# Patient Record
Sex: FEMALE | Race: WHITE | NOT HISPANIC OR LATINO | URBAN - NONMETROPOLITAN AREA
[De-identification: names, ages, dates, MRNs, and addresses within clinical notes are randomized per-mention and may not be internally consistent; named-entity substitution may affect disease eponyms.]

---

## 2020-03-06 ENCOUNTER — DOCTOR'S OFFICE (OUTPATIENT)
Dept: URBAN - NONMETROPOLITAN AREA CLINIC 1 | Facility: CLINIC | Age: 50
Setting detail: OPHTHALMOLOGY
End: 2020-03-06
Payer: COMMERCIAL

## 2020-03-06 DIAGNOSIS — H25.13: ICD-10-CM

## 2020-03-06 DIAGNOSIS — H40.023: ICD-10-CM

## 2020-03-06 PROCEDURE — 99204 OFFICE O/P NEW MOD 45 MIN: CPT | Performed by: OPHTHALMOLOGY

## 2020-03-06 PROCEDURE — 76514 ECHO EXAM OF EYE THICKNESS: CPT | Performed by: OPHTHALMOLOGY

## 2020-03-06 PROCEDURE — 92133 CPTRZD OPH DX IMG PST SGM ON: CPT | Performed by: OPHTHALMOLOGY

## 2020-03-06 ASSESSMENT — KERATOMETRY
OS_K2POWER_DIOPTERS: 43.75
OD_K1POWER_DIOPTERS: 42.25
OS_K1POWER_DIOPTERS: 41.75
OS_AXISANGLE_DEGREES: 087
OD_K2POWER_DIOPTERS: 44.00
OD_AXISANGLE_DEGREES: 085

## 2020-03-06 ASSESSMENT — AXIALLENGTH_DERIVED
OS_AL: 24.432
OD_AL: 24.0298

## 2020-03-06 ASSESSMENT — CONFRONTATIONAL VISUAL FIELD TEST (CVF)
OD_FINDINGS: FULL
OS_FINDINGS: FULL

## 2020-03-06 ASSESSMENT — REFRACTION_AUTOREFRACTION
OS_AXIS: 175
OD_AXIS: 003
OS_CYLINDER: -2.25
OS_SPHERE: -0.25
OD_SPHERE: +0.50
OD_CYLINDER: -2.50

## 2020-03-06 ASSESSMENT — REFRACTION_CURRENTRX
OS_AXIS: 175
OD_ADD: +1.50
OS_CYLINDER: -2.25
OD_VPRISM_DIRECTION: PROGS
OD_SPHERE: +0.25
OS_SPHERE: -0.75
OS_VPRISM_DIRECTION: PROGS
OD_AXIS: 176
OD_CYLINDER: -2.25
OD_OVR_VA: 20/
OS_OVR_VA: 20/
OS_ADD: +1.50

## 2020-03-06 ASSESSMENT — PACHYMETRY
OS_CT_CORRECTION: 2
OD_CT_UM: 518
OD_CT_CORRECTION: 2
OS_CT_UM: 516

## 2020-03-06 ASSESSMENT — SPHEQUIV_DERIVED
OS_SPHEQUIV: -1.375
OD_SPHEQUIV: -0.75

## 2020-03-06 ASSESSMENT — VISUAL ACUITY
OD_BCVA: 20/20-1
OS_BCVA: 20/20-1

## 2020-05-07 ENCOUNTER — OFFICE VISIT (OUTPATIENT)
Dept: URGENT CARE | Facility: CLINIC | Age: 50
End: 2020-05-07

## 2020-05-07 DIAGNOSIS — Z20.828 EXPOSURE TO SARS-ASSOCIATED CORONAVIRUS: ICD-10-CM

## 2020-05-07 DIAGNOSIS — Z02.1 PRE-EMPLOYMENT EXAMINATION: Primary | ICD-10-CM

## 2020-05-07 PROCEDURE — 86765 RUBEOLA ANTIBODY: CPT

## 2020-05-07 PROCEDURE — 86480 TB TEST CELL IMMUN MEASURE: CPT

## 2020-05-07 NOTE — PATIENT INSTRUCTIONS
Wellness Visit for Adults   AMBULATORY CARE:   A wellness visit  is when you see your healthcare provider to get screened for health problems. You can also get advice on how to stay healthy. Write down your questions so you remember to ask them. Ask your healthcare provider how often you should have a wellness visit. What happens at a wellness visit:  Your healthcare provider will ask about your health, and your family history of health problems. This includes high blood pressure, heart disease, and cancer. He or she will ask if you have symptoms that concern you, if you smoke, and about your mood. You may also be asked about your intake of medicines, supplements, food, and alcohol. Any of the following may be done:  · Your weight  will be checked. Your height may also be checked so your body mass index (BMI) can be calculated. Your BMI shows if you are at a healthy weight. · Your blood pressure  and heart rate will be checked. Your temperature may also be checked. · Blood and urine tests  may be done. Blood tests may be done to check your cholesterol levels. Abnormal cholesterol levels increase your risk for heart disease and stroke. You may also need a blood or urine test to check for diabetes if you are at increased risk. Urine tests may be done to look for signs of an infection or kidney disease. · A physical exam  includes checking your heartbeat and lungs with a stethoscope. Your healthcare provider may also check your skin to look for sun damage. · Screening tests  may be recommended. A screening test is done to check for diseases that may not cause symptoms. The screening tests you may need depend on your age, gender, family history, and lifestyle habits. For example, colorectal screening may be recommended if you are 48years old or older. Screening tests you need if you are a woman:   · A Pap smear  is used to screen for cervical cancer.  Pap smears are usually done every 3 to 5 years depending on your age. You may need them more often if you have had abnormal Pap smear test results in the past. Ask your healthcare provider how often you should have a Pap smear. · A mammogram  is an x-ray of your breasts to screen for breast cancer. Experts recommend mammograms every 2 years starting at age 48 years. You may need a mammogram at age 52 years or younger if you have an increased risk for breast cancer. Talk to your healthcare provider about when you should start having mammograms and how often you need them. Vaccines you may need:   · Get an influenza vaccine  every year. The influenza vaccine protects you from the flu. Several types of viruses cause the flu. The viruses change over time, so new vaccines are made each year. · Get a tetanus-diphtheria (Td) booster vaccine  every 10 years. This vaccine protects you against tetanus and diphtheria. Tetanus is a severe infection that may cause painful muscle spasms and lockjaw. Diphtheria is a severe bacterial infection that causes a thick covering in the back of your mouth and throat. · Get a human papillomavirus (HPV) vaccine  if you are female and aged 23 to 32 or male 23 to 24 and never received it. This vaccine protects you from HPV infection. HPV is the most common infection spread by sexual contact. HPV may also cause vaginal, penile, and anal cancers. · Get a pneumococcal vaccine  if you are aged 72 years or older. The pneumococcal vaccine is an injection given to protect you from pneumococcal disease. Pneumococcal disease is an infection caused by pneumococcal bacteria. The infection may cause pneumonia, meningitis, or an ear infection. · Get a shingles vaccine  if you are aged 61 or older, even if you have had shingles before. The shingles vaccine is an injection to protect you from the varicella-zoster virus. This is the same virus that causes chickenpox.  Shingles is a painful rash that develops in people who had chickenpox or have been exposed to the virus. How to eat healthy:  My Plate is a model for planning healthy meals. It shows the types and amounts of foods that should go on your plate. Fruits and vegetables make up about half of your plate, and grains and protein make up the other half. A serving of dairy is included on the side of your plate. The amount of calories and serving sizes you need depends on your age, gender, weight, and height. Examples of healthy foods are listed below:  · Eat a variety of vegetables  such as dark green, red, and orange vegetables. You can also include canned vegetables low in sodium (salt) and frozen vegetables without added butter or sauces. · Eat a variety of fresh fruits , canned fruit in 100% juice, frozen fruit, and dried fruit. · Include whole grains. At least half of the grains you eat should be whole grains. Examples include whole-wheat bread, wheat pasta, brown rice, and whole-grain cereals such as oatmeal.    · Eat a variety of protein foods such as seafood (fish and shellfish), lean meat, and poultry without skin (turkey and chicken). Examples of lean meats include pork leg, shoulder, or tenderloin, and beef round, sirloin, tenderloin, and extra lean ground beef. Other protein foods include eggs and egg substitutes, beans, peas, soy products, nuts, and seeds. · Choose low-fat dairy products such as skim or 1% milk or low-fat yogurt, cheese, and cottage cheese. · Limit unhealthy fats  such as butter, hard margarine, and shortening. Exercise:  Exercise at least 30 minutes per day on most days of the week. Some examples of exercise include walking, biking, dancing, and swimming. You can also fit in more physical activity by taking the stairs instead of the elevator or parking farther away from stores. Include muscle strengthening activities 2 days each week. Regular exercise provides many health benefits.  It helps you manage your weight, and decreases your risk for type 2 diabetes, heart disease, stroke, and high blood pressure. Exercise can also help improve your mood. Ask your healthcare provider about the best exercise plan for you. General health and safety guidelines:   · Do not smoke. Nicotine and other chemicals in cigarettes and cigars can cause lung damage. Ask your healthcare provider for information if you currently smoke and need help to quit. E-cigarettes or smokeless tobacco still contain nicotine. Talk to your healthcare provider before you use these products. · Limit alcohol. A drink of alcohol is 12 ounces of beer, 5 ounces of wine, or 1½ ounces of liquor. · Lose weight, if needed. Being overweight increases your risk of certain health conditions. These include heart disease, high blood pressure, type 2 diabetes, and certain types of cancer. · Protect your skin. Do not sunbathe or use tanning beds. Use sunscreen with a SPF 15 or higher. Apply sunscreen at least 15 minutes before you go outside. Reapply sunscreen every 2 hours. Wear protective clothing, hats, and sunglasses when you are outside. · Drive safely. Always wear your seatbelt. Make sure everyone in your car wears a seatbelt. A seatbelt can save your life if you are in an accident. Do not use your cell phone when you are driving. This could distract you and cause an accident. Pull over if you need to make a call or send a text message. · Practice safe sex. Use latex condoms if are sexually active and have more than one partner. Your healthcare provider may recommend screening tests for sexually transmitted infections (STIs). · Wear helmets, lifejackets, and protective gear. Always wear a helmet when you ride a bike or motorcycle, go skiing, or play sports that could cause a head injury. Wear protective equipment when you play sports. Wear a lifejacket when you are on a boat or doing water sports.   © 2017 [address] [place] [address] Information is for End User's use only and may not be sold, redistributed or otherwise used for commercial purposes. All illustrations and images included in CareNotes® are the copyrighted property of A.D.A.M., Inc. or Bernabe Valentine. The above information is an  only. It is not intended as medical advice for individual conditions or treatments. Talk to your doctor, nurse or pharmacist before following any medical regimen to see if it is safe and effective for you.

## 2020-05-07 NOTE — PROGRESS NOTES
[facility]        NAME: Rodger Kirkpatrick is a 48 y.o. female  : 1970    MRN: [id number]  DATE: May 7, 2020  TIME: 8:28 AM    Assessment and Plan   Pre-employment examination [Z02.1]  1. Pre-employment examination  Quantiferon TB Gold Plus    Rubeola antibody IgG         Patient Instructions       Follow up with PCP in 3-5 days. Proceed to  ER if symptoms worsen. Chief Complaint   No chief complaint on file. History of Present Illness       Patient is a 25-year-old female with no significant past medical history presents to the office for pre-employment physical.  Patient offers no complaints. Review of Systems   Review of Systems   Constitutional: Negative for chills and fever. HENT: Negative for congestion and sore throat. Eyes: Negative for visual disturbance. Respiratory: Negative for cough and shortness of breath. Cardiovascular: Negative for chest pain and palpitations. Gastrointestinal: Negative for abdominal pain, diarrhea, nausea and vomiting. Genitourinary: Negative for dysuria. Musculoskeletal: Negative for myalgias. Skin: Negative for rash. Neurological: Negative for dizziness, light-headedness and headaches. Current Medications     No current outpatient medications on file. Current Allergies     Allergies as of 2020   • (Not on File)            The following portions of the patient's history were reviewed and updated as appropriate: allergies, current medications, past family history, past medical history, past social history, past surgical history and problem list.     No past medical history on file. No past surgical history on file. No family history on file. Medications have been verified. Objective   There were no vitals taken for this visit. Physical Exam     Physical Exam   Constitutional: She appears well-developed and well-nourished. HENT:   Head: Normocephalic and atraumatic.    Right Ear: Tympanic membrane, external ear and ear canal normal.   Left Ear: Tympanic membrane, external ear and ear canal normal.   Nose: Nose normal.   Mouth/Throat: Uvula is midline, oropharynx is clear and moist and mucous membranes are normal.   Eyes: Pupils are equal, round, and reactive to light. Conjunctivae, EOM and lids are normal.   Neck: Neck supple. Cardiovascular: Normal rate, regular rhythm, normal heart sounds and normal pulses. Exam reveals no gallop and no friction rub. No murmur heard. Pulmonary/Chest: Effort normal and breath sounds normal. She has no wheezes. She has no rhonchi. She has no rales. She exhibits no tenderness. Abdominal: Soft. Normal appearance and bowel sounds are normal. There is no tenderness. Musculoskeletal: Normal range of motion. Lymphadenopathy:     She has no cervical adenopathy. Neurological: She is alert. She has normal strength and normal reflexes. No cranial nerve deficit. She displays a negative Romberg sign. Skin: Skin is warm and dry. Capillary refill takes less than 2 seconds. No rash noted. Psychiatric: She has a normal mood and affect. Her speech is normal and behavior is normal.   Nursing note and vitals reviewed.

## 2020-05-08 LAB
GAMMA INTERFERON BACKGROUND BLD IA-ACNC: 0.05 IU/ML
M TB IFN-G BLD-IMP: NEGATIVE
M TB IFN-G CD4+ BCKGRND COR BLD-ACNC: -0.01 IU/ML
M TB IFN-G CD4+ BCKGRND COR BLD-ACNC: -0.01 IU/ML
MITOGEN IGNF BCKGRD COR BLD-ACNC: 5.83 IU/ML

## 2020-05-12 LAB — MEV IGG SER QL: ABNORMAL

## 2020-05-15 ENCOUNTER — DOCTOR'S OFFICE (OUTPATIENT)
Dept: URBAN - NONMETROPOLITAN AREA CLINIC 1 | Facility: CLINIC | Age: 50
Setting detail: OPHTHALMOLOGY
End: 2020-05-15
Payer: COMMERCIAL

## 2020-05-15 DIAGNOSIS — H40.023: ICD-10-CM

## 2020-05-15 PROCEDURE — 92020 GONIOSCOPY: CPT | Performed by: OPHTHALMOLOGY

## 2020-05-15 PROCEDURE — 92083 EXTENDED VISUAL FIELD XM: CPT | Performed by: OPHTHALMOLOGY

## 2020-05-15 PROCEDURE — 92014 COMPRE OPH EXAM EST PT 1/>: CPT | Performed by: OPHTHALMOLOGY

## 2020-05-15 ASSESSMENT — KERATOMETRY
OS_K1POWER_DIOPTERS: 41.50
OD_K1POWER_DIOPTERS: 42.00
OD_AXISANGLE_DEGREES: 085
OS_AXISANGLE_DEGREES: 086
OS_K2POWER_DIOPTERS: 43.50
OD_K2POWER_DIOPTERS: 44.00

## 2020-05-15 ASSESSMENT — REFRACTION_CURRENTRX
OS_AXIS: 175
OS_VPRISM_DIRECTION: PROGS
OD_AXIS: 176
OD_OVR_VA: 20/
OS_CYLINDER: -2.25
OD_CYLINDER: -2.25
OD_ADD: +1.50
OD_SPHERE: +0.25
OS_SPHERE: -0.75
OS_OVR_VA: 20/
OD_VPRISM_DIRECTION: PROGS
OS_ADD: +1.50

## 2020-05-15 ASSESSMENT — VISUAL ACUITY
OD_BCVA: 20/20-2
OS_BCVA: 20/20

## 2020-05-15 ASSESSMENT — CONFRONTATIONAL VISUAL FIELD TEST (CVF)
OD_FINDINGS: FULL
OS_FINDINGS: FULL

## 2020-05-15 ASSESSMENT — REFRACTION_AUTOREFRACTION
OD_SPHERE: +0.75
OS_AXIS: 177
OD_AXIS: 180
OS_SPHERE: 0.00
OD_CYLINDER: -2.25
OS_CYLINDER: -2.50

## 2020-05-15 ASSESSMENT — SPHEQUIV_DERIVED
OS_SPHEQUIV: -1.25
OD_SPHEQUIV: -0.375

## 2020-05-15 ASSESSMENT — AXIALLENGTH_DERIVED
OD_AL: 23.9263
OS_AL: 24.4783

## 2020-05-19 LAB
EXTERNAL HIV CONFIRMATION: NORMAL
EXTERNAL HIV SCREEN: NORMAL
EXTERNAL HIV SCREEN: NORMAL

## 2020-08-04 PROCEDURE — U0003 INFECTIOUS AGENT DETECTION BY NUCLEIC ACID (DNA OR RNA); SEVERE ACUTE RESPIRATORY SYNDROME CORONAVIRUS 2 (SARS-COV-2) (CORONAVIRUS DISEASE [COVID-19]), AMPLIFIED PROBE TECHNIQUE, MAKING USE OF HIGH THROUGHPUT TECHNOLOGIES AS DESCRIBED BY CMS-2020-01-R: HCPCS

## 2020-08-05 LAB — SARS-COV-2 RNA SPEC QL NAA+PROBE: NEGATIVE

## 2020-08-14 ENCOUNTER — TRANSCRIBE ORDERS (OUTPATIENT)
Dept: ADMINISTRATIVE | Facility: HOSPITAL | Age: 50
End: 2020-08-14

## 2020-08-14 DIAGNOSIS — Z20.828 EXPOSURE TO SARS-ASSOCIATED CORONAVIRUS: Primary | ICD-10-CM

## 2020-08-14 PROCEDURE — U0003 INFECTIOUS AGENT DETECTION BY NUCLEIC ACID (DNA OR RNA); SEVERE ACUTE RESPIRATORY SYNDROME CORONAVIRUS 2 (SARS-COV-2) (CORONAVIRUS DISEASE [COVID-19]), AMPLIFIED PROBE TECHNIQUE, MAKING USE OF HIGH THROUGHPUT TECHNOLOGIES AS DESCRIBED BY CMS-2020-01-R: HCPCS | Performed by: PHYSICIAN ASSISTANT

## 2020-08-15 LAB — SARS-COV-2 RNA SPEC QL NAA+PROBE: NOT DETECTED

## 2020-09-11 ENCOUNTER — OFFICE VISIT (OUTPATIENT)
Dept: CARDIOLOGY CLINIC | Facility: CLINIC | Age: 50
End: 2020-09-11
Payer: COMMERCIAL

## 2020-09-11 VITALS
DIASTOLIC BLOOD PRESSURE: 72 MMHG | HEART RATE: 61 BPM | SYSTOLIC BLOOD PRESSURE: 102 MMHG | WEIGHT: 151 LBS | HEIGHT: 68 IN | BODY MASS INDEX: 22.88 KG/M2

## 2020-09-11 DIAGNOSIS — I10 HYPERTENSION, UNSPECIFIED TYPE: ICD-10-CM

## 2020-09-11 DIAGNOSIS — E78.2 MIXED HYPERLIPIDEMIA: ICD-10-CM

## 2020-09-11 DIAGNOSIS — R00.2 PALPITATIONS: Primary | ICD-10-CM

## 2020-09-11 DIAGNOSIS — I47.1 SVT (SUPRAVENTRICULAR TACHYCARDIA) (HCC): ICD-10-CM

## 2020-09-11 DIAGNOSIS — E87.6 HYPOKALEMIA: ICD-10-CM

## 2020-09-11 DIAGNOSIS — I47.2 VT (VENTRICULAR TACHYCARDIA) (HCC): ICD-10-CM

## 2020-09-11 PROCEDURE — 99204 OFFICE O/P NEW MOD 45 MIN: CPT | Performed by: INTERNAL MEDICINE

## 2020-09-11 RX ORDER — LEVOTHYROXINE SODIUM 112 UG/1
112 TABLET ORAL DAILY
COMMUNITY
Start: 2020-06-16 | End: 2021-04-14 | Stop reason: SDUPTHER

## 2020-09-11 RX ORDER — ATORVASTATIN CALCIUM 40 MG/1
40 TABLET, FILM COATED ORAL DAILY
COMMUNITY
Start: 2020-06-16 | End: 2021-04-14 | Stop reason: SDUPTHER

## 2020-09-11 RX ORDER — CITALOPRAM 20 MG/1
20 TABLET ORAL DAILY
COMMUNITY
Start: 2020-06-16 | End: 2021-10-04

## 2020-09-11 RX ORDER — ECHINACEA 400 MG
CAPSULE ORAL
COMMUNITY
End: 2021-04-12

## 2020-09-11 RX ORDER — METOPROLOL SUCCINATE 25 MG/1
TABLET, EXTENDED RELEASE ORAL
COMMUNITY
Start: 2020-06-26 | End: 2020-09-28 | Stop reason: SDUPTHER

## 2020-09-11 RX ORDER — MULTIVITAMIN WITH IRON
250 TABLET ORAL DAILY
Qty: 30 TABLET | Refills: 0
Start: 2020-09-11

## 2020-09-11 RX ORDER — HYDROXYZINE HYDROCHLORIDE 25 MG/1
25 TABLET, FILM COATED ORAL AS NEEDED
COMMUNITY
Start: 2020-08-31 | End: 2021-09-20 | Stop reason: SDUPTHER

## 2020-09-11 RX ORDER — OMEPRAZOLE 20 MG/1
20 TABLET, DELAYED RELEASE ORAL DAILY
COMMUNITY
End: 2021-10-04

## 2020-09-11 RX ORDER — IRBESARTAN AND HYDROCHLOROTHIAZIDE 150; 12.5 MG/1; MG/1
1 TABLET, FILM COATED ORAL DAILY
COMMUNITY
Start: 2020-06-16 | End: 2021-05-18 | Stop reason: DRUGHIGH

## 2020-09-11 RX ORDER — SODIUM PHOSPHATE,MONO-DIBASIC 19G-7G/118
ENEMA (ML) RECTAL DAILY
COMMUNITY

## 2020-09-28 ENCOUNTER — TRANSCRIBE ORDERS (OUTPATIENT)
Dept: ADMINISTRATIVE | Facility: HOSPITAL | Age: 50
End: 2020-09-28

## 2020-09-28 DIAGNOSIS — I10 HYPERTENSION, UNSPECIFIED TYPE: ICD-10-CM

## 2020-09-28 DIAGNOSIS — R00.2 PALPITATIONS: Primary | ICD-10-CM

## 2020-09-28 RX ORDER — METOPROLOL SUCCINATE 25 MG/1
25 TABLET, EXTENDED RELEASE ORAL DAILY
Qty: 90 TABLET | Refills: 4 | Status: SHIPPED | OUTPATIENT
Start: 2020-09-28 | End: 2021-09-20 | Stop reason: SDUPTHER

## 2020-10-01 ENCOUNTER — TRANSCRIBE ORDERS (OUTPATIENT)
Dept: ADMINISTRATIVE | Facility: HOSPITAL | Age: 50
End: 2020-10-01

## 2020-10-01 ENCOUNTER — APPOINTMENT (OUTPATIENT)
Dept: LAB | Facility: HOSPITAL | Age: 50
End: 2020-10-01
Attending: INTERNAL MEDICINE
Payer: COMMERCIAL

## 2020-10-01 ENCOUNTER — APPOINTMENT (OUTPATIENT)
Dept: LAB | Facility: HOSPITAL | Age: 50
End: 2020-10-01
Payer: COMMERCIAL

## 2020-10-01 DIAGNOSIS — Z79.1 ENCOUNTER FOR LONG-TERM (CURRENT) USE OF NON-STEROIDAL ANTI-INFLAMMATORIES: Primary | ICD-10-CM

## 2020-10-01 DIAGNOSIS — Z79.1 ENCOUNTER FOR LONG-TERM (CURRENT) USE OF NON-STEROIDAL ANTI-INFLAMMATORIES: ICD-10-CM

## 2020-10-01 DIAGNOSIS — I10 HYPERTENSION, UNSPECIFIED TYPE: ICD-10-CM

## 2020-10-01 DIAGNOSIS — R00.2 PALPITATIONS: ICD-10-CM

## 2020-10-01 DIAGNOSIS — E87.6 HYPOKALEMIA: ICD-10-CM

## 2020-10-01 LAB
ALBUMIN SERPL BCP-MCNC: 4.1 G/DL (ref 3.5–5)
ALP SERPL-CCNC: 58 U/L (ref 46–116)
ALT SERPL W P-5'-P-CCNC: 27 U/L (ref 12–78)
ANION GAP SERPL CALCULATED.3IONS-SCNC: 9 MMOL/L (ref 4–13)
AST SERPL W P-5'-P-CCNC: 16 U/L (ref 5–45)
BASOPHILS # BLD AUTO: 0.03 THOUSANDS/ΜL (ref 0–0.1)
BASOPHILS NFR BLD AUTO: 1 % (ref 0–1)
BILIRUB SERPL-MCNC: 0.53 MG/DL (ref 0.2–1)
BUN SERPL-MCNC: 11 MG/DL (ref 5–25)
CALCIUM SERPL-MCNC: 8.9 MG/DL (ref 8.3–10.1)
CHLORIDE SERPL-SCNC: 101 MMOL/L (ref 100–108)
CO2 SERPL-SCNC: 29 MMOL/L (ref 21–32)
CREAT SERPL-MCNC: 0.81 MG/DL (ref 0.6–1.3)
EOSINOPHIL # BLD AUTO: 0.05 THOUSAND/ΜL (ref 0–0.61)
EOSINOPHIL NFR BLD AUTO: 1 % (ref 0–6)
ERYTHROCYTE [DISTWIDTH] IN BLOOD BY AUTOMATED COUNT: 12.7 % (ref 11.6–15.1)
GFR SERPL CREATININE-BSD FRML MDRD: 85 ML/MIN/1.73SQ M
GLUCOSE P FAST SERPL-MCNC: 110 MG/DL (ref 65–99)
HCT VFR BLD AUTO: 36.3 % (ref 34.8–46.1)
HGB BLD-MCNC: 12 G/DL (ref 11.5–15.4)
IMM GRANULOCYTES # BLD AUTO: 0.02 THOUSAND/UL (ref 0–0.2)
IMM GRANULOCYTES NFR BLD AUTO: 0 % (ref 0–2)
LYMPHOCYTES # BLD AUTO: 1.21 THOUSANDS/ΜL (ref 0.6–4.47)
LYMPHOCYTES NFR BLD AUTO: 22 % (ref 14–44)
MAGNESIUM SERPL-MCNC: 1.9 MG/DL (ref 1.6–2.6)
MCH RBC QN AUTO: 31.2 PG (ref 26.8–34.3)
MCHC RBC AUTO-ENTMCNC: 33.1 G/DL (ref 31.4–37.4)
MCV RBC AUTO: 94 FL (ref 82–98)
MONOCYTES # BLD AUTO: 0.35 THOUSAND/ΜL (ref 0.17–1.22)
MONOCYTES NFR BLD AUTO: 7 % (ref 4–12)
NEUTROPHILS # BLD AUTO: 3.75 THOUSANDS/ΜL (ref 1.85–7.62)
NEUTS SEG NFR BLD AUTO: 69 % (ref 43–75)
NRBC BLD AUTO-RTO: 0 /100 WBCS
PLATELET # BLD AUTO: 214 THOUSANDS/UL (ref 149–390)
PMV BLD AUTO: 10.6 FL (ref 8.9–12.7)
POTASSIUM SERPL-SCNC: 3.5 MMOL/L (ref 3.5–5.3)
PROT SERPL-MCNC: 7.5 G/DL (ref 6.4–8.2)
RBC # BLD AUTO: 3.85 MILLION/UL (ref 3.81–5.12)
SODIUM SERPL-SCNC: 139 MMOL/L (ref 136–145)
WBC # BLD AUTO: 5.41 THOUSAND/UL (ref 4.31–10.16)

## 2020-10-01 PROCEDURE — 36415 COLL VENOUS BLD VENIPUNCTURE: CPT

## 2020-10-01 PROCEDURE — 83735 ASSAY OF MAGNESIUM: CPT | Performed by: INTERNAL MEDICINE

## 2020-10-01 PROCEDURE — 80053 COMPREHEN METABOLIC PANEL: CPT

## 2020-10-01 PROCEDURE — 85025 COMPLETE CBC W/AUTO DIFF WBC: CPT

## 2020-10-13 ENCOUNTER — ANNUAL EXAM (OUTPATIENT)
Dept: GYNECOLOGY | Facility: CLINIC | Age: 50
End: 2020-10-13
Payer: COMMERCIAL

## 2020-10-13 VITALS
HEIGHT: 68 IN | WEIGHT: 144 LBS | DIASTOLIC BLOOD PRESSURE: 66 MMHG | SYSTOLIC BLOOD PRESSURE: 102 MMHG | TEMPERATURE: 96.1 F | BODY MASS INDEX: 21.82 KG/M2 | HEART RATE: 73 BPM

## 2020-10-13 DIAGNOSIS — R92.8 ABNORMAL MAMMOGRAM: Primary | ICD-10-CM

## 2020-10-13 DIAGNOSIS — Z01.419 ENCOUNTER FOR GYNECOLOGICAL EXAMINATION WITHOUT ABNORMAL FINDING: Primary | ICD-10-CM

## 2020-10-13 PROCEDURE — S0610 ANNUAL GYNECOLOGICAL EXAMINA: HCPCS | Performed by: OBSTETRICS & GYNECOLOGY

## 2020-10-13 RX ORDER — DIPHENOXYLATE HYDROCHLORIDE AND ATROPINE SULFATE 2.5; .025 MG/1; MG/1
1 TABLET ORAL DAILY
COMMUNITY

## 2020-10-15 DIAGNOSIS — R92.8 ABNORMAL MAMMOGRAM: Primary | ICD-10-CM

## 2020-10-15 DIAGNOSIS — Z12.31 ENCOUNTER FOR SCREENING MAMMOGRAM FOR MALIGNANT NEOPLASM OF BREAST: ICD-10-CM

## 2020-10-21 ENCOUNTER — HOSPITAL ENCOUNTER (OUTPATIENT)
Dept: RADIOLOGY | Facility: CLINIC | Age: 50
Discharge: HOME | End: 2020-10-21
Payer: COMMERCIAL

## 2020-10-21 VITALS — BODY MASS INDEX: 21.82 KG/M2 | WEIGHT: 144 LBS | HEIGHT: 68 IN

## 2020-10-21 VITALS
DIASTOLIC BLOOD PRESSURE: 64 MMHG | HEART RATE: 68 BPM | RESPIRATION RATE: 14 BRPM | SYSTOLIC BLOOD PRESSURE: 111 MMHG | TEMPERATURE: 96.1 F

## 2020-10-21 DIAGNOSIS — R92.8 ABNORMAL MAMMOGRAM: ICD-10-CM

## 2020-10-21 PROCEDURE — 88305 TISSUE EXAM BY PATHOLOGIST: CPT | Performed by: PATHOLOGY

## 2020-10-21 PROCEDURE — 77066 DX MAMMO INCL CAD BI: CPT

## 2020-10-21 PROCEDURE — 88341 IMHCHEM/IMCYTCHM EA ADD ANTB: CPT | Performed by: PATHOLOGY

## 2020-10-21 PROCEDURE — 88342 IMHCHEM/IMCYTCHM 1ST ANTB: CPT | Performed by: PATHOLOGY

## 2020-10-21 PROCEDURE — 76642 ULTRASOUND BREAST LIMITED: CPT

## 2020-10-21 PROCEDURE — G0279 TOMOSYNTHESIS, MAMMO: HCPCS

## 2020-10-21 PROCEDURE — 19083 BX BREAST 1ST LESION US IMAG: CPT

## 2020-10-21 RX ORDER — LIDOCAINE HYDROCHLORIDE 10 MG/ML
5 INJECTION, SOLUTION EPIDURAL; INFILTRATION; INTRACAUDAL; PERINEURAL ONCE
Status: COMPLETED | OUTPATIENT
Start: 2020-10-21 | End: 2020-10-21

## 2020-10-21 RX ADMIN — LIDOCAINE HYDROCHLORIDE 5 ML: 10 INJECTION, SOLUTION EPIDURAL; INFILTRATION; INTRACAUDAL; PERINEURAL at 12:35

## 2020-10-27 DIAGNOSIS — M81.0 POSTMENOPAUSAL BONE LOSS: Primary | ICD-10-CM

## 2020-10-28 ENCOUNTER — TELEPHONE (OUTPATIENT)
Dept: MAMMOGRAPHY | Facility: CLINIC | Age: 50
End: 2020-10-28

## 2020-10-29 ENCOUNTER — DOCUMENTATION (OUTPATIENT)
Dept: GYNECOLOGY | Facility: CLINIC | Age: 50
End: 2020-10-29

## 2020-11-17 ENCOUNTER — RX ONLY (RX ONLY)
Age: 50
End: 2020-11-17

## 2020-11-17 ENCOUNTER — DOCTOR'S OFFICE (OUTPATIENT)
Dept: URBAN - NONMETROPOLITAN AREA CLINIC 1 | Facility: CLINIC | Age: 50
Setting detail: OPHTHALMOLOGY
End: 2020-11-17
Payer: COMMERCIAL

## 2020-11-17 DIAGNOSIS — H40.1131: ICD-10-CM

## 2020-11-17 PROCEDURE — 92014 COMPRE OPH EXAM EST PT 1/>: CPT | Performed by: OPHTHALMOLOGY

## 2020-11-17 PROCEDURE — 92083 EXTENDED VISUAL FIELD XM: CPT | Performed by: OPHTHALMOLOGY

## 2020-11-17 ASSESSMENT — REFRACTION_CURRENTRX
OD_VPRISM_DIRECTION: PROGS
OS_ADD: +1.50
OD_CYLINDER: -2.25
OD_ADD: +1.50
OD_OVR_VA: 20/
OS_CYLINDER: -2.25
OS_AXIS: 175
OD_SPHERE: +0.25
OS_VPRISM_DIRECTION: PROGS
OD_AXIS: 176
OS_OVR_VA: 20/
OS_SPHERE: -0.75

## 2020-11-17 ASSESSMENT — REFRACTION_AUTOREFRACTION
OD_SPHERE: +0.50
OS_CYLINDER: -2.50
OD_AXIS: 175
OS_AXIS: 172
OD_CYLINDER: -2.25
OS_SPHERE: 0.00

## 2020-11-17 ASSESSMENT — KERATOMETRY
OS_K2POWER_DIOPTERS: 43.25
OS_AXISANGLE_DEGREES: 085
OD_K1POWER_DIOPTERS: 41.75
OD_AXISANGLE_DEGREES: 084
OS_K1POWER_DIOPTERS: 41.25
OD_K2POWER_DIOPTERS: 43.50

## 2020-11-17 ASSESSMENT — DRY EYES - PHYSICIAN NOTES: OS_GENERALCOMMENTS: 2+ PEE

## 2020-11-17 ASSESSMENT — SPHEQUIV_DERIVED
OD_SPHEQUIV: -0.625
OS_SPHEQUIV: -1.25

## 2020-11-17 ASSESSMENT — AXIALLENGTH_DERIVED
OS_AL: 24.5776
OD_AL: 24.1707

## 2020-11-17 ASSESSMENT — CONFRONTATIONAL VISUAL FIELD TEST (CVF)
OD_FINDINGS: FULL
OS_FINDINGS: FULL

## 2020-11-17 ASSESSMENT — VISUAL ACUITY
OS_BCVA: 20/20-1
OD_BCVA: 20/20

## 2020-11-18 ENCOUNTER — HOSPITAL ENCOUNTER (OUTPATIENT)
Dept: RADIOLOGY | Facility: CLINIC | Age: 50
Discharge: HOME | End: 2020-11-18

## 2020-11-18 ENCOUNTER — OFFICE VISIT (OUTPATIENT)
Dept: CARDIOLOGY CLINIC | Facility: CLINIC | Age: 50
End: 2020-11-18
Payer: COMMERCIAL

## 2020-11-18 VITALS
HEIGHT: 68 IN | BODY MASS INDEX: 21.22 KG/M2 | TEMPERATURE: 95.7 F | OXYGEN SATURATION: 100 % | SYSTOLIC BLOOD PRESSURE: 108 MMHG | HEART RATE: 72 BPM | WEIGHT: 140 LBS | DIASTOLIC BLOOD PRESSURE: 64 MMHG

## 2020-11-18 VITALS — BODY MASS INDEX: 21.22 KG/M2 | HEIGHT: 68 IN | WEIGHT: 140 LBS

## 2020-11-18 DIAGNOSIS — R92.8 ABNORMAL MAMMOGRAM: ICD-10-CM

## 2020-11-18 DIAGNOSIS — I47.2 VT (VENTRICULAR TACHYCARDIA) (HCC): ICD-10-CM

## 2020-11-18 DIAGNOSIS — I10 ESSENTIAL HYPERTENSION: ICD-10-CM

## 2020-11-18 DIAGNOSIS — E87.6 HYPOKALEMIA: ICD-10-CM

## 2020-11-18 DIAGNOSIS — R00.2 PALPITATIONS: Primary | ICD-10-CM

## 2020-11-18 DIAGNOSIS — I47.1 SVT (SUPRAVENTRICULAR TACHYCARDIA) (HCC): ICD-10-CM

## 2020-11-18 DIAGNOSIS — E78.2 MIXED HYPERLIPIDEMIA: ICD-10-CM

## 2020-11-18 PROCEDURE — 99213 OFFICE O/P EST LOW 20 MIN: CPT | Performed by: INTERNAL MEDICINE

## 2020-11-19 ENCOUNTER — DOCUMENTATION (OUTPATIENT)
Dept: GYNECOLOGY | Facility: CLINIC | Age: 50
End: 2020-11-19

## 2020-11-29 PROCEDURE — 93000 ELECTROCARDIOGRAM COMPLETE: CPT | Performed by: INTERNAL MEDICINE

## 2020-12-24 ENCOUNTER — IMMUNIZATIONS (OUTPATIENT)
Dept: FAMILY MEDICINE CLINIC | Facility: HOSPITAL | Age: 50
End: 2020-12-24
Payer: COMMERCIAL

## 2020-12-24 DIAGNOSIS — Z23 ENCOUNTER FOR IMMUNIZATION: ICD-10-CM

## 2020-12-24 PROCEDURE — 0011A SARS-COV-2 / COVID-19 MRNA VACCINE (MODERNA) 100 MCG: CPT

## 2020-12-24 PROCEDURE — 91301 SARS-COV-2 / COVID-19 MRNA VACCINE (MODERNA) 100 MCG: CPT

## 2021-01-12 ENCOUNTER — OFFICE VISIT (OUTPATIENT)
Dept: GYNECOLOGY | Facility: CLINIC | Age: 51
End: 2021-01-12
Payer: COMMERCIAL

## 2021-01-12 VITALS
HEART RATE: 72 BPM | WEIGHT: 140 LBS | DIASTOLIC BLOOD PRESSURE: 60 MMHG | BODY MASS INDEX: 21.22 KG/M2 | SYSTOLIC BLOOD PRESSURE: 100 MMHG | HEIGHT: 68 IN

## 2021-01-12 DIAGNOSIS — M81.0 OSTEOPOROSIS, UNSPECIFIED OSTEOPOROSIS TYPE, UNSPECIFIED PATHOLOGICAL FRACTURE PRESENCE: Primary | ICD-10-CM

## 2021-01-12 PROCEDURE — 99211 OFF/OP EST MAY X REQ PHY/QHP: CPT | Performed by: OBSTETRICS & GYNECOLOGY

## 2021-01-12 PROCEDURE — 96372 THER/PROPH/DIAG INJ SC/IM: CPT | Performed by: OBSTETRICS & GYNECOLOGY

## 2021-01-18 ENCOUNTER — DOCTOR'S OFFICE (OUTPATIENT)
Dept: URBAN - NONMETROPOLITAN AREA CLINIC 1 | Facility: CLINIC | Age: 51
Setting detail: OPHTHALMOLOGY
End: 2021-01-18
Payer: COMMERCIAL

## 2021-01-18 DIAGNOSIS — H40.1131: ICD-10-CM

## 2021-01-18 PROCEDURE — 92012 INTRM OPH EXAM EST PATIENT: CPT | Performed by: OPHTHALMOLOGY

## 2021-01-18 ASSESSMENT — REFRACTION_AUTOREFRACTION
OS_AXIS: 172
OS_SPHERE: 0.00
OD_SPHERE: +0.50
OS_CYLINDER: -2.50
OD_AXIS: 175
OD_CYLINDER: -2.25

## 2021-01-18 ASSESSMENT — CONFRONTATIONAL VISUAL FIELD TEST (CVF)
OS_FINDINGS: FULL
OD_FINDINGS: FULL

## 2021-01-18 ASSESSMENT — SPHEQUIV_DERIVED
OS_SPHEQUIV: -1.25
OD_SPHEQUIV: -0.625

## 2021-01-18 ASSESSMENT — PACHYMETRY
OS_CT_UM: 516
OD_CT_CORRECTION: 2
OD_CT_UM: 518
OS_CT_CORRECTION: 2

## 2021-01-18 ASSESSMENT — REFRACTION_CURRENTRX
OS_VPRISM_DIRECTION: PROGS
OD_VPRISM_DIRECTION: PROGS
OD_SPHERE: +0.25
OS_ADD: +1.50
OS_SPHERE: -0.75
OD_OVR_VA: 20/
OD_CYLINDER: -2.25
OS_OVR_VA: 20/
OS_AXIS: 175
OD_AXIS: 176
OD_ADD: +1.50
OS_CYLINDER: -2.25

## 2021-01-18 ASSESSMENT — KERATOMETRY
OS_K1POWER_DIOPTERS: 41.25
OD_K2POWER_DIOPTERS: 43.50
OD_K1POWER_DIOPTERS: 41.75
OS_K2POWER_DIOPTERS: 43.25
OS_AXISANGLE_DEGREES: 085
OD_AXISANGLE_DEGREES: 084

## 2021-01-18 ASSESSMENT — VISUAL ACUITY
OD_BCVA: 20/25-1
OS_BCVA: 20/25

## 2021-01-18 ASSESSMENT — TONOMETRY
OS_IOP_MMHG: 15
OD_IOP_MMHG: 10

## 2021-01-18 ASSESSMENT — DRY EYES - PHYSICIAN NOTES: OS_GENERALCOMMENTS: 2+ PEE

## 2021-01-18 ASSESSMENT — AXIALLENGTH_DERIVED
OD_AL: 24.1707
OS_AL: 24.5776

## 2021-01-20 ENCOUNTER — IMMUNIZATIONS (OUTPATIENT)
Dept: FAMILY MEDICINE CLINIC | Facility: HOSPITAL | Age: 51
End: 2021-01-20

## 2021-01-20 DIAGNOSIS — Z23 ENCOUNTER FOR IMMUNIZATION: Primary | ICD-10-CM

## 2021-01-20 PROCEDURE — 91301 SARS-COV-2 / COVID-19 MRNA VACCINE (MODERNA) 100 MCG: CPT

## 2021-01-20 PROCEDURE — 0012A SARS-COV-2 / COVID-19 MRNA VACCINE (MODERNA) 100 MCG: CPT

## 2021-03-31 ENCOUNTER — HOSPITAL ENCOUNTER (OUTPATIENT)
Dept: RADIOLOGY | Facility: CLINIC | Age: 51
Discharge: HOME | End: 2021-03-31
Payer: COMMERCIAL

## 2021-03-31 VITALS — WEIGHT: 140 LBS | BODY MASS INDEX: 21.22 KG/M2 | HEIGHT: 68 IN

## 2021-03-31 DIAGNOSIS — R92.8 ABNORMAL MAMMOGRAM: ICD-10-CM

## 2021-03-31 PROCEDURE — 76642 ULTRASOUND BREAST LIMITED: CPT

## 2021-04-12 ENCOUNTER — OFFICE VISIT (OUTPATIENT)
Dept: FAMILY MEDICINE CLINIC | Facility: CLINIC | Age: 51
End: 2021-04-12
Payer: COMMERCIAL

## 2021-04-12 ENCOUNTER — TELEPHONE (OUTPATIENT)
Dept: ADMINISTRATIVE | Facility: OTHER | Age: 51
End: 2021-04-12

## 2021-04-12 VITALS
WEIGHT: 145.6 LBS | SYSTOLIC BLOOD PRESSURE: 104 MMHG | HEIGHT: 68 IN | DIASTOLIC BLOOD PRESSURE: 62 MMHG | HEART RATE: 63 BPM | OXYGEN SATURATION: 98 % | BODY MASS INDEX: 22.07 KG/M2 | TEMPERATURE: 97.6 F

## 2021-04-12 DIAGNOSIS — E87.6 HYPOKALEMIA: ICD-10-CM

## 2021-04-12 DIAGNOSIS — Z00.8 ENCOUNTER FOR OTHER GENERAL EXAMINATION: ICD-10-CM

## 2021-04-12 DIAGNOSIS — F41.9 ANXIETY: ICD-10-CM

## 2021-04-12 DIAGNOSIS — F33.0 MILD EPISODE OF RECURRENT MAJOR DEPRESSIVE DISORDER (HCC): ICD-10-CM

## 2021-04-12 DIAGNOSIS — H40.9 GLAUCOMA OF RIGHT EYE, UNSPECIFIED GLAUCOMA TYPE: ICD-10-CM

## 2021-04-12 DIAGNOSIS — R00.2 PALPITATIONS: ICD-10-CM

## 2021-04-12 DIAGNOSIS — E03.9 ACQUIRED HYPOTHYROIDISM: ICD-10-CM

## 2021-04-12 DIAGNOSIS — I10 ESSENTIAL HYPERTENSION: ICD-10-CM

## 2021-04-12 DIAGNOSIS — E78.2 MIXED HYPERLIPIDEMIA: ICD-10-CM

## 2021-04-12 DIAGNOSIS — Z00.00 ANNUAL PHYSICAL EXAM: Primary | ICD-10-CM

## 2021-04-12 DIAGNOSIS — G43.809 OTHER MIGRAINE WITHOUT STATUS MIGRAINOSUS, NOT INTRACTABLE: ICD-10-CM

## 2021-04-12 DIAGNOSIS — I47.1 SVT (SUPRAVENTRICULAR TACHYCARDIA) (HCC): ICD-10-CM

## 2021-04-12 PROCEDURE — 99386 PREV VISIT NEW AGE 40-64: CPT | Performed by: NURSE PRACTITIONER

## 2021-04-12 RX ORDER — LORAZEPAM 0.5 MG/1
0.5 TABLET ORAL AS NEEDED
COMMUNITY
End: 2022-08-08

## 2021-04-12 RX ORDER — SUMATRIPTAN 100 MG/1
100 TABLET, FILM COATED ORAL AS NEEDED
COMMUNITY
Start: 2020-12-31

## 2021-04-12 RX ORDER — LIDOCAINE, MENTHOL, AND METHYL SALICYLATE 4; 5; 4 MG/100MG; MG/100MG; MG/100MG
PATCH TOPICAL
COMMUNITY
End: 2022-08-08

## 2021-04-12 RX ORDER — BIMATOPROST 0.01 %
DROPS OPHTHALMIC (EYE)
COMMUNITY
End: 2022-08-08

## 2021-04-12 RX ORDER — DICLOFENAC SODIUM 75 MG/1
75 TABLET, DELAYED RELEASE ORAL AS NEEDED
COMMUNITY
End: 2021-05-09

## 2021-04-12 RX ORDER — RIMEGEPANT SULFATE 75 MG/75MG
75 TABLET, ORALLY DISINTEGRATING ORAL AS NEEDED
COMMUNITY
End: 2022-02-16

## 2021-04-12 NOTE — TELEPHONE ENCOUNTER
----- Message from Soy Banks sent at 4/9/2021  1:36 PM EDT -----  Regarding: HIV  04/09/21 1:37 PM    Hello, our patient Armand Duran has had HIV completed/performed  Please assist in updating the patient chart by pulling the Care Everywhere (CE) document  The date of service is 05/19/2020       Thank you,  Ramona Bolivar MA  [facility] PRIMARY [facility]

## 2021-04-12 NOTE — TELEPHONE ENCOUNTER
----- Message from Soy Rivers sent at 4/9/2021  1:35 PM EDT -----  Regarding: Mammogram  04/09/21 1:35 PM    Hello, our patient Quita Cervantes has had Mammogram completed/performed  Please assist in updating the patient chart by pulling the Care Everywhere (CE) document  The date of service is 05/05/2020       Thank you,  Vilma Samuel MA  [facility] PRIMARY [facility]

## 2021-04-12 NOTE — TELEPHONE ENCOUNTER
Upon review of the In Basket request we were able to locate, review, and update the patient chart as requested for Mammogram     Any additional questions or concerns should be emailed to the Practice Liaisons via [email or web address]  [email or web address] email, please do not reply via In Basket      Thank you  Sharath Mcwilliams

## 2021-04-12 NOTE — PROGRESS NOTES
ADULT ANNUAL [facility] PRIMARY CARE    NAME: Tyrone Peak  AGE: 46 y o  SEX: female  : 1970     DATE: 2021     Assessment and Plan:     Problem List Items Addressed This Visit        Endocrine    Hypothyroidism    Relevant Orders    TSH, 3rd generation       Cardiovascular and Mediastinum    Hypertension    SVT (supraventricular tachycardia) (HCC)    Migraine    Relevant Medications    SUMAtriptan (IMITREX) 100 mg tablet    Rimegepant Sulfate (Nurtec) 75 MG TBDP    diclofenac (VOLTAREN) 75 mg EC tablet       Other    Palpitations    Hypokalemia    Hyperlipidemia    Depression    Relevant Medications    LORazepam (ATIVAN) 0 5 mg tablet    Glaucoma    Relevant Medications    bimatoprost (Lumigan) 0 01 % ophthalmic drops    Anxiety      Other Visit Diagnoses     Annual physical exam    -  Primary    Encounter for other general examination        Relevant Orders    Lipid panel    Comprehensive metabolic panel    HEMOGLOBIN A1C W/ EAG ESTIMATION          Immunizations and preventive care screenings were discussed with patient today  Appropriate education was printed on patient's after visit summary  Counseling:  · Dental Health: discussed importance of regular tooth brushing, flossing, and dental visits  Screening blood work ordered, no refills provided at this time  Will have her decrease her Celexa at this time as she is feeling tired and does not want to try new medication but feels she needs a break from it  Return in about 6 months (around 10/12/2021) for Next scheduled follow up  Chief Complaint:     Chief Complaint   Patient presents with    Physical Exam     [facility]      History of Present Illness:     Adult Annual Physical   Patient here for a comprehensive physical exam  The patient reports no problems  Here as a new patient to establish care    Needs screening blood work for work wellness program   Hx of hypothyroidism, has not had blood work to check TSH recently  Hx of migraines which have increased more recently, controlled with Imitrex  Hx of depression and anxiety, recently increased due to family illness  Has been on Celexa but does not feel it is helping at this time  Recent diagnosis of glaucoma, right eye worse than left but eye drops that were prescribed are helping to decrease the pressure  Hx of SVT, controlled with a beta-blocker  Did have a past hx of low potassium  Hx of HTN, controlled with medication  Hx of hyperlipidemia, controlled with a statin  Diet and Physical Activity  · Diet/Nutrition: well balanced diet  · Exercise: no formal exercise  Depression Screening  PHQ-9 Depression Screening    PHQ-9:   Frequency of the following problems over the past two weeks:      Little interest or pleasure in doing things: 1 - several days  Feeling down, depressed, or hopeless: 1 - several days  PHQ-2 Score: 2       General Health  · Sleep: sleeps well  · Hearing: normal - bilateral   · Vision: goes for regular eye exams and wears glasses  · Dental: regular dental visits  /GYN Health  · Patient is: postmenopausal, had surgery  ·      Review of Systems:     Review of Systems   Past Medical History:     Past Medical History:   Diagnosis Date    Allergic 1974    Seasonal    Anxiety     Arthritis     Colitis     Noted on colonoscopy 04/24/2020      Depression     Disease of thyroid gland     Gastritis     Noted on EGD 04/24/2020    Glaucoma     Headache(784 0)     Hyperlipidemia     Hypertension     Hypothyroidism     Lyme disease     Migraine     Osteoporosis     Scoliosis     SVT (supraventricular tachycardia) (HCC)     Visual impairment     VT (ventricular tachycardia) (HCC)       Past Surgical History:     Past Surgical History:   Procedure Laterality Date    BREAST BIOPSY Right 10/21/2020    papilloma    COLONOSCOPY      EGD      FINGER SURGERY      left pinky  HYSTERECTOMY      HYSTERECTOMY W/ SALPINGO-OOPHERECTOMY  2008    US GUIDED BREAST BIOPSY RIGHT COMPLETE Right 10/21/2020      Social History:     E-Cigarette/Vaping    E-Cigarette Use Never User      E-Cigarette/Vaping Substances    Nicotine No     THC No     CBD No     Flavoring No     Other No     Unknown No      Social History     Socioeconomic History    Marital status: /Civil Union     Spouse name: None    Number of children: None    Years of education: None    Highest education level: None   Occupational History    None   Social Needs    Financial resource strain: None    Food insecurity     Worry: None     Inability: None    Transportation needs     Medical: None     Non-medical: None   Tobacco Use    Smoking status: Former Smoker     Packs/day:      Years: 10 00     Pack years: 10 00     Types: Cigarettes     Quit date: 2007     Years since quittin 2    Smokeless tobacco: Never Used    Tobacco comment: quit    Substance and Sexual Activity    Alcohol use: Never     Frequency: Never    Drug use: Never    Sexual activity: Yes     Birth control/protection: Post-menopausal     Comment: hysterectomy   Lifestyle    Physical activity     Days per week: None     Minutes per session: None    Stress: None   Relationships    Social connections     Talks on phone: None     Gets together: None     Attends Confucianist service: None     Active member of club or organization: None     Attends meetings of clubs or organizations: None     Relationship status: None    Intimate partner violence     Fear of current or ex partner: None     Emotionally abused: None     Physically abused: None     Forced sexual activity: None   Other Topics Concern    None   Social History Narrative    None      Family History:     Family History   Problem Relation Age of Onset    Peripheral vascular disease Mother     Glaucoma Mother     Prostate cancer Father     Hypothyroidism Sister  No Known Problems Daughter     Colon cancer Maternal Grandmother     No Known Problems Maternal Grandfather     Arthritis Paternal Grandmother     No Known Problems Paternal Grandfather     No Known Problems Daughter     No Known Problems Maternal Aunt     No Known Problems Maternal Aunt     Skin cancer Paternal Aunt     No Known Problems Paternal Aunt       Current Medications:     Current Outpatient Medications   Medication Sig Dispense Refill    atorvastatin (LIPITOR) 40 mg tablet Take 40 mg by mouth daily       bimatoprost (Lumigan) 0 01 % ophthalmic drops Lumigan 0 01 % eye drops      Calcium Carbonate-Vit D-Min (CALCIUM 1200 PO) Take by mouth daily       Cholecalciferol (VITAMIN D3 PO) Take 1,000 mg by mouth      citalopram (CeleXA) 20 mg tablet Take 20 mg by mouth daily       diclofenac (VOLTAREN) 75 mg EC tablet Take 75 mg by mouth as needed      Glucosamine-Chondroitin 500-400 MG CAPS Take by mouth daily       hydrOXYzine HCL (ATARAX) 25 mg tablet Take 25 mg by mouth as needed       Inulin (FIBER CHOICE PO) Take by mouth      irbesartan-hydrochlorothiazide (AVALIDE) 150-12 5 MG per tablet Take 1 tablet by mouth daily       levothyroxine 112 mcg tablet Take 112 mcg by mouth daily       LORazepam (ATIVAN) 0 5 mg tablet Take 0 5 mg by mouth as needed      Magnesium 250 MG TABS Take 1 tablet (250 mg total) by mouth daily 30 tablet 0    Methyl Salicylate-Lido-Menthol (LidoPro) 4-4-5 % PTCH LidoPro 4 %-4 %-5 % topical patch   APPLY 1 PATCH TO THE AFFECTED AREA EVERY 8 TO 12 HOURS AS NEEDED   MAX OF 2 PATCHES PER DAY      metoprolol succinate (TOPROL-XL) 25 mg 24 hr tablet Take 1 tablet (25 mg total) by mouth daily 90 tablet 4    multivitamin (THERAGRAN) TABS Take 1 tablet by mouth daily      Omega-3 Fatty Acids (FISH OIL OMEGA-3 PO) Take by mouth      omeprazole (PriLOSEC OTC) 20 MG tablet Take 20 mg by mouth daily       Rimegepant Sulfate (Nurtec) 75 MG TBDP Take 75 mg by mouth as needed      SUMAtriptan (IMITREX) 100 mg tablet Take 100 mg by mouth as needed      Thiamine HCl (VITAMIN B1 PO) Take 1 capsule by mouth daily       No current facility-administered medications for this visit         Allergies:     No Known Allergies   Physical Exam:     /62 (BP Location: Left arm, Patient Position: Sitting, Cuff Size: Standard)   Pulse 63   Temp 97 6 °F (36 4 °C)   Ht 5' 8" (1 727 m)   Wt 66 kg (145 lb 9 6 oz)   SpO2 98%   BMI 22 14 kg/m²     Physical Exam     Zackary Gregg 9

## 2021-04-12 NOTE — TELEPHONE ENCOUNTER
Upon review of the In Basket request we were able to locate, review, and update the patient chart as requested for HIV  Any additional questions or concerns should be emailed to the Practice Liaisons via [email or web address] email, please do not reply via In Basket      Thank you  Rich Fresh

## 2021-04-13 ENCOUNTER — TELEPHONE (OUTPATIENT)
Dept: ADMINISTRATIVE | Facility: OTHER | Age: 51
End: 2021-04-13

## 2021-04-13 ENCOUNTER — APPOINTMENT (OUTPATIENT)
Dept: LAB | Facility: HOSPITAL | Age: 51
End: 2021-04-13
Payer: COMMERCIAL

## 2021-04-13 DIAGNOSIS — Z00.8 ENCOUNTER FOR OTHER GENERAL EXAMINATION: ICD-10-CM

## 2021-04-13 DIAGNOSIS — E03.9 ACQUIRED HYPOTHYROIDISM: ICD-10-CM

## 2021-04-13 LAB
ALBUMIN SERPL BCP-MCNC: 3.9 G/DL (ref 3.5–5)
ALP SERPL-CCNC: 64 U/L (ref 46–116)
ALT SERPL W P-5'-P-CCNC: 34 U/L (ref 12–78)
ANION GAP SERPL CALCULATED.3IONS-SCNC: 5 MMOL/L (ref 4–13)
AST SERPL W P-5'-P-CCNC: 16 U/L (ref 5–45)
BILIRUB SERPL-MCNC: 0.4 MG/DL (ref 0.2–1)
BUN SERPL-MCNC: 13 MG/DL (ref 5–25)
CALCIUM SERPL-MCNC: 9.1 MG/DL (ref 8.3–10.1)
CHLORIDE SERPL-SCNC: 101 MMOL/L (ref 100–108)
CHOLEST SERPL-MCNC: 189 MG/DL (ref 50–200)
CO2 SERPL-SCNC: 31 MMOL/L (ref 21–32)
CREAT SERPL-MCNC: 0.77 MG/DL (ref 0.6–1.3)
EST. AVERAGE GLUCOSE BLD GHB EST-MCNC: 100 MG/DL
GFR SERPL CREATININE-BSD FRML MDRD: 90 ML/MIN/1.73SQ M
GLUCOSE SERPL-MCNC: 90 MG/DL (ref 65–140)
HBA1C MFR BLD: 5.1 %
HDLC SERPL-MCNC: 52 MG/DL
LDLC SERPL CALC-MCNC: 99 MG/DL (ref 0–100)
NONHDLC SERPL-MCNC: 137 MG/DL
POTASSIUM SERPL-SCNC: 3.7 MMOL/L (ref 3.5–5.3)
PROT SERPL-MCNC: 7.8 G/DL (ref 6.4–8.2)
SODIUM SERPL-SCNC: 137 MMOL/L (ref 136–145)
TRIGL SERPL-MCNC: 188 MG/DL
TSH SERPL DL<=0.05 MIU/L-ACNC: 0.24 UIU/ML (ref 0.36–3.74)

## 2021-04-13 PROCEDURE — 83036 HEMOGLOBIN GLYCOSYLATED A1C: CPT

## 2021-04-13 PROCEDURE — 80053 COMPREHEN METABOLIC PANEL: CPT

## 2021-04-13 PROCEDURE — 80061 LIPID PANEL: CPT

## 2021-04-13 PROCEDURE — 84443 ASSAY THYROID STIM HORMONE: CPT

## 2021-04-13 NOTE — TELEPHONE ENCOUNTER
Upon review of the In Basket request we were able to locate, review, and update the patient chart as requested for HIV  Any additional questions or concerns should be emailed to the Practice Liaisons via [email or web address] email, please do not reply via In Basket      Thank you  Herbert Ortega

## 2021-04-13 NOTE — TELEPHONE ENCOUNTER
----- Message from Soy Melendrez sent at 4/12/2021  6:44 PM EDT -----  Regarding: Care Gap request  04/12/21 6:44 PM    Hello, our patient attached above has had HIV completed/performed  Please assist in updating the patient chart by pulling the Care Everywhere (CE) document  The date of service is 2020         Thank you,  Palmira Nixon MA  [facility] PRIMARY CARE

## 2021-04-14 DIAGNOSIS — E78.2 MIXED HYPERLIPIDEMIA: Primary | ICD-10-CM

## 2021-04-14 DIAGNOSIS — E03.9 ACQUIRED HYPOTHYROIDISM: ICD-10-CM

## 2021-04-14 RX ORDER — ATORVASTATIN CALCIUM 40 MG/1
40 TABLET, FILM COATED ORAL DAILY
Qty: 90 TABLET | Refills: 1 | Status: SHIPPED | OUTPATIENT
Start: 2021-04-14 | End: 2021-09-20 | Stop reason: SDUPTHER

## 2021-04-14 RX ORDER — LEVOTHYROXINE SODIUM 0.1 MG/1
100 TABLET ORAL DAILY
Qty: 90 TABLET | Refills: 0 | Status: SHIPPED | OUTPATIENT
Start: 2021-04-14 | End: 2021-05-12

## 2021-04-29 DIAGNOSIS — Z12.31 ENCOUNTER FOR SCREENING MAMMOGRAM FOR MALIGNANT NEOPLASM OF BREAST: ICD-10-CM

## 2021-04-29 DIAGNOSIS — N63.0 BREAST MASS: ICD-10-CM

## 2021-04-29 DIAGNOSIS — R92.8 ABNORMAL MAMMOGRAM: Primary | ICD-10-CM

## 2021-05-09 ENCOUNTER — APPOINTMENT (EMERGENCY)
Dept: RADIOLOGY | Facility: HOSPITAL | Age: 51
End: 2021-05-09
Payer: COMMERCIAL

## 2021-05-09 ENCOUNTER — APPOINTMENT (EMERGENCY)
Dept: CT IMAGING | Facility: HOSPITAL | Age: 51
End: 2021-05-09
Payer: COMMERCIAL

## 2021-05-09 ENCOUNTER — HOSPITAL ENCOUNTER (EMERGENCY)
Facility: HOSPITAL | Age: 51
Discharge: HOME | End: 2021-05-09
Attending: EMERGENCY MEDICINE | Admitting: EMERGENCY MEDICINE
Payer: COMMERCIAL

## 2021-05-09 VITALS
WEIGHT: 145 LBS | DIASTOLIC BLOOD PRESSURE: 78 MMHG | OXYGEN SATURATION: 98 % | HEART RATE: 66 BPM | BODY MASS INDEX: 21.98 KG/M2 | SYSTOLIC BLOOD PRESSURE: 120 MMHG | RESPIRATION RATE: 16 BRPM | HEIGHT: 68 IN | TEMPERATURE: 97.1 F

## 2021-05-09 DIAGNOSIS — R55 SYNCOPE: Primary | ICD-10-CM

## 2021-05-09 DIAGNOSIS — S09.90XA INJURY OF HEAD, INITIAL ENCOUNTER: ICD-10-CM

## 2021-05-09 LAB
ANION GAP SERPL CALCULATED.3IONS-SCNC: 6 MMOL/L (ref 4–13)
BASOPHILS # BLD AUTO: 0.04 THOUSANDS/ΜL (ref 0–0.1)
BASOPHILS NFR BLD AUTO: 1 % (ref 0–1)
BUN SERPL-MCNC: 22 MG/DL (ref 5–25)
CALCIUM SERPL-MCNC: 9 MG/DL (ref 8.3–10.1)
CHLORIDE SERPL-SCNC: 102 MMOL/L (ref 100–108)
CO2 SERPL-SCNC: 30 MMOL/L (ref 21–32)
CREAT SERPL-MCNC: 0.87 MG/DL (ref 0.6–1.3)
EOSINOPHIL # BLD AUTO: 0.06 THOUSAND/ΜL (ref 0–0.61)
EOSINOPHIL NFR BLD AUTO: 1 % (ref 0–6)
ERYTHROCYTE [DISTWIDTH] IN BLOOD BY AUTOMATED COUNT: 12.6 % (ref 11.6–15.1)
GFR SERPL CREATININE-BSD FRML MDRD: 77 ML/MIN/1.73SQ M
GLUCOSE SERPL-MCNC: 117 MG/DL (ref 65–140)
GLUCOSE SERPL-MCNC: 97 MG/DL (ref 65–140)
HCT VFR BLD AUTO: 36.5 % (ref 34.8–46.1)
HGB BLD-MCNC: 12.3 G/DL (ref 11.5–15.4)
IMM GRANULOCYTES # BLD AUTO: 0.03 THOUSAND/UL (ref 0–0.2)
IMM GRANULOCYTES NFR BLD AUTO: 1 % (ref 0–2)
LYMPHOCYTES # BLD AUTO: 1.05 THOUSANDS/ΜL (ref 0.6–4.47)
LYMPHOCYTES NFR BLD AUTO: 19 % (ref 14–44)
MCH RBC QN AUTO: 31.3 PG (ref 26.8–34.3)
MCHC RBC AUTO-ENTMCNC: 33.7 G/DL (ref 31.4–37.4)
MCV RBC AUTO: 93 FL (ref 82–98)
MONOCYTES # BLD AUTO: 0.38 THOUSAND/ΜL (ref 0.17–1.22)
MONOCYTES NFR BLD AUTO: 7 % (ref 4–12)
NEUTROPHILS # BLD AUTO: 3.93 THOUSANDS/ΜL (ref 1.85–7.62)
NEUTS SEG NFR BLD AUTO: 71 % (ref 43–75)
NRBC BLD AUTO-RTO: 0 /100 WBCS
PLATELET # BLD AUTO: 229 THOUSANDS/UL (ref 149–390)
PMV BLD AUTO: 9.8 FL (ref 8.9–12.7)
POTASSIUM SERPL-SCNC: 3.8 MMOL/L (ref 3.5–5.3)
RBC # BLD AUTO: 3.93 MILLION/UL (ref 3.81–5.12)
SODIUM SERPL-SCNC: 138 MMOL/L (ref 136–145)
TROPONIN I SERPL-MCNC: <0.02 NG/ML
WBC # BLD AUTO: 5.49 THOUSAND/UL (ref 4.31–10.16)

## 2021-05-09 PROCEDURE — 84484 ASSAY OF TROPONIN QUANT: CPT | Performed by: PHYSICIAN ASSISTANT

## 2021-05-09 PROCEDURE — 80048 BASIC METABOLIC PNL TOTAL CA: CPT | Performed by: PHYSICIAN ASSISTANT

## 2021-05-09 PROCEDURE — 99285 EMERGENCY DEPT VISIT HI MDM: CPT | Performed by: PHYSICIAN ASSISTANT

## 2021-05-09 PROCEDURE — 82948 REAGENT STRIP/BLOOD GLUCOSE: CPT

## 2021-05-09 PROCEDURE — 93005 ELECTROCARDIOGRAM TRACING: CPT

## 2021-05-09 PROCEDURE — 70450 CT HEAD/BRAIN W/O DYE: CPT

## 2021-05-09 PROCEDURE — 71045 X-RAY EXAM CHEST 1 VIEW: CPT

## 2021-05-09 PROCEDURE — 36415 COLL VENOUS BLD VENIPUNCTURE: CPT | Performed by: PHYSICIAN ASSISTANT

## 2021-05-09 PROCEDURE — 72125 CT NECK SPINE W/O DYE: CPT

## 2021-05-09 PROCEDURE — 85025 COMPLETE CBC W/AUTO DIFF WBC: CPT | Performed by: PHYSICIAN ASSISTANT

## 2021-05-09 PROCEDURE — 99284 EMERGENCY DEPT VISIT MOD MDM: CPT

## 2021-05-09 RX ORDER — KETOROLAC TROMETHAMINE 30 MG/ML
15 INJECTION, SOLUTION INTRAMUSCULAR; INTRAVENOUS ONCE
Status: DISCONTINUED | OUTPATIENT
Start: 2021-05-09 | End: 2021-05-09

## 2021-05-09 RX ORDER — ACETAMINOPHEN 325 MG/1
650 TABLET ORAL ONCE
Status: DISCONTINUED | OUTPATIENT
Start: 2021-05-09 | End: 2021-05-09 | Stop reason: HOSPADM

## 2021-05-09 NOTE — ED PROVIDER NOTES
Emergency Department Trauma Note  Ronan Ash 46 y o  female MRN: [id number]  Unit/Bed#: ED 11/ED 11 Encounter: [id number]      Trauma Alert: Trauma Acuity: Trauma Evaluation  Model of Arrival:   via    Trauma Team: Current Providers  Attending Provider: Caesar Marvin DO  Registered Nurse: Kurt Simmons RN  Physician Assistant: Keny Izquierdo PA-C  Consultants: None      History of Present Illness     Chief Complaint:   Chief Complaint   Patient presents with    Fall     fell at 600 am per  and stated she hit her head in the kitchen  The patient doesn't recall   states she passed out      HPI:  Ronan Ash is a 46 y o  female who presents status post syncopal event and head injury  Mechanism:Details of Incident: pt was at home and doesnt' recall anything   states around 600 AM she passed out in the kitchen          46year old female presents the emergency department for evaluation  Patient appears to be confused about specifics of events  States she woke up this morning in a cold sweat  Reports this was around 6am  States she got out of bed to go to the bathroom and the next this she remembers is her  waking her up and she was laying on the floor   states patient rushed out of bed and he thought she was getting up to go to the bathroom  States he then heard a loud noise and he went to see if she was okay when he found her laying on the floor in kitchen which was linoleum frank  They are currently staying in a camper so has been believes he was to her within several seconds  States she seemed to be out of it he got there  He denies any noticeable seizure-like activity  Patient denies history of seizures  Patient denies any loss of bowel or bladder control  She reports hitting the back of her head and states she has a bump on the back of her head and a headache  She denies any use of blood thinners  She denies any recent changes in medications    She denies any visual changes  She denies any nausea or vomiting  She denies any chest pain or difficulty breathing  Patient states she was able to stand ambulate after the incident  She reports history of vasovagal syncope several times previously  Patient denies remembering any prodromal symptoms  Patient denies any weakness, numbness, paresthesias  She denies any speech changes, facial droop  History provided by:  Patient  Syncope  Episode history:  Single  Most recent episode: Today  Context: standing up    Witnessed: no    Associated symptoms: confusion and headaches    Associated symptoms: no anxiety, no chest pain, no diaphoresis, no difficulty breathing, no dizziness, no fever, no focal sensory loss, no focal weakness, no malaise/fatigue, no nausea, no palpitations, no recent fall, no recent injury, no recent surgery, no rectal bleeding, no seizures, no shortness of breath, no visual change, no vomiting and no weakness      Review of Systems   Constitutional: Negative  Negative for appetite change, chills, diaphoresis, fatigue, fever and malaise/fatigue  HENT: Negative  Respiratory: Negative  Negative for shortness of breath  Cardiovascular: Positive for syncope  Negative for chest pain, palpitations and leg swelling  Gastrointestinal: Negative  Negative for nausea and vomiting  Genitourinary: Negative  Musculoskeletal: Negative  Skin: Negative  Neurological: Positive for headaches  Negative for dizziness, tremors, focal weakness, seizures, syncope, facial asymmetry, speech difficulty, weakness, light-headedness and numbness  Psychiatric/Behavioral: Positive for confusion  All other systems reviewed and are negative        Historical Information     Immunizations:   Immunization History   Administered Date(s) Administered    H1N1, All Formulations 11/25/2009    INFLUENZA 10/01/2020    Influenza Quadrivalent Preservative Free 3 years and older IM 10/03/2018, 10/04/2018, 10/01/2019  SARS-CoV-2 / COVID-19 mRNA IM (Moderna) 2020, 2021    Tuberculin Skin Test-PPD Intradermal 1997       Past Medical History:   Diagnosis Date    Allergic 1974    Seasonal    Anxiety     Arthritis     Colitis     Noted on colonoscopy 2020      Depression     Disease of thyroid gland     Gastritis     Noted on EGD 2020    Glaucoma     Headache(784 0)     Hyperlipidemia     Hypertension     Hypothyroidism     Lyme disease     Migraine     Osteoporosis     Scoliosis     SVT (supraventricular tachycardia) (HCC)     Visual impairment     VT (ventricular tachycardia) (HCC)        Family History   Problem Relation Age of Onset    Peripheral vascular disease Mother     Glaucoma Mother     Prostate cancer Father     Hypothyroidism Sister     No Known Problems Daughter     Colon cancer Maternal Grandmother     No Known Problems Maternal Grandfather     Arthritis Paternal Grandmother     No Known Problems Paternal Grandfather     No Known Problems Daughter     No Known Problems Maternal Aunt     No Known Problems Maternal Aunt     Skin cancer Paternal Aunt     No Known Problems Paternal Aunt      Past Surgical History:   Procedure Laterality Date    BREAST BIOPSY Right 10/21/2020    papilloma    COLONOSCOPY      EGD      FINGER SURGERY      left pinky    HYSTERECTOMY      HYSTERECTOMY W/ SALPINGO-OOPHERECTOMY  2008    US GUIDED BREAST BIOPSY RIGHT COMPLETE Right 10/21/2020     Social History     Tobacco Use    Smoking status: Former Smoker     Packs/day: 1 00     Years: 10 00     Pack years: 10 00     Types: Cigarettes     Quit date: 2007     Years since quittin 3    Smokeless tobacco: Never Used    Tobacco comment: quit    Substance Use Topics    Alcohol use: Never     Frequency: Never    Drug use: Never     E-Cigarette/Vaping    E-Cigarette Use Never User      E-Cigarette/Vaping Substances    Nicotine No     THC No     CBD No  Flavoring No     Other No     Unknown No        Family History: non-contributory    Meds/Allergies   Prior to Admission Medications   Prescriptions Last Dose Informant Patient Reported? Taking? Calcium Carbonate-Vit D-Min (CALCIUM 1200 PO) 5/8/2021 at Unknown time  Yes Yes   Sig: Take by mouth daily    Cholecalciferol (VITAMIN D3 PO) 5/8/2021 at Unknown time  Yes Yes   Sig: Take 1,000 mg by mouth   Glucosamine-Chondroitin 500-400 MG CAPS 5/8/2021 at Unknown time  Yes Yes   Sig: Take by mouth daily    Inulin (FIBER CHOICE PO) Unknown at Unknown time  Yes No   Sig: Take by mouth   LORazepam (ATIVAN) 0 5 mg tablet Unknown at Unknown time  Yes No   Sig: Take 0 5 mg by mouth as needed   Magnesium 250 MG TABS Unknown at Unknown time  No No   Sig: Take 1 tablet (250 mg total) by mouth daily   Methyl Salicylate-Lido-Menthol (LidoPro) 4-4-5 % PTCH Unknown at Unknown time  Yes No   Sig: LidoPro 4 %-4 %-5 % topical patch   APPLY 1 PATCH TO THE AFFECTED AREA EVERY 8 TO 12 HOURS AS NEEDED   MAX OF 2 PATCHES PER DAY   Omega-3 Fatty Acids (FISH OIL OMEGA-3 PO) 5/8/2021 at Unknown time  Yes Yes   Sig: Take by mouth   Rimegepant Sulfate (Nurtec) 75 MG TBDP Unknown at Unknown time  Yes No   Sig: Take 75 mg by mouth as needed   SUMAtriptan (IMITREX) 100 mg tablet Unknown at Unknown time  Yes No   Sig: Take 100 mg by mouth as needed   Thiamine HCl (VITAMIN B1 PO) Unknown at Unknown time  Yes No   Sig: Take 1 capsule by mouth daily   atorvastatin (LIPITOR) 40 mg tablet 5/8/2021 at Unknown time  No Yes   Sig: Take 1 tablet (40 mg total) by mouth daily   bimatoprost (Lumigan) 0 01 % ophthalmic drops 5/8/2021 at Unknown time  Yes Yes   Sig: Lumigan 0 01 % eye drops   citalopram (CeleXA) 20 mg tablet 5/8/2021 at Unknown time  Yes Yes   Sig: Take 20 mg by mouth daily    hydrOXYzine HCL (ATARAX) 25 mg tablet Unknown at Unknown time  Yes No   Sig: Take 25 mg by mouth as needed    irbesartan-hydrochlorothiazide (AVALIDE) 150-12 5 MG per tablet 5/8/2021 at Unknown time  Yes Yes   Sig: Take 1 tablet by mouth daily    levothyroxine 100 mcg tablet 5/8/2021 at Unknown time  No Yes   Sig: Take 1 tablet (100 mcg total) by mouth daily   metoprolol succinate (TOPROL-XL) 25 mg 24 hr tablet 5/8/2021 at Unknown time  No Yes   Sig: Take 1 tablet (25 mg total) by mouth daily   multivitamin (THERAGRAN) TABS 5/8/2021 at Unknown time  Yes Yes   Sig: Take 1 tablet by mouth daily   omeprazole (PriLOSEC OTC) 20 MG tablet 5/8/2021 at Unknown time  Yes Yes   Sig: Take 20 mg by mouth daily       Facility-Administered Medications: None       No Known Allergies    PHYSICAL EXAM    PE limited by: none    Objective   Vitals:   First set: Temperature: 98 1 °F (36 7 °C) (05/09/21 1114)  Pulse: 72 (05/09/21 1114)  Respirations: 16 (05/09/21 1114)  Blood Pressure: 135/63 (05/09/21 1114)  SpO2: 98 % (05/09/21 1114)    Primary Survey:   (A) Airway:  Intact   (B) Breathing:  Spontaneously  (C) Circulation: Pulses:   normal  (D) Disabliity:  GCS Total:  15  (E) Expose:  Completed    Secondary Survey: (Click on Physical Exam tab above)  Physical Exam  Vitals signs and nursing note reviewed  Constitutional:       General: She is not in acute distress  Appearance: Normal appearance  She is normal weight  She is not ill-appearing, toxic-appearing or diaphoretic  HENT:      Head: Normocephalic  Comments: Hematoma noted on posterior aspect of the scalp  No open wounds     Right Ear: Tympanic membrane, ear canal and external ear normal       Left Ear: Tympanic membrane, ear canal and external ear normal       Mouth/Throat:      Mouth: Mucous membranes are moist       Pharynx: Oropharynx is clear  No oropharyngeal exudate or posterior oropharyngeal erythema  Eyes:      Extraocular Movements: Extraocular movements intact  Conjunctiva/sclera: Conjunctivae normal       Pupils: Pupils are equal, round, and reactive to light     Neck:      Musculoskeletal: Normal range of motion and neck supple  Muscular tenderness present  Cardiovascular:      Rate and Rhythm: Normal rate and regular rhythm  Pulmonary:      Effort: Pulmonary effort is normal  No respiratory distress  Breath sounds: Normal breath sounds  No stridor  No wheezing, rhonchi or rales  Chest:      Chest wall: No tenderness  Abdominal:      General: Abdomen is flat  Bowel sounds are normal  There is no distension  Palpations: Abdomen is soft  Tenderness: There is no abdominal tenderness  There is no left CVA tenderness or guarding  Musculoskeletal:      Cervical back: She exhibits tenderness and bony tenderness  Skin:     General: Skin is warm and dry  Capillary Refill: Capillary refill takes less than 2 seconds  Coloration: Skin is not pale  Findings: No bruising, erythema or rash  Neurological:      General: No focal deficit present  Mental Status: She is alert and oriented to person, place, and time  She is confused  GCS: GCS eye subscore is 4  GCS verbal subscore is 5  GCS motor subscore is 6  Cranial Nerves: Cranial nerves are intact  No dysarthria  Sensory: Sensation is intact  No sensory deficit  Motor: Motor function is intact  No weakness, tremor or pronator drift  Coordination: Coordination is intact  Finger-Nose-Finger Test normal       Gait: Gait is intact  Deep Tendon Reflexes: Reflexes are normal and symmetric  Psychiatric:         Mood and Affect: Mood normal          Behavior: Behavior normal          Thought Content: Thought content normal          Judgment: Judgment normal          Cervical spine cleared by clinical criteria?  No (imaging required)      Invasive Devices     None                 Lab Results:   Results Reviewed     Procedure Component Value Units Date/Time    Troponin I [[id number]]  (Normal) Collected: 05/09/21 1203    Lab Status: Final result Specimen: Blood from Arm, Right Updated: 05/09/21 1228     Troponin I <0 02 ng/mL     Basic metabolic panel [[id number]] Collected: 05/09/21 1203    Lab Status: Final result Specimen: Blood from Arm, Right Updated: 05/09/21 1218     Sodium 138 mmol/L      Potassium 3 8 mmol/L      Chloride 102 mmol/L      CO2 30 mmol/L      ANION GAP 6 mmol/L      BUN 22 mg/dL      Creatinine 0 87 mg/dL      Glucose 97 mg/dL      Calcium 9 0 mg/dL      eGFR 77 ml/min/1 73sq m     Narrative:      [place] guidelines for Chronic Kidney Disease (CKD):     Stage 1 with normal or high GFR (GFR > 90 mL/min/1 73 square meters)    Stage 2 Mild CKD (GFR = 60-89 mL/min/1 73 square meters)    Stage 3A Moderate CKD (GFR = 45-59 mL/min/1 73 square meters)    Stage 3B Moderate CKD (GFR = 30-44 mL/min/1 73 square meters)    Stage 4 Severe CKD (GFR = 15-29 mL/min/1 73 square meters)    Stage 5 End Stage CKD (GFR <15 mL/min/1 73 square meters)  Note: GFR calculation is accurate only with a steady state creatinine    CBC and differential [[id number]] Collected: 05/09/21 1203    Lab Status: Final result Specimen: Blood from Arm, Right Updated: 05/09/21 1208     WBC 5 49 Thousand/uL      RBC 3 93 Million/uL      Hemoglobin 12 3 g/dL      Hematocrit 36 5 %      MCV 93 fL      MCH 31 3 pg      MCHC 33 7 g/dL      RDW 12 6 %      MPV 9 8 fL      Platelets 828 Thousands/uL      nRBC 0 /100 WBCs      Neutrophils Relative 71 %      Immat GRANS % 1 %      Lymphocytes Relative 19 %      Monocytes Relative 7 %      Eosinophils Relative 1 %      Basophils Relative 1 %      Neutrophils Absolute 3 93 Thousands/µL      Immature Grans Absolute 0 03 Thousand/uL      Lymphocytes Absolute 1 05 Thousands/µL      Monocytes Absolute 0 38 Thousand/µL      Eosinophils Absolute 0 06 Thousand/µL      Basophils Absolute 0 04 Thousands/µL     Fingerstick Glucose (POCT) [[id number]]  (Normal) Collected: 05/09/21 1140    Lab Status: Final result Updated: 05/09/21 1143     POC Glucose 117 mg/dl                  Imaging Studies:   Direct to CT: No  TRAUMA - CT head wo contrast   Final Result by Byron Akbar MD (05/09 1144)      No acute intracranial abnormality  The study was marked in [facility] for immediate notification  Workstation performed: [id number]         TRAUMA - CT spine cervical wo contrast   Final Result by Byron Akbar MD (05/09 1146)      No cervical spine fracture or traumatic malalignment  The study was marked in [facility] for immediate notification  Workstation performed: [id number]         XR Trauma chest portable    (Results Pending)         Procedures  ECG 12 Lead Documentation Only    Date/Time: 5/9/2021 11:36 AM  Performed by: Tobias Baez PA-C  Authorized by: Tobias Baez PA-C     Indications / Diagnosis:  Trauma  Patient location:  ED  Interpretation:     Interpretation: non-specific    Rate:     ECG rate:  72    ECG rate assessment: normal    Rhythm:     Rhythm: sinus rhythm    Ectopy:     Ectopy: none    QRS:     QRS axis:  Normal    QRS intervals:  Normal  Conduction:     Conduction: normal    ST segments:     ST segments:  Normal  T waves:     T waves: normal               ED Course  ED Course as of May 09 1618   Sun May 09, 2021   1115 Trauma evaluation called by nursing staff      4763 Will forego pelvic x-ray as patient is ambulatory and no pelvic instability on exam       1130 ED interpretation of chest x-ray negative for acute cardiopulmonary findings  No obvious osseous injury      1146 CT head: No acute intracranial abnormality          1148 CT cspine: No cervical spine fracture or traumatic malalignment  1148 Cervical Collar Clearance: The patient had a CT scan of the cervical spine demonstrating no acute injury  On exam, the patient had no midline point tenderness or paresthesias/numbness/weakness in the extremities  The patient had full range of motion (was then able to flex, extend, and rotate head laterally) without pain   There were no distracting injuries and the patient was not intoxicated  The patient's cervical spine was cleared radiologically and clinically  Cervical collar removed at this time  Graham Baker PA-C  5/9/2021 11:48 AM           1234 Laboratory findings unremarkable  CBC and BNP normal   Troponin negative      1301 I discussed all results and findings with the patient  She continues with mild headache  Will give Tylenol  We discussed symptomatic treatment at home and symptoms that require prompt return to the ED for further evaluation patient verbalized understanding  She will follow-up with her family doctor  She agreed to this treatment plan and was discharged home      1310 Of note patient declined tylenol will take at home              MDM  Number of Diagnoses or Management Options  Injury of head, initial encounter: new and requires workup  Syncope: new and requires workup  Diagnosis management comments: 46year old female presents status post syncopal event causing her to hit her head  Vitals and medical record reviewed  Patient is confused however alert and oriented  Patient does have hematoma on the posterior aspect of the head  No open wounds  Patient ambulatory  No pelvic instability  Lungs clear auscultation  No specific chest tenderness  ED interpretation of chest x-ray negative for acute cardiopulmonary findings  Patient did have some neck tenderness  C-collar was placed  Additionally found have hematoma on the posterior aspect of the head  No open wounds  CT head and CT C-spine negative for acute findings  Laboratory findings unremarkable  EKG was nonischemic  Troponin negative  Fingerstick glucose normal   I discussed results and findings with the patient  She is feeling improved  Has minimal headache at this time  Refused Tylenol  We discussed symptomatic treatment home and symptoms that require prompt return to the ED for further evaluation she verbalized understanding  Patient will follow-up with her PCP  Amount and/or Complexity of Data Reviewed  Clinical lab tests: ordered and reviewed  Tests in the radiology section of CPT®: ordered and reviewed  Review and summarize past medical records: yes  Independent visualization of images, tracings, or specimens: yes            Disposition  [facility] Transfer: No  Final diagnoses:   Syncope   Injury of head, initial encounter     Time reflects when diagnosis was documented in both MDM as applicable and the Disposition within this note     Time User Action Codes Description Comment    5/9/2021  1:00 PM Jolly Quiroz Add [R55] Syncope     5/9/2021  1:00 PM Jolly Quiroz Add [S09 90XA] Injury of head, initial encounter       ED Disposition     ED Disposition Condition Date/Time Comment    Discharge Stable Sun May 9, 2021  1:00 PM Pau Cid discharge to home/self care              Follow-up Information     Follow up With Specialties Details Why [address] [facility], [address] [facility], Nurse Practitioner In 2 days For continued care and evaluation [address] [place] [address]  [phone]          Discharge Medication List as of 5/9/2021  1:01 PM      CONTINUE these medications which have NOT CHANGED    Details   atorvastatin (LIPITOR) 40 mg tablet Take 1 tablet (40 mg total) by mouth daily, Starting Wed 4/14/2021, Normal      bimatoprost (Lumigan) 0 01 % ophthalmic drops Lumigan 0 01 % eye drops, Historical Med      Calcium Carbonate-Vit D-Min (CALCIUM 1200 PO) Take by mouth daily , Historical Med      Cholecalciferol (VITAMIN D3 PO) Take 1,000 mg by mouth, Historical Med      citalopram (CeleXA) 20 mg tablet Take 20 mg by mouth daily , Starting Tue 6/16/2020, Historical Med      Glucosamine-Chondroitin 500-400 MG CAPS Take by mouth daily , Historical Med      irbesartan-hydrochlorothiazide (AVALIDE) 150-12 5 MG per tablet Take 1 tablet by mouth daily , Starting Tue 6/16/2020, Historical Med levothyroxine 100 mcg tablet Take 1 tablet (100 mcg total) by mouth daily, Starting Wed 4/14/2021, Normal      metoprolol succinate (TOPROL-XL) 25 mg 24 hr tablet Take 1 tablet (25 mg total) by mouth daily, Starting Mon 9/28/2020, Normal      multivitamin (THERAGRAN) TABS Take 1 tablet by mouth daily, Historical Med      Omega-3 Fatty Acids (FISH OIL OMEGA-3 PO) Take by mouth, Historical Med      omeprazole (PriLOSEC OTC) 20 MG tablet Take 20 mg by mouth daily , Historical Med      hydrOXYzine HCL (ATARAX) 25 mg tablet Take 25 mg by mouth as needed , Starting Mon 8/31/2020, Historical Med      Inulin (FIBER CHOICE PO) Take by mouth, Historical Med      LORazepam (ATIVAN) 0 5 mg tablet Take 0 5 mg by mouth as needed, Historical Med      Magnesium 250 MG TABS Take 1 tablet (250 mg total) by mouth daily, Starting Fri 9/11/2020, No Print      Methyl Salicylate-Lido-Menthol (LidoPro) 4-4-5 % PTCH LidoPro 4 %-4 %-5 % topical patch   APPLY 1 PATCH TO THE AFFECTED AREA EVERY 8 TO 12 HOURS AS NEEDED  MAX OF 2 PATCHES PER DAY, Historical Med      Rimegepant Sulfate (Nurtec) 75 MG TBDP Take 75 mg by mouth as needed, Historical Med      SUMAtriptan (IMITREX) 100 mg tablet Take 100 mg by mouth as needed, Starting u 12/31/2020, Historical Med      Thiamine HCl (VITAMIN B1 PO) Take 1 capsule by mouth daily, Historical Med           No discharge procedures on file      PDMP Review     None          ED Provider  Electronically Signed by         Ez George PA-C  05/09/21 2003 [facility] AZEB Lara  05/09/21 [id number]

## 2021-05-09 NOTE — DISCHARGE INSTRUCTIONS
If you have any new or worsening symptoms please return to the ED! Please follow-up with your family doctor

## 2021-05-09 NOTE — Clinical Note
Milagro Carreno was seen and treated in our emergency department on 5/9/2021  Diagnosis:     Ludin Betancur    She may return on this date: 05/11/2021         If you have any questions or concerns, please don't hesitate to call        Ian Quijano PA-C    ______________________________           _______________          _______________  Hospital Representative                              Date                                Time

## 2021-05-10 ENCOUNTER — OFFICE VISIT (OUTPATIENT)
Dept: FAMILY MEDICINE CLINIC | Facility: CLINIC | Age: 51
End: 2021-05-10
Payer: COMMERCIAL

## 2021-05-10 ENCOUNTER — APPOINTMENT (OUTPATIENT)
Dept: LAB | Facility: CLINIC | Age: 51
End: 2021-05-10
Payer: COMMERCIAL

## 2021-05-10 ENCOUNTER — TRANSCRIBE ORDERS (OUTPATIENT)
Dept: ADMINISTRATIVE | Facility: HOSPITAL | Age: 51
End: 2021-05-10

## 2021-05-10 VITALS
SYSTOLIC BLOOD PRESSURE: 118 MMHG | WEIGHT: 145 LBS | HEIGHT: 68 IN | HEART RATE: 86 BPM | OXYGEN SATURATION: 98 % | TEMPERATURE: 97.5 F | DIASTOLIC BLOOD PRESSURE: 68 MMHG | BODY MASS INDEX: 21.98 KG/M2

## 2021-05-10 DIAGNOSIS — S06.0X1A CONCUSSION WITH LOSS OF CONSCIOUSNESS OF 30 MINUTES OR LESS, INITIAL ENCOUNTER: Primary | ICD-10-CM

## 2021-05-10 DIAGNOSIS — G44.89 OTHER HEADACHE SYNDROME: ICD-10-CM

## 2021-05-10 DIAGNOSIS — H53.8 BLURRED VISION: ICD-10-CM

## 2021-05-10 DIAGNOSIS — R55 SYNCOPE, UNSPECIFIED SYNCOPE TYPE: ICD-10-CM

## 2021-05-10 DIAGNOSIS — E03.9 ACQUIRED HYPOTHYROIDISM: ICD-10-CM

## 2021-05-10 LAB — TSH SERPL DL<=0.05 MIU/L-ACNC: 0.27 UIU/ML (ref 0.36–3.74)

## 2021-05-10 PROCEDURE — 99213 OFFICE O/P EST LOW 20 MIN: CPT | Performed by: NURSE PRACTITIONER

## 2021-05-10 PROCEDURE — 84443 ASSAY THYROID STIM HORMONE: CPT

## 2021-05-10 PROCEDURE — 36415 COLL VENOUS BLD VENIPUNCTURE: CPT

## 2021-05-10 NOTE — PROGRESS NOTES
Assessment/Plan:       Diagnoses and all orders for this visit:    Concussion with loss of consciousness of 30 minutes or less, initial encounter    Other headache syndrome    Syncope, unspecified syncope type    Blurred vision      She currently does have a concussion  Unsure as to why she fell at home, she has no recollection of the event except blurred memory prior  Note provided to return to work only on Friday due to needing cognitive rest   Will have her recheck her TSH today as we did change her Levothyroxine dose 4 weeks ago  Reviewed ED testing - no red flags  Subjective:      Patient ID: Ramya Loza is a 46 y o  female  Here today for an ED follow-up  Notes she was found in her Kitchen on Sunday am having fallen and hit her head  Remembers waking up and feeling unwell but then passed out in her kitchen she thinks - does not recall detail  She is with tenderness to the right side of her head and a headache ongoing on the left side  She still feels "out of it" and notes he vision is odd when trying to focus  She did have a head CT at the time of the ED visit with blood work with nothing abnormal   She did have her levothyroxine dose decreased slightly in the past 4 weeks due to a low TSH level  The following portions of the patient's history were reviewed and updated as appropriate: allergies, current medications, past family history, past medical history, past social history, past surgical history and problem list     Review of Systems   Constitutional: Negative  Respiratory: Negative  Cardiovascular: Negative  Neurological: Positive for headaches  Please see HPI  Objective:      /68 (BP Location: Left arm, Patient Position: Sitting, Cuff Size: Standard)   Pulse 86   Temp 97 5 °F (36 4 °C)   Ht 5' 8" (1 727 m)   Wt 65 8 kg (145 lb)   SpO2 98%   BMI 22 05 kg/m²          Physical Exam  Constitutional:       Appearance: Normal appearance     HENT: Head: Normocephalic and atraumatic  Eyes:      Pupils: Pupils are equal, round, and reactive to light  Cardiovascular:      Rate and Rhythm: Normal rate and regular rhythm  Heart sounds: No murmur  No gallop  Pulmonary:      Effort: Pulmonary effort is normal  No respiratory distress  Breath sounds: Normal breath sounds  No wheezing or rales  Neurological:      General: No focal deficit present  Mental Status: She is alert and oriented to person, place, and time  Mental status is at baseline

## 2021-05-10 NOTE — LETTER
May 10, 2021     Patient: Wayne Celaya   YOB: 1970   Date of Visit: 5/10/2021       To Whom it May Concern:    Wayne Celaya is under my professional care  She was seen in my office on 5/10/2021  Please excuse her from work from 05/10/2021 to 05/13/2021  She may return to work on 05/14/2021  If you have any questions or concerns, please don't hesitate to call           Sincerely,          FITO Mccullough        CC: No Recipients

## 2021-05-10 NOTE — PATIENT INSTRUCTIONS
Have thyroid blood work drawn  Increase Celexa back to 20 mg  You may receive a survey in the mail, or by e-mail, please fill it out COMPLETELY, and let us know how we did! Please remember to sign up for your SessionM kat to check you lab results, send us messages, and schedule appointments  If you have questions about the SessionM option, please call us! Thank you again for choosing [facility]!

## 2021-05-11 ENCOUNTER — TELEPHONE (OUTPATIENT)
Dept: CARDIOLOGY CLINIC | Facility: CLINIC | Age: 51
End: 2021-05-11

## 2021-05-11 LAB
ATRIAL RATE: 72 BPM
P AXIS: 75 DEGREES
PR INTERVAL: 150 MS
QRS AXIS: 60 DEGREES
QRSD INTERVAL: 84 MS
QT INTERVAL: 428 MS
QTC INTERVAL: 468 MS
T WAVE AXIS: 1 DEGREES
VENTRICULAR RATE: 72 BPM

## 2021-05-11 PROCEDURE — 93010 ELECTROCARDIOGRAM REPORT: CPT | Performed by: INTERNAL MEDICINE

## 2021-05-11 NOTE — TELEPHONE ENCOUNTER
Spoke with Dr Yocasta BRICEÑO for visit next week  Increase hydration  No additional testing at this time (other than blood work ordered by PCP)   If any recurrent symptoms or any other cardiac complaints she should come back to ER

## 2021-05-11 NOTE — TELEPHONE ENCOUNTER
Pt states that she passed out on Sunday  States that she woke up with chest pains  States that she was cold clammy, and then fell and hit her head  States that she did go to the ER       She wanted to see if she needed to be seen sooner, or if meds needed changing

## 2021-05-12 DIAGNOSIS — E03.9 ACQUIRED HYPOTHYROIDISM: Primary | ICD-10-CM

## 2021-05-12 RX ORDER — LEVOTHYROXINE SODIUM 0.07 MG/1
75 TABLET ORAL DAILY
Qty: 30 TABLET | Refills: 1 | Status: SHIPPED | OUTPATIENT
Start: 2021-05-12 | End: 2021-06-04 | Stop reason: SDUPTHER

## 2021-05-12 NOTE — ADDENDUM NOTE
Addended by: Sanjay Wagner on: 5/12/2021 11:27 AM     Modules accepted: Orders Lab: [id number] Detail Level: Detailed Consent: Written consent was obtained and risks were reviewed including but not limited to scarring, infection, bleeding, scabbing, incomplete removal, nerve damage and allergy to anesthesia. Bill For Surgical Tray: no Hemostasis: Drysol Biopsy Method: Dermablade Notification Instructions: Patient will be notified of biopsy results. However, patient instructed to call the office if not contacted within 2 weeks. Curettage Text: The wound bed was treated with curettage after the biopsy was performed. Additional Anesthesia Volume In Cc (Will Not Render If 0): 0 Anesthesia Type: 1% lidocaine with epinephrine Wound Care: No ointment Lab Facility: [id number] Cryotherapy Text: The wound bed was treated with cryotherapy after the biopsy was performed. Biopsy Type: H and E Billing Type: Third-Party Bill Electrodesiccation And Curettage Text: The wound bed was treated with electrodesiccation and curettage after the biopsy was performed. Type Of Destruction Used: Curettage Electrodesiccation Text: The wound bed was treated with electrodesiccation after the biopsy was performed. Depth Of Biopsy: dermis Dressing: bandage Was A Bandage Applied: Yes Silver Nitrate Text: The wound bed was treated with silver nitrate after the biopsy was performed. Anesthesia Volume In Cc: 0.5 Post-Care Instructions: I reviewed with the patient in detail post-care instructions. Patient is to keep the biopsy site dry overnight, and then apply bacitracin twice daily until healed. Patient may apply hydrogen peroxide soaks to remove any crusting.

## 2021-05-18 ENCOUNTER — OFFICE VISIT (OUTPATIENT)
Dept: CARDIOLOGY CLINIC | Facility: CLINIC | Age: 51
End: 2021-05-18
Payer: COMMERCIAL

## 2021-05-18 ENCOUNTER — DOCTOR'S OFFICE (OUTPATIENT)
Dept: URBAN - NONMETROPOLITAN AREA CLINIC 1 | Facility: CLINIC | Age: 51
Setting detail: OPHTHALMOLOGY
End: 2021-05-18
Payer: COMMERCIAL

## 2021-05-18 ENCOUNTER — RX ONLY (RX ONLY)
Age: 51
End: 2021-05-18

## 2021-05-18 VITALS
BODY MASS INDEX: 22.35 KG/M2 | WEIGHT: 147 LBS | SYSTOLIC BLOOD PRESSURE: 120 MMHG | DIASTOLIC BLOOD PRESSURE: 80 MMHG | TEMPERATURE: 96.5 F | HEART RATE: 63 BPM | OXYGEN SATURATION: 98 %

## 2021-05-18 DIAGNOSIS — I47.1 SVT (SUPRAVENTRICULAR TACHYCARDIA) (HCC): ICD-10-CM

## 2021-05-18 DIAGNOSIS — I47.2 VT (VENTRICULAR TACHYCARDIA) (HCC): ICD-10-CM

## 2021-05-18 DIAGNOSIS — H25.13: ICD-10-CM

## 2021-05-18 DIAGNOSIS — H40.1131: ICD-10-CM

## 2021-05-18 DIAGNOSIS — R55 SYNCOPE AND COLLAPSE: Primary | ICD-10-CM

## 2021-05-18 DIAGNOSIS — R00.2 PALPITATIONS: ICD-10-CM

## 2021-05-18 DIAGNOSIS — E78.2 MIXED HYPERLIPIDEMIA: ICD-10-CM

## 2021-05-18 DIAGNOSIS — E87.6 HYPOKALEMIA: ICD-10-CM

## 2021-05-18 DIAGNOSIS — I10 ESSENTIAL HYPERTENSION: ICD-10-CM

## 2021-05-18 PROCEDURE — 99215 OFFICE O/P EST HI 40 MIN: CPT | Performed by: INTERNAL MEDICINE

## 2021-05-18 PROCEDURE — 92014 COMPRE OPH EXAM EST PT 1/>: CPT | Performed by: OPHTHALMOLOGY

## 2021-05-18 PROCEDURE — 92133 CPTRZD OPH DX IMG PST SGM ON: CPT | Performed by: OPHTHALMOLOGY

## 2021-05-18 RX ORDER — IRBESARTAN 75 MG/1
75 TABLET ORAL DAILY
Qty: 30 TABLET | Refills: 11 | Status: SHIPPED | OUTPATIENT
Start: 2021-05-18 | End: 2022-06-06 | Stop reason: SDUPTHER

## 2021-05-18 RX ORDER — LATANOPROST 50 UG/ML
SOLUTION/ DROPS OPHTHALMIC
Qty: 3 | Refills: 3 | Status: ACTIVE | OUTPATIENT

## 2021-05-18 ASSESSMENT — TONOMETRY
OS_IOP_MMHG: 16
OD_IOP_MMHG: 16

## 2021-05-18 ASSESSMENT — DRY EYES - PHYSICIAN NOTES: OS_GENERALCOMMENTS: 2+ PEE

## 2021-05-18 ASSESSMENT — CONFRONTATIONAL VISUAL FIELD TEST (CVF)
OD_FINDINGS: FULL
OS_FINDINGS: FULL

## 2021-05-18 ASSESSMENT — PACHYMETRY
OD_CT_CORRECTION: 2
OS_CT_UM: 516
OD_CT_UM: 518
OS_CT_CORRECTION: 2

## 2021-05-18 NOTE — PATIENT INSTRUCTIONS
Recommend discontinuing current dose of irbesartan/HCTZ 150/12 5 mg and start irbesartan 75 mg daily  I would recommend transitioning this to the morning instead of at bedtime  We will discuss possible sleep study at follow-up  I would recommend implantable loop recorder as discussed given history of SVT/short run of VT and now syncope/passing out  [employer] LINQ is the device

## 2021-05-18 NOTE — H&P (VIEW-ONLY)
Cardiology Office Visit    Nilson Lowe  [id number]  1970    [facility]  [address] [employer] [address] [place] [facility] [address] [phone]      Dear FITO Stern,    I had the pleasure of seeing your patient at our [address] [facility] [place] office today 5/18/2021  As you know she is a pleasant 46y o  year old female with a medical history as described below  Reason for office visit:  Follow up palpitations, SVT, nonsustained ventricular tachycardia, hypertension and hyperlipidemia  New onset syncope  1  Syncope and collapse  Assessment & Plan:  Patient with episode of syncope and collapse resulting in injury to her head  Concern exists for arrhythmia given ZIO with short run of NSVT  Blood pressure is on low side but she is not orthostatic on exam (see discussion under hypertension regarding medication changes)  Recommend:  Implantable loop recorder  Patient is agreeable  Orders:  -     Cardiac loop recorder implant; Future; Expected date: 05/18/2021    2  Essential hypertension  Assessment & Plan:  Blood pressure is well controlled and in fact on the low side  I would recommend discontinuing irbesartan  hydrochlorothiazide 150/12 5 mg daily and changing to irbesartan 75 mg daily  Continue metoprolol succinate 25 mg daily  Orders:  -     irbesartan (AVAPRO) 75 mg tablet; Take 1 tablet (75 mg total) by mouth daily    3  SVT (supraventricular tachycardia) (HCC)  Assessment & Plan:  Only two short runs of SVT noted on ZIO 1/2020  Continue metoprolol succinate 25 mg daily  Continue magnesium 250 mg daily  Repeat monitoring if persistent symptoms  Palpitations stable since last office visit  Orders:  -     Cardiac loop recorder implant; Future; Expected date: 05/18/2021    4   VT (ventricular tachycardia) (HCC)  Assessment & Plan:  One short run of NSVT lasting 7 beats noted on ZIO 1/2020  Echocardiogram 1/7/2020 with normal LVEF (65-70%)  Continue metoprolol succinate 25 mg daily  Continue magnesium 250 mg daily  Patient with recent syncopal episode  Will plan implantable loop recorder  Orders:  -     Cardiac loop recorder implant; Future; Expected date: 05/18/2021    5  Palpitations  Assessment & Plan:  Patient continued to have intermittent episodes of palpitations at her initial office visit  She described her heart racing at times  Patient did undergo ZIO monitor 01//2020 which showed predominantly normal sinus rhythm with average heart rate of 78 beats per minute with rare PVCs and PACs  ZIO did show 1 run of NSVT lasting 7 beats and 2 runs of SVT with the longest lasting 9 beats (no diary entries to correlate with symptoms)  I had recommended the addition of magnesium 250 mg daily  Repeat blood work given history of hypokalemia showed a K of 3 5 and Mg level of 1 9  Patient has cut back on caffeine intake and has been trying to manage stress better  Palpitations stable since her last office visit  She will continue to monitor symptoms  Continue metoprolol succinate 25 mg daily  Loop recorder for syncopal episode in setting of NSVT and ongoing palpitations  6  Mixed hyperlipidemia  Assessment & Plan:  Patient is currently on atorvastatin  40 mg daily  Lipid panel 4/13/2021: C 189  T 188  H 52  L 99  Should have repeat lipid panel 4/2022        7  Hypokalemia  Assessment & Plan:  Patient has had intermittent episodes of hypokalemia  Most likely related to HCTZ component of Avalide  Repeat labs 10/1/2020 showed K 3 5 (borderline)  Encouraged intake of potassium containing food previously  Blood pressure is somewhat low and I have recommended discontinuing HCTZ portion of Avalide and transition to irbesartan alone which likely will resolve hypokalemia  HPI   Patient presents to establish care    She was previously being followed by Dr Jamal Florence ([facility])  Patient has a history of SVT, NSVT, Lyme disease, HTN, HLD, migraines, hypothyroidism, glaucoma, osteoporosis and anxiety  Patient was initially seen in consultation by cardiology 1/27/2020 for chest pain  She reported palpitations at that time as well  She had been admitted for observation at [facility] 1/6/2020 prior to evaluation  Echocardiogram 1/7/2020 was unremarkable  ZIO monitor 01/27/2020 showed an average heart rate of 78 beats per minute with rare PACs and PVCs  One 7 beat run of NSVT was noted and 2 runs of SVT were noted with the longest lasting 9 beats  Nuclear exercise stress test 03/18/2020 showed no evidence of ischemia  Patient did undergo EGD 4/24/2020 which showed gastritis  Colonoscopy done the same day showed patchy mild inflammation in the sigmoid colon secondary to colitis  9/11/2020: Patient presents to establish care  She has been feeling well in general  She was last seen by Dr Jamal Florence 3/23/2020  She has been maintained on metoprolol succinate for her SVT/VT and palpitations  She denies any chest pain  She denies any shortness of breath  She denies any lower extremity edema  She does continue to have intermittent palpitations  She tells me that over the last 2 weeks she has had some occasional episodes of heart racing as well as occasional lightheadedness/dizziness  In general she is feeling well  ECG today is unremarkable with mild non specific ST T wave abnormality  Possible septal MI likely due to lead placement  11/18/2020: Patient returns to the office today for follow-up  I had started her on magnesium 250 mg daily at her last office visit  She has been drinking last caffeine and also trying to destress as much as possible  She has noted less palpitations since her last office visit  She denies any chest pain  Overall she has been feeling well  She remains active, particularly at work    I had recommended some updated labs at her last visit  CMP 10/1/2020 was unremarkable however potassium remained borderline at 3 5  Magnesium was normal 1 9  CBC was unremarkable  5/18/2021: Patient presents today for follow up  She tells me that she woke up in cold sweat with left upper abdominal/lower chest discomfort (pressure/squeezing)  She tried to call out and could not  She felt like she was in a fog  The next thing she remembers her  found her on the ground in the kitchen after he heard a thud  She did hit her head  She was noted to be in a cold sweat and then later had emesis  [place] funny in her head  She did go to the ER within a few hours  No seizure activity  She tells me that she has passed out in the past during mammogram  She continues to have intermittent palpitations  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  She started feeling run down and weak  She felt lightheaded  She felt her heart racing with episode  TSH was low and medications were adjusted by primary provider  Not orthostatic on exam today  /83 in ER per her report  Patient Active Problem List   Diagnosis    Palpitations    Hypertension    Hypokalemia    SVT (supraventricular tachycardia) (HCC)    VT (ventricular tachycardia) (HCC)    Hyperlipidemia    Depression    Osteoporosis    Arthritis    Hypothyroidism    Migraine    Glaucoma    Anxiety    Syncope and collapse     Past Medical History:   Diagnosis Date    Allergic 1974    Seasonal    Anxiety     Arthritis     Colitis     Noted on colonoscopy 04/24/2020      Depression     Disease of thyroid gland     Gastritis     Noted on EGD 04/24/2020    Glaucoma     Headache(784 0)     Hyperlipidemia     Hypertension     Hypothyroidism     Lyme disease     Migraine     Osteoporosis     Scoliosis     SVT (supraventricular tachycardia) (HCC)     Visual impairment     VT (ventricular tachycardia) (HCC)           Social History Socioeconomic History    Marital status: /Civil Union     Spouse name: Not on file    Number of children: Not on file    Years of education: Not on file    Highest education level: Not on file   Occupational History    Not on file   Social Needs    Financial resource strain: Not on file    Food insecurity     Worry: Not on file     Inability: Not on file    Transportation needs     Medical: Not on file     Non-medical: Not on file   Tobacco Use    Smoking status: Former Smoker     Packs/day: 1 00     Years: 10 00     Pack years: 10 00     Types: Cigarettes     Quit date: 2007     Years since quittin 3    Smokeless tobacco: Never Used    Tobacco comment: quit    Substance and Sexual Activity    Alcohol use: Never     Frequency: Never    Drug use: Never    Sexual activity: Yes     Birth control/protection: Post-menopausal     Comment: hysterectomy   Lifestyle    Physical activity     Days per week: Not on file     Minutes per session: Not on file    Stress: Not on file   Relationships    Social connections     Talks on phone: Not on file     Gets together: Not on file     Attends Faith service: Not on file     Active member of club or organization: Not on file     Attends meetings of clubs or organizations: Not on file     Relationship status: Not on file    Intimate partner violence     Fear of current or ex partner: Not on file     Emotionally abused: Not on file     Physically abused: Not on file     Forced sexual activity: Not on file   Other Topics Concern    Not on file   Social History Narrative    Not on file      Family History   Problem Relation Age of Onset    Peripheral vascular disease Mother     Glaucoma Mother     Prostate cancer Father     Hypothyroidism Sister     No Known Problems Daughter     Colon cancer Maternal Grandmother     No Known Problems Maternal Grandfather     Arthritis Paternal Grandmother     No Known Problems Paternal Grandfather     No Known Problems Daughter     No Known Problems Maternal Aunt     No Known Problems Maternal Aunt     Skin cancer Paternal Aunt     No Known Problems Paternal Aunt      Past Surgical History:   Procedure Laterality Date    BREAST BIOPSY Right 10/21/2020    papilloma    COLONOSCOPY      EGD      FINGER SURGERY      left pinky    HYSTERECTOMY      HYSTERECTOMY W/ SALPINGO-OOPHERECTOMY  05/2008    US GUIDED BREAST BIOPSY RIGHT COMPLETE Right 10/21/2020       Current Outpatient Medications:     atorvastatin (LIPITOR) 40 mg tablet, Take 1 tablet (40 mg total) by mouth daily, Disp: 90 tablet, Rfl: 1    bimatoprost (Lumigan) 0 01 % ophthalmic drops, Lumigan 0 01 % eye drops, Disp: , Rfl:     Calcium Carbonate-Vit D-Min (CALCIUM 1200 PO), Take by mouth daily , Disp: , Rfl:     Cholecalciferol (VITAMIN D3 PO), Take 1,000 mg by mouth, Disp: , Rfl:     citalopram (CeleXA) 20 mg tablet, Take 20 mg by mouth daily , Disp: , Rfl:     Glucosamine-Chondroitin 500-400 MG CAPS, Take by mouth daily , Disp: , Rfl:     hydrOXYzine HCL (ATARAX) 25 mg tablet, Take 25 mg by mouth as needed , Disp: , Rfl:     Inulin (FIBER CHOICE PO), Take by mouth, Disp: , Rfl:     levothyroxine 75 mcg tablet, Take 1 tablet (75 mcg total) by mouth daily, Disp: 30 tablet, Rfl: 1    LORazepam (ATIVAN) 0 5 mg tablet, Take 0 5 mg by mouth as needed, Disp: , Rfl:     Magnesium 250 MG TABS, Take 1 tablet (250 mg total) by mouth daily, Disp: 30 tablet, Rfl: 0    Methyl Salicylate-Lido-Menthol (LidoPro) 4-4-5 % PTCH, LidoPro 4 %-4 %-5 % topical patch  APPLY 1 PATCH TO THE AFFECTED AREA EVERY 8 TO 12 HOURS AS NEEDED   MAX OF 2 PATCHES PER DAY, Disp: , Rfl:     metoprolol succinate (TOPROL-XL) 25 mg 24 hr tablet, Take 1 tablet (25 mg total) by mouth daily, Disp: 90 tablet, Rfl: 4    multivitamin (THERAGRAN) TABS, Take 1 tablet by mouth daily, Disp: , Rfl:     Omega-3 Fatty Acids (FISH OIL OMEGA-3 PO), Take by mouth, Disp: , Rfl:     omeprazole (PriLOSEC OTC) 20 MG tablet, Take 20 mg by mouth daily , Disp: , Rfl:     Rimegepant Sulfate (Nurtec) 75 MG TBDP, Take 75 mg by mouth as needed, Disp: , Rfl:     SUMAtriptan (IMITREX) 100 mg tablet, Take 100 mg by mouth as needed, Disp: , Rfl:     Thiamine HCl (VITAMIN B1 PO), Take 1 capsule by mouth daily, Disp: , Rfl:     irbesartan (AVAPRO) 75 mg tablet, Take 1 tablet (75 mg total) by mouth daily, Disp: 30 tablet, Rfl: 11  No Known Allergies      Cardiac Testing:    ECG 5/9/2021: Normal sinus rhythm  Nonspecific ST abnormality  Lipid panel 4/13/2021: C 189  T 188  H 52  L 99  ECG 09/11/2020:  Normal sinus rhythm  Possible septal MI (more likely related to lead placement)  Nonspecific ST/T wave abnormality  Nuclear Stress test 3/18/2020:   Helio  5:01  7 1 METs  90% MPHR  Deconditioned heart rate response to exercise  No evidence of scar  No evidence of ischemia  Calculated ejection fraction 93%  ZIO 1/27/2020:   1-the patient had a ZIO monitor placed   Analysis is based on 13 days and 14 hours  2-predominant rhythm is sinus  3-the minimum heart rate is 50 with the maximum heart rate being 151 and the average being 78 when in a sinus rhythm  4-there is a rare PVC   There was one run of NSVT consisting of 7 beats  5-there is a rare PAC   There were 2 runs of SVT with the longest being 9 beats  6- there are no pauses greater than 3 0 seconds  7-there are no episodes of second-degree heart block type II or third-degree heart block  8-there were no diary entries placed  9-there were no acute ST/T wave changes on the strips that were reviewed    No comparison ZIO monitor    Echocardiogram 1/7/2020:   Normal left ventricular chamber size  Normal left ventricular systolic function  Normal regional wall motion  Normal left ventricular wall thickness   Estimated left ventricular ejection fraction is 65-70%      Mild mitral regurgitation       Normal pulmonary artery systolic pressure  Normal diastolic function      Visually Estimated LV Ejection Fraction is:70%       Review of Systems:    Review of Systems   Constitutional: Negative for activity change, appetite change and fatigue  HENT: Negative for congestion, hearing loss, tinnitus and trouble swallowing  Eyes: Negative for visual disturbance  Respiratory: Negative for cough, chest tightness, shortness of breath and wheezing  Cardiovascular: Positive for palpitations  Negative for chest pain and leg swelling  Gastrointestinal: Negative for abdominal distention, abdominal pain, nausea and vomiting  Genitourinary: Negative for difficulty urinating  Musculoskeletal: Negative for arthralgias  Skin: Negative for rash  Neurological: Negative for dizziness, syncope and light-headedness  Hematological: Does not bruise/bleed easily  Psychiatric/Behavioral: Negative for confusion  The patient is not nervous/anxious  All other systems reviewed and are negative  Vitals:    05/18/21 0806 05/18/21 0843 05/18/21 0844   BP: 98/62 114/78 120/80   BP Location: Left arm Left arm Left arm   Patient Position: Sitting Sitting Standing   Pulse: 63     Temp: (!) 96 5 °F (35 8 °C)     SpO2: 98%     Weight: 66 7 kg (147 lb)       Vitals:    05/18/21 0806   Weight: 66 7 kg (147 lb)           Physical Exam   Constitutional: She is oriented to person, place, and time  She appears well-developed and well-nourished  HENT:   Head: Normocephalic and atraumatic  Eyes: Pupils are equal, round, and reactive to light  Conjunctivae are normal    Neck: Normal range of motion  No JVD present  Cardiovascular: Normal rate, regular rhythm and normal heart sounds  Exam reveals no gallop and no friction rub  No murmur heard  Pulmonary/Chest: Effort normal and breath sounds normal    Abdominal: Soft  Bowel sounds are normal    Musculoskeletal:         General: No edema     Neurological: She is alert and oriented to person, place, and time  Skin: Skin is warm and dry  Psychiatric: She has a normal mood and affect  Her behavior is normal    Vitals reviewed

## 2021-05-18 NOTE — PROGRESS NOTES
Cardiology Office Visit    Melissa Apple  [id number]  1970    [facility] [address] [address] [phone]      Dear FITO Cobos,    I had the pleasure of seeing your patient at our [facility]  office today 5/18/2021  As you know she is a pleasant 46y o  year old female with a medical history as described below  Reason for office visit:  Follow up palpitations, SVT, nonsustained ventricular tachycardia, hypertension and hyperlipidemia  New onset syncope  1  Syncope and collapse  Assessment & Plan:  Patient with episode of syncope and collapse resulting in injury to her head  Concern exists for arrhythmia given ZIO with short run of NSVT  Blood pressure is on low side but she is not orthostatic on exam (see discussion under hypertension regarding medication changes)  Recommend:  Implantable loop recorder  Patient is agreeable  Orders:  -     Cardiac loop recorder implant; Future; Expected date: 05/18/2021    2  Essential hypertension  Assessment & Plan:  Blood pressure is well controlled and in fact on the low side  I would recommend discontinuing irbesartan  hydrochlorothiazide 150/12 5 mg daily and changing to irbesartan 75 mg daily  Continue metoprolol succinate 25 mg daily  Orders:  -     irbesartan (AVAPRO) 75 mg tablet; Take 1 tablet (75 mg total) by mouth daily    3  SVT (supraventricular tachycardia) (HCC)  Assessment & Plan:  Only two short runs of SVT noted on ZIO 1/2020  Continue metoprolol succinate 25 mg daily  Continue magnesium 250 mg daily  Repeat monitoring if persistent symptoms  Palpitations stable since last office visit  Orders:  -     Cardiac loop recorder implant; Future; Expected date: 05/18/2021    4   VT (ventricular tachycardia) (HCC)  Assessment & Plan:  One short run of NSVT lasting 7 beats noted on ZIO 1/2020  Echocardiogram 1/7/2020 with normal LVEF (65-70%)  Continue metoprolol succinate 25 mg daily  Continue magnesium 250 mg daily  Patient with recent syncopal episode  Will plan implantable loop recorder  Orders:  -     Cardiac loop recorder implant; Future; Expected date: 05/18/2021    5  Palpitations  Assessment & Plan:  Patient continued to have intermittent episodes of palpitations at her initial office visit  She described her heart racing at times  Patient did undergo ZIO monitor 01//2020 which showed predominantly normal sinus rhythm with average heart rate of 78 beats per minute with rare PVCs and PACs  ZIO did show 1 run of NSVT lasting 7 beats and 2 runs of SVT with the longest lasting 9 beats (no diary entries to correlate with symptoms)  I had recommended the addition of magnesium 250 mg daily  Repeat blood work given history of hypokalemia showed a K of 3 5 and Mg level of 1 9  Patient has cut back on caffeine intake and has been trying to manage stress better  Palpitations stable since her last office visit  She will continue to monitor symptoms  Continue metoprolol succinate 25 mg daily  Loop recorder for syncopal episode in setting of NSVT and ongoing palpitations  6  Mixed hyperlipidemia  Assessment & Plan:  Patient is currently on atorvastatin  40 mg daily  Lipid panel 4/13/2021: C 189  T 188  H 52  L 99  Should have repeat lipid panel 4/2022        7  Hypokalemia  Assessment & Plan:  Patient has had intermittent episodes of hypokalemia  Most likely related to HCTZ component of Avalide  Repeat labs 10/1/2020 showed K 3 5 (borderline)  Encouraged intake of potassium containing food previously  Blood pressure is somewhat low and I have recommended discontinuing HCTZ portion of Avalide and transition to irbesartan alone which likely will resolve hypokalemia  HPI   Patient presents to establish care    She was previously being followed by Dr Jose Nixon ([address] [facility])  Patient has a history of SVT, NSVT, Lyme disease, HTN, HLD, migraines, hypothyroidism, glaucoma, osteoporosis and anxiety  Patient was initially seen in consultation by cardiology 1/27/2020 for chest pain  She reported palpitations at that time as well  She had been admitted for observation at [facility] 1/6/2020 prior to evaluation  Echocardiogram 1/7/2020 was unremarkable  ZIO monitor 01/27/2020 showed an average heart rate of 78 beats per minute with rare PACs and PVCs  One 7 beat run of NSVT was noted and 2 runs of SVT were noted with the longest lasting 9 beats  Nuclear exercise stress test 03/18/2020 showed no evidence of ischemia  Patient did undergo EGD 4/24/2020 which showed gastritis  Colonoscopy done the same day showed patchy mild inflammation in the sigmoid colon secondary to colitis  9/11/2020: Patient presents to establish care  She has been feeling well in general  She was last seen by Dr Jose Nixon 3/23/2020  She has been maintained on metoprolol succinate for her SVT/VT and palpitations  She denies any chest pain  She denies any shortness of breath  She denies any lower extremity edema  She does continue to have intermittent palpitations  She tells me that over the last 2 weeks she has had some occasional episodes of heart racing as well as occasional lightheadedness/dizziness  In general she is feeling well  ECG today is unremarkable with mild non specific ST T wave abnormality  Possible septal MI likely due to lead placement  11/18/2020: Patient returns to the office today for follow-up  I had started her on magnesium 250 mg daily at her last office visit  She has been drinking last caffeine and also trying to destress as much as possible  She has noted less palpitations since her last office visit  She denies any chest pain  Overall she has been feeling well  She remains active, particularly at work    I had recommended some updated labs at her last visit  CMP 10/1/2020 was unremarkable however potassium remained borderline at 3 5  Magnesium was normal 1 9  CBC was unremarkable  5/18/2021: Patient presents today for follow up  She tells me that she woke up in cold sweat with left upper abdominal/lower chest discomfort (pressure/squeezing)  She tried to call out and could not  She felt like she was in a fog  The next thing she remembers her  found her on the ground in the kitchen after he heard a thud  She did hit her head  She was noted to be in a cold sweat and then later had emesis  Gay funny in her head  She did go to the ER within a few hours  No seizure activity  She tells me that she has passed out in the past during mammogram  She continues to have intermittent palpitations  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  She started feeling run down and weak  She felt lightheaded  She felt her heart racing with episode  TSH was low and medications were adjusted by primary provider  Not orthostatic on exam today  /83 in ER per her report  Patient Active Problem List   Diagnosis    Palpitations    Hypertension    Hypokalemia    SVT (supraventricular tachycardia) (HCC)    VT (ventricular tachycardia) (HCC)    Hyperlipidemia    Depression    Osteoporosis    Arthritis    Hypothyroidism    Migraine    Glaucoma    Anxiety    Syncope and collapse     Past Medical History:   Diagnosis Date    Allergic 1974    Seasonal    Anxiety     Arthritis     Colitis     Noted on colonoscopy 04/24/2020      Depression     Disease of thyroid gland     Gastritis     Noted on EGD 04/24/2020    Glaucoma     Headache(784 0)     Hyperlipidemia     Hypertension     Hypothyroidism     Lyme disease     Migraine     Osteoporosis     Scoliosis     SVT (supraventricular tachycardia) (HCC)     Visual impairment     VT (ventricular tachycardia) (HCC)           Social History Socioeconomic History    Marital status: /Civil Union     Spouse name: Not on file    Number of children: Not on file    Years of education: Not on file    Highest education level: Not on file   Occupational History    Not on file   Social Needs    Financial resource strain: Not on file    Food insecurity     Worry: Not on file     Inability: Not on file    Transportation needs     Medical: Not on file     Non-medical: Not on file   Tobacco Use    Smoking status: Former Smoker     Packs/day: 1 00     Years: 10 00     Pack years: 10 00     Types: Cigarettes     Quit date: 2007     Years since quittin 3    Smokeless tobacco: Never Used    Tobacco comment: quit    Substance and Sexual Activity    Alcohol use: Never     Frequency: Never    Drug use: Never    Sexual activity: Yes     Birth control/protection: Post-menopausal     Comment: hysterectomy   Lifestyle    Physical activity     Days per week: Not on file     Minutes per session: Not on file    Stress: Not on file   Relationships    Social connections     Talks on phone: Not on file     Gets together: Not on file     Attends [employer] service: Not on file     Active member of club or organization: Not on file     Attends meetings of clubs or organizations: Not on file     Relationship status: Not on file    Intimate partner violence     Fear of current or ex partner: Not on file     Emotionally abused: Not on file     Physically abused: Not on file     Forced sexual activity: Not on file   Other Topics Concern    Not on file   Social History Narrative    Not on file      Family History   Problem Relation Age of Onset    Peripheral vascular disease Mother     Glaucoma Mother     Prostate cancer Father     Hypothyroidism Sister     No Known Problems Daughter     Colon cancer Maternal Grandmother     No Known Problems Maternal Grandfather     Arthritis Paternal Grandmother     No Known Problems Paternal Grandfather     No Known Problems Daughter     No Known Problems Maternal Aunt     No Known Problems Maternal Aunt     Skin cancer Paternal Aunt     No Known Problems Paternal Aunt      Past Surgical History:   Procedure Laterality Date    BREAST BIOPSY Right 10/21/2020    papilloma    COLONOSCOPY      EGD      FINGER SURGERY      left pinky    HYSTERECTOMY      HYSTERECTOMY W/ SALPINGO-OOPHERECTOMY  05/2008    US GUIDED BREAST BIOPSY RIGHT COMPLETE Right 10/21/2020       Current Outpatient Medications:     atorvastatin (LIPITOR) 40 mg tablet, Take 1 tablet (40 mg total) by mouth daily, Disp: 90 tablet, Rfl: 1    bimatoprost (Lumigan) 0 01 % ophthalmic drops, Lumigan 0 01 % eye drops, Disp: , Rfl:     Calcium Carbonate-Vit D-Min (CALCIUM 1200 PO), Take by mouth daily , Disp: , Rfl:     Cholecalciferol (VITAMIN D3 PO), Take 1,000 mg by mouth, Disp: , Rfl:     citalopram (CeleXA) 20 mg tablet, Take 20 mg by mouth daily , Disp: , Rfl:     Glucosamine-Chondroitin 500-400 MG CAPS, Take by mouth daily , Disp: , Rfl:     hydrOXYzine HCL (ATARAX) 25 mg tablet, Take 25 mg by mouth as needed , Disp: , Rfl:     Inulin (FIBER CHOICE PO), Take by mouth, Disp: , Rfl:     levothyroxine 75 mcg tablet, Take 1 tablet (75 mcg total) by mouth daily, Disp: 30 tablet, Rfl: 1    LORazepam (ATIVAN) 0 5 mg tablet, Take 0 5 mg by mouth as needed, Disp: , Rfl:     Magnesium 250 MG TABS, Take 1 tablet (250 mg total) by mouth daily, Disp: 30 tablet, Rfl: 0    Methyl Salicylate-Lido-Menthol (LidoPro) 4-4-5 % PTCH, LidoPro 4 %-4 %-5 % topical patch  APPLY 1 PATCH TO THE AFFECTED AREA EVERY 8 TO 12 HOURS AS NEEDED   MAX OF 2 PATCHES PER DAY, Disp: , Rfl:     metoprolol succinate (TOPROL-XL) 25 mg 24 hr tablet, Take 1 tablet (25 mg total) by mouth daily, Disp: 90 tablet, Rfl: 4    multivitamin (THERAGRAN) TABS, Take 1 tablet by mouth daily, Disp: , Rfl:     Omega-3 Fatty Acids (FISH OIL OMEGA-3 PO), Take by mouth, Disp: , Rfl:     omeprazole (PriLOSEC OTC) 20 MG tablet, Take 20 mg by mouth daily , Disp: , Rfl:     Rimegepant Sulfate (Nurtec) 75 MG TBDP, Take 75 mg by mouth as needed, Disp: , Rfl:     SUMAtriptan (IMITREX) 100 mg tablet, Take 100 mg by mouth as needed, Disp: , Rfl:     Thiamine HCl (VITAMIN B1 PO), Take 1 capsule by mouth daily, Disp: , Rfl:     irbesartan (AVAPRO) 75 mg tablet, Take 1 tablet (75 mg total) by mouth daily, Disp: 30 tablet, Rfl: 11  No Known Allergies      Cardiac Testing:    ECG 5/9/2021: Normal sinus rhythm  Nonspecific ST abnormality  Lipid panel 4/13/2021: C 189  T 188  H 52  L 99  ECG 09/11/2020:  Normal sinus rhythm  Possible septal MI (more likely related to lead placement)  Nonspecific ST/T wave abnormality  Nuclear Stress test 3/18/2020:   Helio  5:01  7 1 METs  90% MPHR  Deconditioned heart rate response to exercise  No evidence of scar  No evidence of ischemia  Calculated ejection fraction 93%  ZIO 1/27/2020:   1-the patient had a ZIO monitor placed   Analysis is based on 13 days and 14 hours  2-predominant rhythm is sinus  3-the minimum heart rate is 50 with the maximum heart rate being 151 and the average being 78 when in a sinus rhythm  4-there is a rare PVC   There was one run of NSVT consisting of 7 beats  5-there is a rare PAC   There were 2 runs of SVT with the longest being 9 beats  6- there are no pauses greater than 3 0 seconds  7-there are no episodes of second-degree heart block type II or third-degree heart block  8-there were no diary entries placed  9-there were no acute ST/T wave changes on the strips that were reviewed    No comparison ZIO monitor    Echocardiogram 1/7/2020:   Normal left ventricular chamber size  Normal left ventricular systolic function  Normal regional wall motion  Normal left ventricular wall thickness   Estimated left ventricular ejection fraction is 65-70%      Mild mitral regurgitation       Normal pulmonary artery systolic pressure  Normal diastolic function      Visually Estimated LV Ejection Fraction is:70%       Review of Systems:    Review of Systems   Constitutional: Negative for activity change, appetite change and fatigue  HENT: Negative for congestion, hearing loss, tinnitus and trouble swallowing  Eyes: Negative for visual disturbance  Respiratory: Negative for cough, chest tightness, shortness of breath and wheezing  Cardiovascular: Positive for palpitations  Negative for chest pain and leg swelling  Gastrointestinal: Negative for abdominal distention, abdominal pain, nausea and vomiting  Genitourinary: Negative for difficulty urinating  Musculoskeletal: Negative for arthralgias  Skin: Negative for rash  Neurological: Negative for dizziness, syncope and light-headedness  Hematological: Does not bruise/bleed easily  Psychiatric/Behavioral: Negative for confusion  The patient is not nervous/anxious  All other systems reviewed and are negative  Vitals:    05/18/21 0806 05/18/21 0843 05/18/21 0844   BP: 98/62 114/78 120/80   BP Location: Left arm Left arm Left arm   Patient Position: Sitting Sitting Standing   Pulse: 63     Temp: (!) 96 5 °F (35 8 °C)     SpO2: 98%     Weight: 66 7 kg (147 lb)       Vitals:    05/18/21 0806   Weight: 66 7 kg (147 lb)           Physical Exam   Constitutional: She is oriented to person, place, and time  She appears well-developed and well-nourished  HENT:   Head: Normocephalic and atraumatic  Eyes: Pupils are equal, round, and reactive to light  Conjunctivae are normal    Neck: Normal range of motion  No JVD present  Cardiovascular: Normal rate, regular rhythm and normal heart sounds  Exam reveals no gallop and no friction rub  No murmur heard  Pulmonary/Chest: Effort normal and breath sounds normal    Abdominal: Soft  Bowel sounds are normal    Musculoskeletal:         General: No edema     Neurological: She is alert and oriented to person, place, and time  Skin: Skin is warm and dry  Psychiatric: She has a normal mood and affect  Her behavior is normal    Vitals reviewed

## 2021-05-19 ASSESSMENT — REFRACTION_CURRENTRX
OD_CYLINDER: -2.25
OS_AXIS: 175
OD_OVR_VA: 20/
OS_CYLINDER: -2.25
OS_VPRISM_DIRECTION: PROGS
OD_VPRISM_DIRECTION: PROGS
OD_SPHERE: +0.25
OD_ADD: +1.50
OD_AXIS: 176
OS_SPHERE: -0.75
OS_OVR_VA: 20/
OS_ADD: +1.50

## 2021-05-19 ASSESSMENT — REFRACTION_AUTOREFRACTION
OD_CYLINDER: -2.25
OS_AXIS: 166
OS_CYLINDER: -2.00
OD_SPHERE: +0.50
OD_AXIS: 177
OS_SPHERE: 0.00

## 2021-05-19 ASSESSMENT — KERATOMETRY
OD_K2POWER_DIOPTERS: 43.75
OD_K1POWER_DIOPTERS: 42.00
OS_K2POWER_DIOPTERS: 43.75
OS_AXISANGLE_DEGREES: 082
OD_AXISANGLE_DEGREES: 085
OS_K1POWER_DIOPTERS: 41.50

## 2021-05-19 ASSESSMENT — AXIALLENGTH_DERIVED
OD_AL: 24.0747
OS_AL: 24.325

## 2021-05-19 ASSESSMENT — SPHEQUIV_DERIVED
OD_SPHEQUIV: -0.625
OS_SPHEQUIV: -1

## 2021-05-19 ASSESSMENT — VISUAL ACUITY
OS_BCVA: 20/25
OD_BCVA: 20/25

## 2021-05-19 NOTE — ASSESSMENT & PLAN NOTE
Blood pressure is well controlled and in fact on the low side  I would recommend discontinuing irbesartan  hydrochlorothiazide 150/12 5 mg daily and changing to irbesartan 75 mg daily  Continue metoprolol succinate 25 mg daily

## 2021-05-19 NOTE — ASSESSMENT & PLAN NOTE
Only two short runs of SVT noted on ZIO 1/2020  Continue metoprolol succinate 25 mg daily  Continue magnesium 250 mg daily  Repeat monitoring if persistent symptoms  Palpitations stable since last office visit

## 2021-05-19 NOTE — ASSESSMENT & PLAN NOTE
Patient is currently on atorvastatin  40 mg daily  Lipid panel 4/13/2021: C 189  T 188  H 52  L 99  Should have repeat lipid panel 4/2022

## 2021-05-19 NOTE — ASSESSMENT & PLAN NOTE
Patient continued to have intermittent episodes of palpitations at her initial office visit  She described her heart racing at times  Patient did undergo ZIO monitor 01//2020 which showed predominantly normal sinus rhythm with average heart rate of 78 beats per minute with rare PVCs and PACs  ZIO did show 1 run of NSVT lasting 7 beats and 2 runs of SVT with the longest lasting 9 beats (no diary entries to correlate with symptoms)  I had recommended the addition of magnesium 250 mg daily  Repeat blood work given history of hypokalemia showed a K of 3 5 and Mg level of 1 9  Patient has cut back on caffeine intake and has been trying to manage stress better  Palpitations stable since her last office visit  She will continue to monitor symptoms  Continue metoprolol succinate 25 mg daily  Loop recorder for syncopal episode in setting of NSVT and ongoing palpitations

## 2021-05-19 NOTE — ASSESSMENT & PLAN NOTE
Patient with episode of syncope and collapse resulting in injury to her head  Concern exists for arrhythmia given ZIO with short run of NSVT  Blood pressure is on low side but she is not orthostatic on exam (see discussion under hypertension regarding medication changes)  Recommend:  Implantable loop recorder  Patient is agreeable

## 2021-05-19 NOTE — ASSESSMENT & PLAN NOTE
One short run of NSVT lasting 7 beats noted on ZIO 1/2020  Echocardiogram 1/7/2020 with normal LVEF (65-70%)  Continue metoprolol succinate 25 mg daily  Continue magnesium 250 mg daily  Patient with recent syncopal episode  Will plan implantable loop recorder

## 2021-05-24 ENCOUNTER — TELEPHONE (OUTPATIENT)
Dept: CARDIOLOGY CLINIC | Facility: CLINIC | Age: 51
End: 2021-05-24

## 2021-05-24 NOTE — TELEPHONE ENCOUNTER
She does not need auth for her Loop implant [id number], [id number] on 5/27/21 at Tayo Cannon at [facility]  Ref# [id number]

## 2021-05-24 NOTE — TELEPHONE ENCOUNTER
----- Message from [facility], DO sent at 5/18/2021 10:09 PM EDT -----  Regarding: Loop implant  Can we please schedule patient for loop recorder at the site of her choice for unexplained syncope and NSVT  Thank you

## 2021-05-24 NOTE — TELEPHONE ENCOUNTER
COVID Pre-Visit Screening     1  Is this a family member screening? Yes  2  Have you traveled outside of your state in the past 2 weeks? No  3  Do you presently have a fever or flu-like symptoms? No  4  Do you have symptoms of an upper respiratory infection like runny nose, sore throat, or cough? No  5  Are you suffering from new headache that you have not had in the past?  No  6  Do you have/have you experienced any new shortness of breath recently? No  7  Do you have any new diarrhea, nausea or vomiting? No  8  Have you been in contact with anyone who has been sick or diagnosed with COVID-19? No  9  Do you have any new loss of taste or smell? No  10  Are you able to wear a mask without a valve for the entire visit? Yes     Patient schedule for loop implant at \[facility]\"" on 5/27/21 with Dr Dania Serrano  Patient aware of general instructions    Please Teresita López can you check for insurance approval

## 2021-05-26 ENCOUNTER — TELEPHONE (OUTPATIENT)
Dept: CARDIOLOGY CLINIC | Facility: CLINIC | Age: 51
End: 2021-05-26

## 2021-05-27 ENCOUNTER — HOSPITAL ENCOUNTER (OUTPATIENT)
Dept: NON INVASIVE DIAGNOSTICS | Facility: HOSPITAL | Age: 51
Discharge: HOME | End: 2021-05-27
Attending: INTERNAL MEDICINE | Admitting: INTERNAL MEDICINE
Payer: COMMERCIAL

## 2021-05-27 VITALS
TEMPERATURE: 98.2 F | WEIGHT: 145 LBS | HEIGHT: 68 IN | HEART RATE: 62 BPM | DIASTOLIC BLOOD PRESSURE: 70 MMHG | RESPIRATION RATE: 18 BRPM | SYSTOLIC BLOOD PRESSURE: 127 MMHG | OXYGEN SATURATION: 100 % | BODY MASS INDEX: 21.98 KG/M2

## 2021-05-27 DIAGNOSIS — I47.2 VT (VENTRICULAR TACHYCARDIA) (HCC): ICD-10-CM

## 2021-05-27 DIAGNOSIS — I47.1 SVT (SUPRAVENTRICULAR TACHYCARDIA) (HCC): ICD-10-CM

## 2021-05-27 DIAGNOSIS — R55 SYNCOPE AND COLLAPSE: ICD-10-CM

## 2021-05-27 PROCEDURE — C1764 EVENT RECORDER, CARDIAC: HCPCS

## 2021-05-27 PROCEDURE — 33285 INSJ SUBQ CAR RHYTHM MNTR: CPT

## 2021-05-27 PROCEDURE — 33285 INSJ SUBQ CAR RHYTHM MNTR: CPT | Performed by: INTERNAL MEDICINE

## 2021-05-27 RX ORDER — LIDOCAINE HYDROCHLORIDE 10 MG/ML
INJECTION, SOLUTION EPIDURAL; INFILTRATION; INTRACAUDAL; PERINEURAL CODE/TRAUMA/SEDATION MEDICATION
Status: COMPLETED | OUTPATIENT
Start: 2021-05-27 | End: 2021-05-27

## 2021-05-27 RX ADMIN — LIDOCAINE HYDROCHLORIDE 10 ML: 10 INJECTION, SOLUTION EPIDURAL; INFILTRATION; INTRACAUDAL; PERINEURAL at 08:38

## 2021-05-27 NOTE — DISCHARGE INSTRUCTIONS
Keep loop recorder incision dry for one week  Do not use lotions/powders/creams on incision  Remove outer bandage 48 hours after procedure - if present, leave underlying steri-strips in place, they will either fall off on their own or will be removed at 2 week follow up appointment  Please call the office [phone] if you notice redness, swelling, bleeding, or drainage from incision or if you develop fevers  Cardiac Loop Recorder Insertion      WHAT YOU SHOULD KNOW:    A cardiac loop recorder is a device used to diagnose heart rhythm problems, such as a fast or irregular heartbeat  It is implanted in your left chest, just under the skin  The device records a pattern of your heart's rhythm, called an EKG  Your device records automatic EKGs, depending on how your caregiver programs it  You may also receive a handheld controller  You press a button on the controller when you have symptoms, such as dizziness, lightheadedness, or palpitations  The device will record an EKG at that moment  The recording can help your caregiver see if your symptoms may be caused by heart rhythm problems  Your caregiver will remove the device after it has collected enough data  You may need the device for up to 3 years  The procedure to remove the device is similar to the procedure used to implant it       AFTER YOU LEAVE:    Follow up with your cardiologist as directed: You will need to return in 1 to 2 weeks  Your cardiologist will check your incision  He may also program your device settings again  He will retrieve data from the device every 1 to 3 months with a monitor held over your skin  You may be able to transmit data from your device from home as well  You will do this by calling a number provided by your cardiologist, or as they have instructed you  Ask for information about this process  Write down your questions so you remember to ask them during your visits       Wound care: Keep loop recorder incision dry for one week  Do not use lotions/powders/creams on incision  Remove outer bandage 48 hours after procedure - leave underlying steri-strips in place, they will either fall off on their own or will be removed at 2 week follow up appointment  Please call the office if you notice redness, swelling, bleeding, or drainage from incision or if you develop fevers  After that first week, carefully wash your incision with soap and water  Keep the area clean and dry until it heals       Return to activity: If you received anesthesia, you will not be able to drive for 24 hours  Otherwise, most people can return to normal activities soon after the procedure  Your cardiologist may want to know if your work involves electrical current or high-voltage equipment  Ask about other electrical items that could interfere with your cardiac loop recorder       Contact your cardiologist if:   · You have a fever or chills  · Your wound is red, swollen, or draining pus  · You have questions or concerns about your condition or care  Seek care immediately or call 911 if:   · You feel weak, dizzy, or faint  · You lose consciousness  © 2014 [employer] is for End User's use only and may not be sold, redistributed or otherwise used for commercial purposes  All illustrations and images included in CareNotes® are the copyrighted property of A D [employer] M [employer]  or Bernabe Valentine  The above information is an  only  It is not intended as medical advice for individual conditions or treatments  Talk to your doctor, nurse or pharmacist before following any medical regimen to see if it is safe and effective for you

## 2021-05-27 NOTE — INTERVAL H&P NOTE
Please see recent office visit with Dr Will Vaughan for full details  Briefly this patient is a pleasant 59-year-old female with prior episode of syncope, SVT and brief NSVT noted on prior event monitor, normal LV systolic function, hypertension, hypothyroidism and hyperlipidemia  She has a history of palpitations as well as dizziness/lightheadedness, and has been maintained on metoprolol  She underwent an outpatient event monitor which showed 2 brief runs of SVT as well as a 7 beat run of nonsustained VT  A loop recorder implantation has been recommended for ongoing arrhythmia monitoring, as well as better symptom/rhythm correlation  No significant changes since she was last seen, physical exam unchanged      Vitals:    05/27/21 0820   BP: 127/70   Pulse: 62   Resp: 18   Temp: 98 2 °F (36 8 °C)   SpO2: 100%

## 2021-06-03 ENCOUNTER — APPOINTMENT (OUTPATIENT)
Dept: LAB | Facility: HOSPITAL | Age: 51
End: 2021-06-03
Payer: COMMERCIAL

## 2021-06-03 DIAGNOSIS — E03.9 ACQUIRED HYPOTHYROIDISM: ICD-10-CM

## 2021-06-03 LAB — TSH SERPL DL<=0.05 MIU/L-ACNC: 0.82 UIU/ML (ref 0.36–3.74)

## 2021-06-03 PROCEDURE — 36415 COLL VENOUS BLD VENIPUNCTURE: CPT

## 2021-06-03 PROCEDURE — 84443 ASSAY THYROID STIM HORMONE: CPT

## 2021-06-04 DIAGNOSIS — E03.9 ACQUIRED HYPOTHYROIDISM: ICD-10-CM

## 2021-06-04 RX ORDER — LEVOTHYROXINE SODIUM 0.07 MG/1
75 TABLET ORAL DAILY
Qty: 90 TABLET | Refills: 1 | Status: SHIPPED | OUTPATIENT
Start: 2021-06-04 | End: 2021-07-07 | Stop reason: SDUPTHER

## 2021-06-07 ENCOUNTER — OFFICE VISIT (OUTPATIENT)
Dept: FAMILY MEDICINE CLINIC | Facility: CLINIC | Age: 51
End: 2021-06-07
Payer: COMMERCIAL

## 2021-06-07 VITALS
TEMPERATURE: 97.6 F | WEIGHT: 147.6 LBS | OXYGEN SATURATION: 99 % | HEIGHT: 68 IN | HEART RATE: 71 BPM | DIASTOLIC BLOOD PRESSURE: 72 MMHG | SYSTOLIC BLOOD PRESSURE: 122 MMHG | BODY MASS INDEX: 22.37 KG/M2

## 2021-06-07 DIAGNOSIS — E03.9 ACQUIRED HYPOTHYROIDISM: Primary | ICD-10-CM

## 2021-06-07 DIAGNOSIS — E78.2 MIXED HYPERLIPIDEMIA: ICD-10-CM

## 2021-06-07 DIAGNOSIS — R00.2 PALPITATIONS: ICD-10-CM

## 2021-06-07 DIAGNOSIS — F41.9 ANXIETY: ICD-10-CM

## 2021-06-07 PROCEDURE — 99213 OFFICE O/P EST LOW 20 MIN: CPT | Performed by: NURSE PRACTITIONER

## 2021-06-07 NOTE — PATIENT INSTRUCTIONS
You may receive a survey in the mail, or by e-mail, please fill it out COMPLETELY, and let us know how we did! Please remember to sign up for your [employer] kat to check you lab results, send us messages, and schedule appointments  If you have questions about the [employer] option, please call us! Thank you again for choosing [facility]!

## 2021-06-07 NOTE — PROGRESS NOTES
Assessment/Plan:       Diagnoses and all orders for this visit:    Acquired hypothyroidism    Palpitations    Anxiety    Mixed hyperlipidemia      Concern regarding her weight discussed - she is currently at a normal BMI  Discussed healthy weight levels and maintaining health  Recommended she seek out the  she had last fall to help maintain her more recent weight loss as she does not want to regain the weight  Continue with healthy lifestyle choices for her cholesterol  BP within parameters  Loop recorder in place and monitoring  Subjective:      Patient ID: Ayala Thorpe is a 46 y o  female  Here today for a follow-up  She is with a hx of hypothyroidism, recent TSH in normal range  Hx of HTN, HLD, syncope/collapse - is followed by cardiology with a loop recorder recently placed to monitor her heart rhythm  Is worried about her weight - notes she lost 60 pounds last fall and has gained some back and is concerned about gaining more  She notes she does exercise and eat healthy  The following portions of the patient's history were reviewed and updated as appropriate: allergies, current medications, past family history, past medical history, past social history, past surgical history and problem list     Review of Systems   Constitutional: Negative  Respiratory: Negative  Cardiovascular:        Hx of palpitations  Genitourinary: Negative  Neurological: Negative  All other systems reviewed and are negative  Objective:      /72 (BP Location: Left arm, Patient Position: Sitting, Cuff Size: Standard)   Pulse 71   Temp 97 6 °F (36 4 °C)   Ht 5' 8" (1 727 m)   Wt 67 kg (147 lb 9 6 oz)   SpO2 99%   BMI 22 44 kg/m²          Physical Exam  Constitutional:       Appearance: Normal appearance  Cardiovascular:      Rate and Rhythm: Normal rate and regular rhythm  Heart sounds: No murmur  No gallop      Pulmonary:      Effort: Pulmonary effort is normal  No respiratory distress  Breath sounds: Normal breath sounds  No wheezing or rales  Neurological:      General: No focal deficit present  Mental Status: She is alert and oriented to person, place, and time  Mental status is at baseline  Psychiatric:         Mood and Affect: Mood normal          Behavior: Behavior normal          Thought Content:  Thought content normal          Judgment: Judgment normal

## 2021-06-16 ENCOUNTER — OFFICE VISIT (OUTPATIENT)
Dept: FAMILY MEDICINE CLINIC | Facility: CLINIC | Age: 51
End: 2021-06-16
Payer: COMMERCIAL

## 2021-06-16 VITALS
TEMPERATURE: 98 F | DIASTOLIC BLOOD PRESSURE: 78 MMHG | HEIGHT: 68 IN | WEIGHT: 149 LBS | HEART RATE: 88 BPM | SYSTOLIC BLOOD PRESSURE: 126 MMHG | OXYGEN SATURATION: 98 % | BODY MASS INDEX: 22.58 KG/M2

## 2021-06-16 DIAGNOSIS — M81.0 OSTEOPOROSIS, UNSPECIFIED OSTEOPOROSIS TYPE, UNSPECIFIED PATHOLOGICAL FRACTURE PRESENCE: Primary | ICD-10-CM

## 2021-06-16 DIAGNOSIS — M54.50 ACUTE RIGHT-SIDED LOW BACK PAIN WITHOUT SCIATICA: Primary | ICD-10-CM

## 2021-06-16 DIAGNOSIS — S39.012A STRAIN OF LUMBAR REGION, INITIAL ENCOUNTER: ICD-10-CM

## 2021-06-16 DIAGNOSIS — M62.838 MUSCLE SPASM: ICD-10-CM

## 2021-06-16 PROCEDURE — 99213 OFFICE O/P EST LOW 20 MIN: CPT | Performed by: NURSE PRACTITIONER

## 2021-06-16 PROCEDURE — 96372 THER/PROPH/DIAG INJ SC/IM: CPT | Performed by: NURSE PRACTITIONER

## 2021-06-16 RX ORDER — CYCLOBENZAPRINE HCL 10 MG
10 TABLET ORAL 3 TIMES DAILY PRN
Qty: 30 TABLET | Refills: 0 | Status: SHIPPED | OUTPATIENT
Start: 2021-06-16 | End: 2021-08-06

## 2021-06-16 RX ORDER — CHLORAL HYDRATE 500 MG
CAPSULE ORAL
COMMUNITY
End: 2021-10-04

## 2021-06-16 RX ORDER — KETOROLAC TROMETHAMINE 30 MG/ML
30 INJECTION, SOLUTION INTRAMUSCULAR; INTRAVENOUS ONCE
Status: COMPLETED | OUTPATIENT
Start: 2021-06-16 | End: 2021-06-16

## 2021-06-16 RX ORDER — DICLOFENAC SODIUM 75 MG/1
75 TABLET, DELAYED RELEASE ORAL 2 TIMES DAILY
Qty: 60 TABLET | Refills: 1 | Status: SHIPPED | OUTPATIENT
Start: 2021-06-16 | End: 2021-08-06

## 2021-06-16 RX ADMIN — KETOROLAC TROMETHAMINE 30 MG: 30 INJECTION, SOLUTION INTRAMUSCULAR; INTRAVENOUS at 12:05

## 2021-06-16 NOTE — PROGRESS NOTES
Pt  Seen initially by a [employer] student  Assessed and reviewed this patient with the [employer] student thereafter, see plan  Assessment/Plan:     Diagnoses and all orders for this visit:    Acute right-sided low back pain without sciatica  -     ketorolac (TORADOL) injection 30 mg  -     diclofenac (VOLTAREN) 75 mg EC tablet; Take 1 tablet (75 mg total) by mouth 2 (two) times a day  -     cyclobenzaprine (FLEXERIL) 10 mg tablet; Take 1 tablet (10 mg total) by mouth 3 (three) times a day as needed for muscle spasms    Strain of lumbar region, initial encounter  -     ketorolac (TORADOL) injection 30 mg  -     diclofenac (VOLTAREN) 75 mg EC tablet; Take 1 tablet (75 mg total) by mouth 2 (two) times a day  -     cyclobenzaprine (FLEXERIL) 10 mg tablet; Take 1 tablet (10 mg total) by mouth 3 (three) times a day as needed for muscle spasms    Muscle spasm  -     ketorolac (TORADOL) injection 30 mg  -     diclofenac (VOLTAREN) 75 mg EC tablet; Take 1 tablet (75 mg total) by mouth 2 (two) times a day  -     cyclobenzaprine (FLEXERIL) 10 mg tablet; Take 1 tablet (10 mg total) by mouth 3 (three) times a day as needed for muscle spasms    Other orders  -     Omega-3 Fatty Acids (fish oil) 1,000 mg; Take by mouth      Patient was given Toradol injection in the office today  She was prescribed Voltaren 75mg tablets, and was instructed to take 1 tablet by mouth, twice daily  She was also prescribed Flexeril 10mg tablets, and was told to take 1 tablet 3 times a day as needed  She was counseled to not drive or go to work if she takes Flexeril  She was told that Flexeril is best taken at night or at times when she can rest  Patient was told to do home-exercises as well  Subjective:      Patient ID: Cathyann Dakins is a 46 y o  female  Cathyann Dakins presents today due to right-sided back pain  She reports that 3 days ago she was taking her mother to a doctor's appointment and she was sliding out of her wheelchair   She went to lift and reposition her mother and felt a pull in her back  Patient states that it "wasn't terrible pain " This has happened in the past  That night she was awoken from sleep due to back pain  She reports since then she has had aching back pain, even at rest  When she walks or does any physical activities she gets sharp back pain  She has tried Tylenol, Ibuprofen,warm and cool compresses, IcyHot, and lidocaine patches with no relief  She states that she has strained her back in the past, but did not have any significant injuries to it  She does not have any pain radiating down her legs  Back Pain  This is a new problem  The current episode started in the past 7 days  The problem occurs constantly  The problem is unchanged  The pain is present in the lumbar spine  The quality of the pain is described as aching and stabbing  The pain does not radiate  The pain is moderate  The pain is worse during the day  The symptoms are aggravated by position and twisting  Pertinent negatives include no paresthesias or tingling  She has tried analgesics, heat and NSAIDs for the symptoms  The treatment provided no relief  The following portions of the patient's history were reviewed and updated as appropriate: allergies, current medications, past family history, past medical history, past social history, past surgical history and problem list     Review of Systems   Musculoskeletal: Positive for back pain  Neurological: Negative for tingling and paresthesias  Objective:      /78 (BP Location: Left arm, Patient Position: Sitting, Cuff Size: Large)   Pulse 88   Temp 98 °F (36 7 °C)   Ht 5' 8" (1 727 m)   Wt 67 6 kg (149 lb)   SpO2 98%   BMI 22 66 kg/m²          Physical Exam  Constitutional:       Appearance: Normal appearance  Musculoskeletal:         General: No swelling or tenderness  Lumbar back: No swelling or deformity  Decreased range of motion          Back:       Comments: Negative straight leg test bilaterally    Neurological:      General: No focal deficit present  Mental Status: She is alert and oriented to person, place, and time  Mental status is at baseline

## 2021-06-16 NOTE — TELEPHONE ENCOUNTER
Called [facility] admin  for info on Prolia she gave me [facility]'s number [phone] spoke to Daphne Barney  She says the RX need to go to the [address] she need to  from the phrmacy and bring it to offe  She ran a claim [facility] will have to pay $260  00   Au pt she will  RX and bring with her for appt

## 2021-06-16 NOTE — LETTER
June 16, 2021     Patient: Tennille Mesa   YOB: 1970   Date of Visit: 6/16/2021       To Whom it May Concern:    Tennille Mesa is under my professional care  She was seen in my office on 6/16/2021  Please excuse her from work on 06/17/2021  If you have any questions or concerns, please don't hesitate to call           Sincerely,          Oneida Stanley

## 2021-06-24 ENCOUNTER — DOCUMENTATION (OUTPATIENT)
Dept: GYNECOLOGY | Facility: CLINIC | Age: 51
End: 2021-06-24

## 2021-06-24 NOTE — PROGRESS NOTES
Per [facility] no Chelsey Jon is needed and R was filled at [address] so she can get shot at [facility]    She has not heard from [facility]  I will contact them

## 2021-06-28 DIAGNOSIS — M81.0 OSTEOPOROSIS, UNSPECIFIED OSTEOPOROSIS TYPE, UNSPECIFIED PATHOLOGICAL FRACTURE PRESENCE: ICD-10-CM

## 2021-06-30 ENCOUNTER — OFFICE VISIT (OUTPATIENT)
Dept: CARDIOLOGY CLINIC | Facility: CLINIC | Age: 51
End: 2021-06-30
Payer: COMMERCIAL

## 2021-06-30 ENCOUNTER — IN-CLINIC DEVICE VISIT (OUTPATIENT)
Dept: CARDIOLOGY CLINIC | Facility: CLINIC | Age: 51
End: 2021-06-30
Payer: COMMERCIAL

## 2021-06-30 ENCOUNTER — DOCUMENTATION (OUTPATIENT)
Dept: GYNECOLOGY | Facility: CLINIC | Age: 51
End: 2021-06-30

## 2021-06-30 ENCOUNTER — TELEPHONE (OUTPATIENT)
Dept: CARDIOLOGY CLINIC | Facility: CLINIC | Age: 51
End: 2021-06-30

## 2021-06-30 ENCOUNTER — APPOINTMENT (OUTPATIENT)
Dept: LAB | Facility: HOSPITAL | Age: 51
End: 2021-06-30
Payer: COMMERCIAL

## 2021-06-30 VITALS
DIASTOLIC BLOOD PRESSURE: 78 MMHG | OXYGEN SATURATION: 99 % | BODY MASS INDEX: 22.73 KG/M2 | WEIGHT: 150 LBS | HEART RATE: 90 BPM | SYSTOLIC BLOOD PRESSURE: 120 MMHG | HEIGHT: 68 IN

## 2021-06-30 DIAGNOSIS — I10 ESSENTIAL HYPERTENSION: ICD-10-CM

## 2021-06-30 DIAGNOSIS — I47.1 SVT (SUPRAVENTRICULAR TACHYCARDIA) (HCC): ICD-10-CM

## 2021-06-30 DIAGNOSIS — Z95.818 PRESENCE OF OTHER CARDIAC IMPLANTS AND GRAFTS: Primary | ICD-10-CM

## 2021-06-30 DIAGNOSIS — E03.9 ACQUIRED HYPOTHYROIDISM: ICD-10-CM

## 2021-06-30 DIAGNOSIS — E78.2 MIXED HYPERLIPIDEMIA: ICD-10-CM

## 2021-06-30 DIAGNOSIS — R55 SYNCOPE AND COLLAPSE: Primary | ICD-10-CM

## 2021-06-30 DIAGNOSIS — I47.2 VT (VENTRICULAR TACHYCARDIA) (HCC): ICD-10-CM

## 2021-06-30 LAB
T3FREE SERPL-MCNC: 2.29 PG/ML (ref 2.3–4.2)
T4 FREE SERPL-MCNC: 1.01 NG/DL (ref 0.76–1.46)
TSH SERPL DL<=0.05 MIU/L-ACNC: 0.96 UIU/ML (ref 0.36–3.74)

## 2021-06-30 PROCEDURE — 84481 FREE ASSAY (FT-3): CPT

## 2021-06-30 PROCEDURE — 93291 INTERROG DEV EVAL SCRMS IP: CPT | Performed by: INTERNAL MEDICINE

## 2021-06-30 PROCEDURE — 84439 ASSAY OF FREE THYROXINE: CPT | Performed by: NURSE PRACTITIONER

## 2021-06-30 PROCEDURE — 99214 OFFICE O/P EST MOD 30 MIN: CPT | Performed by: NURSE PRACTITIONER

## 2021-06-30 PROCEDURE — 84443 ASSAY THYROID STIM HORMONE: CPT | Performed by: NURSE PRACTITIONER

## 2021-06-30 NOTE — TELEPHONE ENCOUNTER
Spoke with pt regarding thyroid results  TSH is trending in the right direction and T3, T4 indicate she is no longer in hyperactive thyroid state  She will continue with treatment as per her PCP   She verbalized understanding

## 2021-06-30 NOTE — PROGRESS NOTES
Called [facility] in [address] pt picked up Prolia  Will need new RX nest time  Only given 1 time  Will send new RS next time

## 2021-06-30 NOTE — PATIENT INSTRUCTIONS
Will recheck thyroid now  Please send a transmission with any symptoms of concern    Call if any new or worsening symptoms

## 2021-07-05 NOTE — ASSESSMENT & PLAN NOTE
One short run of NSVT lasting 7 beats noted on ZIO 1/2020  Echocardiogram 1/7/2020 with normal LVEF (65-70%)  Continue metoprolol succinate 25 mg daily  Continue magnesium 250 mg daily  Patient did have one syncopal episode in May 2021 but no recurrent episodes since  Implantable loop recorder placed this month  No events thus far, will monitor

## 2021-07-05 NOTE — ASSESSMENT & PLAN NOTE
TSH noted to be suppressed on recent labs  Discussed correlation between thyroid and her symptoms  Levothyroxine dose adjusted to 75mcg by PCP  Will repeat TSH as well as free T3, free T4 as she is 1 month out from med adjustment

## 2021-07-05 NOTE — ASSESSMENT & PLAN NOTE
Two short runs of SVT noted on ZIO 1/2020  Continue metoprolol succinate 25 mg daily  Continue magnesium 250 mg daily  Loop recorder in place presently and no events noted since insertion this month  Will monitor

## 2021-07-05 NOTE — ASSESSMENT & PLAN NOTE
Patient with episode of syncope and collapse resulting in injury to her head in May 2021  Concern exists for arrhythmia given ZIO with short run of NSVT  Not orthostatic on exam (see discussion under hypertension regarding medication changes)  Implantable loop recorder placed this month with no events thus far    Will continue to monitor

## 2021-07-05 NOTE — ASSESSMENT & PLAN NOTE
Blood pressure is well controlled   Discontinued irbesartan-hydrochlorothiazide 150/12 5 mg daily and changed to irbesartan 75 mg daily in May 2021 with improvement in BP readings  BP remains well controlled  Continue metoprolol succinate 25 mg daily and Irbesartan 75mg daily dosing

## 2021-07-05 NOTE — PROGRESS NOTES
Cardiology Follow Up    Gerhard Marino  1970  [id number]  [facility]  [address]  [place] [facility] [address]  [phone]    Lien Pacheco presents today for routine follow up of palpitations, SVT, NSVT, syncope, s/p implantable loop recorder insertion on 5/27/2021      1  Syncope and collapse  Assessment & Plan:  Patient with episode of syncope and collapse resulting in injury to her head in May 2021  Concern exists for arrhythmia given ZIO with short run of NSVT  Not orthostatic on exam (see discussion under hypertension regarding medication changes)  Implantable loop recorder placed this month with no events thus far  Will continue to monitor       2  SVT (supraventricular tachycardia) (HCC)  Assessment & Plan:  Two short runs of SVT noted on ZIO 1/2020  Continue metoprolol succinate 25 mg daily  Continue magnesium 250 mg daily  Loop recorder in place presently and no events noted since insertion this month  Will monitor  3  VT (ventricular tachycardia) (HCC)  Assessment & Plan:  One short run of NSVT lasting 7 beats noted on ZIO 1/2020  Echocardiogram 1/7/2020 with normal LVEF (65-70%)  Continue metoprolol succinate 25 mg daily  Continue magnesium 250 mg daily  Patient did have one syncopal episode in May 2021 but no recurrent episodes since  Implantable loop recorder placed this month  No events thus far, will monitor  4  Essential hypertension  Assessment & Plan:  Blood pressure is well controlled   Discontinued irbesartan-hydrochlorothiazide 150/12 5 mg daily and changed to irbesartan 75 mg daily in May 2021 with improvement in BP readings  BP remains well controlled  Continue metoprolol succinate 25 mg daily and Irbesartan 75mg daily dosing  5  Mixed hyperlipidemia  Assessment & Plan:  Patient is currently on atorvastatin  40 mg daily  Lipid panel 4/13/2021: C 189  T 188  H 52  L 99     Should have repeat lipid panel 4/2022        6  Acquired hypothyroidism  Assessment & Plan:  TSH noted to be suppressed on recent labs  Discussed correlation between thyroid and her symptoms  Levothyroxine dose adjusted to 75mcg by PCP  Will repeat TSH as well as free T3, free T4 as she is 1 month out from med adjustment    Orders:  -     TSH, 3rd generation with Free T4 reflex  -     T3, free; Future  -     T4, free       Patient has a history of SVT, NSVT, Lyme disease, HTN, HLD, migraines, hypothyroidism, glaucoma, osteoporosis and anxiety  She was previously being followed by Dr Thanh Gates ([address] Cardiology)       Per previous visits with Dr Manoj Brunner:  "Patient was initially seen in consultation by cardiology 1/27/2020 for chest pain  She reported palpitations at that time as well  She had been admitted for observation at [facility] 1/6/2020 prior to evaluation       Echocardiogram 1/7/2020 was unremarkable  ZIO monitor 01/27/2020 showed an average heart rate of 78 beats per minute with rare PACs and PVCs  One 7 beat run of NSVT was noted and 2 runs of SVT were noted with the longest lasting 9 beats  Nuclear exercise stress test 03/18/2020 showed no evidence of ischemia      Patient did undergo EGD 4/24/2020 which showed gastritis  Colonoscopy done the same day showed patchy mild inflammation in the sigmoid colon secondary to colitis      9/11/2020: Patient presents to establish care  She has been feeling well in general  She was last seen by Dr Thanh Gates 3/23/2020  She has been maintained on metoprolol succinate for her SVT/VT and palpitations  She denies any chest pain  She denies any shortness of breath  She denies any lower extremity edema  She does continue to have intermittent palpitations  She tells me that over the last 2 weeks she has had some occasional episodes of heart racing as well as occasional lightheadedness/dizziness  In general she is feeling well   ECG today is unremarkable with mild non specific ST T wave abnormality  Possible septal MI likely due to lead placement       11/18/2020: Patient returns to the office today for follow-up  I had started her on magnesium 250 mg daily at her last office visit  She has been drinking last caffeine and also trying to destress as much as possible  She has noted less palpitations since her last office visit  She denies any chest pain  Overall she has been feeling well  She remains active, particularly at work  I had recommended some updated labs at her last visit  CMP 10/1/2020 was unremarkable however potassium remained borderline at 3 5  Magnesium was normal 1 9  CBC was unremarkable      5/18/2021: Patient presents today for follow up  She tells me that she woke up in cold sweat with left upper abdominal/lower chest discomfort (pressure/squeezing)  She tried to call out and could not  She felt like she was in a fog  The next thing she remembers her  found her on the ground in the kitchen after he heard a thud  She did hit her head  She was noted to be in a cold sweat and then later had emesis  [place] funny in her head  She did go to the ER within a few hours  No seizure activity  She tells me that she has passed out in the past during mammogram  She continues to have intermittent palpitations  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  She started feeling run down and weak  She felt lightheaded  She felt her heart racing with episode  TSH was low and medications were adjusted by primary provider  Not orthostatic on exam today  /83 in ER per her report  6/30/2021Lmeghnaconner Araiza had an implantable loop recorder placed on 5/27/2021  She has not had any events since placement  Her incision has healed well without issues  She attributes a lot of her symptoms she was experiencing to stress  She is caring for her ailing mother  Her father passed away this year   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  This year has been difficult  Her TSH did improve somewhat at recheck by her PCP 1 month ago  She is tolerating the lower dosing of levothyroxine  She feels today that she is doing better  She denies any recurrent syncope  No lightheadedness/dizziness, chest pain, shortness of breath, palpitations, or leg swelling  She is planning a trip to the [place] with her daughter coming up this fall  Medical Problems     Problem List     SVT [facility]supraventricular tachycardia) ([facility])    VT (ventricular tachycardia) ([facility])    Syncope and collapse    Hypertension    Hyperlipidemia    Palpitations    Hypokalemia    Depression    Osteoporosis    Arthritis    Overview Signed 2021  1:11 PM by FITO Torres     B/l hand pain/swelling  Hypothyroidism    Migraine    Glaucoma    Anxiety              Past Medical History:   Diagnosis Date    Allergic     Seasonal    Anxiety     Arthritis     Colitis     Noted on colonoscopy 2020      Depression     Disease of thyroid gland     Gastritis     Noted on EGD 2020    Glaucoma     Headache(784 0)     Hyperlipidemia     Hypertension     Hypothyroidism     Lyme disease     Migraine     Osteoporosis     Scoliosis     SVT (supraventricular tachycardia) (HCC)     Visual impairment     VT (ventricular tachycardia) (HCC)      Social History     Socioeconomic History    Marital status: /Civil Union     Spouse name: Not on file    Number of children: Not on file    Years of education: Not on file    Highest education level: Not on file   Occupational History    Not on file   Tobacco Use    Smoking status: Former Smoker     Packs/day: 1 00     Years: 10 00     Pack years: 10 00     Types: Cigarettes     Quit date: 2007     Years since quittin 5    Smokeless tobacco: Never Used    Tobacco comment: quit 2007   Vaping Use    Vaping Use: Never used   Substance and Sexual Activity    Alcohol use: Never    Drug use: Never    Sexual activity: Yes     Birth control/protection: Post-menopausal     Comment: hysterectomy   Other Topics Concern    Not on file   Social History Narrative    Not on file     Social Determinants of Health     Financial Resource Strain:     Difficulty of Paying Living Expenses:    Food Insecurity:     Worried About Running Out of Food in the Last Year:     [address] in the Last Year:    Transportation Needs:     Lack of Transportation (Medical):      Lack of Transportation (Non-Medical):    Physical Activity:     Days of Exercise per Week:     Minutes of Exercise per Session:    Stress:     Feeling of Stress :    Social Connections:     Frequency of Communication with Friends and Family:     Frequency of Social Gatherings with Friends and Family:     Attends [employer] Services:     Active Member of Clubs or Organizations:     Attends Club or Organization Meetings:     Marital Status:    Intimate Partner Violence:     Fear of Current or Ex-Partner:     Emotionally Abused:     Physically Abused:     Sexually Abused:       Family History   Problem Relation Age of Onset    Peripheral vascular disease Mother     Glaucoma Mother     Prostate cancer Father     Hypothyroidism Sister     No Known Problems Daughter     Colon cancer Maternal Grandmother     No Known Problems Maternal Grandfather     Arthritis Paternal Grandmother     No Known Problems Paternal Grandfather     No Known Problems Daughter     No Known Problems Maternal Aunt     No Known Problems Maternal Aunt     Skin cancer Paternal Aunt     No Known Problems Paternal Aunt      Past Surgical History:   Procedure Laterality Date    BREAST BIOPSY Right 10/21/2020    papilloma    COLONOSCOPY      EGD      FINGER SURGERY      left pinky    HYSTERECTOMY      HYSTERECTOMY W/ SALPINGO-OOPHERECTOMY  05/2008    US GUIDED BREAST BIOPSY RIGHT COMPLETE Right 10/21/2020       Current Outpatient Medications:     atorvastatin (LIPITOR) 40 mg tablet, Take 1 tablet (40 mg total) by mouth daily, Disp: 90 tablet, Rfl: 1    bimatoprost (Lumigan) 0 01 % ophthalmic drops, Lumigan 0 01 % eye drops, Disp: , Rfl:     Calcium Carbonate-Vit D-Min (CALCIUM 1200 PO), Take by mouth daily , Disp: , Rfl:     Cholecalciferol (VITAMIN D3 PO), Take 1,000 mg by mouth, Disp: , Rfl:     citalopram (CeleXA) 20 mg tablet, Take 20 mg by mouth daily , Disp: , Rfl:     cyclobenzaprine (FLEXERIL) 10 mg tablet, Take 1 tablet (10 mg total) by mouth 3 (three) times a day as needed for muscle spasms, Disp: 30 tablet, Rfl: 0    denosumab (PROLIA) 60 mg/mL, Inject 1 mL (60 mg total) under the skin every 6 (six) months, Disp: 1 mL, Rfl: 3    diclofenac (VOLTAREN) 75 mg EC tablet, Take 1 tablet (75 mg total) by mouth 2 (two) times a day, Disp: 60 tablet, Rfl: 1    Glucosamine-Chondroitin 500-400 MG CAPS, Take by mouth daily , Disp: , Rfl:     hydrOXYzine HCL (ATARAX) 25 mg tablet, Take 25 mg by mouth as needed , Disp: , Rfl:     Inulin (FIBER CHOICE PO), Take by mouth, Disp: , Rfl:     irbesartan (AVAPRO) 75 mg tablet, Take 1 tablet (75 mg total) by mouth daily, Disp: 30 tablet, Rfl: 11    levothyroxine 75 mcg tablet, Take 1 tablet (75 mcg total) by mouth daily, Disp: 90 tablet, Rfl: 1    LORazepam (ATIVAN) 0 5 mg tablet, Take 0 5 mg by mouth as needed, Disp: , Rfl:     Magnesium 250 MG TABS, Take 1 tablet (250 mg total) by mouth daily, Disp: 30 tablet, Rfl: 0    Methyl Salicylate-Lido-Menthol (LidoPro) 4-4-5 % PTCH, LidoPro 4 %-4 %-5 % topical patch  APPLY 1 PATCH TO THE AFFECTED AREA EVERY 8 TO 12 HOURS AS NEEDED   MAX OF 2 PATCHES PER DAY, Disp: , Rfl:     metoprolol succinate (TOPROL-XL) 25 mg 24 hr tablet, Take 1 tablet (25 mg total) by mouth daily, Disp: 90 tablet, Rfl: 4    multivitamin (THERAGRAN) TABS, Take 1 tablet by mouth daily, Disp: , Rfl:     Omega-3 Fatty Acids (FISH OIL OMEGA-3 PO), Take by mouth, Disp: , Rfl:     omeprazole (PriLOSEC OTC) 20 MG tablet, Take 20 mg by mouth daily , Disp: , Rfl:     Rimegepant Sulfate (Nurtec) 75 MG TBDP, Take 75 mg by mouth as needed, Disp: , Rfl:     SUMAtriptan (IMITREX) 100 mg tablet, Take 100 mg by mouth as needed, Disp: , Rfl:     Thiamine HCl (VITAMIN B1 PO), Take 1 capsule by mouth daily, Disp: , Rfl:     Omega-3 Fatty Acids (fish oil) 1,000 mg, Take by mouth (Patient not taking: Reported on 6/30/2021), Disp: , Rfl:   No Known Allergies    Labs:     Chemistry        Component Value Date/Time    K 3 8 05/09/2021 1203     05/09/2021 1203    CO2 30 05/09/2021 1203    BUN 22 05/09/2021 1203    CREATININE 0 87 05/09/2021 1203        Component Value Date/Time    CALCIUM 9 0 05/09/2021 1203    ALKPHOS 64 04/13/2021 0639    AST 16 04/13/2021 0639    ALT 34 04/13/2021 0639            No results found for: CHOL  Lab Results   Component Value Date    HDL 52 04/13/2021     Lab Results   Component Value Date    LDLCALC 99 04/13/2021     Lab Results   Component Value Date    TRIG 188 (H) 04/13/2021     Cardiac Testing:   Implantable Loop recorder interrogation 6/30/2021:  [id number]/ ACTIVE SYSTEM IS MRI CONDITIONAL  BATTERY VOLTAGE OK  PRESENTING RHYTHM: NSR @ 82 BPM  R-WAVES: 0 51mV  NO PATIENT OR DEVICE ACTIVATED EPISODES  NO PROGRAMMING CHANGES MADE TO DEVICE PARAMETERS  NORMAL DEVICE FUNCTION     ECG 5/9/2021: Normal sinus rhythm  Nonspecific ST abnormality       Lipid panel 4/13/2021: C 189  T 188  H 52  L 99       ECG 09/11/2020:  Normal sinus rhythm  Possible septal MI (more likely related to lead placement)  Nonspecific ST/T wave abnormality      Nuclear Stress test 3/18/2020:   Helio  5:01  7 1 METs  90% MPHR  Deconditioned heart rate response to exercise  No evidence of scar  No evidence of ischemia    Calculated ejection fraction 93%      ZIO 1/27/2020:   1-the patient had a ZIO monitor placed   Analysis is based on 13 days and 14 hours  2-predominant rhythm is sinus  3-the minimum heart rate is 50 with the maximum heart rate being 151 and the average being 78 when in a sinus rhythm  4-there is a rare PVC   There was one run of NSVT consisting of 7 beats  5-there is a rare PAC   There were 2 runs of SVT with the longest being 9 beats  6- there are no pauses greater than 3 0 seconds  7-there are no episodes of second-degree heart block type II or third-degree heart block  8-there were no diary entries placed  9-there were no acute ST/T wave changes on the strips that were reviewed    No comparison ZIO monitor     Echocardiogram 1/7/2020:   Normal left ventricular chamber size  Normal left ventricular systolic function  Normal regional wall motion  Normal left ventricular wall thickness  Estimated left ventricular ejection fraction is 65-70%      Mild mitral regurgitation       Normal pulmonary artery systolic pressure  Normal diastolic function      Visually Estimated LV Ejection Fraction is:70%    Review of Systems   Constitutional: Negative  HENT: Negative  Cardiovascular: Negative for chest pain, dyspnea on exertion, irregular heartbeat, leg swelling, near-syncope, orthopnea and palpitations  Respiratory: Negative for cough and snoring  Endocrine: Negative  Skin: Negative  Musculoskeletal: Negative  Gastrointestinal: Negative  Genitourinary: Negative  Neurological: Negative  Psychiatric/Behavioral: Negative  Vitals:    06/30/21 0935   BP: 120/78   Pulse: 90   SpO2: 99%     Vitals:    06/30/21 0935   Weight: 68 kg (150 lb)     Height: 5' 8" (172 7 cm)   Body mass index is 22 81 kg/m²  Physical Exam  Vitals and nursing note reviewed  Constitutional:       General: She is not in acute distress  Appearance: She is well-developed  She is not diaphoretic  HENT:      Head: Normocephalic and atraumatic  Neck:      Thyroid: No thyromegaly  Vascular: No carotid bruit or JVD     Cardiovascular:      Rate and Rhythm: Normal rate and regular rhythm  Pulses: Intact distal pulses  Radial pulses are 2+ on the right side and 2+ on the left side  Heart sounds: Normal heart sounds, S1 normal and S2 normal  No murmur heard  Comments: No lower leg swelling  Pulmonary:      Effort: Pulmonary effort is normal       Breath sounds: Normal breath sounds  Abdominal:      General: There is no distension  Palpations: Abdomen is soft  Tenderness: There is no abdominal tenderness  Musculoskeletal:         General: Normal range of motion  Cervical back: Normal range of motion and neck supple  Lymphadenopathy:      Cervical: No cervical adenopathy  Skin:     General: Skin is warm and dry  Neurological:      Mental Status: She is alert and oriented to person, place, and time  Cranial Nerves: No cranial nerve deficit     Psychiatric:         Behavior: Behavior normal

## 2021-07-07 DIAGNOSIS — E03.9 ACQUIRED HYPOTHYROIDISM: ICD-10-CM

## 2021-07-07 RX ORDER — LEVOTHYROXINE SODIUM 0.07 MG/1
75 TABLET ORAL DAILY
Qty: 90 TABLET | Refills: 0 | Status: SHIPPED | OUTPATIENT
Start: 2021-07-07 | End: 2021-09-20 | Stop reason: SDUPTHER

## 2021-08-01 ENCOUNTER — PATIENT MESSAGE (OUTPATIENT)
Dept: CARDIOLOGY CLINIC | Facility: CLINIC | Age: 51
End: 2021-08-01

## 2021-08-02 ENCOUNTER — TELEPHONE (OUTPATIENT)
Dept: NON INVASIVE DIAGNOSTICS | Facility: HOSPITAL | Age: 51
End: 2021-08-02

## 2021-08-02 NOTE — TELEPHONE ENCOUNTER
Device clinic state that her "box" for the transmission was not connected  Called pt  She sent the report from her loop over manually and it did go through while I was on the phone with her

## 2021-08-02 NOTE — TELEPHONE ENCOUNTER
Cameron John 8/2/2021 8:00 AM EDT      ----- Message -----  From: Nino Schwab  Sent: 8/1/2021 11:19 AM EDT  To: [employer]  Subject: Non-Urgent Medical Question     Friday morning I hit the loop monitor 2 times  Not severe chest pain, some discomfort, embarrassed to hit the button  I was sick, cold, clammy, sweaty and passed out once again  This time I awoke in my bathroom without memory  I'm off work Monday and Thursday this week do you want me to call for an appointment?    Thank you,Brandi

## 2021-08-02 NOTE — TELEPHONE ENCOUNTER
Pt states that she felt fine before this happened  States that remembers walking to the bathroom and waking up in the bathroom  States that she woke up from a deep sleep cold, clammy and sweaty  States that there was very mild chest pressure/pain  States that she did hit her head on the wall  Lianne July,   Could you take a look at the two time that she pressed the button? She says that it was around one in the morning on Friday

## 2021-08-02 NOTE — TELEPHONE ENCOUNTER
Regarding: Non-Urgent Medical Question  Contact: [phone]  ----- Message from Berna Friedman sent at 8/2/2021  8:39 AM EDT -----       ----- Message from Po Decker to Cassy LudwigDO sent at 8/1/2021 11:19 AM -----   Friday morning I hit the loop monitor 2 times  Not severe chest pain, some discomfort, embarrassed to hit the button  I was sick, cold, clammy, sweaty and passed out once again  This time I awoke in my bathroom without memory  I'm off work Monday and Thursday this week do you want me to call for an appointment?    Thank you,Brandi

## 2021-08-02 NOTE — TELEPHONE ENCOUNTER
Please let her know device did not show cardiac event   Please schedule sooner appt with me to discuss

## 2021-08-02 NOTE — TELEPHONE ENCOUNTER
Sohail Redd from the device clinic states that there was nothing on the device over the last few days  States that it doesn't look like it worked when she pushed the button, but if it was cardiac it would have caught it

## 2021-08-05 ENCOUNTER — OFFICE VISIT (OUTPATIENT)
Dept: CARDIOLOGY CLINIC | Facility: CLINIC | Age: 51
End: 2021-08-05
Payer: COMMERCIAL

## 2021-08-05 VITALS
DIASTOLIC BLOOD PRESSURE: 70 MMHG | WEIGHT: 153 LBS | SYSTOLIC BLOOD PRESSURE: 120 MMHG | OXYGEN SATURATION: 98 % | BODY MASS INDEX: 23.19 KG/M2 | HEIGHT: 68 IN | HEART RATE: 93 BPM

## 2021-08-05 DIAGNOSIS — I47.2 VT (VENTRICULAR TACHYCARDIA) (HCC): ICD-10-CM

## 2021-08-05 DIAGNOSIS — R00.2 PALPITATIONS: ICD-10-CM

## 2021-08-05 DIAGNOSIS — I10 ESSENTIAL HYPERTENSION: ICD-10-CM

## 2021-08-05 DIAGNOSIS — E03.9 ACQUIRED HYPOTHYROIDISM: ICD-10-CM

## 2021-08-05 DIAGNOSIS — I47.1 SVT (SUPRAVENTRICULAR TACHYCARDIA) (HCC): Primary | ICD-10-CM

## 2021-08-05 DIAGNOSIS — R55 SYNCOPE AND COLLAPSE: ICD-10-CM

## 2021-08-05 PROCEDURE — 99214 OFFICE O/P EST MOD 30 MIN: CPT | Performed by: NURSE PRACTITIONER

## 2021-08-05 NOTE — PATIENT INSTRUCTIONS
I will review symptoms with Dr Tucker Felipe and let you know further testing plans  I would consider further evaluation of your thyroid given your symptoms and abnormal thyroid levels on testing  Please continue to notify me with any recurrent symptoms and send a loop transmission  Seek immediate medical attention for anything new/worsening

## 2021-08-06 ENCOUNTER — OFFICE VISIT (OUTPATIENT)
Dept: FAMILY MEDICINE CLINIC | Facility: CLINIC | Age: 51
End: 2021-08-06
Payer: COMMERCIAL

## 2021-08-06 VITALS
WEIGHT: 153.4 LBS | DIASTOLIC BLOOD PRESSURE: 68 MMHG | HEIGHT: 68 IN | OXYGEN SATURATION: 98 % | TEMPERATURE: 97.8 F | HEART RATE: 87 BPM | BODY MASS INDEX: 23.25 KG/M2 | SYSTOLIC BLOOD PRESSURE: 110 MMHG

## 2021-08-06 DIAGNOSIS — M54.50 ACUTE RIGHT-SIDED LOW BACK PAIN WITHOUT SCIATICA: Primary | ICD-10-CM

## 2021-08-06 DIAGNOSIS — M62.838 MUSCLE SPASMS OF LOWER EXTREMITY, UNSPECIFIED LATERALITY: ICD-10-CM

## 2021-08-06 PROCEDURE — 96372 THER/PROPH/DIAG INJ SC/IM: CPT | Performed by: NURSE PRACTITIONER

## 2021-08-06 PROCEDURE — 99213 OFFICE O/P EST LOW 20 MIN: CPT | Performed by: NURSE PRACTITIONER

## 2021-08-06 RX ORDER — KETOROLAC TROMETHAMINE 30 MG/ML
30 INJECTION, SOLUTION INTRAMUSCULAR; INTRAVENOUS ONCE
Status: COMPLETED | OUTPATIENT
Start: 2021-08-06 | End: 2021-08-06

## 2021-08-06 RX ORDER — CYCLOBENZAPRINE HCL 10 MG
10 TABLET ORAL
Qty: 30 TABLET | Refills: 0 | Status: SHIPPED | OUTPATIENT
Start: 2021-08-06 | End: 2021-10-04

## 2021-08-06 RX ORDER — DICLOFENAC SODIUM 75 MG/1
75 TABLET, DELAYED RELEASE ORAL 2 TIMES DAILY
Qty: 30 TABLET | Refills: 1 | Status: SHIPPED | OUTPATIENT
Start: 2021-08-06 | End: 2021-11-08

## 2021-08-06 RX ADMIN — KETOROLAC TROMETHAMINE 30 MG: 30 INJECTION, SOLUTION INTRAMUSCULAR; INTRAVENOUS at 11:28

## 2021-08-06 NOTE — ASSESSMENT & PLAN NOTE
She again had an episode this weekend where she woke from sleep with heart racing and feeling diaphoretic and lightheaded, she got up and had a syncopal event  Patient did undergo ZIO monitor 01//2020 which showed predominantly normal sinus rhythm with average heart rate of 78 beats per minute with rare PVCs and PACs  ZIO did show 1 run of NSVT lasting 7 beats and 2 runs of SVT with the longest lasting 9 beats (no diary entries to correlate with symptoms)  Loop recorder transmission unfortunately did not come through, however device clinic states there were no events captured    Continue magnesium 250 mg daily and Toprol XL 25mg daily  Will continue to monitor

## 2021-08-06 NOTE — ASSESSMENT & PLAN NOTE
Repeat thyroid counts with TSH in range, free T3 slightly low  May benefit from endocrinology consultation for thyroid management

## 2021-08-06 NOTE — LETTER
August 6, 2021     Patient: Margareth Dias   YOB: 1970   Date of Visit: 8/6/2021       To Whom it May Concern:    Margareth Dias is under my professional care  She was seen in my office on 8/6/2021  Please excuse her from work on 08/06/2021 and 08/09/2021  If you have any questions or concerns, please don't hesitate to call           Sincerely,          [address]Am[address]

## 2021-08-06 NOTE — PROGRESS NOTES
Cardiology Follow Up    Awa Albarado  1970  [id number]  [facility]  [address]  [address] [facility] [address]  [phone]    Tomasa Trejo presents today to follow up on recurrent syncopal event  1  SVT (supraventricular tachycardia) ([facility])  Assessment & Plan:  Two short runs of SVT noted on ZIO 1/2020  Continue metoprolol succinate 25 mg daily  Continue magnesium 250 mg daily  Loop recorder in place presently  Will monitor  2  VT (ventricular tachycardia) ([facility])  Assessment & Plan:  One short run of NSVT lasting 7 beats noted on ZIO 1/2020  Echocardiogram 1/7/2020 with normal LVEF (65-70%)  Continue metoprolol succinate 25 mg daily  Continue magnesium 250 mg daily  Patient did have one syncopal episode in May 2021 and again one event this past weekend  See discussion under syncope      3  Syncope and collapse  Assessment & Plan:  Patient with episode of syncope and collapse resulting in injury to her head in May 2021  Concern exists for arrhythmia given ZIO with short run of NSVT  Not orthostatic on exam (see discussion under hypertension regarding medication changes)  Implantable loop recorder placed  Pt with one episode of syncope and collapse this past weekend - unfortunately her transmission did not come through so I cannot review results, however I am told by device clinic there were no events on loop  Will continue to monitor  4  Essential hypertension  Assessment & Plan:  Blood pressure is well controlled   Discontinued irbesartan-hydrochlorothiazide 150/12 5 mg daily and changed to irbesartan 75 mg daily in May 2021 with improvement in BP readings  BP remains well controlled  Continue metoprolol succinate 25 mg daily and Irbesartan 75mg daily dosing  5  Acquired hypothyroidism  Assessment & Plan:  Repeat thyroid counts with TSH in range, free T3 slightly low    May benefit from endocrinology consultation for thyroid management  6  Palpitations  Assessment & Plan:  She again had an episode this weekend where she woke from sleep with heart racing and feeling diaphoretic and lightheaded, she got up and had a syncopal event  Patient did undergo ZIO monitor 01//2020 which showed predominantly normal sinus rhythm with average heart rate of 78 beats per minute with rare PVCs and PACs  ZIO did show 1 run of NSVT lasting 7 beats and 2 runs of SVT with the longest lasting 9 beats (no diary entries to correlate with symptoms)  Loop recorder transmission unfortunately did not come through, however device clinic states there were no events captured  Continue magnesium 250 mg daily and Toprol XL 25mg daily  Will continue to monitor        HPI  Patient has a history of SVT, NSVT, Lyme disease, HTN, HLD, migraines, hypothyroidism, glaucoma, osteoporosis and anxiety  She was previously being followed by Dr Hira Fernando ([address] [place] [address] Cardiology)       Per previous visits with Dr Aimee Pedroza:  Patient was initially seen in consultation by cardiology 1/27/2020 for chest pain  She reported palpitations at that time as well  She had been admitted for observation at [facility] 1/6/2020 prior to evaluation       Echocardiogram 1/7/2020 was unremarkable   ZIO monitor 01/27/2020 showed an average heart rate of 78 beats per minute with rare PACs and PVCs  One 7 beat run of NSVT was noted and 2 runs of SVT were noted with the longest lasting 9 beats   Nuclear exercise stress test 03/18/2020 showed no evidence of ischemia      Patient did undergo EGD 4/24/2020 which showed gastritis   Colonoscopy done the same day showed patchy mild inflammation in the sigmoid colon secondary to colitis      9/11/2020: Patient presents to establish care  She has been feeling well in general  She was last seen by Dr Hira Fernando 3/23/2020  She has been maintained on metoprolol succinate for her SVT/VT and palpitations   She denies any chest pain   She denies any shortness of breath   She denies any lower extremity edema   She does continue to have intermittent palpitations  Shaq Borrero tells me that over the last 2 weeks she has had some occasional episodes of heart racing as well as occasional lightheadedness/dizziness  In general she is feeling well  ECG today is unremarkable with mild non specific ST T wave abnormality  Possible septal MI likely due to lead placement       11/18/2020: Patient returns to the office today for follow-up  Chen Jama had started her on magnesium 250 mg daily at her last office visit  Shaq Borrero has been drinking last caffeine and also trying to destress as much as possible   She has noted less palpitations since her last office visit  She denies any chest pain   Overall she has been feeling well   She remains active, particularly at work  Chen Evita had recommended some updated labs at her last visit  Layne Cowan 10/1/2020 was unremarkable however potassium remained borderline at 3 5   Magnesium was normal 1 9  [address] was unremarkable      5/18/2021: Patient presents today for follow up  She tells me that she woke up in cold sweat with left upper abdominal/lower chest discomfort (pressure/squeezing)  She tried to call out and could not  She felt like she was in a fog  The next thing she remembers her  found her on the ground in the kitchen after he heard a thud  She did hit her head  She was noted to be in a cold sweat and then later had emesis  [place] funny in her head  She did go to the ER within a few hours  No seizure activity  She tells me that she has passed out in the past during mammogram  She continues to have intermittent palpitations  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  She started feeling run down and weak  She felt lightheaded  She felt her heart racing with episode  TSH was low and medications were adjusted by primary provider  Not orthostatic on exam today   /83 in ER per her report       Previous OV with me:  6/30/2021: Zeinab Martini had an implantable loop recorder placed on 5/27/2021  She has not had any events since placement  Her incision has healed well without issues  She attributes a lot of her symptoms she was experiencing to stress  She is caring for her ailing mother  Her father passed away this year  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  This year has been difficult  Her TSH did improve somewhat at recheck by her PCP 1 month ago  She is tolerating the lower dosing of levothyroxine  She feels today that she is doing better  She denies any recurrent syncope  No lightheadedness/dizziness, chest pain, shortness of breath, palpitations, or leg swelling  She is planning a trip to the [place] with her daughter coming up this fall  8/5/2021: Dano presents today for follow up from a syncopal event over the weekend  She reached out to me via the patient portal on 8/1/2021 as she experienced an episode overnight where she woke up with heart racing, chest pressure, feeling lightheaded, diaphoretic  She denies any N/V/D  She states she rushed to the kitchen and then woke up on the floor  She is not sure why she went to the kitchen instead of staying in bed and she is not sure how long she was out  She states that she did hit her transmission trigger on the loop 2 times before passing out  She tells me she went back to bed and woke up without any residual symptoms  Medical Problems     Problem List     SVT (supraventricular tachycardia) ([facility])    VT (ventricular tachycardia) ([address] )    Syncope and collapse    Hypertension    Hyperlipidemia    Palpitations    Hypokalemia    Depression    Osteoporosis    Arthritis    Overview Signed 4/12/2021  1:11 PM by FITO Funes     B/l hand pain/swelling            Hypothyroidism    Migraine    Glaucoma    Anxiety              Past Medical History:   Diagnosis Date    Allergic 1974    Seasonal    Anxiety     Arthritis  Colitis     Noted on colonoscopy 2020   Depression     Disease of thyroid gland     Gastritis     Noted on EGD 2020    Glaucoma     Headache(784 0)     Hyperlipidemia     Hypertension     Hypothyroidism     Lyme disease     Migraine     Osteoporosis     Scoliosis     SVT (supraventricular tachycardia) (HCC)     Visual impairment     VT (ventricular tachycardia) (H[facility])      Social History     Socioeconomic History    Marital status: /Civil Union     Spouse name: Not on file    Number of children: Not on file    Years of education: Not on file    Highest education level: Not on file   Occupational History    Not on file   Tobacco Use    Smoking status: Former Smoker     Packs/day: 1      Years: 10 00     Pack years: 10 00     Types: Cigarettes     Quit date: 2007     Years since quittin 6    Smokeless tobacco: Never Used    Tobacco comment: quit    Vaping Use    Vaping Use: Never used   Substance and Sexual Activity    Alcohol use: Never    Drug use: Never    Sexual activity: Yes     Birth control/protection: Post-menopausal     Comment: hysterectomy   Other Topics Concern    Not on file   Social History Narrative    Not on file     Social Determinants of Health     Financial Resource Strain:     Difficulty of Paying Living Expenses:    Food Insecurity:     Worried About Running Out of Food in the Last Year:     Ran Out of Food in the Last Year:    Transportation Needs:     Lack of Transportation (Medical):      Lack of Transportation (Non-Medical):    Physical Activity:     Days of Exercise per Week:     Minutes of Exercise per Session:    Stress:     Feeling of Stress :    Social Connections:     Frequency of Communication with Friends and Family:     Frequency of Social Gatherings with Friends and Family:     Attends Gnosticist Services:     Active Member of Clubs or Organizations:     Attends Club or Organization Meetings:     Marital Status:    Intimate Partner Violence:     Fear of Current or Ex-Partner:     Emotionally Abused:     Physically Abused:     Sexually Abused:       Family History   Problem Relation Age of Onset    Peripheral vascular disease Mother     Glaucoma Mother     Prostate cancer Father     Hypothyroidism Sister     No Known Problems Daughter     Colon cancer Maternal Grandmother     No Known Problems Maternal Grandfather     Arthritis Paternal Grandmother     No Known Problems Paternal Grandfather     No Known Problems Daughter     No Known Problems Maternal Aunt     No Known Problems Maternal Aunt     Skin cancer Paternal Aunt     No Known Problems Paternal Aunt      Past Surgical History:   Procedure Laterality Date    BREAST BIOPSY Right 10/21/2020    papilloma    COLONOSCOPY      EGD      FINGER SURGERY      left pinky    HYSTERECTOMY      HYSTERECTOMY W/ SALPINGO-OOPHERECTOMY  05/2008    US GUIDED BREAST BIOPSY RIGHT COMPLETE Right 10/21/2020       Current Outpatient Medications:     atorvastatin (LIPITOR) 40 mg tablet, Take 1 tablet (40 mg total) by mouth daily, Disp: 90 tablet, Rfl: 1    bimatoprost (Lumigan) 0 01 % ophthalmic drops, Lumigan 0 01 % eye drops, Disp: , Rfl:     Calcium Carbonate-Vit D-Min (CALCIUM 1200 PO), Take by mouth daily , Disp: , Rfl:     Cholecalciferol (VITAMIN D3 PO), Take 1,000 mg by mouth, Disp: , Rfl:     denosumab (PROLIA) 60 mg/mL, Inject 1 mL (60 mg total) under the skin every 6 (six) months, Disp: 1 mL, Rfl: 3    Glucosamine-Chondroitin 500-400 MG CAPS, Take by mouth daily , Disp: , Rfl:     hydrOXYzine HCL (ATARAX) 25 mg tablet, Take 25 mg by mouth as needed , Disp: , Rfl:     Inulin (FIBER CHOICE PO), Take by mouth, Disp: , Rfl:     irbesartan (AVAPRO) 75 mg tablet, Take 1 tablet (75 mg total) by mouth daily, Disp: 30 tablet, Rfl: 11    levothyroxine 75 mcg tablet, Take 1 tablet (75 mcg total) by mouth daily, Disp: 90 tablet, Rfl: 0    LORazepam (ATIVAN) 0 5 mg tablet, Take 0 5 mg by mouth as needed, Disp: , Rfl:     Magnesium 250 MG TABS, Take 1 tablet (250 mg total) by mouth daily, Disp: 30 tablet, Rfl: 0    Methyl Salicylate-Lido-Menthol (LidoPro) 4-4-5 % PTCH, LidoPro 4 %-4 %-5 % topical patch  APPLY 1 PATCH TO THE AFFECTED AREA EVERY 8 TO 12 HOURS AS NEEDED   MAX OF 2 PATCHES PER DAY, Disp: , Rfl:     metoprolol succinate (TOPROL-XL) 25 mg 24 hr tablet, Take 1 tablet (25 mg total) by mouth daily, Disp: 90 tablet, Rfl: 4    multivitamin (THERAGRAN) TABS, Take 1 tablet by mouth daily, Disp: , Rfl:     Omega-3 Fatty Acids (FISH OIL OMEGA-3 PO), Take by mouth, Disp: , Rfl:     omeprazole (PriLOSEC OTC) 20 MG tablet, Take 20 mg by mouth daily , Disp: , Rfl:     Rimegepant Sulfate (Nurtec) 75 MG TBDP, Take 75 mg by mouth as needed, Disp: , Rfl:     SUMAtriptan (IMITREX) 100 mg tablet, Take 100 mg by mouth as needed, Disp: , Rfl:     Thiamine HCl (VITAMIN B1 PO), Take 1 capsule by mouth daily, Disp: , Rfl:     citalopram (CeleXA) 20 mg tablet, Take 20 mg by mouth daily  (Patient not taking: Reported on 8/5/2021), Disp: , Rfl:     cyclobenzaprine (FLEXERIL) 10 mg tablet, Take 1 tablet (10 mg total) by mouth 3 (three) times a day as needed for muscle spasms (Patient not taking: Reported on 8/5/2021), Disp: 30 tablet, Rfl: 0    diclofenac (VOLTAREN) 75 mg EC tablet, Take 1 tablet (75 mg total) by mouth 2 (two) times a day (Patient not taking: Reported on 8/5/2021), Disp: 60 tablet, Rfl: 1    Omega-3 Fatty Acids (fish oil) 1,000 mg, Take by mouth (Patient not taking: Reported on 6/30/2021), Disp: , Rfl:   No Known Allergies    Labs:     Chemistry        Component Value Date/Time    K 3 8 05/09/2021 1203     05/09/2021 1203    CO2 30 05/09/2021 1203    BUN 22 05/09/2021 1203    CREATININE 0 87 05/09/2021 1203        Component Value Date/Time    CALCIUM 9 0 05/09/2021 1203    ALKPHOS 64 04/13/2021 0639    AST 16 04/13/2021 0639    ALT 34 04/13/2021 0639            No results found for: CHOL  Lab Results   Component Value Date    HDL 52 04/13/2021     Lab Results   Component Value Date    LDLCALC 99 04/13/2021     Lab Results   Component Value Date    TRIG 188 (H) 04/13/2021     Cardiac Testing:   Implantable Loop recorder interrogation 6/30/2021:  LNQ11/ ACTIVE SYSTEM IS MRI CONDITIONAL  BATTERY VOLTAGE OK  PRESENTING RHYTHM: NSR @ 82 BPM  R-WAVES: 0 51mV  NO PATIENT OR DEVICE ACTIVATED EPISODES  NO PROGRAMMING CHANGES MADE TO DEVICE PARAMETERS  NORMAL DEVICE FUNCTION     ECG 5/9/2021: Normal sinus rhythm   Nonspecific ST abnormality       Lipid panel 4/13/2021: C 189  T 188  H 52  L 99       ECG 09/11/2020:  Normal sinus rhythm   Possible septal MI (more likely related to lead placement)   Nonspecific ST/T wave abnormality      Nuclear Stress test 3/18/2020:   Helio  5:01  7 1 METs  90% MPHR  Deconditioned heart rate response to exercise  No evidence of scar  No evidence of ischemia  Calculated ejection fraction 93%      ZIO 1/27/2020:   1-the patient had a ZIO monitor placed   Analysis is based on 13 days and 14 hours  2-predominant rhythm is sinus  3-the minimum heart rate is 50 with the maximum heart rate being 151 and the average being 78 when in a sinus rhythm  4-there is a rare PVC   There was one run of NSVT consisting of 7 beats  5-there is a rare PAC   There were 2 runs of SVT with the longest being 9 beats  6- there are no pauses greater than 3 0 seconds  7-there are no episodes of second-degree heart block type II or third-degree heart block  8-there were no diary entries placed  9-there were no acute ST/T wave changes on the strips that were reviewed    No comparison ZIO monitor     Echocardiogram 1/7/2020:   Normal left ventricular chamber size  Normal left ventricular systolic function  Normal regional wall motion  Normal left ventricular wall thickness  Estimated left ventricular ejection fraction is 65-70%       Mild mitral regurgitation       Normal pulmonary artery systolic pressure  Normal diastolic function      Visually Estimated LV Ejection Fraction is:70%      Review of Systems   Constitutional: Negative  HENT: Negative  Cardiovascular: Positive for palpitations and syncope  Negative for chest pain, dyspnea on exertion, irregular heartbeat, leg swelling, near-syncope and orthopnea  Respiratory: Negative for cough and snoring  Endocrine: Negative  Skin: Negative  Musculoskeletal: Negative  Gastrointestinal: Negative  Genitourinary: Negative  Psychiatric/Behavioral: Negative  Vitals:    08/05/21 1526   BP: 120/70   Pulse:    SpO2:      Vitals:    08/05/21 1433   Weight: 69 4 kg (153 lb)     Height: 5' 8" (172 7 cm)   Body mass index is 23 26 kg/m²  Physical Exam  Vitals and nursing note reviewed  Constitutional:       General: She is not in acute distress  Appearance: She is well-developed  She is not diaphoretic  HENT:      Head: Normocephalic and atraumatic  Neck:      Thyroid: No thyromegaly  Vascular: No carotid bruit or JVD  Cardiovascular:      Rate and Rhythm: Normal rate and regular rhythm  Pulses: Intact distal pulses  Radial pulses are 2+ on the right side and 2+ on the left side  Heart sounds: Normal heart sounds, S1 normal and S2 normal  No murmur heard  Comments: No lower leg edema  Pulmonary:      Effort: Pulmonary effort is normal       Breath sounds: Normal breath sounds  Abdominal:      General: There is no distension  Palpations: Abdomen is soft  Tenderness: There is no abdominal tenderness  Musculoskeletal:         General: Normal range of motion  Cervical back: Normal range of motion and neck supple  Lymphadenopathy:      Cervical: No cervical adenopathy  Skin:     General: Skin is warm and dry  Neurological:      Mental Status: She is alert and oriented to person, place, and time        Cranial Nerves: Cranial nerves are intact  No cranial nerve deficit  Psychiatric:         Mood and Affect: Mood normal          Speech: Speech normal          Behavior: Behavior normal  Behavior is cooperative           Cognition and Memory: Cognition normal

## 2021-08-06 NOTE — PROGRESS NOTES
Assessment/Plan:       Diagnoses and all orders for this visit:    Acute right-sided low back pain without sciatica  -     ketorolac (TORADOL) injection 30 mg  -     diclofenac (VOLTAREN) 75 mg EC tablet; Take 1 tablet (75 mg total) by mouth 2 (two) times a day  -     cyclobenzaprine (FLEXERIL) 10 mg tablet; Take 1 tablet (10 mg total) by mouth daily at bedtime as needed for muscle spasms    Muscle spasms of lower extremity, unspecified laterality  -     ketorolac (TORADOL) injection 30 mg  -     diclofenac (VOLTAREN) 75 mg EC tablet; Take 1 tablet (75 mg total) by mouth 2 (two) times a day  -     cyclobenzaprine (FLEXERIL) 10 mg tablet; Take 1 tablet (10 mg total) by mouth daily at bedtime as needed for muscle spasms     Will treat the same as previous back pain with a toradol injection, diclofenac PO and cyclobenzaprine at bedtiime  To call Monday if no improvement  Subjective:      Patient ID: Ashish Oconnor is a 46 y o  female  Here today for an acute visit  Reports she was helping move a mattress and started with right sided low back pain thereafter  She is unsure if she hurt her back then or prior and the mattress moving exacerbated it  Reports the pain goes down to the top part of her thigh  Back Pain  This is a recurrent problem  The current episode started yesterday  The pain is present in the lumbar spine  The quality of the pain is described as aching and cramping  The pain is at a severity of 9/10  The pain is the same all the time  The symptoms are aggravated by bending, coughing, position, lying down, sitting, standing and twisting  Stiffness is present all day  The following portions of the patient's history were reviewed and updated as appropriate: allergies, current medications, past family history, past medical history, past social history, past surgical history and problem list     Review of Systems   Constitutional: Negative  Respiratory: Negative      Cardiovascular: Negative  Musculoskeletal: Positive for back pain  All other systems reviewed and are negative  Objective:      /68 (BP Location: Left arm, Patient Position: Sitting, Cuff Size: Standard)   Pulse 87   Temp 97 8 °F (36 6 °C)   Ht 5' 8" (1 727 m)   Wt 69 6 kg (153 lb 6 4 oz)   SpO2 98%   BMI 23 32 kg/m²          Physical Exam  Vitals reviewed  Pulmonary:      Effort: Pulmonary effort is normal  No respiratory distress  Musculoskeletal:      Lumbar back: Spasms and tenderness present  No swelling, edema, deformity or bony tenderness  Decreased range of motion  Back:    Neurological:      Mental Status: She is alert and oriented to person, place, and time  Psychiatric:         Mood and Affect: Mood normal          Behavior: Behavior normal          Thought Content:  Thought content normal          Judgment: Judgment normal

## 2021-08-06 NOTE — ASSESSMENT & PLAN NOTE
Two short runs of SVT noted on ZIO 1/2020  Continue metoprolol succinate 25 mg daily  Continue magnesium 250 mg daily  Loop recorder in place presently  Will monitor

## 2021-08-06 NOTE — ASSESSMENT & PLAN NOTE
One short run of NSVT lasting 7 beats noted on ZIO 1/2020  Echocardiogram 1/7/2020 with normal LVEF (65-70%)  Continue metoprolol succinate 25 mg daily  Continue magnesium 250 mg daily  Patient did have one syncopal episode in May 2021 and again one event this past weekend    See discussion under syncope

## 2021-08-06 NOTE — ASSESSMENT & PLAN NOTE
Patient with episode of syncope and collapse resulting in injury to her head in May 2021  Concern exists for arrhythmia given ZIO with short run of NSVT  Not orthostatic on exam (see discussion under hypertension regarding medication changes)  Implantable loop recorder placed  Pt with one episode of syncope and collapse this past weekend - unfortunately her transmission did not come through so I cannot review results, however I am told by device clinic there were no events on loop  Will continue to monitor

## 2021-08-10 ENCOUNTER — OFFICE VISIT (OUTPATIENT)
Dept: GYNECOLOGY | Facility: CLINIC | Age: 51
End: 2021-08-10
Payer: COMMERCIAL

## 2021-08-10 VITALS
HEIGHT: 68 IN | HEART RATE: 87 BPM | DIASTOLIC BLOOD PRESSURE: 68 MMHG | WEIGHT: 153 LBS | SYSTOLIC BLOOD PRESSURE: 110 MMHG | BODY MASS INDEX: 23.19 KG/M2

## 2021-08-10 DIAGNOSIS — M81.0 OSTEOPOROSIS, UNSPECIFIED OSTEOPOROSIS TYPE, UNSPECIFIED PATHOLOGICAL FRACTURE PRESENCE: Primary | ICD-10-CM

## 2021-08-10 PROCEDURE — 99211 OFF/OP EST MAY X REQ PHY/QHP: CPT

## 2021-08-10 PROCEDURE — 96372 THER/PROPH/DIAG INJ SC/IM: CPT | Performed by: OBSTETRICS & GYNECOLOGY

## 2021-08-10 NOTE — PROGRESS NOTES
pt presents for prolia inj , given in [address] [facility] Gerard [id number], EXP 08/23  pt tolerated inj and provided med  Inj given by José Luis Rea

## 2021-09-10 DIAGNOSIS — M81.0 OSTEOPOROSIS, UNSPECIFIED OSTEOPOROSIS TYPE, UNSPECIFIED PATHOLOGICAL FRACTURE PRESENCE: Primary | ICD-10-CM

## 2021-09-13 ENCOUNTER — APPOINTMENT (OUTPATIENT)
Dept: LAB | Facility: HOSPITAL | Age: 51
End: 2021-09-13
Payer: COMMERCIAL

## 2021-09-13 DIAGNOSIS — M25.50 PAIN IN JOINT, MULTIPLE SITES: ICD-10-CM

## 2021-09-13 DIAGNOSIS — R76.8 FALSE POSITIVE SEROLOGICAL TEST FOR SYPHILIS: ICD-10-CM

## 2021-09-13 DIAGNOSIS — Z82.69 FAMILY HISTORY OF MUSCULOSKELETAL DISEASE: ICD-10-CM

## 2021-09-13 DIAGNOSIS — R53.83 FATIGUE, UNSPECIFIED TYPE: ICD-10-CM

## 2021-09-13 DIAGNOSIS — M79.7 SCAPULOHUMERAL FIBROSITIS: ICD-10-CM

## 2021-09-13 LAB
ALT SERPL W P-5'-P-CCNC: 23 U/L (ref 12–78)
AST SERPL W P-5'-P-CCNC: 15 U/L (ref 5–45)
BASOPHILS # BLD AUTO: 0.04 THOUSANDS/ΜL (ref 0–0.1)
BASOPHILS NFR BLD AUTO: 1 % (ref 0–1)
C3 SERPL-MCNC: 121 MG/DL (ref 90–180)
C4 SERPL-MCNC: 33 MG/DL (ref 10–40)
CK MB SERPL-MCNC: 0.5 NG/ML (ref 0–5)
CK MB SERPL-MCNC: <1 % (ref 0–2.5)
CK SERPL-CCNC: 131 U/L (ref 26–192)
CREAT SERPL-MCNC: 0.73 MG/DL (ref 0.6–1.3)
CRP SERPL QL: <0.5 MG/L
EOSINOPHIL # BLD AUTO: 0.04 THOUSAND/ΜL (ref 0–0.61)
EOSINOPHIL NFR BLD AUTO: 1 % (ref 0–6)
ERYTHROCYTE [DISTWIDTH] IN BLOOD BY AUTOMATED COUNT: 12.7 % (ref 11.6–15.1)
ERYTHROCYTE [SEDIMENTATION RATE] IN BLOOD: 12 MM/HOUR (ref 0–29)
GFR SERPL CREATININE-BSD FRML MDRD: 96 ML/MIN/1.73SQ M
HCT VFR BLD AUTO: 40.4 % (ref 34.8–46.1)
HGB BLD-MCNC: 13.2 G/DL (ref 11.5–15.4)
IMM GRANULOCYTES # BLD AUTO: 0.01 THOUSAND/UL (ref 0–0.2)
IMM GRANULOCYTES NFR BLD AUTO: 0 % (ref 0–2)
LYMPHOCYTES # BLD AUTO: 0.98 THOUSANDS/ΜL (ref 0.6–4.47)
LYMPHOCYTES NFR BLD AUTO: 19 % (ref 14–44)
MCH RBC QN AUTO: 31.4 PG (ref 26.8–34.3)
MCHC RBC AUTO-ENTMCNC: 32.7 G/DL (ref 31.4–37.4)
MCV RBC AUTO: 96 FL (ref 82–98)
MONOCYTES # BLD AUTO: 0.33 THOUSAND/ΜL (ref 0.17–1.22)
MONOCYTES NFR BLD AUTO: 6 % (ref 4–12)
NEUTROPHILS # BLD AUTO: 3.91 THOUSANDS/ΜL (ref 1.85–7.62)
NEUTS SEG NFR BLD AUTO: 73 % (ref 43–75)
NRBC BLD AUTO-RTO: 0 /100 WBCS
PLATELET # BLD AUTO: 240 THOUSANDS/UL (ref 149–390)
PMV BLD AUTO: 10.4 FL (ref 8.9–12.7)
RBC # BLD AUTO: 4.2 MILLION/UL (ref 3.81–5.12)
URATE SERPL-MCNC: 3.1 MG/DL (ref 2–6.8)
WBC # BLD AUTO: 5.31 THOUSAND/UL (ref 4.31–10.16)

## 2021-09-13 PROCEDURE — 82565 ASSAY OF CREATININE: CPT

## 2021-09-13 PROCEDURE — 82550 ASSAY OF CK (CPK): CPT

## 2021-09-13 PROCEDURE — 84450 TRANSFERASE (AST) (SGOT): CPT

## 2021-09-13 PROCEDURE — 82553 CREATINE MB FRACTION: CPT

## 2021-09-13 PROCEDURE — 86160 COMPLEMENT ANTIGEN: CPT

## 2021-09-13 PROCEDURE — 84550 ASSAY OF BLOOD/URIC ACID: CPT

## 2021-09-13 PROCEDURE — 36415 COLL VENOUS BLD VENIPUNCTURE: CPT

## 2021-09-13 PROCEDURE — 85652 RBC SED RATE AUTOMATED: CPT

## 2021-09-13 PROCEDURE — 86140 C-REACTIVE PROTEIN: CPT

## 2021-09-13 PROCEDURE — 84460 ALANINE AMINO (ALT) (SGPT): CPT

## 2021-09-13 PROCEDURE — 85025 COMPLETE CBC W/AUTO DIFF WBC: CPT

## 2021-09-20 DIAGNOSIS — E78.2 MIXED HYPERLIPIDEMIA: ICD-10-CM

## 2021-09-20 DIAGNOSIS — R00.2 PALPITATIONS: ICD-10-CM

## 2021-09-20 DIAGNOSIS — I10 HYPERTENSION, UNSPECIFIED TYPE: ICD-10-CM

## 2021-09-20 DIAGNOSIS — E03.9 ACQUIRED HYPOTHYROIDISM: ICD-10-CM

## 2021-09-20 DIAGNOSIS — F41.9 ANXIETY: Primary | ICD-10-CM

## 2021-09-20 RX ORDER — ATORVASTATIN CALCIUM 40 MG/1
40 TABLET, FILM COATED ORAL DAILY
Qty: 90 TABLET | Refills: 1 | Status: SHIPPED | OUTPATIENT
Start: 2021-09-20 | End: 2022-01-28 | Stop reason: SDUPTHER

## 2021-09-20 RX ORDER — LEVOTHYROXINE SODIUM 0.07 MG/1
75 TABLET ORAL DAILY
Qty: 90 TABLET | Refills: 0 | Status: SHIPPED | OUTPATIENT
Start: 2021-09-20 | End: 2021-12-03 | Stop reason: DRUGHIGH

## 2021-09-20 RX ORDER — METOPROLOL SUCCINATE 25 MG/1
25 TABLET, EXTENDED RELEASE ORAL DAILY
Qty: 90 TABLET | Refills: 4 | Status: SHIPPED | OUTPATIENT
Start: 2021-09-20

## 2021-09-20 RX ORDER — LEVOTHYROXINE SODIUM 0.07 MG/1
75 TABLET ORAL DAILY
Qty: 90 TABLET | Refills: 0 | Status: SHIPPED | OUTPATIENT
Start: 2021-09-20 | End: 2021-09-20 | Stop reason: SDUPTHER

## 2021-09-20 RX ORDER — HYDROXYZINE HYDROCHLORIDE 25 MG/1
25 TABLET, FILM COATED ORAL AS NEEDED
Qty: 30 TABLET | Refills: 1 | Status: SHIPPED | OUTPATIENT
Start: 2021-09-20

## 2021-09-22 ENCOUNTER — OFFICE VISIT (OUTPATIENT)
Dept: ENDOCRINOLOGY | Facility: CLINIC | Age: 51
End: 2021-09-22
Payer: COMMERCIAL

## 2021-09-22 VITALS
HEART RATE: 78 BPM | WEIGHT: 153 LBS | BODY MASS INDEX: 23.19 KG/M2 | HEIGHT: 68 IN | DIASTOLIC BLOOD PRESSURE: 80 MMHG | SYSTOLIC BLOOD PRESSURE: 112 MMHG

## 2021-09-22 DIAGNOSIS — G25.81 RESTLESS LEGS: ICD-10-CM

## 2021-09-22 DIAGNOSIS — E03.9 ACQUIRED HYPOTHYROIDISM: Primary | ICD-10-CM

## 2021-09-22 PROCEDURE — 99244 OFF/OP CNSLTJ NEW/EST MOD 40: CPT | Performed by: STUDENT IN AN ORGANIZED HEALTH CARE EDUCATION/TRAINING PROGRAM

## 2021-09-22 NOTE — ASSESSMENT & PLAN NOTE
I reviewed with patient that levothyroxine should be taken 30 minutes before a meal and that it should not be taken together with medications that inhibit its absorption including calcium or iron supplements, cholestyramine, sucralfate, and aluminum hydroxide  She should avoid calcium supplements, iron supplements, multivitamins or soy products within 4 hours of taking thyroid hormone pill  Her last dose adjustment was >3 months ago so I have asked that she repeat her studies  Of note, patient with prior negative celiacs screen

## 2021-09-22 NOTE — PROGRESS NOTES
Nicol Fleming 46 y o  female MRN: [id number]    Encounter: [id number]      Assessment/Plan     Problem List Items Addressed This Visit        Endocrine    Hypothyroidism due to Hashimoto's thyroiditis - Primary     I reviewed with patient that levothyroxine should be taken 30 minutes before a meal and that it should not be taken together with medications that inhibit its absorption including calcium or iron supplements, cholestyramine, sucralfate, and aluminum hydroxide  She should avoid calcium supplements, iron supplements, multivitamins or soy products within 4 hours of taking thyroid hormone pill  Her last dose adjustment was >3 months ago so I have asked that she repeat her studies  Of note, patient with prior negative celiacs screen  Other    Restless legs     Although last CBC wnl, will screen for iron deficiency given symptoms of restless legs  Relevant Orders    Iron Panel (Includes Ferritin, Iron Sat%, Iron, and TIBC)          CC:   Hypothyroidism    History of Present Illness     HPI:    Ms Sigrid Castañeda presents today for initial evaluation of hypothyroidism  She was diagnosed with hypothyroidism 15 years ago with symptomatic spells consistent with hypothyroidism  Currently taking brand Synthroid 75 mcg daily  She was taking 112 mcg levothyroxine in April, but this was reduced based on thyroid labs  She reports taking her synthroid reliably with a glass of water and without missed doses  She separates her synthroid from her other medications  She does not take calcium until later in the day  She hasn't been taking omeprazole  She reports a number of symptoms under the umbrella of a thyroid condition  Among them, she is most burdened by fatigue and lack of restful sleep  She does wear a fitness watch that tracks sleep quality and reports that she spends very little time in restful sleep  She reports having restless legs       She reports recent history of abrupt, syncopal episodes that have occurred on 2 occasions since May  She has seen neurology and cardiology for this reason  Recent cardiology note reports shorts episodes of SVT and NSVT on Zio  She reports having had a hysterectomy at age 45 2/2 endometriosis  She states that the procedure also included b/l oopherectomy  She states history of osteoporosis on prolia  Patient has HLAB27 positivity  Strong family history of thyroid disease on dad's side (no history of thyroid cancer)  Review of Systems   Constitutional: Positive for appetite change and fatigue  Negative for diaphoresis and unexpected weight change  HENT: Negative for trouble swallowing and voice change  Eyes: Negative for pain, redness and visual disturbance  Respiratory: Negative for shortness of breath  Cardiovascular: Negative for chest pain, palpitations and leg swelling  Gastrointestinal: Positive for constipation and diarrhea  Negative for abdominal pain, nausea and vomiting  Endocrine: Positive for cold intolerance  Negative for heat intolerance, polydipsia, polyphagia and polyuria  Genitourinary: Negative for flank pain, hematuria and menstrual problem  Musculoskeletal: Positive for arthralgias and myalgias  Skin: Negative for rash and wound  Neurological: Positive for syncope and numbness  Negative for tremors, weakness, light-headedness and headaches  Psychiatric/Behavioral: Positive for sleep disturbance  Negative for confusion, decreased concentration and self-injury  The patient is nervous/anxious  Historical Information   Past Medical History:   Diagnosis Date    Allergic 1974    Seasonal    Anxiety     Arthritis     Colitis     Noted on colonoscopy 04/24/2020      Depression     Disease of thyroid gland     Gastritis     Noted on EGD 04/24/2020    Glaucoma     Headache(784 0)     Hyperlipidemia     Hypertension     Hypothyroidism     Lyme disease     Migraine     Osteoporosis     Scoliosis     SVT (supraventricular tachycardia) (HCC)     Visual impairment     VT (ventricular tachycardia) (HCC)      Past Surgical History:   Procedure Laterality Date    BREAST BIOPSY Right 10/21/2020    papilloma    COLONOSCOPY      EGD      FINGER SURGERY      left pinky    HYSTERECTOMY      HYSTERECTOMY W/ SALPINGO-OOPHERECTOMY  2008    US GUIDED BREAST BIOPSY RIGHT COMPLETE Right 10/21/2020     Social History   Social History     Substance and Sexual Activity   Alcohol Use Never     Social History     Substance and Sexual Activity   Drug Use Never     Social History     Tobacco Use   Smoking Status Former Smoker    Packs/day: 1     Years: 10 00    Pack years: 10 00    Types: Cigarettes    Quit date: 2007    Years since quittin 7   Smokeless Tobacco Never Used   Tobacco Comment    quit      Family History:   Family History   Problem Relation Age of Onset    Peripheral vascular disease Mother     Glaucoma Mother     Prostate cancer Father     Hypothyroidism Sister     No Known Problems Daughter     Colon cancer Maternal Grandmother     No Known Problems Maternal Grandfather     Arthritis Paternal Grandmother     No Known Problems Paternal Grandfather     No Known Problems Daughter     No Known Problems Maternal Aunt     No Known Problems Maternal Aunt     Skin cancer Paternal Aunt     No Known Problems Paternal Aunt        Meds/Allergies   Current Outpatient Medications   Medication Sig Dispense Refill    atorvastatin (LIPITOR) 40 mg tablet Take 1 tablet (40 mg total) by mouth daily 90 tablet 1    bimatoprost (Lumigan) 0 01 % ophthalmic drops Lumigan 0 01 % eye drops      Calcium Carbonate-Vit D-Min (CALCIUM 1200 PO) Take by mouth daily       Cholecalciferol (VITAMIN D3 PO) Take 1,000 mg by mouth      cyclobenzaprine (FLEXERIL) 10 mg tablet Take 1 tablet (10 mg total) by mouth daily at bedtime as needed for muscle spasms 30 tablet 0    denosumab (PROLIA) 60 mg/mL Inject 1 mL (60 mg total) under the skin every 6 (six) months 1 mL 3    diclofenac (VOLTAREN) 75 mg EC tablet Take 1 tablet (75 mg total) by mouth 2 (two) times a day 30 tablet 1    Glucosamine-Chondroitin 500-400 MG CAPS Take by mouth daily       hydrOXYzine HCL (ATARAX) 25 mg tablet Take 1 tablet (25 mg total) by mouth as needed for anxiety 30 tablet 1    Inulin (FIBER CHOICE PO) Take by mouth      irbesartan (AVAPRO) 75 mg tablet Take 1 tablet (75 mg total) by mouth daily 30 tablet 11    levothyroxine 75 mcg tablet Take 1 tablet (75 mcg total) by mouth daily 90 tablet 0    LORazepam (ATIVAN) 0 5 mg tablet Take 0 5 mg by mouth as needed      Magnesium 250 MG TABS Take 1 tablet (250 mg total) by mouth daily 30 tablet 0    Methyl Salicylate-Lido-Menthol (LidoPro) 4-4-5 % PTCH LidoPro 4 %-4 %-5 % topical patch   APPLY 1 PATCH TO THE AFFECTED AREA EVERY 8 TO 12 HOURS AS NEEDED  MAX OF 2 PATCHES PER DAY      metoprolol succinate (TOPROL-XL) 25 mg 24 hr tablet Take 1 tablet (25 mg total) by mouth daily 90 tablet 4    multivitamin (THERAGRAN) TABS Take 1 tablet by mouth daily      Omega-3 Fatty Acids (FISH OIL OMEGA-3 PO) Take by mouth      omeprazole (PriLOSEC OTC) 20 MG tablet Take 20 mg by mouth daily       Rimegepant Sulfate (Nurtec) 75 MG TBDP Take 75 mg by mouth as needed      SUMAtriptan (IMITREX) 100 mg tablet Take 100 mg by mouth as needed      Thiamine HCl (VITAMIN B1 PO) Take 1 capsule by mouth daily      citalopram (CeleXA) 20 mg tablet Take 20 mg by mouth daily  (Patient not taking: Reported on 8/5/2021)      Omega-3 Fatty Acids (fish oil) 1,000 mg Take by mouth (Patient not taking: Reported on 6/30/2021)       No current facility-administered medications for this visit  No Known Allergies    Objective   Vitals: Blood pressure 112/80, pulse 78, height 5' 8" (1 727 m), weight 69 4 kg (153 lb)  Physical Exam  Vitals reviewed  Constitutional:       Appearance: She is not ill-appearing     HENT: Head: Normocephalic and atraumatic  Nose: Nose normal    Eyes:      General: No scleral icterus  Conjunctiva/sclera: Conjunctivae normal    Neck:      Thyroid: No thyroid mass, thyromegaly or thyroid tenderness  Cardiovascular:      Rate and Rhythm: Normal rate and regular rhythm  Pulses: Normal pulses  Heart sounds: No murmur heard  Pulmonary:      Effort: Pulmonary effort is normal  No respiratory distress  Abdominal:      Palpations: Abdomen is soft  Tenderness: There is no abdominal tenderness  Musculoskeletal:      Cervical back: Neck supple  Lymphadenopathy:      Cervical: No cervical adenopathy  Skin:     General: Skin is warm and dry  Neurological:      Mental Status: She is alert  Psychiatric:         Mood and Affect: Mood normal          Behavior: Behavior normal          The history was obtained from the review of the chart, patient  Lab Results:   Lab Results   Component Value Date/Time    TSH 3RD GENERATON 0 960 06/30/2021 10:35 AM    TSH 3RD GENERATON 0 817 06/03/2021 03:30 PM    TSH 3RD GENERATON 0 265 (L) 05/10/2021 08:42 AM    Free T4 1 01 06/30/2021 10:35 AM           Imaging Studies:       I have personally reviewed pertinent reports  Portions of the record may have been created with voice recognition software  Occasional wrong word or "sound a like" substitutions may have occurred due to the inherent limitations of voice recognition software  Read the chart carefully and recognize, using context, where substitutions have occurred

## 2021-09-24 ENCOUNTER — OFFICE VISIT (OUTPATIENT)
Dept: CARDIOLOGY CLINIC | Facility: CLINIC | Age: 51
End: 2021-09-24
Payer: COMMERCIAL

## 2021-09-24 VITALS
HEART RATE: 88 BPM | WEIGHT: 153.4 LBS | SYSTOLIC BLOOD PRESSURE: 118 MMHG | HEIGHT: 68 IN | DIASTOLIC BLOOD PRESSURE: 82 MMHG | OXYGEN SATURATION: 98 % | BODY MASS INDEX: 23.25 KG/M2

## 2021-09-24 DIAGNOSIS — R55 SYNCOPE AND COLLAPSE: ICD-10-CM

## 2021-09-24 DIAGNOSIS — E78.2 MIXED HYPERLIPIDEMIA: ICD-10-CM

## 2021-09-24 DIAGNOSIS — I47.1 SVT (SUPRAVENTRICULAR TACHYCARDIA) (HCC): ICD-10-CM

## 2021-09-24 DIAGNOSIS — Z87.898 HISTORY OF SYNCOPE: ICD-10-CM

## 2021-09-24 DIAGNOSIS — I47.2 VT (VENTRICULAR TACHYCARDIA) (HCC): ICD-10-CM

## 2021-09-24 DIAGNOSIS — R00.2 PALPITATIONS: Primary | ICD-10-CM

## 2021-09-24 DIAGNOSIS — I10 ESSENTIAL HYPERTENSION: ICD-10-CM

## 2021-09-24 DIAGNOSIS — E87.6 HYPOKALEMIA: ICD-10-CM

## 2021-09-24 PROCEDURE — 99214 OFFICE O/P EST MOD 30 MIN: CPT | Performed by: INTERNAL MEDICINE
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
## 2021-10-04 ENCOUNTER — OFFICE VISIT (OUTPATIENT)
Dept: FAMILY MEDICINE CLINIC | Facility: CLINIC | Age: 51
End: 2021-10-04
Payer: COMMERCIAL

## 2021-10-04 VITALS
TEMPERATURE: 97.8 F | SYSTOLIC BLOOD PRESSURE: 118 MMHG | OXYGEN SATURATION: 99 % | BODY MASS INDEX: 23.43 KG/M2 | WEIGHT: 154.6 LBS | HEART RATE: 87 BPM | HEIGHT: 68 IN | DIASTOLIC BLOOD PRESSURE: 80 MMHG

## 2021-10-04 DIAGNOSIS — S39.012A STRAIN OF LUMBAR REGION, INITIAL ENCOUNTER: ICD-10-CM

## 2021-10-04 DIAGNOSIS — E06.3 HYPOTHYROIDISM DUE TO HASHIMOTO'S THYROIDITIS: ICD-10-CM

## 2021-10-04 DIAGNOSIS — R51.9 SINUS HEADACHE: ICD-10-CM

## 2021-10-04 DIAGNOSIS — E03.8 HYPOTHYROIDISM DUE TO HASHIMOTO'S THYROIDITIS: ICD-10-CM

## 2021-10-04 DIAGNOSIS — R00.2 PALPITATIONS: ICD-10-CM

## 2021-10-04 DIAGNOSIS — R09.81 NASAL CONGESTION: ICD-10-CM

## 2021-10-04 DIAGNOSIS — F43.21 SITUATIONAL DEPRESSION: ICD-10-CM

## 2021-10-04 DIAGNOSIS — M54.50 ACUTE LEFT-SIDED LOW BACK PAIN WITHOUT SCIATICA: Primary | ICD-10-CM

## 2021-10-04 DIAGNOSIS — F41.9 ANXIETY: ICD-10-CM

## 2021-10-04 PROCEDURE — 99214 OFFICE O/P EST MOD 30 MIN: CPT | Performed by: NURSE PRACTITIONER

## 2021-10-04 RX ORDER — METHYLPREDNISOLONE 4 MG/1
TABLET ORAL
Qty: 21 EACH | Refills: 0 | Status: SHIPPED | OUTPATIENT
Start: 2021-10-04 | End: 2022-01-04

## 2021-10-04 RX ORDER — CYCLOBENZAPRINE HCL 5 MG
5 TABLET ORAL 3 TIMES DAILY PRN
Qty: 30 TABLET | Refills: 0 | Status: SHIPPED | OUTPATIENT
Start: 2021-10-04 | End: 2022-01-04

## 2021-10-11 ENCOUNTER — REMOTE DEVICE CLINIC VISIT (OUTPATIENT)
Dept: CARDIOLOGY CLINIC | Facility: CLINIC | Age: 51
End: 2021-10-11
Payer: COMMERCIAL

## 2021-10-11 DIAGNOSIS — Z95.818 PRESENCE OF OTHER CARDIAC IMPLANTS AND GRAFTS: Primary | ICD-10-CM

## 2021-10-11 PROCEDURE — 93298 REM INTERROG DEV EVAL SCRMS: CPT | Performed by: INTERNAL MEDICINE

## 2021-10-11 PROCEDURE — G2066 INTER DEVC REMOTE 30D: HCPCS | Performed by: INTERNAL MEDICINE

## 2021-10-22 ENCOUNTER — HOSPITAL ENCOUNTER (OUTPATIENT)
Dept: RADIOLOGY | Facility: CLINIC | Age: 51
Discharge: HOME | End: 2021-10-22
Payer: COMMERCIAL

## 2021-10-22 VITALS — HEIGHT: 68 IN | BODY MASS INDEX: 23.49 KG/M2 | WEIGHT: 155 LBS

## 2021-10-22 DIAGNOSIS — M81.0 OSTEOPOROSIS, UNSPECIFIED OSTEOPOROSIS TYPE, UNSPECIFIED PATHOLOGICAL FRACTURE PRESENCE: ICD-10-CM

## 2021-10-22 DIAGNOSIS — Z12.31 ENCOUNTER FOR SCREENING MAMMOGRAM FOR MALIGNANT NEOPLASM OF BREAST: ICD-10-CM

## 2021-10-22 PROCEDURE — 77080 DXA BONE DENSITY AXIAL: CPT

## 2021-10-22 PROCEDURE — 77063 BREAST TOMOSYNTHESIS BI: CPT

## 2021-10-22 PROCEDURE — 77067 SCR MAMMO BI INCL CAD: CPT

## 2021-11-08 ENCOUNTER — OFFICE VISIT (OUTPATIENT)
Dept: FAMILY MEDICINE CLINIC | Facility: CLINIC | Age: 51
End: 2021-11-08
Payer: COMMERCIAL

## 2021-11-08 VITALS
SYSTOLIC BLOOD PRESSURE: 112 MMHG | OXYGEN SATURATION: 98 % | DIASTOLIC BLOOD PRESSURE: 74 MMHG | HEART RATE: 89 BPM | TEMPERATURE: 98 F | BODY MASS INDEX: 23.64 KG/M2 | HEIGHT: 68 IN | WEIGHT: 156 LBS

## 2021-11-08 DIAGNOSIS — M54.9 UPPER BACK PAIN: ICD-10-CM

## 2021-11-08 DIAGNOSIS — M79.10 MUSCLE SORENESS: ICD-10-CM

## 2021-11-08 DIAGNOSIS — M25.512 ACUTE PAIN OF LEFT SHOULDER: Primary | ICD-10-CM

## 2021-11-08 DIAGNOSIS — M54.50 ACUTE MIDLINE LOW BACK PAIN WITHOUT SCIATICA: ICD-10-CM
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
## 2021-11-08 PROCEDURE — 99213 OFFICE O/P EST LOW 20 MIN: CPT | Performed by: NURSE PRACTITIONER

## 2021-11-08 PROCEDURE — 96372 THER/PROPH/DIAG INJ SC/IM: CPT | Performed by: NURSE PRACTITIONER

## 2021-11-08 RX ORDER — DICLOFENAC SODIUM 75 MG/1
75 TABLET, DELAYED RELEASE ORAL 2 TIMES DAILY
Qty: 60 TABLET | Refills: 0 | Status: SHIPPED | OUTPATIENT
Start: 2021-11-08 | End: 2022-01-04

## 2021-11-08 RX ORDER — KETOROLAC TROMETHAMINE 30 MG/ML
30 INJECTION, SOLUTION INTRAMUSCULAR; INTRAVENOUS ONCE
Status: COMPLETED | OUTPATIENT
Start: 2021-11-08 | End: 2021-11-08

## 2021-11-08 RX ADMIN — KETOROLAC TROMETHAMINE 30 MG: 30 INJECTION, SOLUTION INTRAMUSCULAR; INTRAVENOUS at 10:59

## 2021-11-23 ENCOUNTER — DOCTOR'S OFFICE (OUTPATIENT)
Dept: URBAN - NONMETROPOLITAN AREA CLINIC 1 | Facility: CLINIC | Age: 51
Setting detail: OPHTHALMOLOGY
End: 2021-11-23
Payer: COMMERCIAL

## 2021-11-23 DIAGNOSIS — H40.1131: ICD-10-CM

## 2021-11-23 DIAGNOSIS — H25.13: ICD-10-CM

## 2021-11-23 PROBLEM — H25.12: Status: ACTIVE | Noted: 2020-03-06

## 2021-11-23 PROBLEM — H25.11: Status: ACTIVE | Noted: 2020-03-06

## 2021-11-23 PROCEDURE — 92014 COMPRE OPH EXAM EST PT 1/>: CPT | Performed by: OPHTHALMOLOGY

## 2021-11-23 PROCEDURE — 92083 EXTENDED VISUAL FIELD XM: CPT | Performed by: OPHTHALMOLOGY

## 2021-11-23 PROCEDURE — 76514 ECHO EXAM OF EYE THICKNESS: CPT | Performed by: OPHTHALMOLOGY

## 2021-11-23 PROCEDURE — 92020 GONIOSCOPY: CPT | Performed by: OPHTHALMOLOGY

## 2021-11-23 ASSESSMENT — REFRACTION_CURRENTRX
OS_ADD: +2.00
OS_AXIS: 180
OS_VPRISM_DIRECTION: BF
OD_AXIS: 161
OD_OVR_VA: 20/
OD_CYLINDER: -2.25
OS_CYLINDER: -2.25
OS_SPHERE: +1.00
OD_SPHERE: +0.25
OS_OVR_VA: 20/

## 2021-11-23 ASSESSMENT — AXIALLENGTH_DERIVED
OD_AL: 23.9149
OS_AL: 24.4693

## 2021-11-23 ASSESSMENT — TONOMETRY
OD_IOP_MMHG: 10
OS_IOP_MMHG: 16

## 2021-11-23 ASSESSMENT — PACHYMETRY
OD_CT_CORRECTION: 2
OD_CT_UM: 518
OS_CT_CORRECTION: 2
OS_CT_UM: 516

## 2021-11-23 ASSESSMENT — DRY EYES - PHYSICIAN NOTES: OS_GENERALCOMMENTS: 2+ PEE

## 2021-11-23 ASSESSMENT — REFRACTION_AUTOREFRACTION
OS_AXIS: 172
OS_CYLINDER: -2.25
OD_CYLINDER: -1.75
OD_SPHERE: +1.00
OD_AXIS: 174
OS_SPHERE: +0.25

## 2021-11-23 ASSESSMENT — KERATOMETRY
OS_K1POWER_DIOPTERS: 41.25
OD_K2POWER_DIOPTERS: 43.25
OD_K1POWER_DIOPTERS: 41.75
OS_K2POWER_DIOPTERS: 43.00
OD_AXISANGLE_DEGREES: 082
OS_AXISANGLE_DEGREES: 082

## 2021-11-23 ASSESSMENT — SPHEQUIV_DERIVED
OS_SPHEQUIV: -0.875
OD_SPHEQUIV: 0.125

## 2021-11-23 ASSESSMENT — CONFRONTATIONAL VISUAL FIELD TEST (CVF)
OS_FINDINGS: FULL
OD_FINDINGS: FULL

## 2021-11-23 ASSESSMENT — VISUAL ACUITY
OD_BCVA: 20/20-1
OS_BCVA: 20/20-1

## 2021-12-02 ENCOUNTER — APPOINTMENT (OUTPATIENT)
Dept: LAB | Facility: HOSPITAL | Age: 51
End: 2021-12-02
Payer: COMMERCIAL

## 2021-12-02 DIAGNOSIS — E03.9 ACQUIRED HYPOTHYROIDISM: ICD-10-CM

## 2021-12-02 DIAGNOSIS — G25.81 RESTLESS LEGS: ICD-10-CM

## 2021-12-02 LAB
BASOPHILS # BLD AUTO: 0.06 THOUSANDS/ΜL (ref 0–0.1)
BASOPHILS NFR BLD AUTO: 1 % (ref 0–1)
EOSINOPHIL # BLD AUTO: 0.08 THOUSAND/ΜL (ref 0–0.61)
EOSINOPHIL NFR BLD AUTO: 2 % (ref 0–6)
ERYTHROCYTE [DISTWIDTH] IN BLOOD BY AUTOMATED COUNT: 13.9 % (ref 11.6–15.1)
FERRITIN SERPL-MCNC: 71 NG/ML (ref 8–388)
HCT VFR BLD AUTO: 36.6 % (ref 34.8–46.1)
HGB BLD-MCNC: 12 G/DL (ref 11.5–15.4)
IMM GRANULOCYTES # BLD AUTO: 0.01 THOUSAND/UL (ref 0–0.2)
IMM GRANULOCYTES NFR BLD AUTO: 0 % (ref 0–2)
IRON SATN MFR SERPL: 20 % (ref 15–50)
IRON SERPL-MCNC: 68 UG/DL (ref 50–170)
LYMPHOCYTES # BLD AUTO: 1.37 THOUSANDS/ΜL (ref 0.6–4.47)
LYMPHOCYTES NFR BLD AUTO: 25 % (ref 14–44)
MCH RBC QN AUTO: 31.5 PG (ref 26.8–34.3)
MCHC RBC AUTO-ENTMCNC: 32.8 G/DL (ref 31.4–37.4)
MCV RBC AUTO: 96 FL (ref 82–98)
MONOCYTES # BLD AUTO: 0.37 THOUSAND/ΜL (ref 0.17–1.22)
MONOCYTES NFR BLD AUTO: 7 % (ref 4–12)
NEUTROPHILS # BLD AUTO: 3.57 THOUSANDS/ΜL (ref 1.85–7.62)
NEUTS SEG NFR BLD AUTO: 65 % (ref 43–75)
NRBC BLD AUTO-RTO: 0 /100 WBCS
PLATELET # BLD AUTO: 240 THOUSANDS/UL (ref 149–390)
PMV BLD AUTO: 10.7 FL (ref 8.9–12.7)
RBC # BLD AUTO: 3.81 MILLION/UL (ref 3.81–5.12)
T4 FREE SERPL-MCNC: 1.01 NG/DL (ref 0.76–1.46)
TIBC SERPL-MCNC: 335 UG/DL (ref 250–450)
TSH SERPL DL<=0.05 MIU/L-ACNC: 10.32 UIU/ML (ref 0.36–3.74)
WBC # BLD AUTO: 5.46 THOUSAND/UL (ref 4.31–10.16)

## 2021-12-02 PROCEDURE — 83540 ASSAY OF IRON: CPT

## 2021-12-02 PROCEDURE — 83550 IRON BINDING TEST: CPT

## 2021-12-02 PROCEDURE — 85025 COMPLETE CBC W/AUTO DIFF WBC: CPT

## 2021-12-02 PROCEDURE — 84439 ASSAY OF FREE THYROXINE: CPT

## 2021-12-02 PROCEDURE — 36415 COLL VENOUS BLD VENIPUNCTURE: CPT

## 2021-12-02 PROCEDURE — 84443 ASSAY THYROID STIM HORMONE: CPT

## 2021-12-02 PROCEDURE — 82728 ASSAY OF FERRITIN: CPT

## 2021-12-03 DIAGNOSIS — E03.9 ACQUIRED HYPOTHYROIDISM: Primary | ICD-10-CM

## 2021-12-03 RX ORDER — LEVOTHYROXINE SODIUM 0.1 MG/1
100 TABLET ORAL DAILY
COMMUNITY
End: 2021-12-03 | Stop reason: DRUGHIGH

## 2021-12-03 RX ORDER — LEVOTHYROXINE SODIUM 0.1 MG/1
100 TABLET ORAL DAILY
Qty: 90 TABLET | Refills: 1 | Status: SHIPPED | OUTPATIENT
Start: 2021-12-03

## 2021-12-07 ENCOUNTER — RX ONLY (RX ONLY)
Age: 51
End: 2021-12-07

## 2021-12-07 RX ORDER — BIMATOPROST 0.1 MG/ML
SOLUTION/ DROPS OPHTHALMIC
Qty: 3 | Refills: 3 | Status: ACTIVE | OUTPATIENT

## 2021-12-13 ENCOUNTER — OFFICE VISIT (OUTPATIENT)
Dept: FAMILY MEDICINE CLINIC | Facility: CLINIC | Age: 51
End: 2021-12-13
Payer: COMMERCIAL

## 2021-12-13 ENCOUNTER — TELEPHONE (OUTPATIENT)
Dept: FAMILY MEDICINE CLINIC | Facility: CLINIC | Age: 51
End: 2021-12-13

## 2021-12-13 VITALS
DIASTOLIC BLOOD PRESSURE: 72 MMHG | WEIGHT: 156 LBS | BODY MASS INDEX: 23.64 KG/M2 | SYSTOLIC BLOOD PRESSURE: 116 MMHG | HEIGHT: 68 IN | HEART RATE: 85 BPM | OXYGEN SATURATION: 98 % | TEMPERATURE: 98.2 F

## 2021-12-13 DIAGNOSIS — R20.0 NUMBNESS AND TINGLING IN LEFT ARM: ICD-10-CM

## 2021-12-13 DIAGNOSIS — M25.512 ACUTE PAIN OF LEFT SHOULDER: Primary | ICD-10-CM

## 2021-12-13 DIAGNOSIS — M79.601 PAIN IN BOTH UPPER EXTREMITIES: ICD-10-CM

## 2021-12-13 DIAGNOSIS — M54.50 ACUTE BILATERAL LOW BACK PAIN WITHOUT SCIATICA: ICD-10-CM

## 2021-12-13 DIAGNOSIS — R20.2 NUMBNESS AND TINGLING IN LEFT ARM: ICD-10-CM

## 2021-12-13 DIAGNOSIS — M79.602 PAIN IN BOTH UPPER EXTREMITIES: ICD-10-CM

## 2021-12-13 DIAGNOSIS — M54.9 UPPER BACK PAIN: ICD-10-CM
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
## 2021-12-13 DIAGNOSIS — M25.612 DECREASED RANGE OF MOTION OF SHOULDER, LEFT: ICD-10-CM

## 2021-12-13 PROCEDURE — 99213 OFFICE O/P EST LOW 20 MIN: CPT | Performed by: NURSE PRACTITIONER

## 2021-12-23 ENCOUNTER — EVALUATION (OUTPATIENT)
Dept: PHYSICAL THERAPY | Facility: CLINIC | Age: 51
End: 2021-12-23
Payer: COMMERCIAL

## 2021-12-23 DIAGNOSIS — M54.50 ACUTE BILATERAL LOW BACK PAIN WITHOUT SCIATICA: ICD-10-CM

## 2021-12-23 DIAGNOSIS — R20.0 NUMBNESS AND TINGLING IN LEFT ARM: ICD-10-CM

## 2021-12-23 DIAGNOSIS — M54.9 UPPER BACK PAIN: ICD-10-CM

## 2021-12-23 DIAGNOSIS — M79.601 PAIN IN BOTH UPPER EXTREMITIES: ICD-10-CM
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
## 2021-12-23 DIAGNOSIS — M25.512 ACUTE PAIN OF LEFT SHOULDER: ICD-10-CM

## 2021-12-23 DIAGNOSIS — M79.602 PAIN IN BOTH UPPER EXTREMITIES: ICD-10-CM

## 2021-12-23 DIAGNOSIS — M25.612 DECREASED RANGE OF MOTION OF SHOULDER, LEFT: ICD-10-CM

## 2021-12-23 DIAGNOSIS — R20.2 NUMBNESS AND TINGLING IN LEFT ARM: ICD-10-CM

## 2021-12-23 PROCEDURE — 97163 PT EVAL HIGH COMPLEX 45 MIN: CPT

## 2021-12-23 PROCEDURE — 97535 SELF CARE MNGMENT TRAINING: CPT

## 2021-12-27 ENCOUNTER — TELEPHONE (OUTPATIENT)
Dept: GYNECOLOGY | Facility: CLINIC | Age: 51
End: 2021-12-27

## 2021-12-27 ENCOUNTER — APPOINTMENT (OUTPATIENT)
Dept: PHYSICAL THERAPY | Facility: CLINIC | Age: 51
End: 2021-12-27
Payer: COMMERCIAL

## 2021-12-27 ENCOUNTER — OFFICE VISIT (OUTPATIENT)
Dept: PHYSICAL THERAPY | Facility: CLINIC | Age: 51
End: 2021-12-27
Payer: COMMERCIAL

## 2021-12-27 DIAGNOSIS — M25.612 DECREASED RANGE OF MOTION OF SHOULDER, LEFT: ICD-10-CM

## 2021-12-27 DIAGNOSIS — M54.9 UPPER BACK PAIN: ICD-10-CM

## 2021-12-27 DIAGNOSIS — R20.0 NUMBNESS AND TINGLING IN LEFT ARM: ICD-10-CM

## 2021-12-27 DIAGNOSIS — M79.602 PAIN IN BOTH UPPER EXTREMITIES: ICD-10-CM

## 2021-12-27 DIAGNOSIS — R20.2 NUMBNESS AND TINGLING IN LEFT ARM: ICD-10-CM

## 2021-12-27 DIAGNOSIS — M54.50 ACUTE BILATERAL LOW BACK PAIN WITHOUT SCIATICA: ICD-10-CM

## 2021-12-27 DIAGNOSIS — M79.601 PAIN IN BOTH UPPER EXTREMITIES: ICD-10-CM
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
## 2021-12-27 DIAGNOSIS — M25.512 ACUTE PAIN OF LEFT SHOULDER: Primary | ICD-10-CM

## 2021-12-27 PROCEDURE — 97110 THERAPEUTIC EXERCISES: CPT

## 2021-12-27 PROCEDURE — 97112 NEUROMUSCULAR REEDUCATION: CPT

## 2021-12-27 PROCEDURE — 97140 MANUAL THERAPY 1/> REGIONS: CPT

## 2021-12-27 NOTE — TELEPHONE ENCOUNTER
Eduard Nunez called and scheduled appt for Feb 10 th in [facility], for Prolia injection  This will be 2 days early If this is ok Please order Prolia

## 2021-12-30 ENCOUNTER — OFFICE VISIT (OUTPATIENT)
Dept: PHYSICAL THERAPY | Facility: CLINIC | Age: 51
End: 2021-12-30
Payer: COMMERCIAL

## 2021-12-30 DIAGNOSIS — R20.0 NUMBNESS AND TINGLING IN LEFT ARM: ICD-10-CM

## 2021-12-30 DIAGNOSIS — M79.601 PAIN IN BOTH UPPER EXTREMITIES: ICD-10-CM

## 2021-12-30 DIAGNOSIS — M25.512 ACUTE PAIN OF LEFT SHOULDER: Primary | ICD-10-CM

## 2021-12-30 DIAGNOSIS — M54.50 ACUTE BILATERAL LOW BACK PAIN WITHOUT SCIATICA: ICD-10-CM

## 2021-12-30 DIAGNOSIS — M54.9 UPPER BACK PAIN: ICD-10-CM
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
## 2021-12-30 DIAGNOSIS — M79.602 PAIN IN BOTH UPPER EXTREMITIES: ICD-10-CM

## 2021-12-30 DIAGNOSIS — R20.2 NUMBNESS AND TINGLING IN LEFT ARM: ICD-10-CM

## 2021-12-30 DIAGNOSIS — M25.612 DECREASED RANGE OF MOTION OF SHOULDER, LEFT: ICD-10-CM

## 2021-12-30 PROCEDURE — 97140 MANUAL THERAPY 1/> REGIONS: CPT

## 2021-12-30 PROCEDURE — 97110 THERAPEUTIC EXERCISES: CPT

## 2021-12-30 PROCEDURE — 97112 NEUROMUSCULAR REEDUCATION: CPT

## 2022-01-03 DIAGNOSIS — M81.0 OSTEOPOROSIS, UNSPECIFIED OSTEOPOROSIS TYPE, UNSPECIFIED PATHOLOGICAL FRACTURE PRESENCE: ICD-10-CM

## 2022-01-04 ENCOUNTER — OFFICE VISIT (OUTPATIENT)
Dept: FAMILY MEDICINE CLINIC | Facility: CLINIC | Age: 52
End: 2022-01-04
Payer: COMMERCIAL

## 2022-01-04 ENCOUNTER — OFFICE VISIT (OUTPATIENT)
Dept: PHYSICAL THERAPY | Facility: CLINIC | Age: 52
End: 2022-01-04
Payer: COMMERCIAL

## 2022-01-04 VITALS
DIASTOLIC BLOOD PRESSURE: 64 MMHG | BODY MASS INDEX: 23.4 KG/M2 | HEART RATE: 74 BPM | HEIGHT: 68 IN | OXYGEN SATURATION: 98 % | WEIGHT: 154.4 LBS | SYSTOLIC BLOOD PRESSURE: 114 MMHG

## 2022-01-04 DIAGNOSIS — R20.2 NUMBNESS AND TINGLING IN LEFT ARM: ICD-10-CM
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
## 2022-01-04 DIAGNOSIS — M25.612 DECREASED RANGE OF MOTION OF SHOULDER, LEFT: ICD-10-CM

## 2022-01-04 DIAGNOSIS — M54.9 UPPER BACK PAIN: ICD-10-CM
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
## 2022-01-04 DIAGNOSIS — M79.602 PAIN IN BOTH UPPER EXTREMITIES: ICD-10-CM

## 2022-01-04 DIAGNOSIS — M25.512 ACUTE PAIN OF LEFT SHOULDER: Primary | ICD-10-CM

## 2022-01-04 DIAGNOSIS — M54.50 ACUTE BILATERAL LOW BACK PAIN WITHOUT SCIATICA: ICD-10-CM

## 2022-01-04 DIAGNOSIS — M79.601 PAIN IN BOTH UPPER EXTREMITIES: ICD-10-CM

## 2022-01-04 DIAGNOSIS — R20.0 NUMBNESS AND TINGLING IN LEFT ARM: ICD-10-CM

## 2022-01-04 DIAGNOSIS — M54.50 ACUTE BILATERAL LOW BACK PAIN WITHOUT SCIATICA: Primary | ICD-10-CM

## 2022-01-04 PROBLEM — R55 SYNCOPE AND COLLAPSE: Status: RESOLVED | Noted: 2021-05-18 | Resolved: 2022-01-04

## 2022-01-04 PROCEDURE — 99213 OFFICE O/P EST LOW 20 MIN: CPT | Performed by: NURSE PRACTITIONER

## 2022-01-04 PROCEDURE — 97140 MANUAL THERAPY 1/> REGIONS: CPT

## 2022-01-04 PROCEDURE — 97112 NEUROMUSCULAR REEDUCATION: CPT

## 2022-01-04 PROCEDURE — 97110 THERAPEUTIC EXERCISES: CPT
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
## 2022-01-05 ENCOUNTER — OFFICE VISIT (OUTPATIENT)
Dept: PHYSICAL THERAPY | Facility: CLINIC | Age: 52
End: 2022-01-05
Payer: COMMERCIAL

## 2022-01-05 ENCOUNTER — REMOTE DEVICE CLINIC VISIT (OUTPATIENT)
Dept: CARDIOLOGY CLINIC | Facility: CLINIC | Age: 52
End: 2022-01-05
Payer: COMMERCIAL

## 2022-01-05 DIAGNOSIS — M25.612 DECREASED RANGE OF MOTION OF SHOULDER, LEFT: ICD-10-CM

## 2022-01-05 DIAGNOSIS — R20.2 NUMBNESS AND TINGLING IN LEFT ARM: ICD-10-CM

## 2022-01-05 DIAGNOSIS — M79.602 PAIN IN BOTH UPPER EXTREMITIES: ICD-10-CM

## 2022-01-05 DIAGNOSIS — M79.601 PAIN IN BOTH UPPER EXTREMITIES: ICD-10-CM

## 2022-01-05 DIAGNOSIS — M54.9 UPPER BACK PAIN: ICD-10-CM

## 2022-01-05 DIAGNOSIS — R20.0 NUMBNESS AND TINGLING IN LEFT ARM: ICD-10-CM

## 2022-01-05 DIAGNOSIS — Z95.818 PRESENCE OF OTHER CARDIAC IMPLANTS AND GRAFTS: Primary | ICD-10-CM
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
## 2022-01-05 DIAGNOSIS — M25.512 ACUTE PAIN OF LEFT SHOULDER: Primary | ICD-10-CM

## 2022-01-05 DIAGNOSIS — M54.50 ACUTE BILATERAL LOW BACK PAIN WITHOUT SCIATICA: ICD-10-CM

## 2022-01-05 PROCEDURE — 97110 THERAPEUTIC EXERCISES: CPT

## 2022-01-05 PROCEDURE — 97112 NEUROMUSCULAR REEDUCATION: CPT

## 2022-01-05 PROCEDURE — 93298 REM INTERROG DEV EVAL SCRMS: CPT | Performed by: INTERNAL MEDICINE

## 2022-01-05 PROCEDURE — G2066 INTER DEVC REMOTE 30D: HCPCS | Performed by: INTERNAL MEDICINE

## 2022-01-05 PROCEDURE — 97140 MANUAL THERAPY 1/> REGIONS: CPT
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
## 2022-01-05 NOTE — PROGRESS NOTES
MDT CGA82/ ACTIVE SYSTEM IS MRI CONDITIONAL   CARELINK TRANSMISSION: LOOP RECORDER  PRESENTING RHYTHM VS @ 95 BPM  BATTERY STATUS "OK " NO PATIENT OR DEVICE ACTIVATED EPISODES  NORMAL DEVICE FUNCTION   DL

## 2022-01-11 ENCOUNTER — TELEPHONE (OUTPATIENT)
Dept: GYNECOLOGY | Facility: CLINIC | Age: 52
End: 2022-01-11

## 2022-01-11 NOTE — TELEPHONE ENCOUNTER
Pt can stop Prolia and just get Dexa scan in 2 years  No need for medication Dexa showed osteopenia only so insurance wont pay for  Prolia  Not able to  L/m  Mailbox  Will try another day

## 2022-01-11 NOTE — TELEPHONE ENCOUNTER
Patient left message stating she got letter saying her Prolia will not be covered  She has been on for years  She is [employer] and is wondering if this is because of our new insurance    Please call her Home

## 2022-01-12 ENCOUNTER — OFFICE VISIT (OUTPATIENT)
Dept: PHYSICAL THERAPY | Facility: CLINIC | Age: 52
End: 2022-01-12
Payer: COMMERCIAL
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
## 2022-01-12 DIAGNOSIS — M25.612 DECREASED RANGE OF MOTION OF SHOULDER, LEFT: ICD-10-CM

## 2022-01-12 DIAGNOSIS — M79.601 PAIN IN BOTH UPPER EXTREMITIES: ICD-10-CM

## 2022-01-12 DIAGNOSIS — M79.602 PAIN IN BOTH UPPER EXTREMITIES: ICD-10-CM

## 2022-01-12 DIAGNOSIS — M54.50 ACUTE BILATERAL LOW BACK PAIN WITHOUT SCIATICA: ICD-10-CM

## 2022-01-12 DIAGNOSIS — M25.512 ACUTE PAIN OF LEFT SHOULDER: Primary | ICD-10-CM

## 2022-01-12 DIAGNOSIS — R20.2 NUMBNESS AND TINGLING IN LEFT ARM: ICD-10-CM

## 2022-01-12 DIAGNOSIS — R20.0 NUMBNESS AND TINGLING IN LEFT ARM: ICD-10-CM

## 2022-01-12 DIAGNOSIS — M54.9 UPPER BACK PAIN: ICD-10-CM

## 2022-01-12 PROCEDURE — 97110 THERAPEUTIC EXERCISES: CPT

## 2022-01-12 PROCEDURE — 97140 MANUAL THERAPY 1/> REGIONS: CPT

## 2022-01-12 PROCEDURE — 97112 NEUROMUSCULAR REEDUCATION: CPT
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
## 2022-01-17 ENCOUNTER — APPOINTMENT (OUTPATIENT)
Dept: PHYSICAL THERAPY | Facility: CLINIC | Age: 52
End: 2022-01-17
Payer: COMMERCIAL

## 2022-01-20 ENCOUNTER — EVALUATION (OUTPATIENT)
Dept: PHYSICAL THERAPY | Facility: CLINIC | Age: 52
End: 2022-01-20
Payer: COMMERCIAL

## 2022-01-20 DIAGNOSIS — M79.602 PAIN IN BOTH UPPER EXTREMITIES: ICD-10-CM

## 2022-01-20 DIAGNOSIS — M54.50 ACUTE BILATERAL LOW BACK PAIN WITHOUT SCIATICA: ICD-10-CM

## 2022-01-20 DIAGNOSIS — R20.0 NUMBNESS AND TINGLING IN LEFT ARM: ICD-10-CM

## 2022-01-20 DIAGNOSIS — M25.612 DECREASED RANGE OF MOTION OF SHOULDER, LEFT: ICD-10-CM
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
## 2022-01-20 DIAGNOSIS — R20.2 NUMBNESS AND TINGLING IN LEFT ARM: ICD-10-CM

## 2022-01-20 DIAGNOSIS — M54.9 UPPER BACK PAIN: ICD-10-CM

## 2022-01-20 DIAGNOSIS — M25.512 ACUTE PAIN OF LEFT SHOULDER: Primary | ICD-10-CM

## 2022-01-20 DIAGNOSIS — M79.601 PAIN IN BOTH UPPER EXTREMITIES: ICD-10-CM

## 2022-01-20 PROCEDURE — 97112 NEUROMUSCULAR REEDUCATION: CPT

## 2022-01-20 PROCEDURE — 97140 MANUAL THERAPY 1/> REGIONS: CPT

## 2022-01-20 PROCEDURE — 97110 THERAPEUTIC EXERCISES: CPT
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
## 2022-01-27 ENCOUNTER — APPOINTMENT (OUTPATIENT)
Dept: PHYSICAL THERAPY | Facility: CLINIC | Age: 52
End: 2022-01-27
Payer: COMMERCIAL

## 2022-01-27 NOTE — PROGRESS NOTES
Daily Note     Today's date: 2022  Patient name: Maria G Guerra  : 1970  MRN: [id number]  Referring provider: Izabel Tellez, *  Dx: No diagnosis found  Subjective: ***      Objective: See treatment diary below      Assessment: Tolerated treatment {Tolerated treatment :[id number]}  Patient {assessment:[id number]}      Plan: Continue per plan of care        Precautions: Arthritis, OP, depression/anxiety, SVT      Manuals    Lumbar PIVMs  10' 10' 10' 5' 10' 10'    BLE hamstring, piriformis    5' 5' 5' 5'    Lumbar STM     5'                 Neuro Re-Ed           TA bracing HEP 10x:05 10x:05 10x:05 DC      TA marching     10x ea 10x ea 10x ea    TA SLR     10x ea 10x ea 10x ea    Palloff Press           Chin Tucks HEP 10x:05 10x:05 10x:05 10x:05 10x:05 10x:05    Shoulder retractions  10x:05 10x:05 10x:05 10x:05 10x:05 10x:05    Prone YTI           Cervical Isometrics     1ox:05 flex/ext/LF 1ox:05 flex/ext/LF NV                                     Ther Ex           NuStep  6' 8' 10' 10' 10' 10'    Bridges  2x10 2x10 2x10 NV 2x10 2x10    SLR           Supine hip abd           Clamshells           Wall slides  10x 10x 10x 10x 10x NV    SKTC HEP 10x:05 ea 10x:05 ea 10x:05 ea 10x:05 ea 10x:05 ea NV    Caudel Glides           Pball DKTC  10x:05 10x:05 10x:05 10x:05 10x:05 10x:05    Pball LTR  10x:05 10x:05 10x:05 10x:05 10x:05 10x:05                                     Ther Activity           Step up/lat/dwn                      Gait Training                                 Modalities           MHP  Seated L shoulder and LB  15' Seated L shoulder and LB  15' Seated L shoulder and LB  15' Deferred Seated L shoulder and LB  15' Seated L shoulder and LB  15'

## 2022-01-28 ENCOUNTER — OFFICE VISIT (OUTPATIENT)
Dept: FAMILY MEDICINE CLINIC | Facility: CLINIC | Age: 52
End: 2022-01-28
Payer: COMMERCIAL

## 2022-01-28 ENCOUNTER — OFFICE VISIT (OUTPATIENT)
Dept: PHYSICAL THERAPY | Facility: CLINIC | Age: 52
End: 2022-01-28
Payer: COMMERCIAL

## 2022-01-28 VITALS
WEIGHT: 155.4 LBS | OXYGEN SATURATION: 97 % | SYSTOLIC BLOOD PRESSURE: 110 MMHG | TEMPERATURE: 97.8 F | DIASTOLIC BLOOD PRESSURE: 68 MMHG | HEIGHT: 68 IN | HEART RATE: 80 BPM | BODY MASS INDEX: 23.55 KG/M2

## 2022-01-28 DIAGNOSIS — I10 PRIMARY HYPERTENSION: ICD-10-CM

## 2022-01-28 DIAGNOSIS — Z23 ENCOUNTER FOR IMMUNIZATION: ICD-10-CM

## 2022-01-28 DIAGNOSIS — M54.50 ACUTE BILATERAL LOW BACK PAIN WITHOUT SCIATICA: ICD-10-CM

## 2022-01-28 DIAGNOSIS — M54.9 UPPER BACK PAIN: ICD-10-CM

## 2022-01-28 DIAGNOSIS — M79.602 PAIN IN BOTH UPPER EXTREMITIES: ICD-10-CM

## 2022-01-28 DIAGNOSIS — R20.0 NUMBNESS AND TINGLING IN LEFT ARM: ICD-10-CM

## 2022-01-28 DIAGNOSIS — E78.2 MIXED HYPERLIPIDEMIA: ICD-10-CM

## 2022-01-28 DIAGNOSIS — G43.809 OTHER MIGRAINE WITHOUT STATUS MIGRAINOSUS, NOT INTRACTABLE: ICD-10-CM

## 2022-01-28 DIAGNOSIS — M79.601 PAIN IN BOTH UPPER EXTREMITIES: ICD-10-CM

## 2022-01-28 DIAGNOSIS — M25.612 DECREASED RANGE OF MOTION OF SHOULDER, LEFT: ICD-10-CM

## 2022-01-28 DIAGNOSIS — E03.8 HYPOTHYROIDISM DUE TO HASHIMOTO'S THYROIDITIS: ICD-10-CM

## 2022-01-28 DIAGNOSIS — M25.512 ACUTE PAIN OF LEFT SHOULDER: Primary | ICD-10-CM

## 2022-01-28 DIAGNOSIS — R20.2 NUMBNESS AND TINGLING IN LEFT ARM: ICD-10-CM

## 2022-01-28 DIAGNOSIS — E06.3 HYPOTHYROIDISM DUE TO HASHIMOTO'S THYROIDITIS: ICD-10-CM

## 2022-01-28 DIAGNOSIS — Z00.00 ANNUAL PHYSICAL EXAM: Primary | ICD-10-CM
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
## 2022-01-28 PROCEDURE — 97140 MANUAL THERAPY 1/> REGIONS: CPT

## 2022-01-28 PROCEDURE — 97110 THERAPEUTIC EXERCISES: CPT

## 2022-01-28 PROCEDURE — 90471 IMMUNIZATION ADMIN: CPT

## 2022-01-28 PROCEDURE — 90750 HZV VACC RECOMBINANT IM: CPT

## 2022-01-28 PROCEDURE — 99396 PREV VISIT EST AGE 40-64: CPT | Performed by: NURSE PRACTITIONER

## 2022-01-28 PROCEDURE — 97112 NEUROMUSCULAR REEDUCATION: CPT

## 2022-01-28 RX ORDER — ATORVASTATIN CALCIUM 40 MG/1
40 TABLET, FILM COATED ORAL DAILY
Qty: 90 TABLET | Refills: 1 | Status: SHIPPED | OUTPATIENT
Start: 2022-01-28

## 2022-01-28 NOTE — PROGRESS NOTES
Pt  Seen initially by [employer] student  Assessed and reviewed this patient with the FITO student thereafter, see plan  ADULT ANNUAL [facility] PRIMARY CARE    NAME: Milagro Carreno  AGE: 46 y o  SEX: female  : 1970     DATE: 2022     Assessment and Plan:     Problem List Items Addressed This Visit        Endocrine    Hypothyroidism due to Hashimoto's thyroiditis    Relevant Orders    TSH, 3rd generation       Cardiovascular and Mediastinum    Hypertension    Migraine       Other    Hyperlipidemia    Relevant Medications    atorvastatin (LIPITOR) 40 mg tablet      Other Visit Diagnoses     Annual physical exam    -  Primary    Relevant Orders    Lipid panel    Encounter for immunization        Relevant Orders    Zoster Vaccine Recombinant IM           Immunizations and preventive care screenings were discussed with patient today  Labs were ordered to check her thyroid and lipid panel  Refills were sent on atorvastatin  Appropriate education was printed on patient's after visit summary  Counseling:  · Exercise: the importance of regular exercise/physical activity was discussed  Recommend exercise 3-5 times per week for at least 30 minutes  Return in about 6 months (around 2022)  Chief Complaint:     Chief Complaint   Patient presents with    Physical Exam    Immunizations     1st dose of Shingrix      History of Present Illness:     Adult Annual Physical   Patient here for a comprehensive physical exam  The patient reports no problems  She would like her first dose of the Shingrix today  Diet and Physical Activity  · Diet/Nutrition: well balanced diet  · Exercise: walking  Depression Screening  PHQ-2/9 Depression Screening         General Health  · Sleep: sleeps well  · Hearing: normal - bilateral   · Vision: goes for regular eye exams and wears glasses  · Dental: regular dental visits         /GYN Health  · Patient is: postmenopausal  · Contraceptive method: hysterectomy     Review of Systems:     Review of Systems   Constitutional: Negative  HENT: Negative  Respiratory: Negative  Cardiovascular: Negative  Gastrointestinal: Negative  Neurological: Negative  All other systems reviewed and are negative  Past Medical History:     Past Medical History:   Diagnosis Date    Allergic 1974    Seasonal    Anxiety     Arthritis     Colitis     Noted on colonoscopy 04/24/2020      Depression     Disease of thyroid gland     Gastritis     Noted on EGD 04/24/2020    Glaucoma     Headache(784 0)     Hyperlipidemia     Hypertension     Hypothyroidism     Lyme disease     Migraine     Osteoporosis     Scoliosis     SVT (supraventricular tachycardia) (HCC)     Visual impairment     VT (ventricular tachycardia) (HCC)       Past Surgical History:     Past Surgical History:   Procedure Laterality Date    BREAST BIOPSY Right 10/21/2020    papilloma    COLONOSCOPY      EGD      FINGER SURGERY      left pinky    HYSTERECTOMY      age- 45    HYSTERECTOMY W/ SALPINGO-OOPHERECTOMY  05/2008    OOPHORECTOMY Bilateral     age- 45    US GUIDED BREAST BIOPSY RIGHT COMPLETE Right 10/21/2020      Social History:     Social History     Socioeconomic History    Marital status: /Civil Union     Spouse name: None    Number of children: None    Years of education: None    Highest education level: None   Occupational History    None   Tobacco Use    Smoking status: Former Smoker     Packs/day: 1 00     Years: 10 00     Pack years: 10 00     Types: Cigarettes     Quit date: 1/1/2007     Years since quitting: 15 0    Smokeless tobacco: Never Used    Tobacco comment: quit 2007   Vaping Use    Vaping Use: Never used   Substance and Sexual Activity    Alcohol use: Never    Drug use: Never    Sexual activity: Yes     Birth control/protection: Post-menopausal     Comment: hysterectomy   Other Topics Concern    None   Social History Narrative    None     Social Determinants of Health     Financial Resource Strain: Not on file   Food Insecurity: Not on file   Transportation Needs: Not on file   Physical Activity: Not on file   Stress: Not on file   Social Connections: Not on file   Intimate Partner Violence: Not on file   Housing Stability: Not on file      Family History:     Family History   Problem Relation Age of Onset    Peripheral vascular disease Mother     Glaucoma Mother     Prostate cancer Father     Hypothyroidism Sister     No Known Problems Daughter     Colon cancer Maternal Grandmother     No Known Problems Maternal Grandfather     Arthritis Paternal Grandmother     No Known Problems Paternal Grandfather     No Known Problems Daughter     No Known Problems Maternal Aunt     No Known Problems Maternal Aunt     Skin cancer Paternal Aunt     No Known Problems Paternal Aunt       Current Medications:     Current Outpatient Medications   Medication Sig Dispense Refill    atorvastatin (LIPITOR) 40 mg tablet Take 1 tablet (40 mg total) by mouth daily 90 tablet 1    bimatoprost (Lumigan) 0 01 % ophthalmic drops Lumigan 0 01 % eye drops      Calcium Carbonate-Vit D-Min (CALCIUM 1200 PO) Take by mouth daily       Cholecalciferol (VITAMIN D3 PO) Take 1,000 mg by mouth      Glucosamine-Chondroitin 500-400 MG CAPS Take by mouth daily       hydrOXYzine HCL (ATARAX) 25 mg tablet Take 1 tablet (25 mg total) by mouth as needed for anxiety 30 tablet 1    Inulin (FIBER CHOICE PO) Take by mouth      irbesartan (AVAPRO) 75 mg tablet Take 1 tablet (75 mg total) by mouth daily 30 tablet 11    levothyroxine 100 mcg tablet Take 1 tablet (100 mcg total) by mouth daily 90 tablet 1    LORazepam (ATIVAN) 0 5 mg tablet Take 0 5 mg by mouth as needed      Magnesium 250 MG TABS Take 1 tablet (250 mg total) by mouth daily 30 tablet 0    Methyl Salicylate-Lido-Menthol (LidoPro) 4-4-5 % PTCH LidoPro 4 %-4 %-5 % topical patch   APPLY 1 PATCH TO THE AFFECTED AREA EVERY 8 TO 12 HOURS AS NEEDED  MAX OF 2 PATCHES PER DAY      metoprolol succinate (TOPROL-XL) 25 mg 24 hr tablet Take 1 tablet (25 mg total) by mouth daily 90 tablet 4    multivitamin (THERAGRAN) TABS Take 1 tablet by mouth daily      Omega-3 Fatty Acids (FISH OIL OMEGA-3 PO) Take by mouth      Rimegepant Sulfate (Nurtec) 75 MG TBDP Take 75 mg by mouth as needed      SUMAtriptan (IMITREX) 100 mg tablet Take 100 mg by mouth as needed      Thiamine HCl (VITAMIN B1 PO) Take 1 capsule by mouth daily      denosumab (PROLIA) 60 mg/mL Inject 1 mL (60 mg total) under the skin every 6 (six) months (Patient not taking: Reported on 1/28/2022 ) 1 mL 3     No current facility-administered medications for this visit  Allergies:     No Known Allergies   Physical Exam:     /68 (BP Location: Left arm, Patient Position: Sitting, Cuff Size: Standard)   Pulse 80   Temp 97 8 °F (36 6 °C)   Ht 5' 8" (1 727 m)   Wt 70 5 kg (155 lb 6 4 oz)   SpO2 97%   BMI 23 63 kg/m²     Physical Exam  Vitals and nursing note reviewed  Constitutional:       General: She is not in acute distress  Appearance: Normal appearance  She is well-developed  HENT:      Head: Normocephalic and atraumatic  Eyes:      Conjunctiva/sclera: Conjunctivae normal    Cardiovascular:      Rate and Rhythm: Normal rate and regular rhythm  Heart sounds: Normal heart sounds  No murmur heard  No gallop  Pulmonary:      Effort: Pulmonary effort is normal  No respiratory distress  Breath sounds: Normal breath sounds  Abdominal:      Palpations: Abdomen is soft  Tenderness: There is no abdominal tenderness  Musculoskeletal:      Cervical back: Neck supple  Skin:     General: Skin is warm and dry  Neurological:      General: No focal deficit present  Mental Status: She is alert and oriented to person, place, and time  Mental status is at baseline     Psychiatric: Mood and Affect: Mood normal          Behavior: Behavior normal          Thought Content:  Thought content normal          Judgment: Judgment normal           FITO Paul  [facility]IMARY [facility]

## 2022-01-28 NOTE — PATIENT INSTRUCTIONS
You may receive a survey in the mail, by text message, or by e-mail, please fill it out COMPLETELY, and let us know how we did! Please remember to sign up for your [employer] kat to check you lab results, send us messages, and schedule appointments  If you have questions about the [employer] option, please call us! Thank you again for choosing [facility]! Wellness Visit for Adults   AMBULATORY CARE:   A wellness visit  is when you see your healthcare provider to get screened for health problems  Your healthcare provider will also give you advice on how to stay healthy  Write down your questions so you remember to ask them  Ask your healthcare provider how often you should have a wellness visit  What happens at a wellness visit:  Your healthcare provider will ask about your health, and your family history of health problems  This includes high blood pressure, heart disease, and cancer  He or she will ask if you have symptoms that concern you, if you smoke, and about your mood  You may also be asked about your intake of medicines, supplements, food, and alcohol  Any of the following may be done:  · Your weight  will be checked  Your height may also be checked so your body mass index (BMI) can be calculated  Your BMI shows if you are at a healthy weight  · Your blood pressure  and heart rate will be checked  Your temperature may also be checked  · Blood and urine tests  may be done  Blood tests may be done to check your cholesterol levels  Abnormal cholesterol levels increase your risk for heart disease and stroke  You may also need a blood or urine test to check for diabetes if you are at increased risk  Urine tests may be done to look for signs of an infection or kidney disease  · A physical exam  includes checking your heartbeat and lungs with a stethoscope  Your healthcare provider may also check your skin to look for sun damage  · Screening tests  may be recommended   A screening test is done to check for diseases that may not cause symptoms  The screening tests you may need depend on your age, gender, family history, and lifestyle habits  For example, colorectal screening may be recommended if you are 48years old or older  Screening tests you need if you are a woman:   · A Pap smear  is used to screen for cervical cancer  Pap smears are usually done every 3 to 5 years depending on your age  You may need them more often if you have had abnormal Pap smear test results in the past  Ask your healthcare provider how often you should have a Pap smear  · A mammogram  is an x-ray of your breasts to screen for breast cancer  Experts recommend mammograms every 2 years starting at age 48 years  You may need a mammogram at age 52 years or younger if you have an increased risk for breast cancer  Talk to your healthcare provider about when you should start having mammograms and how often you need them  Vaccines you may need:   · Get an influenza vaccine  every year  The influenza vaccine protects you from the flu  Several types of viruses cause the flu  The viruses change over time, so new vaccines are made each year  · Get a tetanus-diphtheria (Td) booster vaccine  every 10 years  This vaccine protects you against tetanus and diphtheria  Tetanus is a severe infection that may cause painful muscle spasms and lockjaw  Diphtheria is a severe bacterial infection that causes a thick covering in the back of your mouth and throat  · Get a human papillomavirus (HPV) vaccine  if you are female and aged 23 to 32 or male 23 to 24 and never received it  This vaccine protects you from HPV infection  HPV is the most common infection spread by sexual contact  HPV may also cause vaginal, penile, and anal cancers  · Get a pneumococcal vaccine  if you are aged 72 years or older  The pneumococcal vaccine is an injection given to protect you from pneumococcal disease   Pneumococcal disease is an infection caused by pneumococcal bacteria  The infection may cause pneumonia, meningitis, or an ear infection  · Get a shingles vaccine  if you are 60 or older, even if you have had shingles before  The shingles vaccine is an injection to protect you from the varicella-zoster virus  This is the same virus that causes chickenpox  Shingles is a painful rash that develops in people who had chickenpox or have been exposed to the virus  How to eat healthy:  My Plate is a model for planning healthy meals  It shows the types and amounts of foods that should go on your plate  Fruits and vegetables make up about half of your plate, and grains and protein make up the other half  A serving of dairy is included on the side of your plate  The amount of calories and serving sizes you need depends on your age, gender, weight, and height  Examples of healthy foods are listed below:  · Eat a variety of vegetables  such as dark green, red, and orange vegetables  You can also include canned vegetables low in sodium (salt) and frozen vegetables without added butter or sauces  · Eat a variety of fresh fruits , canned fruit in 100% juice, frozen fruit, and dried fruit  · Include whole grains  At least half of the grains you eat should be whole grains  Examples include whole-wheat bread, wheat pasta, brown rice, and whole-grain cereals such as oatmeal     · Eat a variety of protein foods such as seafood (fish and shellfish), lean meat, and poultry without skin (turkey and chicken)  Examples of lean meats include pork leg, shoulder, or tenderloin, and beef round, sirloin, tenderloin, and extra lean ground beef  Other protein foods include eggs and egg substitutes, beans, peas, soy products, nuts, and seeds  · Choose low-fat dairy products such as skim or 1% milk or low-fat yogurt, cheese, and cottage cheese  · Limit unhealthy fats  such as butter, hard margarine, and shortening         Exercise:  Exercise at least 30 minutes per day on most days of the week  Some examples of exercise include walking, biking, dancing, and swimming  You can also fit in more physical activity by taking the stairs instead of the elevator or parking farther away from stores  Include muscle strengthening activities 2 days each week  Regular exercise provides many health benefits  It helps you manage your weight, and decreases your risk for type 2 diabetes, heart disease, stroke, and high blood pressure  Exercise can also help improve your mood  Ask your healthcare provider about the best exercise plan for you  General health and safety guidelines:   · Do not smoke  Nicotine and other chemicals in cigarettes and cigars can cause lung damage  Ask your healthcare provider for information if you currently smoke and need help to quit  E-cigarettes or smokeless tobacco still contain nicotine  Talk to your healthcare provider before you use these products  · Limit alcohol  A drink of alcohol is 12 ounces of beer, 5 ounces of wine, or 1½ ounces of liquor  · Lose weight, if needed  Being overweight increases your risk of certain health conditions  These include heart disease, high blood pressure, type 2 diabetes, and certain types of cancer  · Protect your skin  Do not sunbathe or use tanning beds  Use sunscreen with a SPF 15 or higher  Apply sunscreen at least 15 minutes before you go outside  Reapply sunscreen every 2 hours  Wear protective clothing, hats, and sunglasses when you are outside  · Drive safely  Always wear your seatbelt  Make sure everyone in your car wears a seatbelt  A seatbelt can save your life if you are in an accident  Do not use your cell phone when you are driving  This could distract you and cause an accident  Pull over if you need to make a call or send a text message  · Practice safe sex  Use latex condoms if are sexually active and have more than one partner   Your healthcare provider may recommend screening tests for sexually transmitted infections (STIs)  · Wear helmets, lifejackets, and protective gear  Always wear a helmet when you ride a bike or motorcycle, go skiing, or play sports that could cause a head injury  Wear protective equipment when you play sports  Wear a lifejacket when you are on a boat or doing water sports  © Copyright [employer] 2021 Information is for End User's use only and may not be sold, redistributed or otherwise used for commercial purposes  All illustrations and images included in CareNotes® are the copyrighted property of [employer]  or [address] [place], [place] [address]ruth jeanna   The above information is an  only  It is not intended as medical advice for individual conditions or treatments  Talk to your doctor, nurse or pharmacist before following any medical regimen to see if it is safe and effective for you
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
## 2022-02-07 DIAGNOSIS — R21 RASH: Primary | ICD-10-CM

## 2022-02-07 RX ORDER — METHYLPREDNISOLONE 4 MG/1
TABLET ORAL
Qty: 21 EACH | Refills: 0 | Status: SHIPPED | OUTPATIENT
Start: 2022-02-07 | End: 2022-02-21

## 2022-02-10 ENCOUNTER — OFFICE VISIT (OUTPATIENT)
Dept: PHYSICAL THERAPY | Facility: CLINIC | Age: 52
End: 2022-02-10
Payer: COMMERCIAL

## 2022-02-10 DIAGNOSIS — M25.612 DECREASED RANGE OF MOTION OF SHOULDER, LEFT: ICD-10-CM

## 2022-02-10 DIAGNOSIS — M54.50 ACUTE BILATERAL LOW BACK PAIN WITHOUT SCIATICA: ICD-10-CM

## 2022-02-10 DIAGNOSIS — R20.0 NUMBNESS AND TINGLING IN LEFT ARM: ICD-10-CM

## 2022-02-10 DIAGNOSIS — M54.9 UPPER BACK PAIN: ICD-10-CM

## 2022-02-10 DIAGNOSIS — M79.602 PAIN IN BOTH UPPER EXTREMITIES: ICD-10-CM

## 2022-02-10 DIAGNOSIS — M79.601 PAIN IN BOTH UPPER EXTREMITIES: ICD-10-CM

## 2022-02-10 DIAGNOSIS — M25.512 ACUTE PAIN OF LEFT SHOULDER: Primary | ICD-10-CM
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
## 2022-02-10 DIAGNOSIS — R20.2 NUMBNESS AND TINGLING IN LEFT ARM: ICD-10-CM

## 2022-02-10 PROCEDURE — 97110 THERAPEUTIC EXERCISES: CPT

## 2022-02-10 PROCEDURE — 97140 MANUAL THERAPY 1/> REGIONS: CPT
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
## 2022-02-11 ENCOUNTER — OFFICE VISIT (OUTPATIENT)
Dept: PHYSICAL THERAPY | Facility: CLINIC | Age: 52
End: 2022-02-11
Payer: COMMERCIAL

## 2022-02-11 ENCOUNTER — APPOINTMENT (OUTPATIENT)
Dept: LAB | Facility: HOSPITAL | Age: 52
End: 2022-02-11
Payer: COMMERCIAL

## 2022-02-11 DIAGNOSIS — E03.9 ACQUIRED HYPOTHYROIDISM: ICD-10-CM

## 2022-02-11 DIAGNOSIS — R20.2 NUMBNESS AND TINGLING IN LEFT ARM: ICD-10-CM

## 2022-02-11 DIAGNOSIS — E06.3 HYPOTHYROIDISM DUE TO HASHIMOTO'S THYROIDITIS: ICD-10-CM

## 2022-02-11 DIAGNOSIS — R20.0 NUMBNESS AND TINGLING IN LEFT ARM: ICD-10-CM

## 2022-02-11 DIAGNOSIS — M54.9 UPPER BACK PAIN: ICD-10-CM

## 2022-02-11 DIAGNOSIS — E03.8 HYPOTHYROIDISM DUE TO HASHIMOTO'S THYROIDITIS: ICD-10-CM

## 2022-02-11 DIAGNOSIS — Z00.00 ANNUAL PHYSICAL EXAM: ICD-10-CM

## 2022-02-11 DIAGNOSIS — M79.601 PAIN IN BOTH UPPER EXTREMITIES: ICD-10-CM

## 2022-02-11 DIAGNOSIS — M25.512 ACUTE PAIN OF LEFT SHOULDER: Primary | ICD-10-CM

## 2022-02-11 DIAGNOSIS — M25.612 DECREASED RANGE OF MOTION OF SHOULDER, LEFT: ICD-10-CM

## 2022-02-11 DIAGNOSIS — M79.602 PAIN IN BOTH UPPER EXTREMITIES: ICD-10-CM
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
## 2022-02-11 DIAGNOSIS — M54.50 ACUTE BILATERAL LOW BACK PAIN WITHOUT SCIATICA: ICD-10-CM

## 2022-02-11 LAB
CHOLEST SERPL-MCNC: 225 MG/DL
HDLC SERPL-MCNC: 71 MG/DL
LDLC SERPL CALC-MCNC: 124 MG/DL (ref 0–100)
NONHDLC SERPL-MCNC: 154 MG/DL
T4 FREE SERPL-MCNC: 1.13 NG/DL (ref 0.76–1.46)
TRIGL SERPL-MCNC: 150 MG/DL
TSH SERPL DL<=0.05 MIU/L-ACNC: 1.46 UIU/ML (ref 0.36–3.74)

## 2022-02-11 PROCEDURE — 84439 ASSAY OF FREE THYROXINE: CPT

## 2022-02-11 PROCEDURE — 84443 ASSAY THYROID STIM HORMONE: CPT

## 2022-02-11 PROCEDURE — 36415 COLL VENOUS BLD VENIPUNCTURE: CPT

## 2022-02-11 PROCEDURE — 80061 LIPID PANEL: CPT

## 2022-02-11 PROCEDURE — 97110 THERAPEUTIC EXERCISES: CPT

## 2022-02-11 PROCEDURE — 97140 MANUAL THERAPY 1/> REGIONS: CPT

## 2022-02-16 ENCOUNTER — CONSULT (OUTPATIENT)
Dept: PAIN MEDICINE | Facility: CLINIC | Age: 52
End: 2022-02-16
Payer: COMMERCIAL

## 2022-02-16 ENCOUNTER — OFFICE VISIT (OUTPATIENT)
Dept: ENDOCRINOLOGY | Facility: CLINIC | Age: 52
End: 2022-02-16
Payer: COMMERCIAL

## 2022-02-16 VITALS
HEART RATE: 84 BPM | SYSTOLIC BLOOD PRESSURE: 118 MMHG | TEMPERATURE: 96.7 F | WEIGHT: 154.2 LBS | BODY MASS INDEX: 23.37 KG/M2 | RESPIRATION RATE: 20 BRPM | HEIGHT: 68 IN | DIASTOLIC BLOOD PRESSURE: 62 MMHG

## 2022-02-16 VITALS
BODY MASS INDEX: 23.95 KG/M2 | HEIGHT: 68 IN | DIASTOLIC BLOOD PRESSURE: 62 MMHG | HEART RATE: 72 BPM | SYSTOLIC BLOOD PRESSURE: 108 MMHG | WEIGHT: 158 LBS

## 2022-02-16 DIAGNOSIS — E06.3 HYPOTHYROIDISM DUE TO HASHIMOTO'S THYROIDITIS: Primary | ICD-10-CM

## 2022-02-16 DIAGNOSIS — M54.12 CERVICAL RADICULOPATHY: Primary | ICD-10-CM

## 2022-02-16 DIAGNOSIS — M81.0 OSTEOPOROSIS, UNSPECIFIED OSTEOPOROSIS TYPE, UNSPECIFIED PATHOLOGICAL FRACTURE PRESENCE: ICD-10-CM

## 2022-02-16 DIAGNOSIS — I10 PRIMARY HYPERTENSION: ICD-10-CM

## 2022-02-16 DIAGNOSIS — R11.2 NAUSEA WITH VOMITING, UNSPECIFIED: ICD-10-CM

## 2022-02-16 DIAGNOSIS — M54.16 LUMBAR RADICULOPATHY: ICD-10-CM

## 2022-02-16 DIAGNOSIS — R10.11 RIGHT UPPER QUADRANT PAIN: ICD-10-CM

## 2022-02-16 DIAGNOSIS — E03.8 HYPOTHYROIDISM DUE TO HASHIMOTO'S THYROIDITIS: Primary | ICD-10-CM

## 2022-02-16 PROBLEM — E03.9 ACQUIRED HYPOTHYROIDISM: Status: ACTIVE | Noted: 2022-02-16

## 2022-02-16 PROCEDURE — 99204 OFFICE O/P NEW MOD 45 MIN: CPT | Performed by: ANESTHESIOLOGY

## 2022-02-16 PROCEDURE — 99214 OFFICE O/P EST MOD 30 MIN: CPT | Performed by: STUDENT IN AN ORGANIZED HEALTH CARE EDUCATION/TRAINING PROGRAM

## 2022-02-16 RX ORDER — GABAPENTIN 100 MG/1
100 CAPSULE ORAL 3 TIMES DAILY
Qty: 90 CAPSULE | Refills: 0 | Status: SHIPPED | OUTPATIENT
Start: 2022-02-16 | End: 2022-08-08

## 2022-02-16 RX ORDER — MELOXICAM 7.5 MG/1
7.5 TABLET ORAL 2 TIMES DAILY PRN
Qty: 60 TABLET | Refills: 0 | Status: SHIPPED | OUTPATIENT
Start: 2022-02-16 | End: 2022-04-04

## 2022-02-16 NOTE — H&P (VIEW-ONLY)
Assessment  1  Cervical radiculopathy  -     MRI cervical spine without contrast; Future; Expected date: 02/16/2022  -     MRI lumbar spine wo contrast; Future; Expected date: 02/16/2022  -     gabapentin (NEURONTIN) 100 mg capsule; Take 1 capsule (100 mg total) by mouth 3 (three) times a day  -     meloxicam (MOBIC) 7 5 mg tablet; Take 1 tablet (7 5 mg total) by mouth 2 (two) times a day as needed for moderate pain    2  Lumbar radiculopathy  -     gabapentin (NEURONTIN) 100 mg capsule; Take 1 capsule (100 mg total) by mouth 3 (three) times a day  -     meloxicam (MOBIC) 7 5 mg tablet; Take 1 tablet (7 5 mg total) by mouth 2 (two) times a day as needed for moderate pain    Neck and low back pain described primarily are radicular features  Pain radiates in C6 and C7 dermatomal distribution from neck to hands bilaterally as well as in L3 and L4 dermatomal distribution right greater than left  5/5 strength bilaterally with active range of motion movements in upper lower extremities  Slight pain limited weakness with left hip flexion, knee extension  Negative Spurling's maneuver, negative SLR bilaterally  Significant tenderness to palpation with lumbar cervical facet loading maneuvers  She is currently engaged with PT for both her neck and low back with modest relief of the pain  She has not trialed medications other than OTC tylenol and ibuprofen  CT cervical spine reviewed which is noncontributory  Will proceed with multimodal pain therapy the plan will obtaining imaging to guide interventional therapy  Plan  -MRI cervical spine noncontrast; MRI lumbar spine noncontrast  -gabapentin 100 mg t i d  Ordered for patient; counseled regarding sedative effects of taking this medication and provided up titration calendar  Counseled not to take medication while driving or operating heavy machinery/using stairs  -meloxicam 7 5 mg b i d  P r n   pain    Patient educated regarding bleeding risk of taking this medication not taking any other nonsteroidal anti-inflammatory medications while taking this medication; counseled thoroughly regarding potential risk of Cardiovascular injury, Kidney injury, Gastrointestinal ulceration/bleeding  Patient voiced understanding  -physical therapy for right-sided lumbar radiculopathy; Physician directed home exercise plan as per [facility] demonstrated and handouts provided that patient plans to participate with for 1 hour, twice a week for the next 6 weeks  There are risks associated with opioid medications, including dependence, addiction and tolerance  The patient understands and agrees to use these medications only as prescribed  Potential side effects of the medications include, but are not limited to, constipation, drowsiness, addiction, impaired judgment and risk of fatal overdose if not taken as prescribed  The patient was warned against driving while taking sedation medications  Sharing medications is a felony  At this point in time, the patient is showing no signs of addiction, abuse, diversion or suicidal ideation  [place] Prescription Drug Monitoring Program report was reviewed and was appropriate      Complete risks and benefits including bleeding, infection, tissue reaction, nerve injury and allergic reaction were discussed  The approach was demonstrated using models and literature was provided  Verbal and written consent was obtained  My impressions and treatment recommendations were discussed in detail with the patient who verbalized understanding and had no further questions  Discharge instructions were provided  I personally saw and examined the patient and I agree with the above discussed plan of care  My impressions and treatment recommendations were discussed in detail with the patient who verbalized understanding and had no further questions  Discharge instructions were provided   I personally saw and examined the patient and I agree with the above discussed plan of care  New Medications Ordered This Visit   Medications    gabapentin (NEURONTIN) 100 mg capsule     Sig: Take 1 capsule (100 mg total) by mouth 3 (three) times a day     Dispense:  90 capsule     Refill:  0    meloxicam (MOBIC) 7 5 mg tablet     Sig: Take 1 tablet (7 5 mg total) by mouth 2 (two) times a day as needed for moderate pain     Dispense:  60 tablet     Refill:  0       History of Present Illness    Ramya Loza is a 46 y o  female with past medical history of hypertension, hypothyroidism, hypercholesterolemia, migraine headaches presenting with neck and low back pain described primarily radicular features  Neck pain radiates into the C6 and C7 dermatomal distribution bilaterally accompanied by pain limited weakness numbness and paresthesias  Similarly in low back radicular pain travels into the bilateral L3 and L4 dermatomal distribution, right greater than left  She describes lancinating features of the pain  She has been to formal physical therapy for both neck and low back pain with modest relief of her symptoms  She has trialed over-the-counter NSAIDs as well as Tylenol with modest relief of the pain  Her pain significantly impacts independent activities of daily living and overall function, 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  She denies any bowel or bladder abnormality, incontinence, gait instability, headaches or dizziness  I have personally reviewed and/or updated the patient's past medical history, past surgical history, family history, social history, current medications, allergies, and vital signs today  Review of Systems   Constitutional: Positive for activity change  HENT: Negative  Eyes: Negative  Respiratory: Negative  Cardiovascular: Negative  Gastrointestinal: Negative  Endocrine: Negative  Genitourinary: Negative  Musculoskeletal: Positive for arthralgias, back pain, myalgias, neck pain and neck stiffness  Skin: Negative  Allergic/Immunologic: Negative  Neurological: Positive for weakness and numbness  Hematological: Negative  Psychiatric/Behavioral: Negative  All other systems reviewed and are negative  Patient Active Problem List   Diagnosis    Palpitations    Hypertension    Hypokalemia    SVT (supraventricular tachycardia) (HCC)    VT (ventricular tachycardia) ([address] )    Hyperlipidemia    Depression    Osteoporosis    Arthritis    Hypothyroidism due to Hashimoto's thyroiditis    Migraine    Glaucoma    Anxiety    Restless legs       Past Medical History:   Diagnosis Date    Allergic 1974    Seasonal    Anxiety     Arthritis     Colitis     Noted on colonoscopy 04/24/2020      Depression     Disease of thyroid gland     Gastritis     Noted on EGD 04/24/2020    Glaucoma     Headache(784 0)     Hyperlipidemia     Hypertension     Hypothyroidism     Lyme disease     Migraine     Osteoporosis     Scoliosis     SVT (supraventricular tachycardia) (HCC)     Visual impairment     VT (ventricular tachycardia) (HCC)        Past Surgical History:   Procedure Laterality Date    BREAST BIOPSY Right 10/21/2020    papilloma    COLONOSCOPY      EGD      FINGER SURGERY      left pinky    HYSTERECTOMY      age- 45    HYSTERECTOMY W/ SALPINGO-OOPHERECTOMY  05/2008    OOPHORECTOMY Bilateral     age- 45    US GUIDED BREAST BIOPSY RIGHT COMPLETE Right 10/21/2020       Family History   Problem Relation Age of Onset    Peripheral vascular disease Mother     Glaucoma Mother     Prostate cancer Father     Hypothyroidism Sister     No Known Problems Daughter     Colon cancer Maternal Grandmother     No Known Problems Maternal Grandfather     Arthritis Paternal Grandmother     No Known Problems Paternal Grandfather     No Known Problems Daughter     No Known Problems Maternal Aunt     No Known Problems Maternal Aunt     Skin cancer Paternal Aunt     No Known Problems Paternal Aunt Social History     Occupational History    Not on file   Tobacco Use    Smoking status: Former Smoker     Packs/day: 1 00     Years: 10 00     Pack years: 10 00     Types: Cigarettes     Quit date: 1/1/2007     Years since quitting: 15 1    Smokeless tobacco: Never Used    Tobacco comment: quit 2007   Vaping Use    Vaping Use: Never used   Substance and Sexual Activity    Alcohol use: Never    Drug use: Never    Sexual activity: Yes     Birth control/protection: Post-menopausal     Comment: hysterectomy       Current Outpatient Medications on File Prior to Visit   Medication Sig    atorvastatin (LIPITOR) 40 mg tablet Take 1 tablet (40 mg total) by mouth daily    bimatoprost (Lumigan) 0 01 % ophthalmic drops Lumigan 0 01 % eye drops    Calcium Carbonate-Vit D-Min (CALCIUM 1200 PO) Take by mouth daily     Cholecalciferol (VITAMIN D3 PO) Take 1,000 mg by mouth    Glucosamine-Chondroitin 500-400 MG CAPS Take by mouth daily     hydrOXYzine HCL (ATARAX) 25 mg tablet Take 1 tablet (25 mg total) by mouth as needed for anxiety    Inulin (FIBER CHOICE PO) Take by mouth    irbesartan (AVAPRO) 75 mg tablet Take 1 tablet (75 mg total) by mouth daily    levothyroxine 100 mcg tablet Take 1 tablet (100 mcg total) by mouth daily    LORazepam (ATIVAN) 0 5 mg tablet Take 0 5 mg by mouth as needed    Magnesium 250 MG TABS Take 1 tablet (250 mg total) by mouth daily    Methyl Salicylate-Lido-Menthol (LidoPro) 4-4-5 % PTCH LidoPro 4 %-4 %-5 % topical patch   APPLY 1 PATCH TO THE AFFECTED AREA EVERY 8 TO 12 HOURS AS NEEDED   MAX OF 2 PATCHES PER DAY    methylPREDNISolone 4 MG tablet therapy pack Use as directed on package    metoprolol succinate (TOPROL-XL) 25 mg 24 hr tablet Take 1 tablet (25 mg total) by mouth daily    multivitamin (THERAGRAN) TABS Take 1 tablet by mouth daily    Omega-3 Fatty Acids (FISH OIL OMEGA-3 PO) Take by mouth    SUMAtriptan (IMITREX) 100 mg tablet Take 100 mg by mouth as needed    Thiamine HCl (VITAMIN B1 PO) Take 1 capsule by mouth daily    [DISCONTINUED] Rimegepant Sulfate (Nurtec) 75 MG TBDP Take 75 mg by mouth as needed    denosumab (PROLIA) 60 mg/mL Inject 1 mL (60 mg total) under the skin every 6 (six) months (Patient not taking: Reported on 2/16/2022 )     No current facility-administered medications on file prior to visit  No Known Allergies      Physical Exam    /62   Pulse 84   Temp (!) 96 7 °F (35 9 °C)   Resp 20   Ht 5' 8" (1 727 m)   Wt 69 9 kg (154 lb 3 2 oz)   BMI 23 45 kg/m²     Constitutional: normal, well developed, well nourished, alert, in no distress and non-toxic and no overt pain behavior  Eyes: anicteric  HEENT: grossly intact  Neck: supple, symmetric, trachea midline and no masses   Pulmonary:even and unlabored  Cardiovascular:No edema or pitting edema present  Skin:Normal without rashes or lesions and well hydrated  Psychiatric:Mood and affect appropriate  Neurologic:Cranial Nerves II-XII grossly intact Sensation grossly intact; no clonus negative bunn's  Reflexes 2+ and brisk  SLR negative bilaterally  Spurling's maneuver negative bilaterally  Musculoskeletal:normal gait  5/5 strength bilaterally with AROM in all extremities  Slight pain limited weakness with left hip flexion, knee extension  Normal heel toe and tip toe walking  Significant pain with cervical and lumbar facet loading bilaterally and with lateral spine rotation  TTP over cervical and lumbar paraspinal muscles  Negative davie's test, negative gaenslen's negative SIJ loading bilaterally  Imaging    CT CERVICAL SPINE - WITHOUT CONTRAST     INDICATION:   TRAUMA      COMPARISON:  None      TECHNIQUE:  CT examination of the cervical spine was performed without intravenous contrast   Contiguous axial images were obtained  Sagittal and coronal reconstructions were performed        Radiation dose length product (DLP) for this visit:  355 mGy-cm     This examination, like all CT scans performed in the [facility], was performed utilizing techniques to minimize radiation dose exposure, including the use of iterative   reconstruction and automated exposure control        IMAGE QUALITY:  Diagnostic      FINDINGS:     ALIGNMENT:  Normal alignment of the cervical spine   No subluxation      VERTEBRAL BODIES:  No fracture      DEGENERATIVE CHANGES:  No significant cervical degenerative changes are noted      PREVERTEBRAL AND PARASPINAL SOFT TISSUES:  Unremarkable      THORACIC INLET:  Normal      IMPRESSION:     No cervical spine fracture or traumatic malalignment      The study was marked in EPIC for immediate notification

## 2022-02-16 NOTE — PROGRESS NOTES
Assessment  1  Cervical radiculopathy  -     MRI cervical spine without contrast; Future; Expected date: 02/16/2022  -     MRI lumbar spine wo contrast; Future; Expected date: 02/16/2022  -     gabapentin (NEURONTIN) 100 mg capsule; Take 1 capsule (100 mg total) by mouth 3 (three) times a day  -     meloxicam (MOBIC) 7 5 mg tablet; Take 1 tablet (7 5 mg total) by mouth 2 (two) times a day as needed for moderate pain    2  Lumbar radiculopathy  -     gabapentin (NEURONTIN) 100 mg capsule; Take 1 capsule (100 mg total) by mouth 3 (three) times a day  -     meloxicam (MOBIC) 7 5 mg tablet; Take 1 tablet (7 5 mg total) by mouth 2 (two) times a day as needed for moderate pain    Neck and low back pain described primarily are radicular features  Pain radiates in C6 and C7 dermatomal distribution from neck to hands bilaterally as well as in L3 and L4 dermatomal distribution right greater than left  5/5 strength bilaterally with active range of motion movements in upper lower extremities  Slight pain limited weakness with left hip flexion, knee extension  Negative Spurling's maneuver, negative SLR bilaterally  Significant tenderness to palpation with lumbar cervical facet loading maneuvers  She is currently engaged with PT for both her neck and low back with modest relief of the pain  She has not trialed medications other than OTC tylenol and ibuprofen  CT cervical spine reviewed which is noncontributory  Will proceed with multimodal pain therapy the plan will obtaining imaging to guide interventional therapy  Plan  -MRI cervical spine noncontrast; MRI lumbar spine noncontrast  -gabapentin 100 mg t i d  Ordered for patient; counseled regarding sedative effects of taking this medication and provided up titration calendar  Counseled not to take medication while driving or operating heavy machinery/using stairs  -meloxicam 7 5 mg b i d  P r n   pain    Patient educated regarding bleeding risk of taking this medication not taking any other nonsteroidal anti-inflammatory medications while taking this medication; counseled thoroughly regarding potential risk of Cardiovascular injury, Kidney injury, Gastrointestinal ulceration/bleeding  Patient voiced understanding  -physical therapy for right-sided lumbar radiculopathy; Physician directed home exercise plan as per [facility] demonstrated and handouts provided that patient plans to participate with for 1 hour, twice a week for the next 6 weeks  There are risks associated with opioid medications, including dependence, addiction and tolerance  The patient understands and agrees to use these medications only as prescribed  Potential side effects of the medications include, but are not limited to, constipation, drowsiness, addiction, impaired judgment and risk of fatal overdose if not taken as prescribed  The patient was warned against driving while taking sedation medications  Sharing medications is a felony  At this point in time, the patient is showing no signs of addiction, abuse, diversion or suicidal ideation  [place] Prescription Drug Monitoring Program report was reviewed and was appropriate      Complete risks and benefits including bleeding, infection, tissue reaction, nerve injury and allergic reaction were discussed  The approach was demonstrated using models and literature was provided  Verbal and written consent was obtained  My impressions and treatment recommendations were discussed in detail with the patient who verbalized understanding and had no further questions  Discharge instructions were provided  I personally saw and examined the patient and I agree with the above discussed plan of care  My impressions and treatment recommendations were discussed in detail with the patient who verbalized understanding and had no further questions  Discharge instructions were provided   I personally saw and examined the patient and I agree with the above discussed plan of care  New Medications Ordered This Visit   Medications    gabapentin (NEURONTIN) 100 mg capsule     Sig: Take 1 capsule (100 mg total) by mouth 3 (three) times a day     Dispense:  90 capsule     Refill:  0    meloxicam (MOBIC) 7 5 mg tablet     Sig: Take 1 tablet (7 5 mg total) by mouth 2 (two) times a day as needed for moderate pain     Dispense:  60 tablet     Refill:  0       History of Present Illness    Michelle Lopez is a 46 y o  female with past medical history of hypertension, hypothyroidism, hypercholesterolemia, migraine headaches presenting with neck and low back pain described primarily radicular features  Neck pain radiates into the C6 and C7 dermatomal distribution bilaterally accompanied by pain limited weakness numbness and paresthesias  Similarly in low back radicular pain travels into the bilateral L3 and L4 dermatomal distribution, right greater than left  She describes lancinating features of the pain  She has been to formal physical therapy for both neck and low back pain with modest relief of her symptoms  She has trialed over-the-counter NSAIDs as well as Tylenol with modest relief of the pain  Her pain significantly impacts independent activities of daily living and overall function, 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  She denies any bowel or bladder abnormality, incontinence, gait instability, headaches or dizziness  I have personally reviewed and/or updated the patient's past medical history, past surgical history, family history, social history, current medications, allergies, and vital signs today  Review of Systems   Constitutional: Positive for activity change  HENT: Negative  Eyes: Negative  Respiratory: Negative  Cardiovascular: Negative  Gastrointestinal: Negative  Endocrine: Negative  Genitourinary: Negative  Musculoskeletal: Positive for arthralgias, back pain, myalgias, neck pain and neck stiffness  Skin: Negative  Allergic/Immunologic: Negative  Neurological: Positive for weakness and numbness  Hematological: Negative  Psychiatric/Behavioral: Negative  All other systems reviewed and are negative  Patient Active Problem List   Diagnosis    Palpitations    Hypertension    Hypokalemia    SVT (supraventricular tachycardia) (HCC)    VT (ventricular tachycardia) ([address] )    Hyperlipidemia    Depression    Osteoporosis    Arthritis    Hypothyroidism due to Hashimoto's thyroiditis    Migraine    Glaucoma    Anxiety    Restless legs       Past Medical History:   Diagnosis Date    Allergic 1974    Seasonal    Anxiety     Arthritis     Colitis     Noted on colonoscopy 04/24/2020      Depression     Disease of thyroid gland     Gastritis     Noted on EGD 04/24/2020    Glaucoma     Headache(784 0)     Hyperlipidemia     Hypertension     Hypothyroidism     Lyme disease     Migraine     Osteoporosis     Scoliosis     SVT (supraventricular tachycardia) (HCC)     Visual impairment     VT (ventricular tachycardia) (HCC)        Past Surgical History:   Procedure Laterality Date    BREAST BIOPSY Right 10/21/2020    papilloma    COLONOSCOPY      EGD      FINGER SURGERY      left pinky    HYSTERECTOMY      age- 45    HYSTERECTOMY W/ SALPINGO-OOPHERECTOMY  05/2008    OOPHORECTOMY Bilateral     age- 45    US GUIDED BREAST BIOPSY RIGHT COMPLETE Right 10/21/2020       Family History   Problem Relation Age of Onset    Peripheral vascular disease Mother     Glaucoma Mother     Prostate cancer Father     Hypothyroidism Sister     No Known Problems Daughter     Colon cancer Maternal Grandmother     No Known Problems Maternal Grandfather     Arthritis Paternal Grandmother     No Known Problems Paternal Grandfather     No Known Problems Daughter     No Known Problems Maternal Aunt     No Known Problems Maternal Aunt     Skin cancer Paternal Aunt     No Known Problems Paternal Aunt Social History     Occupational History    Not on file   Tobacco Use    Smoking status: Former Smoker     Packs/day: 1 00     Years: 10 00     Pack years: 10 00     Types: Cigarettes     Quit date: 1/1/2007     Years since quitting: 15 1    Smokeless tobacco: Never Used    Tobacco comment: quit 2007   Vaping Use    Vaping Use: Never used   Substance and Sexual Activity    Alcohol use: Never    Drug use: Never    Sexual activity: Yes     Birth control/protection: Post-menopausal     Comment: hysterectomy       Current Outpatient Medications on File Prior to Visit   Medication Sig    atorvastatin (LIPITOR) 40 mg tablet Take 1 tablet (40 mg total) by mouth daily    bimatoprost (Lumigan) 0 01 % ophthalmic drops Lumigan 0 01 % eye drops    Calcium Carbonate-Vit D-Min (CALCIUM 1200 PO) Take by mouth daily     Cholecalciferol (VITAMIN D3 PO) Take 1,000 mg by mouth    Glucosamine-Chondroitin 500-400 MG CAPS Take by mouth daily     hydrOXYzine HCL (ATARAX) 25 mg tablet Take 1 tablet (25 mg total) by mouth as needed for anxiety    Inulin (FIBER CHOICE PO) Take by mouth    irbesartan (AVAPRO) 75 mg tablet Take 1 tablet (75 mg total) by mouth daily    levothyroxine 100 mcg tablet Take 1 tablet (100 mcg total) by mouth daily    LORazepam (ATIVAN) 0 5 mg tablet Take 0 5 mg by mouth as needed    Magnesium 250 MG TABS Take 1 tablet (250 mg total) by mouth daily    Methyl Salicylate-Lido-Menthol (LidoPro) 4-4-5 % PTCH LidoPro 4 %-4 %-5 % topical patch   APPLY 1 PATCH TO THE AFFECTED AREA EVERY 8 TO 12 HOURS AS NEEDED   MAX OF 2 PATCHES PER DAY    methylPREDNISolone 4 MG tablet therapy pack Use as directed on package    metoprolol succinate (TOPROL-XL) 25 mg 24 hr tablet Take 1 tablet (25 mg total) by mouth daily    multivitamin (THERAGRAN) TABS Take 1 tablet by mouth daily    Omega-3 Fatty Acids (FISH OIL OMEGA-3 PO) Take by mouth    SUMAtriptan (IMITREX) 100 mg tablet Take 100 mg by mouth as needed    Thiamine HCl (VITAMIN B1 PO) Take 1 capsule by mouth daily    [DISCONTINUED] Rimegepant Sulfate (Nurtec) 75 MG TBDP Take 75 mg by mouth as needed    denosumab (PROLIA) 60 mg/mL Inject 1 mL (60 mg total) under the skin every 6 (six) months (Patient not taking: Reported on 2/16/2022 )     No current facility-administered medications on file prior to visit  No Known Allergies      Physical Exam    /62   Pulse 84   Temp (!) 96 7 °F (35 9 °C)   Resp 20   Ht 5' 8" (1 727 m)   Wt 69 9 kg (154 lb 3 2 oz)   BMI 23 45 kg/m²     Constitutional: normal, well developed, well nourished, alert, in no distress and non-toxic and no overt pain behavior  Eyes: anicteric  HEENT: grossly intact  Neck: supple, symmetric, trachea midline and no masses   Pulmonary:even and unlabored  Cardiovascular:No edema or pitting edema present  Skin:Normal without rashes or lesions and well hydrated  Psychiatric:Mood and affect appropriate  Neurologic:Cranial Nerves II-XII grossly intact Sensation grossly intact; no clonus negative bunn's  Reflexes 2+ and brisk  SLR negative bilaterally  Spurling's maneuver negative bilaterally  Musculoskeletal:normal gait  5/5 strength bilaterally with AROM in all extremities  Slight pain limited weakness with left hip flexion, knee extension  Normal heel toe and tip toe walking  Significant pain with cervical and lumbar facet loading bilaterally and with lateral spine rotation  TTP over cervical and lumbar paraspinal muscles  Negative davie's test, negative gaenslen's negative SIJ loading bilaterally  Imaging    CT CERVICAL SPINE - WITHOUT CONTRAST     INDICATION:   TRAUMA      COMPARISON:  None      TECHNIQUE:  CT examination of the cervical spine was performed without intravenous contrast   Contiguous axial images were obtained  Sagittal and coronal reconstructions were performed        Radiation dose length product (DLP) for this visit:  355 mGy-cm     This examination, like all CT scans performed in the [facility], was performed utilizing techniques to minimize radiation dose exposure, including the use of iterative   reconstruction and automated exposure control        IMAGE QUALITY:  Diagnostic      FINDINGS:     ALIGNMENT:  Normal alignment of the cervical spine   No subluxation      VERTEBRAL BODIES:  No fracture      DEGENERATIVE CHANGES:  No significant cervical degenerative changes are noted      PREVERTEBRAL AND PARASPINAL SOFT TISSUES:  Unremarkable      THORACIC INLET:  Normal      IMPRESSION:     No cervical spine fracture or traumatic malalignment      The study was marked in EPIC for immediate notification

## 2022-02-16 NOTE — PROGRESS NOTES
Ayala Thorpe 46 y o  female MRN: [id number]    Encounter: [id number]      Assessment/Plan     Problem List Items Addressed This Visit        Endocrine    Hypothyroidism due to Hashimoto's thyroiditis - Primary     Hypothyroidism improving  Continue present dosing of levothyroxine 100 mcg daily  Levothyroxine should be taken 30 minutes before a meal, since dietary fiber and soy products interfere with its absorption  It should not be taken together with medications that inhibit its absorption including calcium or iron supplements, cholestyramine, sucralfate, and aluminum hydroxide  I also advised that biotin should be held x72 hours prior to repeat thyroid testing, which I requested she obtain in 2-months  Relevant Orders    T4, free Lab Collect    TSH, 3rd generation Lab Collect       Cardiovascular and Mediastinum    Hypertension       Musculoskeletal and Integument    Osteoporosis     Patient on denosumab therapy x1 year, now off treatment and without transition plan  Prior to starting denosumab, she was on zoledronic acid for an uncertain amount of time (patient states maybe up to 7-8 years)  Rapid bone loss and rebound vertebral fractures has been observed with delay in dosing or discontinuation of denosumab  This has been observed more often following protracted courses of denosumab treatment, usually exceeding 3 years  It is possible that her prior treatment with zoledronic acid is protective  But in the absence of certainty, I would like to consider arranging a single infusion of reclast to protect her from any loss  Relevant Orders    Comprehensive metabolic panel Lab Collect    Vitamin D 25 hydroxy Lab Collect    PTH, intact Lab Collect Lab Collect    Phosphorus Lab Collect          CC: Hypothyroidism    History of Present Illness     HPI:    Nola Cazares presents today for follow up evaluation of hypothyroidism   She is presently taking LT4 100 mcg daily and is taking this medication appropriately  She reports fatigue but otherwise denies any hyper- or hypothyroid symptoms  She reports starting biotin after having her recent thyroid labs obtained  For osteoporosis, she reports prior rib fractures  She is a former smoker  There is a parental history of hip fracture  She is presently on calcium and vit D supplementation  She was on prolia, her last dose was in August 2021 and she otherwise would have been due for another injection this month but therapy was discontinued  It appears her insurance denied the request as well on account of her recent DXA  She was on therapy for 1-year  Prior to starting denosumab, she was on zoledronic acid  It is unclear for how long she received reclast infusions  She does report bad esophagitis and reflux, for which she is planning on seeing GI  Review of Systems   Constitutional: Positive for fatigue  Negative for diaphoresis and unexpected weight change  HENT: Negative for trouble swallowing and voice change  Eyes: Negative for visual disturbance  Respiratory: Negative for shortness of breath  Cardiovascular: Negative for chest pain, palpitations and leg swelling  Gastrointestinal: Negative for abdominal pain, nausea and vomiting  Endocrine: Negative for heat intolerance  Skin: Negative for rash and wound  Neurological: Negative for tremors and weakness  Psychiatric/Behavioral: Positive for sleep disturbance  Negative for agitation and behavioral problems  Historical Information   Past Medical History:   Diagnosis Date    Allergic 1974    Seasonal    Anxiety     Arthritis     Colitis     Noted on colonoscopy 04/24/2020      Depression     Disease of thyroid gland     Gastritis     Noted on EGD 04/24/2020    Glaucoma     Headache(784 0)     Hyperlipidemia     Hypertension     Hypothyroidism     Lyme disease     Migraine     Osteoporosis     Scoliosis     SVT (supraventricular tachycardia) (HCC)     Visual impairment     VT (ventricular tachycardia) (HCC)      Past Surgical History:   Procedure Laterality Date    BREAST BIOPSY Right 10/21/2020    papilloma    COLONOSCOPY      EGD      FINGER SURGERY      left pinky    HYSTERECTOMY      age- 45    HYSTERECTOMY W/ SALPINGO-OOPHERECTOMY  05/2008    OOPHORECTOMY Bilateral     age- 45   [address] BIOPSY RIGHT COMPLETE Right 10/21/2020     Social History   Social History     Substance and Sexual Activity   Alcohol Use Never     Social History     Substance and Sexual Activity   Drug Use Never     Social History     Tobacco Use   Smoking Status Former Smoker    Packs/day: 1 00    Years: 10 00    Pack years: 10 00    Types: Cigarettes    Quit date: 1/1/2007    Years since quitting: 15 1   Smokeless Tobacco Never Used   Tobacco Comment    quit 2007     Family History:   Family History   Problem Relation Age of Onset    Peripheral vascular disease Mother     Glaucoma Mother     Prostate cancer Father     Hypothyroidism Sister     No Known Problems Daughter     Colon cancer Maternal Grandmother     No Known Problems Maternal Grandfather     Arthritis Paternal Grandmother     No Known Problems Paternal Grandfather     No Known Problems Daughter     No Known Problems Maternal Aunt     No Known Problems Maternal Aunt     Skin cancer Paternal Aunt     No Known Problems Paternal Aunt        Meds/Allergies   Current Outpatient Medications   Medication Sig Dispense Refill    atorvastatin (LIPITOR) 40 mg tablet Take 1 tablet (40 mg total) by mouth daily 90 tablet 1    bimatoprost (Lumigan) 0 01 % ophthalmic drops Lumigan 0 01 % eye drops      Calcium Carbonate-Vit D-Min (CALCIUM 1200 PO) Take by mouth daily       Cholecalciferol (VITAMIN D3 PO) Take 1,000 mg by mouth      gabapentin (NEURONTIN) 100 mg capsule Take 1 capsule (100 mg total) by mouth 3 (three) times a day 90 capsule 0    Glucosamine-Chondroitin 500-400 MG CAPS Take by mouth daily  hydrOXYzine HCL (ATARAX) 25 mg tablet Take 1 tablet (25 mg total) by mouth as needed for anxiety 30 tablet 1    Inulin (FIBER CHOICE PO) Take by mouth      irbesartan (AVAPRO) 75 mg tablet Take 1 tablet (75 mg total) by mouth daily 30 tablet 11    levothyroxine 100 mcg tablet Take 1 tablet (100 mcg total) by mouth daily 90 tablet 1    LORazepam (ATIVAN) 0 5 mg tablet Take 0 5 mg by mouth as needed      Magnesium 250 MG TABS Take 1 tablet (250 mg total) by mouth daily 30 tablet 0    meloxicam (MOBIC) 7 5 mg tablet Take 1 tablet (7 5 mg total) by mouth 2 (two) times a day as needed for moderate pain 60 tablet 0    Methyl Salicylate-Lido-Menthol (LidoPro) 4-4-5 % PTCH LidoPro 4 %-4 %-5 % topical patch   APPLY 1 PATCH TO THE AFFECTED AREA EVERY 8 TO 12 HOURS AS NEEDED  MAX OF 2 PATCHES PER DAY      metoprolol succinate (TOPROL-XL) 25 mg 24 hr tablet Take 1 tablet (25 mg total) by mouth daily 90 tablet 4    multivitamin (THERAGRAN) TABS Take 1 tablet by mouth daily      Omega-3 Fatty Acids (FISH OIL OMEGA-3 PO) Take by mouth      SUMAtriptan (IMITREX) 100 mg tablet Take 100 mg by mouth as needed      Thiamine HCl (VITAMIN B1 PO) Take 1 capsule by mouth daily      denosumab (PROLIA) 60 mg/mL Inject 1 mL (60 mg total) under the skin every 6 (six) months (Patient not taking: Reported on 2/16/2022 ) 1 mL 3    methylPREDNISolone 4 MG tablet therapy pack Use as directed on package (Patient not taking: Reported on 2/16/2022 ) 21 each 0     No current facility-administered medications for this visit  No Known Allergies    Objective   Vitals: Blood pressure 108/62, pulse 72, height 5' 8" (1 727 m), weight 71 7 kg (158 lb)  Physical Exam  Vitals reviewed  Constitutional:       Appearance: She is not ill-appearing  HENT:      Head: Normocephalic and atraumatic  Nose: Nose normal    Eyes:      General: No scleral icterus       Conjunctiva/sclera: Conjunctivae normal    Neck:      Thyroid: No thyroid mass, thyromegaly or thyroid tenderness  Cardiovascular:      Rate and Rhythm: Normal rate and regular rhythm  Pulses: Normal pulses  Heart sounds: No murmur heard  Pulmonary:      Effort: Pulmonary effort is normal  No respiratory distress  Abdominal:      Palpations: Abdomen is soft  Tenderness: There is no abdominal tenderness  Musculoskeletal:      Cervical back: Neck supple  Lymphadenopathy:      Cervical: No cervical adenopathy  Skin:     General: Skin is warm and dry  Neurological:      Mental Status: She is alert  Psychiatric:         Mood and Affect: Mood normal          Behavior: Behavior normal          The history was obtained from the review of the chart, patient  Lab Results:   Lab Results   Component Value Date/Time    TSH 3RD GENERATON 1 464 02/11/2022 08:12 AM    TSH 3RD GENERATON 10 325 (H) 12/02/2021 09:30 AM    TSH 3RD GENERATON 0 960 06/30/2021 10:35 AM    Free T4 1 13 02/11/2022 08:12 AM    Free T4 1 01 12/02/2021 09:30 AM    Free T4 1 01 06/30/2021 10:35 AM           Imaging Studies:      10/22/2021  CENTRAL DXA SCAN     CLINICAL HISTORY:  70-year-old postmenopausal  female with a family history of low impact parenteral hip fracture  Osteoporosis screening  OTHER RISK FACTORS:  None      PHARMACOLOGIC THERAPY FOR OSTEOPOROSIS:  Prolia      TECHNIQUE: Bone densitometry was performed using a HoloResilience Horizon A  bone densitometer  Regions of interest appear properly placed       COMPARISON: Outside studies performed on a dissimilar DEXA unit, most recent November 13, 2019            RESULTS:   LUMBAR SPINE L1-L4 :   BMD  0 881  gm/cm2   T-score -1 5    These values are artifactually elevated due to the presence of scoliosis with spondylosis      LEFT  TOTAL HIP:   BMD:  0 886  gm/cm2   T-score:  -0 5     LEFT  FEMORAL NECK:   BMD:  0 710  gm/cm2   T score: -1 2               IMPRESSION:  1  Low bone mass (OSTEOPENIA)   [Based on the lumbar spine]     2  Since the prior study, there has been a slight DECREASE in the total bone mineral densities of both the lumbar spine and left hip      It should be noted however that the examinations were performed on dissimilar DXA units  I have personally reviewed pertinent reports  Portions of the record may have been created with voice recognition software  Occasional wrong word or "sound a like" substitutions may have occurred due to the inherent limitations of voice recognition software  Read the chart carefully and recognize, using context, where substitutions have occurred

## 2022-02-16 NOTE — PATIENT INSTRUCTIONS
Core Strengthening Exercises   WHAT YOU NEED TO KNOW:   Your core includes the muscles of your lower back, hip, pelvis, and abdomen  Core strengthening exercises help heal and strengthen these muscles  This helps prevent another injury, and keeps your pelvis, spine, and hips in the correct position  DISCHARGE INSTRUCTIONS:   Contact your healthcare provider if:   · You have sharp or worsening pain during exercise or at rest     · You have questions or concerns about your shoulder exercises  Safety tips:  Talk to your healthcare provider before you start an exercise program  A physical therapist can teach you how to do core strengthening exercises safely  · Do the exercises on a mat or firm surface  A firm surface will support your spine and prevent low back pain  Do not do these exercises on a bed  · Move slowly and smoothly  Avoid fast or jerky motions  · Stop if you feel pain  Core exercises should not be painful  Stop if you feel pain  · Breathe normally during core exercises  Do not hold your breath  This may cause an increase in blood pressure and prevent muscle strengthening  Your healthcare provider will tell you when to inhale and exhale during the exercise  · Begin all of your exercises with abdominal bracing  Abdominal bracing helps warm up your core muscles  You can also practice abdominal bracing throughout the day  Lie on your back with your knees bent and feet flat on the floor  Place your arms in a relaxed position beside your body  Tighten your abdominal muscles  Pull your belly button in and up toward your spine  Hold for 5 seconds  Relax your muscles  Repeat 10 times  Core strengthening exercises: Your healthcare provider will tell you how often to do these exercises  The provider will also tell you how many repetitions of each exercise you should do  Hold each exercise for 5 seconds or as directed  As you get stronger, increase your hold to 10 to 15 seconds   You can do some of these exercises on a stability ball, or with a weight  Ask your healthcare provider how to use a stability ball or weight for these exercises:  · Bridging:  Lie on your back with your knees bent and feet flat on the floor  Rest your arms at your side  Tighten your buttocks, and then lift your hips 1 inch off the floor  Hold for 5 seconds  When you can do this exercise without pain for 10 seconds, increase the distance you lift your hips  A good goal is to be able to lift your hips so that your shoulders, hips, and knees are in a straight line  · Dead bug:  Lie on your back with your knees bent and feet flat on the floor  Place your arms in a relaxed position beside your body  Begin with abdominal bracing  Next, raise one leg, keeping your knee bent  Hold for 5 seconds  Repeat with the other leg  When you can do this exercise without pain for 10 to 15 seconds, you may raise one straight leg and hold  Repeat with the other leg  · Quadruped:  Place your hands and knees on the floor  Keep your wrists directly below your shoulders and your knees directly below your hips  Pull your belly button in toward your spine  Do not flatten or arch your back  Tighten your abdominal muscles below your belly button  Hold for 5 seconds  When you can do this exercise without pain for 10 to 15 seconds, you may extend one arm and hold  Repeat on the other side  · Side bridge exercises:      ? Standing side bridge:  Stand next to a wall and extend one arm toward the wall  Place your palm flat on the wall with your fingers pointing upward  Begin with abdominal bracing  Next, without moving your feet, slowly bend your arm to 90 degrees  Hold for 5 seconds  Repeat on the other side  When you can do this exercise without pain for 10 to 15 seconds, you may do the bent leg side bridge on the floor  ? Bent leg side bridge:  Lie on one side with your legs, hips, and shoulders in a straight line   Prop yourself up onto your forearm so your elbow is directly below your shoulder  Bend your knees back to 90 degrees  Begin with abdominal bracing  Next, lift your hips and balance yourself on your forearm and knees  Hold for 5 seconds  Repeat on the other side  When you can do this exercise without pain for 10 to 15 seconds, you may do the straight leg side bridge on the floor  ? Straight leg side bridge:  Lie on one side with your legs, hips, and shoulders in a straight line  Prop yourself up onto your forearm so your elbow is directly below your shoulder  Begin with abdominal bracing  Lift your hips off the floor and balance yourself on your forearm and the outside of your flexed foot  Do not let your ankle bend sideways  Hold for 5 seconds  Repeat on the other side  When you can do this exercise without pain for 10 to 15 seconds, ask your healthcare provider for more advanced exercises  · Superman:  Lie on your stomach  Extend your arms forward on the floor  Tighten your abdominal muscles and lift your right hand and left leg off the floor  Hold this position  Slowly return to the starting position  Tighten your abdominal muscles and lift your left hand and right leg off the floor  Hold this position  Slowly return to the starting position  · Clam:  Lie on your side with your knees bent  Put your bottom arm under your head to keep your neck in line  Put your top hand on your hip to keep your pelvis from moving  Put your heels together, and keep them together during this exercise  Slowly raise your top knee toward the ceiling  Then lower your leg so your knees are together  Repeat this exercise 10 times  Then switch sides and do the exercise 10 times with the other leg  · Curl up:  Lie on your back with your knees bent and feet flat on the floor  Place your hands, palms down, underneath your lower back   Next, with your elbows on the floor, lift your shoulders and chest 2 to 3 inches off the floor  Keep your head in line with your shoulders  Hold this position  Slowly return to the starting position  · Straight leg raises:  Lie on your back with one leg straight  Bend the other knee and place your foot flat on the floor  Tighten your abdominal muscles  Keep your leg straight and slowly lift it straight up 6 to 12 inches off the floor  Hold this position  Lower your leg slowly  Do as many repetitions as directed on this side  Repeat with the other leg  · Plank:  Lie on your stomach  Bend your elbows and place your forearms flat on the floor  Lift your chest, stomach, and knees off the floor  Make sure your elbows are below your shoulders  Your body should be in a straight line  Do not let your hips or lower back sink to the ground  Squeeze your abdominal muscles together and hold for 15 seconds  To make this exercise harder, hold for 30 seconds or lift 1 leg at a time  · Bicycles:  Lie on your back  Bend both knees and bring them toward your chest  Your calves should be parallel to the floor  Place the palms of your hands on the back of your head  Straighten your right leg and keep it lifted 2 inches off the floor  Raise your head and shoulders off the floor and twist towards your left  Keep your head and shoulders lifted  Bend your right knee while you straighten your left leg  Keep your left leg 2 inches off the floor  Twist your head and chest towards the left leg  Continue to straighten 1 leg at a time and twist        Follow up with your doctor as directed:  Write down your questions so you remember to ask them during your visits  © Copyright [employer] 2021 Information is for End User's use only and may not be sold, redistributed or otherwise used for commercial purposes  All illustrations and images included in CareNotes® are the copyrighted property of A D A M , Inc  or [facility] Leo Mazariegos   The above information is an  only   It is not intended as medical advice for individual conditions or treatments  Talk to your doctor, nurse or pharmacist before following any medical regimen to see if it is safe and effective for you  Neck Exercises   AMBULATORY CARE:   Neck exercises  help reduce neck pain, and improve neck movement and strength  Neck exercises also help prevent long-term neck problems  What you need to know about neck exercises:   · Do the exercises every day,  or as often as directed by your healthcare provider  · Move slowly, gently, and smoothly  Avoid fast or jerky motions  · Stand and sit the way your healthcare provider shows you  Good posture may reduce your neck pain  Check your posture often, even when you are not doing your neck exercises  How to perform neck exercises safely:   · Exercise position:  You may sit or stand while you do neck exercises  Face forward  Your shoulders should be straight and relaxed, with a good posture  · Head tilts, forward and back:  Gently bow your head and try to touch your chin to your chest  Your healthcare provider may tell you to push on the back of your neck to help bow your head  Raise your chin back to the starting position  Tilt your head back as far as possible so you are looking up at the ceiling  Your healthcare provider may tell you to lift your chin to help tilt your head back  Return your head to the starting position  · Head tilts, side to side:  Tilt your head, bringing your ear toward your shoulder  Then tilt your head toward the other shoulder  · Head turns:  Turn your head to look over your shoulder  Tilt your chin down and try to touch it to your shoulder  Do not raise your shoulder to your chin  Face forward again  Do the same on the other side  · Head rolls:  Slowly bring your chin toward your chest  Next, roll your head to the right  Your ear should be positioned over your shoulder  Hold this position for 5 seconds   Roll your head back toward your chest and to the left into the same position  Hold for 5 seconds  Gently roll your head back and around in a clockwise [place] 3 times  Next, move your head in the reverse direction (counterclockwise) in a [place] 3 times  Do not shrug your shoulders upwards while you do this exercise  Contact your healthcare provider if:   · Your pain does not get better, or gets worse  · You have questions or concerns about your condition, care, or exercise program     © Copyright [employer] 2021 Information is for End User's use only and may not be sold, redistributed or otherwise used for commercial purposes  All illustrations and images included in CareNotes® are the copyrighted property of A D A M , Inc  or [facility] Leo Mazariegos   The above information is an  only  It is not intended as medical advice for individual conditions or treatments  Talk to your doctor, nurse or pharmacist before following any medical regimen to see if it is safe and effective for you

## 2022-02-16 NOTE — LETTER
February 17, 2022     [place], [address] [facility] [address]    Patient: Daniela Abdullahi   YOB: 1970   Date of Visit: 2/16/2022       Dear Dr Heriberto Lester: Thank you for referring Daniela Abdullahi to me for evaluation  Below are my notes for this consultation  If you have questions, please do not hesitate to call me  I look forward to following your patient along with you  Sincerely,        Gustavo Abreu MD        CC: No Recipients  Gustavo Abreu MD  2/16/2022 11:13 AM  Signed      Assessment  1  Cervical radiculopathy  -     MRI cervical spine without contrast; Future; Expected date: 02/16/2022  -     MRI lumbar spine wo contrast; Future; Expected date: 02/16/2022  -     gabapentin (NEURONTIN) 100 mg capsule; Take 1 capsule (100 mg total) by mouth 3 (three) times a day  -     meloxicam (MOBIC) 7 5 mg tablet; Take 1 tablet (7 5 mg total) by mouth 2 (two) times a day as needed for moderate pain    2  Lumbar radiculopathy  -     gabapentin (NEURONTIN) 100 mg capsule; Take 1 capsule (100 mg total) by mouth 3 (three) times a day  -     meloxicam (MOBIC) 7 5 mg tablet; Take 1 tablet (7 5 mg total) by mouth 2 (two) times a day as needed for moderate pain    Neck and low back pain described primarily are radicular features  Pain radiates in C6 and C7 dermatomal distribution from neck to hands bilaterally as well as in L3 and L4 dermatomal distribution right greater than left  5/5 strength bilaterally with active range of motion movements in upper lower extremities  Slight pain limited weakness with left hip flexion, knee extension  Negative Spurling's maneuver, negative SLR bilaterally  Significant tenderness to palpation with lumbar cervical facet loading maneuvers  She is currently engaged with PT for both her neck and low back with modest relief of the pain  She has not trialed medications other than OTC tylenol and ibuprofen   CT cervical spine reviewed which is noncontributory  Will proceed with multimodal pain therapy the plan will obtaining imaging to guide interventional therapy  Plan  -MRI cervical spine noncontrast; MRI lumbar spine noncontrast  -gabapentin 100 mg t i d  Ordered for patient; counseled regarding sedative effects of taking this medication and provided up titration calendar  Counseled not to take medication while driving or operating heavy machinery/using stairs  -meloxicam 7 5 mg b i d  P r n   pain  Patient educated regarding bleeding risk of taking this medication not taking any other nonsteroidal anti-inflammatory medications while taking this medication; counseled thoroughly regarding potential risk of Cardiovascular injury, Kidney injury, Gastrointestinal ulceration/bleeding  Patient voiced understanding  -physical therapy for right-sided lumbar radiculopathy; Physician directed home exercise plan as per [facility] demonstrated and handouts provided that patient plans to participate with for 1 hour, twice a week for the next 6 weeks  There are risks associated with opioid medications, including dependence, addiction and tolerance  The patient understands and agrees to use these medications only as prescribed  Potential side effects of the medications include, but are not limited to, constipation, drowsiness, addiction, impaired judgment and risk of fatal overdose if not taken as prescribed  The patient was warned against driving while taking sedation medications  Sharing medications is a felony  At this point in time, the patient is showing no signs of addiction, abuse, diversion or suicidal ideation  [place] Prescription Drug Monitoring Program report was reviewed and was appropriate      Complete risks and benefits including bleeding, infection, tissue reaction, nerve injury and allergic reaction were discussed  The approach was demonstrated using models and literature was provided  Verbal and written consent was obtained       My impressions and treatment recommendations were discussed in detail with the patient who verbalized understanding and had no further questions  Discharge instructions were provided  I personally saw and examined the patient and I agree with the above discussed plan of care  My impressions and treatment recommendations were discussed in detail with the patient who verbalized understanding and had no further questions  Discharge instructions were provided  I personally saw and examined the patient and I agree with the above discussed plan of care  New Medications Ordered This Visit   Medications    gabapentin (NEURONTIN) 100 mg capsule     Sig: Take 1 capsule (100 mg total) by mouth 3 (three) times a day     Dispense:  90 capsule     Refill:  0    meloxicam (MOBIC) 7 5 mg tablet     Sig: Take 1 tablet (7 5 mg total) by mouth 2 (two) times a day as needed for moderate pain     Dispense:  60 tablet     Refill:  0       History of Present Illness    Federico Baird is a 46 y o  female with past medical history of hypertension, hypothyroidism, hypercholesterolemia, migraine headaches presenting with neck and low back pain described primarily radicular features  Neck pain radiates into the C6 and C7 dermatomal distribution bilaterally accompanied by pain limited weakness numbness and paresthesias  Similarly in low back radicular pain travels into the bilateral L3 and L4 dermatomal distribution, right greater than left  She describes lancinating features of the pain  She has been to formal physical therapy for both neck and low back pain with modest relief of her symptoms  She has trialed over-the-counter NSAIDs as well as Tylenol with modest relief of the pain  Her pain significantly impacts independent activities of daily living and overall function, 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  She denies any bowel or bladder abnormality, incontinence, gait instability, headaches or dizziness      I have personally reviewed and/or updated the patient's past medical history, past surgical history, family history, social history, current medications, allergies, and vital signs today  Review of Systems   Constitutional: Positive for activity change  HENT: Negative  Eyes: Negative  Respiratory: Negative  Cardiovascular: Negative  Gastrointestinal: Negative  Endocrine: Negative  Genitourinary: Negative  Musculoskeletal: Positive for arthralgias, back pain, myalgias, neck pain and neck stiffness  Skin: Negative  Allergic/Immunologic: Negative  Neurological: Positive for weakness and numbness  Hematological: Negative  Psychiatric/Behavioral: Negative  All other systems reviewed and are negative  Patient Active Problem List   Diagnosis    Palpitations    Hypertension    Hypokalemia    SVT (supraventricular tachycardia) (HCC)    VT (ventricular tachycardia) ([address] )    Hyperlipidemia    Depression    Osteoporosis    Arthritis    Hypothyroidism due to Hashimoto's thyroiditis    Migraine    Glaucoma    Anxiety    Restless legs       Past Medical History:   Diagnosis Date    Allergic 1974    Seasonal    Anxiety     Arthritis     Colitis     Noted on colonoscopy 04/24/2020      Depression     Disease of thyroid gland     Gastritis     Noted on EGD 04/24/2020    Glaucoma     Headache(784 0)     Hyperlipidemia     Hypertension     Hypothyroidism     Lyme disease     Migraine     Osteoporosis     Scoliosis     SVT (supraventricular tachycardia) (HCC)     Visual impairment     VT (ventricular tachycardia) (HCC)        Past Surgical History:   Procedure Laterality Date    BREAST BIOPSY Right 10/21/2020    papilloma    COLONOSCOPY      EGD      FINGER SURGERY      left pinky    HYSTERECTOMY      age- 45    HYSTERECTOMY W/ SALPINGO-OOPHERECTOMY  05/2008    OOPHORECTOMY Bilateral     age- 45    US GUIDED BREAST BIOPSY RIGHT COMPLETE Right 10/21/2020       Family History   Problem Relation Age of Onset    Peripheral vascular disease Mother    Debbie Betancur Glaucoma Mother     Prostate cancer Father     Hypothyroidism Sister     No Known Problems Daughter     Colon cancer Maternal Grandmother     No Known Problems Maternal Grandfather     Arthritis Paternal Grandmother     No Known Problems Paternal Grandfather     No Known Problems Daughter     No Known Problems Maternal Aunt     No Known Problems Maternal Aunt     Skin cancer Paternal Aunt     No Known Problems Paternal Aunt        Social History     Occupational History    Not on file   Tobacco Use    Smoking status: Former Smoker     Packs/day: 1 00     Years: 10 00     Pack years: 10 00     Types: Cigarettes     Quit date: 1/1/2007     Years since quitting: 15 1    Smokeless tobacco: Never Used    Tobacco comment: quit 2007   Vaping Use    Vaping Use: Never used   Substance and Sexual Activity    Alcohol use: Never    Drug use: Never    Sexual activity: Yes     Birth control/protection: Post-menopausal     Comment: hysterectomy       Current Outpatient Medications on File Prior to Visit   Medication Sig    atorvastatin (LIPITOR) 40 mg tablet Take 1 tablet (40 mg total) by mouth daily    bimatoprost (Lumigan) 0 01 % ophthalmic drops Lumigan 0 01 % eye drops    Calcium Carbonate-Vit D-Min (CALCIUM 1200 PO) Take by mouth daily     Cholecalciferol (VITAMIN D3 PO) Take 1,000 mg by mouth    Glucosamine-Chondroitin 500-400 MG CAPS Take by mouth daily     hydrOXYzine HCL (ATARAX) 25 mg tablet Take 1 tablet (25 mg total) by mouth as needed for anxiety    Inulin (FIBER CHOICE PO) Take by mouth    irbesartan (AVAPRO) 75 mg tablet Take 1 tablet (75 mg total) by mouth daily    levothyroxine 100 mcg tablet Take 1 tablet (100 mcg total) by mouth daily    LORazepam (ATIVAN) 0 5 mg tablet Take 0 5 mg by mouth as needed    Magnesium 250 MG TABS Take 1 tablet (250 mg total) by mouth daily    Methyl Salicylate-Lido-Menthol (LidoPro) 4-4-5 % PTCH LidoPro 4 %-4 %-5 % topical patch   APPLY 1 PATCH TO THE AFFECTED AREA EVERY 8 TO 12 HOURS AS NEEDED  MAX OF 2 PATCHES PER DAY    methylPREDNISolone 4 MG tablet therapy pack Use as directed on package    metoprolol succinate (TOPROL-XL) 25 mg 24 hr tablet Take 1 tablet (25 mg total) by mouth daily    multivitamin (THERAGRAN) TABS Take 1 tablet by mouth daily    Omega-3 Fatty Acids (FISH OIL OMEGA-3 PO) Take by mouth    SUMAtriptan (IMITREX) 100 mg tablet Take 100 mg by mouth as needed    Thiamine HCl (VITAMIN B1 PO) Take 1 capsule by mouth daily    [DISCONTINUED] Rimegepant Sulfate (Nurtec) 75 MG TBDP Take 75 mg by mouth as needed    denosumab (PROLIA) 60 mg/mL Inject 1 mL (60 mg total) under the skin every 6 (six) months (Patient not taking: Reported on 2/16/2022 )     No current facility-administered medications on file prior to visit  No Known Allergies      Physical Exam    /62   Pulse 84   Temp (!) 96 7 °F (35 9 °C)   Resp 20   Ht 5' 8" (1 727 m)   Wt 69 9 kg (154 lb 3 2 oz)   BMI 23 45 kg/m²     Constitutional: normal, well developed, well nourished, alert, in no distress and non-toxic and no overt pain behavior  Eyes: anicteric  HEENT: grossly intact  Neck: supple, symmetric, trachea midline and no masses   Pulmonary:even and unlabored  Cardiovascular:No edema or pitting edema present  Skin:Normal without rashes or lesions and well hydrated  Psychiatric:Mood and affect appropriate  Neurologic:Cranial Nerves II-XII grossly intact Sensation grossly intact; no clonus negative bunn's  Reflexes 2+ and brisk  SLR negative bilaterally  Spurling's maneuver negative bilaterally  Musculoskeletal:normal gait  5/5 strength bilaterally with AROM in all extremities  Slight pain limited weakness with left hip flexion, knee extension  Normal heel toe and tip toe walking    Significant pain with cervical and lumbar facet loading bilaterally and with lateral spine rotation  TTP over cervical and lumbar paraspinal muscles  Negative davie's test, negative gaenslen's negative SIJ loading bilaterally  Imaging    CT CERVICAL SPINE - WITHOUT CONTRAST     INDICATION:   TRAUMA      COMPARISON:  None      TECHNIQUE:  CT examination of the cervical spine was performed without intravenous contrast   Contiguous axial images were obtained  Sagittal and coronal reconstructions were performed        Radiation dose length product (DLP) for this visit:  355 mGy-cm   This examination, like all CT scans performed in the [facility], was performed utilizing techniques to minimize radiation dose exposure, including the use of iterative   reconstruction and automated exposure control        IMAGE QUALITY:  Diagnostic      FINDINGS:     ALIGNMENT:  Normal alignment of the cervical spine   No subluxation      VERTEBRAL BODIES:  No fracture      DEGENERATIVE CHANGES:  No significant cervical degenerative changes are noted      PREVERTEBRAL AND PARASPINAL SOFT TISSUES:  Unremarkable      THORACIC INLET:  Normal      IMPRESSION:     No cervical spine fracture or traumatic malalignment      The study was marked in EPIC for immediate notification

## 2022-02-16 NOTE — ASSESSMENT & PLAN NOTE
Hypothyroidism improving  Continue present dosing of levothyroxine 100 mcg daily  Levothyroxine should be taken 30 minutes before a meal, since dietary fiber and soy products interfere with its absorption  It should not be taken together with medications that inhibit its absorption including calcium or iron supplements, cholestyramine, sucralfate, and aluminum hydroxide  I also advised that biotin should be held x72 hours prior to repeat thyroid testing, which I requested she obtain in 2-months

## 2022-02-16 NOTE — ASSESSMENT & PLAN NOTE
Patient on denosumab therapy x1 year, now off treatment and without transition plan  Prior to starting denosumab, she was on zoledronic acid for an uncertain amount of time (patient states maybe up to 7-8 years)  Rapid bone loss and rebound vertebral fractures has been observed with delay in dosing or discontinuation of denosumab  This has been observed more often following protracted courses of denosumab treatment, usually exceeding 3 years  It is possible that her prior treatment with zoledronic acid is protective  But in the absence of certainty, I would like to consider arranging a single infusion of reclast to protect her from any loss

## 2022-02-17 ENCOUNTER — EVALUATION (OUTPATIENT)
Dept: PHYSICAL THERAPY | Facility: CLINIC | Age: 52
End: 2022-02-17
Payer: COMMERCIAL

## 2022-02-17 ENCOUNTER — PATIENT MESSAGE (OUTPATIENT)
Dept: ENDOCRINOLOGY | Facility: CLINIC | Age: 52
End: 2022-02-17

## 2022-02-17 DIAGNOSIS — M54.9 UPPER BACK PAIN: ICD-10-CM

## 2022-02-17 DIAGNOSIS — M25.612 DECREASED RANGE OF MOTION OF SHOULDER, LEFT: ICD-10-CM

## 2022-02-17 DIAGNOSIS — R20.2 NUMBNESS AND TINGLING IN LEFT ARM: ICD-10-CM
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
## 2022-02-17 DIAGNOSIS — M25.512 ACUTE PAIN OF LEFT SHOULDER: Primary | ICD-10-CM

## 2022-02-17 DIAGNOSIS — M79.601 PAIN IN BOTH UPPER EXTREMITIES: ICD-10-CM

## 2022-02-17 DIAGNOSIS — M81.0 OSTEOPOROSIS, UNSPECIFIED OSTEOPOROSIS TYPE, UNSPECIFIED PATHOLOGICAL FRACTURE PRESENCE: Primary | ICD-10-CM

## 2022-02-17 DIAGNOSIS — M79.602 PAIN IN BOTH UPPER EXTREMITIES: ICD-10-CM

## 2022-02-17 DIAGNOSIS — M54.50 ACUTE BILATERAL LOW BACK PAIN WITHOUT SCIATICA: ICD-10-CM

## 2022-02-17 DIAGNOSIS — R20.0 NUMBNESS AND TINGLING IN LEFT ARM: ICD-10-CM

## 2022-02-17 PROCEDURE — 97110 THERAPEUTIC EXERCISES: CPT
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
## 2022-02-21 ENCOUNTER — HOSPITAL ENCOUNTER (OUTPATIENT)
Dept: MRI IMAGING | Facility: HOSPITAL | Age: 52
Discharge: HOME | End: 2022-02-21
Attending: ANESTHESIOLOGY
Payer: COMMERCIAL

## 2022-02-21 ENCOUNTER — OFFICE VISIT (OUTPATIENT)
Dept: FAMILY MEDICINE CLINIC | Facility: CLINIC | Age: 52
End: 2022-02-21
Payer: COMMERCIAL

## 2022-02-21 VITALS
HEART RATE: 85 BPM | WEIGHT: 156 LBS | OXYGEN SATURATION: 99 % | TEMPERATURE: 98 F | BODY MASS INDEX: 23.64 KG/M2 | HEIGHT: 68 IN | SYSTOLIC BLOOD PRESSURE: 132 MMHG | DIASTOLIC BLOOD PRESSURE: 72 MMHG

## 2022-02-21 DIAGNOSIS — M54.16 LUMBAR RADICULOPATHY: ICD-10-CM

## 2022-02-21 DIAGNOSIS — M54.12 CERVICAL RADICULOPATHY: Primary | ICD-10-CM

## 2022-02-21 DIAGNOSIS — G47.9 DIFFICULTY SLEEPING: ICD-10-CM

## 2022-02-21 DIAGNOSIS — M54.12 CERVICAL RADICULOPATHY: ICD-10-CM
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
## 2022-02-21 PROCEDURE — G1004 CDSM NDSC: HCPCS

## 2022-02-21 PROCEDURE — 72141 MRI NECK SPINE W/O DYE: CPT

## 2022-02-21 PROCEDURE — 99213 OFFICE O/P EST LOW 20 MIN: CPT | Performed by: NURSE PRACTITIONER

## 2022-02-21 PROCEDURE — 72148 MRI LUMBAR SPINE W/O DYE: CPT

## 2022-02-21 RX ORDER — OMEPRAZOLE 40 MG/1
CAPSULE, DELAYED RELEASE ORAL
COMMUNITY
Start: 2022-02-16

## 2022-02-21 RX ORDER — VITAMIN B COMPLEX
1 CAPSULE ORAL DAILY
COMMUNITY
End: 2022-08-08
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
## 2022-02-24 ENCOUNTER — TELEPHONE (OUTPATIENT)
Dept: PAIN MEDICINE | Facility: CLINIC | Age: 52
End: 2022-02-24
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
## 2022-02-24 NOTE — TELEPHONE ENCOUNTER
Pt called in to go over the MRI results  Please be advised thank you    Pt can be reached @ [phone]

## 2022-02-28 ENCOUNTER — TELEPHONE (OUTPATIENT)
Dept: PAIN MEDICINE | Facility: CLINIC | Age: 52
End: 2022-02-28

## 2022-02-28 ENCOUNTER — OFFICE VISIT (OUTPATIENT)
Dept: PHYSICAL THERAPY | Facility: CLINIC | Age: 52
End: 2022-02-28
Payer: COMMERCIAL

## 2022-02-28 ENCOUNTER — APPOINTMENT (OUTPATIENT)
Dept: LAB | Facility: HOSPITAL | Age: 52
End: 2022-02-28
Payer: COMMERCIAL

## 2022-02-28 DIAGNOSIS — R20.2 NUMBNESS AND TINGLING IN LEFT ARM: ICD-10-CM

## 2022-02-28 DIAGNOSIS — R20.0 NUMBNESS AND TINGLING IN LEFT ARM: ICD-10-CM

## 2022-02-28 DIAGNOSIS — E06.3 HYPOTHYROIDISM DUE TO HASHIMOTO'S THYROIDITIS: ICD-10-CM

## 2022-02-28 DIAGNOSIS — M79.602 PAIN IN BOTH UPPER EXTREMITIES: ICD-10-CM

## 2022-02-28 DIAGNOSIS — M79.601 PAIN IN BOTH UPPER EXTREMITIES: ICD-10-CM
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
## 2022-02-28 DIAGNOSIS — E03.8 HYPOTHYROIDISM DUE TO HASHIMOTO'S THYROIDITIS: ICD-10-CM

## 2022-02-28 DIAGNOSIS — M25.512 ACUTE PAIN OF LEFT SHOULDER: Primary | ICD-10-CM

## 2022-02-28 DIAGNOSIS — M54.9 UPPER BACK PAIN: ICD-10-CM

## 2022-02-28 DIAGNOSIS — M25.612 DECREASED RANGE OF MOTION OF SHOULDER, LEFT: ICD-10-CM

## 2022-02-28 DIAGNOSIS — M81.0 OSTEOPOROSIS, UNSPECIFIED OSTEOPOROSIS TYPE, UNSPECIFIED PATHOLOGICAL FRACTURE PRESENCE: ICD-10-CM

## 2022-02-28 DIAGNOSIS — M54.50 ACUTE BILATERAL LOW BACK PAIN WITHOUT SCIATICA: ICD-10-CM

## 2022-02-28 LAB
ALBUMIN SERPL BCP-MCNC: 3.9 G/DL (ref 3.5–5)
ALP SERPL-CCNC: 59 U/L (ref 46–116)
ALT SERPL W P-5'-P-CCNC: 26 U/L (ref 12–78)
ANION GAP SERPL CALCULATED.3IONS-SCNC: 9 MMOL/L (ref 4–13)
AST SERPL W P-5'-P-CCNC: 17 U/L (ref 5–45)
BILIRUB SERPL-MCNC: 0.4 MG/DL (ref 0.2–1)
BUN SERPL-MCNC: 10 MG/DL (ref 5–25)
CALCIUM SERPL-MCNC: 8.2 MG/DL (ref 8.3–10.1)
CHLORIDE SERPL-SCNC: 104 MMOL/L (ref 100–108)
CO2 SERPL-SCNC: 26 MMOL/L (ref 21–32)
CREAT SERPL-MCNC: 0.78 MG/DL (ref 0.6–1.3)
GFR SERPL CREATININE-BSD FRML MDRD: 87 ML/MIN/1.73SQ M
GLUCOSE P FAST SERPL-MCNC: 101 MG/DL (ref 65–99)
PHOSPHATE SERPL-MCNC: 2.4 MG/DL (ref 2.7–4.5)
POTASSIUM SERPL-SCNC: 3.6 MMOL/L (ref 3.5–5.3)
PROT SERPL-MCNC: 7.7 G/DL (ref 6.4–8.2)
PTH-INTACT SERPL-MCNC: 106.2 PG/ML (ref 18.4–80.1)
SODIUM SERPL-SCNC: 139 MMOL/L (ref 136–145)
T4 FREE SERPL-MCNC: 1.13 NG/DL (ref 0.76–1.46)
TSH SERPL DL<=0.05 MIU/L-ACNC: 2.21 UIU/ML (ref 0.36–3.74)

## 2022-02-28 PROCEDURE — 82306 VITAMIN D 25 HYDROXY: CPT

## 2022-02-28 PROCEDURE — 97110 THERAPEUTIC EXERCISES: CPT

## 2022-02-28 PROCEDURE — 83970 ASSAY OF PARATHORMONE: CPT

## 2022-02-28 PROCEDURE — 82523 COLLAGEN CROSSLINKS: CPT

## 2022-02-28 PROCEDURE — 84439 ASSAY OF FREE THYROXINE: CPT

## 2022-02-28 PROCEDURE — 84443 ASSAY THYROID STIM HORMONE: CPT

## 2022-02-28 PROCEDURE — 97140 MANUAL THERAPY 1/> REGIONS: CPT

## 2022-02-28 PROCEDURE — 80053 COMPREHEN METABOLIC PANEL: CPT

## 2022-02-28 PROCEDURE — 84100 ASSAY OF PHOSPHORUS: CPT

## 2022-02-28 PROCEDURE — 36415 COLL VENOUS BLD VENIPUNCTURE: CPT
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
## 2022-02-28 NOTE — TELEPHONE ENCOUNTER
Spoke with patient extensively regarding Mri lumbar spine and Mri cervical spine  Prominent lumbar facet arthropathy noted on MRI lumbar spine which correlates with positive facet loading maneuvers, tenderness to palpation over lumbar paraspinal muscles  Risks, benefits alternatives of medial branch blocks and subsequent radiofrequency ablation does explained to patient  Informed consent to be obtained day of procedure

## 2022-03-01 ENCOUNTER — TELEPHONE (OUTPATIENT)
Dept: PAIN MEDICINE | Facility: CLINIC | Age: 52
End: 2022-03-01

## 2022-03-01 LAB — 25(OH)D3 SERPL-MCNC: 37.9 NG/ML (ref 30–100)

## 2022-03-01 NOTE — TELEPHONE ENCOUNTER
----- Message from Houston De Paz RN sent at 3/1/2022  8:06 AM EST -----  Regarding: FW: Injection   See below pt request  ----- Message -----  From: Wayne Celaya  Sent: 2/28/2022   4:37 PM EST  To: [facility]  Subject: Injection                                        Please have your office call to schedule me for the 1st injection     Thank you,   Tanya Michaud

## 2022-03-01 NOTE — TELEPHONE ENCOUNTER
Spoke to pt  She needs to find out when she can take off work for procedure  I informed her procedures are done on Tue/Thurs  Pt will see when she can get off work and call back to schedule

## 2022-03-02 ENCOUNTER — TELEPHONE (OUTPATIENT)
Dept: PAIN MEDICINE | Facility: CLINIC | Age: 52
End: 2022-03-02

## 2022-03-02 ENCOUNTER — PREP FOR PROCEDURE (OUTPATIENT)
Dept: PAIN MEDICINE | Facility: CLINIC | Age: 52
End: 2022-03-02

## 2022-03-02 DIAGNOSIS — M54.16 LUMBAR RADICULOPATHY: Primary | ICD-10-CM

## 2022-03-02 NOTE — TELEPHONE ENCOUNTER
Spoke to patient  Scheduled B/L L3-L5 MBB #1 3/15/2022  Patient aware of instructions  No food/drink one hour before  Wear comfortable clothing  A  is required  Continue all prescribed medications  Call if prescribed antibiotic or develop infection  Refrain from vaccinations two wks prior and after procedure  Call with questions

## 2022-03-05 LAB — COLLAGEN CTX SERPL-MCNC: 153 PG/ML

## 2022-03-09 ENCOUNTER — APPOINTMENT (OUTPATIENT)
Dept: PHYSICAL THERAPY | Facility: CLINIC | Age: 52
End: 2022-03-09
Payer: COMMERCIAL
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
## 2022-03-11 ENCOUNTER — OFFICE VISIT (OUTPATIENT)
Dept: PHYSICAL THERAPY | Facility: CLINIC | Age: 52
End: 2022-03-11
Payer: COMMERCIAL

## 2022-03-11 ENCOUNTER — OFFICE VISIT (OUTPATIENT)
Dept: CARDIOLOGY CLINIC | Facility: CLINIC | Age: 52
End: 2022-03-11
Payer: COMMERCIAL

## 2022-03-11 DIAGNOSIS — E03.8 HYPOTHYROIDISM DUE TO HASHIMOTO'S THYROIDITIS: ICD-10-CM

## 2022-03-11 DIAGNOSIS — M79.601 PAIN IN BOTH UPPER EXTREMITIES: ICD-10-CM

## 2022-03-11 DIAGNOSIS — R20.2 NUMBNESS AND TINGLING IN LEFT ARM: ICD-10-CM

## 2022-03-11 DIAGNOSIS — R00.2 PALPITATIONS: Primary | ICD-10-CM
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
## 2022-03-11 DIAGNOSIS — M25.512 ACUTE PAIN OF LEFT SHOULDER: Primary | ICD-10-CM

## 2022-03-11 DIAGNOSIS — I10 PRIMARY HYPERTENSION: ICD-10-CM

## 2022-03-11 DIAGNOSIS — M54.50 ACUTE BILATERAL LOW BACK PAIN WITHOUT SCIATICA: ICD-10-CM

## 2022-03-11 DIAGNOSIS — E06.3 HYPOTHYROIDISM DUE TO HASHIMOTO'S THYROIDITIS: ICD-10-CM

## 2022-03-11 DIAGNOSIS — Z87.898 HISTORY OF SYNCOPE: ICD-10-CM

## 2022-03-11 DIAGNOSIS — M79.602 PAIN IN BOTH UPPER EXTREMITIES: ICD-10-CM

## 2022-03-11 DIAGNOSIS — M25.612 DECREASED RANGE OF MOTION OF SHOULDER, LEFT: ICD-10-CM

## 2022-03-11 DIAGNOSIS — R20.0 NUMBNESS AND TINGLING IN LEFT ARM: ICD-10-CM

## 2022-03-11 DIAGNOSIS — M54.9 UPPER BACK PAIN: ICD-10-CM

## 2022-03-11 DIAGNOSIS — E78.2 MIXED HYPERLIPIDEMIA: ICD-10-CM

## 2022-03-11 DIAGNOSIS — I47.1 SVT (SUPRAVENTRICULAR TACHYCARDIA) (HCC): ICD-10-CM

## 2022-03-11 DIAGNOSIS — I47.2 VT (VENTRICULAR TACHYCARDIA) (HCC): ICD-10-CM

## 2022-03-11 PROCEDURE — 97140 MANUAL THERAPY 1/> REGIONS: CPT

## 2022-03-11 PROCEDURE — 97112 NEUROMUSCULAR REEDUCATION: CPT

## 2022-03-11 PROCEDURE — 97110 THERAPEUTIC EXERCISES: CPT

## 2022-03-11 PROCEDURE — 99214 OFFICE O/P EST MOD 30 MIN: CPT | Performed by: NURSE PRACTITIONER

## 2022-03-11 NOTE — PATIENT INSTRUCTIONS
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
## 2022-03-12 VITALS
BODY MASS INDEX: 23.64 KG/M2 | TEMPERATURE: 97.8 F | OXYGEN SATURATION: 99 % | SYSTOLIC BLOOD PRESSURE: 110 MMHG | DIASTOLIC BLOOD PRESSURE: 70 MMHG | WEIGHT: 156 LBS | HEIGHT: 68 IN | HEART RATE: 76 BPM

## 2022-03-12 PROBLEM — Z87.898 HISTORY OF SYNCOPE: Status: ACTIVE | Noted: 2022-03-12

## 2022-03-12 PROBLEM — E03.9 ACQUIRED HYPOTHYROIDISM: Status: RESOLVED | Noted: 2022-02-16 | Resolved: 2022-03-12

## 2022-03-12 NOTE — ASSESSMENT & PLAN NOTE
One short run of NSVT lasting 7 beats noted on ZIO 1/2020  Echocardiogram 1/7/2020 with normal LVEF (65-70%)  Continue metoprolol succinate 25 mg daily  Continue magnesium 250 mg daily  Patient did have two syncopal events summer 2021  None since    See discussion under syncope

## 2022-03-12 NOTE — ASSESSMENT & PLAN NOTE
Two short runs of SVT noted on ZIO 1/2020  Continue metoprolol succinate 25 mg daily  Continue magnesium 250 mg daily  Loop recorder in place presently

## 2022-03-12 NOTE — ASSESSMENT & PLAN NOTE
Patient now following with endocrinologist Dr Taylor Wilks  Recent thyroid testing improved  Currently taking levothyroxine 100 mcg daily  We reviewed importance of thyroid regulation in setting of SVT history

## 2022-03-12 NOTE — ASSESSMENT & PLAN NOTE
Patient previously noted episodes where she woke from sleep with heart racing and feeling diaphoretic and lightheaded, she got up and had a syncopal event  Patient did undergo ZIO monitor 01//2020 which showed predominantly normal sinus rhythm with average heart rate of 78 beats per minute with rare PVCs and PACs  ZIO did show 1 run of NSVT lasting 7 beats and 2 runs of SVT with the longest lasting 9 beats (no diary entries to correlate with symptoms)  See discussion under syncope  Continue magnesium 250 mg daily and metoprolol succinate 25 mg daily  Will continue to monitor

## 2022-03-12 NOTE — ASSESSMENT & PLAN NOTE
Patient is currently on atorvastatin  40 mg daily  Lipid panel 2/11/2022: C 225  T 150  H 71  L 124  Goal LDL < 100  Patient had been eating a lot of cheese prior to blood work  Given order to recheck lipids in 3 months

## 2022-03-12 NOTE — ASSESSMENT & PLAN NOTE
Patient with two episodes of syncope and collapse in 2021  Concern exists for arrhythmia given ZIO with short run of NSVT  Not orthostatic on exam (see discussion under hypertension regarding medication changes)  Implantable loop recorder placed May 27 2021 with no events noted on loop recorder thus far  Most recent interrogation 1/5/2022  No recent syncopal events  Will continue to monitor

## 2022-03-12 NOTE — ASSESSMENT & PLAN NOTE
Blood pressure is borderline low today  Patient reports BP is higher on days that she works  Previously taking irbesartan-hydrochlorothiazide 150/12 5 mg daily and changed to irbesartan 75 mg daily in May 2021 with improvement in BP readings  Continue metoprolol succinate 25 mg daily and irbesartan 75 mg daily  Reviewed benefits of 2 g sodium diet, regular exercise  Recent lab results reviewed
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
## 2022-03-14 ENCOUNTER — OFFICE VISIT (OUTPATIENT)
Dept: PHYSICAL THERAPY | Facility: CLINIC | Age: 52
End: 2022-03-14
Payer: COMMERCIAL

## 2022-03-14 DIAGNOSIS — R20.2 NUMBNESS AND TINGLING IN LEFT ARM: ICD-10-CM

## 2022-03-14 DIAGNOSIS — M25.612 DECREASED RANGE OF MOTION OF SHOULDER, LEFT: ICD-10-CM

## 2022-03-14 DIAGNOSIS — M79.601 PAIN IN BOTH UPPER EXTREMITIES: ICD-10-CM

## 2022-03-14 DIAGNOSIS — M54.50 ACUTE BILATERAL LOW BACK PAIN WITHOUT SCIATICA: ICD-10-CM

## 2022-03-14 DIAGNOSIS — M79.602 PAIN IN BOTH UPPER EXTREMITIES: ICD-10-CM
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
## 2022-03-14 DIAGNOSIS — M25.512 ACUTE PAIN OF LEFT SHOULDER: Primary | ICD-10-CM

## 2022-03-14 DIAGNOSIS — R20.0 NUMBNESS AND TINGLING IN LEFT ARM: ICD-10-CM

## 2022-03-14 DIAGNOSIS — M54.9 UPPER BACK PAIN: ICD-10-CM

## 2022-03-14 PROCEDURE — 97110 THERAPEUTIC EXERCISES: CPT

## 2022-03-14 PROCEDURE — 97140 MANUAL THERAPY 1/> REGIONS: CPT

## 2022-03-16 ENCOUNTER — TELEPHONE (OUTPATIENT)
Dept: PAIN MEDICINE | Facility: CLINIC | Age: 52
End: 2022-03-16

## 2022-03-17 ENCOUNTER — TELEPHONE (OUTPATIENT)
Dept: PAIN MEDICINE | Facility: CLINIC | Age: 52
End: 2022-03-17

## 2022-03-17 ENCOUNTER — HOSPITAL ENCOUNTER (OUTPATIENT)
Facility: HOSPITAL | Age: 52
Setting detail: OUTPATIENT SURGERY
Discharge: HOME | End: 2022-03-17
Attending: ANESTHESIOLOGY | Admitting: ANESTHESIOLOGY
Payer: COMMERCIAL

## 2022-03-17 ENCOUNTER — APPOINTMENT (OUTPATIENT)
Dept: RADIOLOGY | Facility: HOSPITAL | Age: 52
End: 2022-03-17
Payer: COMMERCIAL

## 2022-03-17 VITALS
SYSTOLIC BLOOD PRESSURE: 128 MMHG | BODY MASS INDEX: 23.64 KG/M2 | HEIGHT: 68 IN | OXYGEN SATURATION: 99 % | RESPIRATION RATE: 18 BRPM | TEMPERATURE: 98 F | DIASTOLIC BLOOD PRESSURE: 64 MMHG | HEART RATE: 70 BPM | WEIGHT: 156 LBS

## 2022-03-17 DIAGNOSIS — M54.16 LUMBAR RADICULOPATHY: ICD-10-CM

## 2022-03-17 PROCEDURE — 64494 INJ PARAVERT F JNT L/S 2 LEV: CPT | Performed by: ANESTHESIOLOGY

## 2022-03-17 PROCEDURE — 64493 INJ PARAVERT F JNT L/S 1 LEV: CPT | Performed by: ANESTHESIOLOGY

## 2022-03-17 RX ORDER — METHYLPREDNISOLONE ACETATE 80 MG/ML
INJECTION, SUSPENSION INTRA-ARTICULAR; INTRALESIONAL; INTRAMUSCULAR; SOFT TISSUE AS NEEDED
Status: DISCONTINUED | OUTPATIENT
Start: 2022-03-17 | End: 2022-03-17 | Stop reason: HOSPADM

## 2022-03-17 RX ORDER — LIDOCAINE HYDROCHLORIDE 10 MG/ML
INJECTION, SOLUTION EPIDURAL; INFILTRATION; INTRACAUDAL; PERINEURAL AS NEEDED
Status: DISCONTINUED | OUTPATIENT
Start: 2022-03-17 | End: 2022-03-17 | Stop reason: HOSPADM

## 2022-03-17 RX ORDER — BUPIVACAINE HYDROCHLORIDE 2.5 MG/ML
INJECTION, SOLUTION EPIDURAL; INFILTRATION; INTRACAUDAL AS NEEDED
Status: DISCONTINUED | OUTPATIENT
Start: 2022-03-17 | End: 2022-03-17 | Stop reason: HOSPADM

## 2022-03-17 RX ORDER — ALPRAZOLAM 0.5 MG/1
0.5 TABLET ORAL ONCE
Status: COMPLETED | OUTPATIENT
Start: 2022-03-17 | End: 2022-03-17

## 2022-03-17 RX ADMIN — ALPRAZOLAM 0.5 MG: 0.5 TABLET ORAL at 07:18

## 2022-03-17 NOTE — INTERVAL H&P NOTE
H&P reviewed  Prominent lumbar facet arthropathy noted on MRI lumbar spine which correlates with positive facet loading maneuvers, tenderness to palpation over lumbar paraspinal muscles  Risks, benefits alternatives of medial branch blocks and subsequent radiofrequency ablation does explained to patient  Informed consent obtained       Vitals:    03/17/22 0708   BP: 146/67   Pulse: 76   Resp: 18   Temp: 97 9 °F (36 6 °C)   SpO2: 98%

## 2022-03-17 NOTE — INTERVAL H&P NOTE
H&P reviewed  After examining the patient I find no changes in the patients condition since the H&P had been written      Vitals:    03/17/22 0708   BP: 146/67   Pulse: 76   Resp: 18   Temp: 97 9 °F (36 6 °C)   SpO2: 98%

## 2022-03-17 NOTE — DISCHARGE INSTRUCTIONS
PLEASE SCHEDULE 2 WEEK FOLLOW UP BY CALLING THE [facility] [place]: [phone]      MEDIAL BRANCH BLOCK DISCHARGE INSTRUCTIONS      ACTIVITY  · Please do activities that will bring the normal pain that we are rating  For example, if vacuuming or walking increases the painm do that  Leonel twill give the most accurate response to the diary  · You may shower, but no tub baths today, or applied heat  CARE OF THE INJECTION SITE  · This area may be numb for several hours after the injection  · Notify the [facility] if you have any of the following: redness, drainage, swelling or fever above 100°F     SPECIAL INSTRUCTIONS  · Please return the MBB diary to our office by mail, fax, or drop it off  MEDICATIONS  · Please do not take any break through or short acting pain medications for 8 hours after the block  · Continue to take all routine medications  · Our office may have instructed you to hold some medications  · You may resume _______________________________________________  If you have any problems specifically related to your procedure, please call our office at [phone]  Problems not related to your procedure should be directed at your primary care physician  Lumbar Radiofrequency Ablation   WHAT YOU NEED TO KNOW:   What do I need to know about lumbar radiofrequency ablation? Lumbar radiofrequency ablation (RFA) is a procedure used to treat facet joint pain in your lower back  Facet joints are found at the back of each vertebra  A needle electrode is used to send electrical currents to the nerves in your facet joint  The electrical currents create heat that damages the nerve so it cannot send pain signals  How do I prepare for lumbar RFA? Your healthcare provider will talk to you about how to prepare for this procedure  He may tell you not to eat or drink anything after midnight on the day of your procedure   He will tell you what medicines to take or not take on the day of your procedure  What will happen during lumbar RFA? · You will lie on your stomach  You will be given local anesthesia to numb the area of your back where the needle electrode will be inserted  You may be given a sedative to help keep you relaxed  You may still feel pressure or pushing during the procedure, but you should not feel any pain  Your healthcare provider will use fluoroscopy (a type of x-ray) to guide the needle electrode to the nerves near your facet joint  · Your healthcare provider may touch the affected nerve to make sure the needle electrode is in the right place  You will feel tingling or pressure when he does this  He will then apply local anesthesia to the nerve to numb it  This will prevent you from feeling pain when he applies heat to the nerve  Your healthcare provider will then apply heat to the nerve using the needle electrode  He may need to apply heat to more than one nerve  He will remove the needle electrode and apply a bandage over the area  What are the risks of lumbar RFA? You may have pain, numbness, tingling, or burning in the area where the lumbar RFA was done  These normally go away within 6 weeks  The needle electrode may injure your spinal nerves  This may cause permanent leg weakness or nerve pain  CARE AGREEMENT:   You have the right to help plan your care  Learn about your health condition and how it may be treated  Discuss treatment options with your healthcare providers to decide what care you want to receive  You always have the right to refuse treatment  The above information is an  only  It is not intended as medical advice for individual conditions or treatments  Talk to your doctor, nurse or pharmacist before following any medical regimen to see if it is safe and effective for you    © Copyright [employer] 2022 Information is for End User's use only and may not be sold, redistributed or otherwise used for commercial purposes   All illustrations and images included in CareNotes® are the copyrighted property of A D A M , Inc  or [facility] Leo Villar

## 2022-03-17 NOTE — PROCEDURES
Pre-procedure Diagnosis: Lumbar Facet Arthropathy  Post-procedure Diagnosis: Lumbar Facet Arthropathy  Procedure Title(s):  [BILATERAL L3, L4, L5] medial branch nerve blocks [(DIAGNOSTIC)]  Attending Surgeon:   Linda Steven MD  Anesthesia:   Local     Indications: The patient is a 46y o  year-old female with a diagnosis of lumbar facet arthropathy  The patient's history and physical exam were reviewed  The risks, benefits and alternatives to the procedure were discussed, and all questions were answered to the patient's satisfaction  The patient agreed to proceed, and written informed consent was obtained  Procedure in Detail: The patient was brought into the procedure room and placed in the prone position on the fluoroscopy table  The area of the lumbar spine was prepped with chloraprep and then draped in a sterile manner  AP fluoroscopy was used to identify the [L3-L5] levels on the [LEFT] side  The C-arm was obliqued to visualize the junction of the superior articulate process and transverse process  The sacral ala was identified and marked  The skin in these identified areas was anesthetized with 1% lidocaine  A 22-gauge, 3½-inch spinal needle was advanced toward each of these points under fluoroscopic guidance  Once bone was contacted, negative aspiration was confirmed and [1-mL] of a [6mL]mixture of [5mL] [0 25% bupivicaine] and 1mL of 80mg/mL Depomedrol was injected at each level  (The same procedure was performed on the opposite side )    After the procedure was completed, the patient's back was cleaned and bandages were placed at the needle insertion sites  Disposition: The patient tolerated the procedure well, and there were no apparent complications  The patient was taken to the recovery area where written discharge instructions for the procedure were given  The patient was given a pain diary to determine if the patient's pain improves following the injection   Once the diary is returned we will consider next appropriate course of treatment      Postoperative pain relief [WAS] significant    Estimated Blood Loss: None  Specimens Obtained: N/A

## 2022-03-17 NOTE — TELEPHONE ENCOUNTER
Patient   [phone]  Dr Sara Marte     Patient is calling in stating that she had a procedure today  She would like to know which insurance it was or will be billed too?  Please follow up thank you     It is supposed to go to [employer]

## 2022-03-17 NOTE — OP NOTE
OPERATIVE REPORT  PATIENT NAME: Melissa Apple    :  1970  MRN: [id number]  Pt Location:  GI ROOM 01    SURGERY DATE: 3/17/2022    Surgeon(s) and Role: Roxana Combs MD - Primary    Preop Diagnosis:  Lumbar facet arthropathy    Post-Op Diagnosis Codes:  Lumbar facet arthropathy    Procedure(s) (LRB):  L3, L4, L5 BLOCK MEDIAL BRANCH (Bilateral)    Specimen(s):  * No specimens in log *    Estimated Blood Loss:   Minimal    Drains:  * No LDAs found *    Anesthesia Type:   Local    Operative Indications:  Lumbar facet arthropathy    Operative Findings:  Bilateral L3, L4, L5 medial branch nerve regions identified under fluoroscopic guidance      Complications:   None    Procedure and Technique:  Please see detailed procedure note     I was present for the entire procedure    Patient Disposition:  PACU       SIGNATURE: Carola Peace MD  DATE: 2022  TIME: 7:50 AM

## 2022-03-17 NOTE — TELEPHONE ENCOUNTER
Called to inform patient procedure will get billed to [employer]  I also informed her we are required to obtain an auth through her  [employer], should her claim be denied or funds become exhausted  Patient acknowledged understanding
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
## 2022-03-23 ENCOUNTER — OFFICE VISIT (OUTPATIENT)
Dept: FAMILY MEDICINE CLINIC | Facility: CLINIC | Age: 52
End: 2022-03-23
Payer: COMMERCIAL

## 2022-03-23 ENCOUNTER — EVALUATION (OUTPATIENT)
Dept: PHYSICAL THERAPY | Facility: CLINIC | Age: 52
End: 2022-03-23
Payer: COMMERCIAL

## 2022-03-23 ENCOUNTER — APPOINTMENT (OUTPATIENT)
Dept: LAB | Facility: HOSPITAL | Age: 52
End: 2022-03-23
Payer: COMMERCIAL

## 2022-03-23 VITALS
SYSTOLIC BLOOD PRESSURE: 112 MMHG | WEIGHT: 155.2 LBS | TEMPERATURE: 98 F | BODY MASS INDEX: 23.52 KG/M2 | HEART RATE: 82 BPM | HEIGHT: 68 IN | DIASTOLIC BLOOD PRESSURE: 72 MMHG | OXYGEN SATURATION: 99 %
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
## 2022-03-23 DIAGNOSIS — M54.16 LUMBAR RADICULOPATHY: ICD-10-CM

## 2022-03-23 DIAGNOSIS — R20.0 NUMBNESS AND TINGLING IN LEFT ARM: ICD-10-CM

## 2022-03-23 DIAGNOSIS — Z00.8 ENCOUNTER FOR OTHER GENERAL EXAMINATION: ICD-10-CM

## 2022-03-23 DIAGNOSIS — M79.601 PAIN IN BOTH UPPER EXTREMITIES: ICD-10-CM
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
## 2022-03-23 DIAGNOSIS — M54.50 ACUTE BILATERAL LOW BACK PAIN WITHOUT SCIATICA: ICD-10-CM

## 2022-03-23 DIAGNOSIS — R20.2 NUMBNESS AND TINGLING IN LEFT ARM: ICD-10-CM

## 2022-03-23 DIAGNOSIS — M25.512 ACUTE PAIN OF LEFT SHOULDER: Primary | ICD-10-CM

## 2022-03-23 DIAGNOSIS — M54.9 UPPER BACK PAIN: ICD-10-CM

## 2022-03-23 DIAGNOSIS — M79.602 PAIN IN BOTH UPPER EXTREMITIES: ICD-10-CM

## 2022-03-23 DIAGNOSIS — M25.612 DECREASED RANGE OF MOTION OF SHOULDER, LEFT: ICD-10-CM

## 2022-03-23 DIAGNOSIS — M54.12 CERVICAL RADICULOPATHY: ICD-10-CM

## 2022-03-23 LAB
CHOLEST SERPL-MCNC: 189 MG/DL
EST. AVERAGE GLUCOSE BLD GHB EST-MCNC: 97 MG/DL
HBA1C MFR BLD: 5 %
HDLC SERPL-MCNC: 65 MG/DL
LDLC SERPL CALC-MCNC: 94 MG/DL (ref 0–100)
NONHDLC SERPL-MCNC: 124 MG/DL
TRIGL SERPL-MCNC: 149 MG/DL

## 2022-03-23 PROCEDURE — 99213 OFFICE O/P EST LOW 20 MIN: CPT | Performed by: NURSE PRACTITIONER

## 2022-03-23 PROCEDURE — 80061 LIPID PANEL: CPT

## 2022-03-23 PROCEDURE — 97110 THERAPEUTIC EXERCISES: CPT

## 2022-03-23 PROCEDURE — 83036 HEMOGLOBIN GLYCOSYLATED A1C: CPT

## 2022-03-23 PROCEDURE — 97140 MANUAL THERAPY 1/> REGIONS: CPT

## 2022-03-23 PROCEDURE — 36415 COLL VENOUS BLD VENIPUNCTURE: CPT
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
## 2022-03-28 ENCOUNTER — OFFICE VISIT (OUTPATIENT)
Dept: PHYSICAL THERAPY | Facility: CLINIC | Age: 52
End: 2022-03-28
Payer: COMMERCIAL

## 2022-03-28 ENCOUNTER — HOSPITAL ENCOUNTER (OUTPATIENT)
Dept: NUCLEAR MEDICINE | Facility: HOSPITAL | Age: 52
Discharge: HOME | End: 2022-03-28
Attending: INTERNAL MEDICINE
Payer: COMMERCIAL

## 2022-03-28 DIAGNOSIS — M54.50 ACUTE BILATERAL LOW BACK PAIN WITHOUT SCIATICA: ICD-10-CM

## 2022-03-28 DIAGNOSIS — M54.9 UPPER BACK PAIN: ICD-10-CM

## 2022-03-28 DIAGNOSIS — R20.0 NUMBNESS AND TINGLING IN LEFT ARM: ICD-10-CM

## 2022-03-28 DIAGNOSIS — M79.602 PAIN IN BOTH UPPER EXTREMITIES: ICD-10-CM
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
## 2022-03-28 DIAGNOSIS — M79.601 PAIN IN BOTH UPPER EXTREMITIES: ICD-10-CM

## 2022-03-28 DIAGNOSIS — M25.512 ACUTE PAIN OF LEFT SHOULDER: Primary | ICD-10-CM

## 2022-03-28 DIAGNOSIS — R20.2 NUMBNESS AND TINGLING IN LEFT ARM: ICD-10-CM

## 2022-03-28 DIAGNOSIS — R11.2 NAUSEA WITH VOMITING, UNSPECIFIED: ICD-10-CM

## 2022-03-28 DIAGNOSIS — M25.612 DECREASED RANGE OF MOTION OF SHOULDER, LEFT: ICD-10-CM

## 2022-03-28 PROCEDURE — 78264 GASTRIC EMPTYING IMG STUDY: CPT

## 2022-03-28 PROCEDURE — 97110 THERAPEUTIC EXERCISES: CPT

## 2022-03-28 PROCEDURE — 97140 MANUAL THERAPY 1/> REGIONS: CPT

## 2022-03-28 PROCEDURE — A9541 TC99M SULFUR COLLOID: HCPCS

## 2022-03-31 PROBLEM — R55 SYNCOPE AND COLLAPSE: Status: ACTIVE | Noted: 2022-03-12
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
## 2022-03-31 NOTE — ASSESSMENT & PLAN NOTE
Patient had intermittent episodes of hypokalemia  Most likely related to HCTZ component of Avalide  Repeat labs 10/1/2020 showed K 3 5 (borderline)  Encouraged intake of potassium containing food previously  Blood pressure was somewhat low at last OV with me and  I had recommended discontinuing HCTZ portion of Avalide and transitioning to irbesartan alone in an attempt to resolve hypokalemia  Should have BMP at follow up

## 2022-03-31 NOTE — ASSESSMENT & PLAN NOTE
Blood pressure is well controlled  I had discontinued irbesartan  hydrochlorothiazide 150/12 5 mg daily and changed to irbesartan 75 mg daily 5/2021 due to relatively low blood pressures  Continue metoprolol succinate 25 mg daily

## 2022-03-31 NOTE — ASSESSMENT & PLAN NOTE
Only two short runs of SVT noted on ZIO 1/2020  Continue metoprolol succinate 25 mg daily  Continue magnesium 250 mg daily  Currently has implantable loop recorder which was placed 5/27/2021  Palpitations have been quiescent since last office visit

## 2022-03-31 NOTE — ASSESSMENT & PLAN NOTE
One short run of NSVT lasting 7 beats noted on ZIO 1/2020  Echocardiogram 1/7/2020 with normal LVEF (65-70%)  Nonischemic nuclear stress test 03/18/2020  Continue metoprolol succinate 25 mg daily  Continue magnesium 250 mg daily  Patient with syncopal episode x 2  Implantable loop recorder placed 5/27/2021  Most recent syncopal episode occurred with the ILR in place and showed no arrhythmia when interrogated

## 2022-03-31 NOTE — ASSESSMENT & PLAN NOTE
Patient continued to have intermittent episodes of palpitations at her initial office visit  She described her heart racing at times  Patient did undergo ZIO monitor 01//2020 which showed predominantly normal sinus rhythm with average heart rate of 78 beats per minute with rare PVCs and PACs  ZIO did show 1 run of NSVT lasting 7 beats and 2 runs of SVT with the longest lasting 9 beats (no diary entries to correlate with symptoms)  I had recommended the addition of magnesium 250 mg daily  Repeat blood work given history of hypokalemia showed a K of 3 5 and Mg level of 1 9  Patient has cut back on caffeine intake and has been trying to manage stress better  She will continue to monitor symptoms  Continue metoprolol succinate 25 mg daily  Continue magnesium supplementation  She reports that she has not had any recent palpitations since she has cut back her caffeine even further  Loop recorder for syncopal episode in setting of NSVT and ongoing palpitations

## 2022-04-02 ENCOUNTER — HOSPITAL ENCOUNTER (OUTPATIENT)
Dept: ULTRASOUND IMAGING | Facility: HOSPITAL | Age: 52
Discharge: HOME | End: 2022-04-02
Payer: COMMERCIAL

## 2022-04-02 DIAGNOSIS — R10.11 RIGHT UPPER QUADRANT PAIN: ICD-10-CM

## 2022-04-02 DIAGNOSIS — R11.2 NAUSEA WITH VOMITING, UNSPECIFIED: ICD-10-CM

## 2022-04-02 PROCEDURE — 76705 ECHO EXAM OF ABDOMEN: CPT

## 2022-04-04 ENCOUNTER — OFFICE VISIT (OUTPATIENT)
Dept: PAIN MEDICINE | Facility: CLINIC | Age: 52
End: 2022-04-04
Payer: COMMERCIAL

## 2022-04-04 VITALS
DIASTOLIC BLOOD PRESSURE: 64 MMHG | SYSTOLIC BLOOD PRESSURE: 112 MMHG | WEIGHT: 151.6 LBS | BODY MASS INDEX: 22.97 KG/M2 | HEIGHT: 68 IN | TEMPERATURE: 97 F | RESPIRATION RATE: 20 BRPM | HEART RATE: 78 BPM

## 2022-04-04 DIAGNOSIS — F41.9 ANXIETY: ICD-10-CM

## 2022-04-04 DIAGNOSIS — M47.816 LUMBAR FACET ARTHROPATHY: Primary | ICD-10-CM

## 2022-04-04 PROCEDURE — 99214 OFFICE O/P EST MOD 30 MIN: CPT | Performed by: ANESTHESIOLOGY

## 2022-04-04 RX ORDER — BUPIVACAINE HCL/PF 2.5 MG/ML
10 VIAL (ML) INJECTION ONCE
Status: CANCELLED | OUTPATIENT
Start: 2022-04-04 | End: 2022-04-04

## 2022-04-04 RX ORDER — LIDOCAINE HYDROCHLORIDE 10 MG/ML
10 INJECTION, SOLUTION EPIDURAL; INFILTRATION; INTRACAUDAL; PERINEURAL ONCE
Status: CANCELLED | OUTPATIENT
Start: 2022-04-04 | End: 2022-04-04

## 2022-04-04 RX ORDER — METHYLPREDNISOLONE ACETATE 80 MG/ML
80 INJECTION, SUSPENSION INTRA-ARTICULAR; INTRALESIONAL; INTRAMUSCULAR; PARENTERAL; SOFT TISSUE ONCE
Status: CANCELLED | OUTPATIENT
Start: 2022-04-04 | End: 2022-04-04

## 2022-04-04 RX ORDER — ALPRAZOLAM 0.5 MG/1
0.5 TABLET ORAL
Qty: 4 TABLET | Refills: 0 | Status: SHIPPED | OUTPATIENT
Start: 2022-04-04

## 2022-04-04 NOTE — PROGRESS NOTES
Assessment  1  Lumbar facet arthropathy    2  Anxiety  -     ALPRAZolam (XANAX) 0 5 mg tablet; Take 1 tablet (0 5 mg total) by mouth 30 min pre-procedure    Greater than 90% relief of pain with improved ability to participate with IADLs after bilateral L3, L4, L5 medial branch blocks #1 for one week  Previously reported the following symptomatology:     Neck and low back pain described primarily are radicular features  Pain radiates in C6 and C7 dermatomal distribution from neck to hands bilaterally as well as primarily arthritic in nature in low back; additionally with radicular features in L3 and L4 dermatomal distribution right greater than left  5/5 strength bilaterally with active range of motion movements in upper lower extremities  Slight pain limited weakness with left hip flexion, knee extension  Negative Spurling's maneuver, negative SLR bilaterally  Significant tenderness to palpation with lumbar cervical facet loading maneuvers  She is currently engaged with PT for both her neck and low back with modest relief of the pain  She has not trialed medications other than OTC tylenol and ibuprofen  MRI cervical spine noncontrast, MRI lumbar spine noncontrast show mild compressive disease with spondyloarthropathy  Will proceed with multimodal pain therapy; counseled regarding lifestyle modifications including PT  Plan  -bilateral L3, L4, L5 medial branch blocks #2; f/u 2 weeks post procedure  -gabapentin 100 mg t i d  Ordered for patient; has since tapered counseled regarding sedative effects of taking this medication and provided up titration calendar  Counseled not to take medication while driving or operating heavy machinery/using stairs  -meloxicam 7 5 mg b i d  P r n   pain    Patient educated regarding bleeding risk of taking this medication not taking any other nonsteroidal anti-inflammatory medications while taking this medication; counseled thoroughly regarding potential risk of Cardiovascular injury, Kidney injury, Gastrointestinal ulceration/bleeding  Patient voiced understanding  -physical therapy for right-sided lumbar radiculopathy, lumbar facet arthropathy; Physician directed home exercise plan as per [facility] demonstrated and handouts provided that patient plans to participate with for 1 hour, twice a week for the next 6 weeks  There are risks associated with opioid medications, including dependence, addiction and tolerance  The patient understands and agrees to use these medications only as prescribed  Potential side effects of the medications include, but are not limited to, constipation, drowsiness, addiction, impaired judgment and risk of fatal overdose if not taken as prescribed  The patient was warned against driving while taking sedation medications  Sharing medications is a felony  At this point in time, the patient is showing no signs of addiction, abuse, diversion or suicidal ideation  [place] Prescription Drug Monitoring Program report was reviewed and was appropriate      Complete risks and benefits including bleeding, infection, tissue reaction, nerve injury and allergic reaction were discussed  The approach was demonstrated using models and literature was provided  Verbal and written consent was obtained  My impressions and treatment recommendations were discussed in detail with the patient who verbalized understanding and had no further questions  Discharge instructions were provided  I personally saw and examined the patient and I agree with the above discussed plan of care  My impressions and treatment recommendations were discussed in detail with the patient who verbalized understanding and had no further questions  Discharge instructions were provided  I personally saw and examined the patient and I agree with the above discussed plan of care      New Medications Ordered This Visit   Medications    ALPRAZolam (XANAX) 0 5 mg tablet     Sig: Take 1 tablet (0 5 mg total) by mouth 30 min pre-procedure     Dispense:  4 tablet     Refill:  0       History of Present Illness    Greater than 90% relief of pain with improved ability to participate with IADLs after bilateral L3, L4, L5 medial branch blocks #1 for one week  Previously reported the following symptomatology:     Colin Sr is a 46 y o  female with past medical history of hypertension, hypothyroidism, hypercholesterolemia, migraine headaches presenting with neck and low back pain described primarily radicular features in neck and axial/arthritic features in low back  Neck pain radiates into the C6 and C7 dermatomal distribution bilaterally accompanied by pain limited weakness numbness and paresthesias  In low back radicular pain travels into the bilateral L3 and L4 dermatomal distribution, right greater than left; however in low back, features are mostly arthritic in nature  She describes lancinating features of the pain  She has been to formal physical therapy for both neck and low back pain with modest relief of her symptoms  She has trialed over-the-counter NSAIDs as well as Tylenol with modest relief of the pain  Her pain significantly impacts independent activities of daily living and overall function, 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  She denies any bowel or bladder abnormality, incontinence, gait instability, headaches or dizziness  I have personally reviewed and/or updated the patient's past medical history, past surgical history, family history, social history, current medications, allergies, and vital signs today  Review of Systems   Constitutional: Positive for activity change  HENT: Negative  Eyes: Negative  Respiratory: Negative  Cardiovascular: Negative  Gastrointestinal: Negative  Endocrine: Negative  Genitourinary: Negative  Musculoskeletal: Positive for arthralgias, back pain, myalgias, neck pain and neck stiffness  Skin: Negative  Allergic/Immunologic: Negative  Neurological: Positive for weakness and numbness  Hematological: Negative  Psychiatric/Behavioral: Negative  All other systems reviewed and are negative  Patient Active Problem List   Diagnosis    Palpitations    Hypertension    Hypokalemia    SVT (supraventricular tachycardia) (HCC)    VT (ventricular tachycardia) ([address] )    Hyperlipidemia    Depression    Osteoporosis    Arthritis    Hypothyroidism due to Hashimoto's thyroiditis    Migraine    Glaucoma    Anxiety    Restless legs    History of syncope       Past Medical History:   Diagnosis Date    Allergic 1974    Seasonal    Anxiety     Arthritis     Colitis     Noted on colonoscopy 04/24/2020      Depression     Disease of thyroid gland     Gastritis     Noted on EGD 04/24/2020    Gastroparesis     Glaucoma     Headache(784 0)     Hyperlipidemia     Hypertension     Hypothyroidism     Lyme disease     Migraine     Osteoporosis     Scoliosis     SVT (supraventricular tachycardia) (HCC)     Visual impairment     VT (ventricular tachycardia) (HCC)        Past Surgical History:   Procedure Laterality Date    BREAST BIOPSY Right 10/21/2020    papilloma    COLONOSCOPY      EGD      FINGER SURGERY      left pinky    FL GUIDED NEEDLE PLAC BX/ASP/INJ  3/17/2022    HYSTERECTOMY      age- 45    HYSTERECTOMY W/ SALPINGO-OOPHERECTOMY  05/2008    NERVE BLOCK Bilateral 3/17/2022    Procedure: L3, L4, L5 BLOCK MEDIAL BRANCH;  Surgeon: Phyllis Randle MD;  Location: [facility];  Service: Pain Management     OOPHORECTOMY Bilateral     age- 45    US GUIDED BREAST BIOPSY RIGHT COMPLETE Right 10/21/2020       Family History   Problem Relation Age of Onset    Peripheral vascular disease Mother     Glaucoma Mother     Prostate cancer Father     Hypothyroidism Sister     No Known Problems Daughter     Colon cancer Maternal Grandmother     No Known Problems Maternal Grandfather     Arthritis Paternal Grandmother     No Known Problems Paternal Grandfather     No Known Problems Daughter     No Known Problems Maternal Aunt     No Known Problems Maternal Aunt     Skin cancer Paternal Aunt     No Known Problems Paternal Aunt        Social History     Occupational History    Not on file   Tobacco Use    Smoking status: Former Smoker     Packs/day: 1 00     Years: 10 00     Pack years: 10 00     Types: Cigarettes     Quit date: 1/1/2007     Years since quitting: 15 2    Smokeless tobacco: Never Used    Tobacco comment: quit 2007   Vaping Use    Vaping Use: Never used   Substance and Sexual Activity    Alcohol use: Never    Drug use: Never    Sexual activity: Yes     Birth control/protection: Post-menopausal     Comment: hysterectomy       Current Outpatient Medications on File Prior to Visit   Medication Sig    atorvastatin (LIPITOR) 40 mg tablet Take 1 tablet (40 mg total) by mouth daily    b complex vitamins capsule Take 1 capsule by mouth daily    bimatoprost (Lumigan) 0 01 % ophthalmic drops Lumigan 0 01 % eye drops    Calcium Carbonate-Vit D-Min (CALCIUM 1200 PO) Take by mouth daily     Cholecalciferol (VITAMIN D3 PO) Take 1,000 mg by mouth    gabapentin (NEURONTIN) 100 mg capsule Take 1 capsule (100 mg total) by mouth 3 (three) times a day    Glucosamine-Chondroitin 500-400 MG CAPS Take by mouth daily     hydrOXYzine HCL (ATARAX) 25 mg tablet Take 1 tablet (25 mg total) by mouth as needed for anxiety    Inulin (FIBER CHOICE PO) Take by mouth    irbesartan (AVAPRO) 75 mg tablet Take 1 tablet (75 mg total) by mouth daily    levothyroxine 100 mcg tablet Take 1 tablet (100 mcg total) by mouth daily    LORazepam (ATIVAN) 0 5 mg tablet Take 0 5 mg by mouth as needed    Magnesium 250 MG TABS Take 1 tablet (250 mg total) by mouth daily    Methyl Salicylate-Lido-Menthol (LidoPro) 4-4-5 % PTCH LidoPro 4 %-4 %-5 % topical patch   APPLY 1 PATCH TO THE AFFECTED AREA EVERY 8 TO 12 HOURS AS NEEDED  MAX OF 2 PATCHES PER DAY    metoprolol succinate (TOPROL-XL) 25 mg 24 hr tablet Take 1 tablet (25 mg total) by mouth daily    Omega-3 Fatty Acids (FISH OIL OMEGA-3 PO) Take by mouth    omeprazole (PriLOSEC) 40 MG capsule     SUMAtriptan (IMITREX) 100 mg tablet Take 100 mg by mouth as needed    Thiamine HCl (VITAMIN B1 PO) Take 1 capsule by mouth daily    [DISCONTINUED] meloxicam (MOBIC) 7 5 mg tablet Take 1 tablet (7 5 mg total) by mouth 2 (two) times a day as needed for moderate pain    multivitamin (THERAGRAN) TABS Take 1 tablet by mouth daily     No current facility-administered medications on file prior to visit  No Known Allergies      Physical Exam    /64   Pulse 78   Temp (!) 97 °F (36 1 °C)   Resp 20   Ht 5' 8" (1 727 m)   Wt 68 8 kg (151 lb 9 6 oz)   BMI 23 05 kg/m²     Constitutional: normal, well developed, well nourished, alert, in no distress and non-toxic and no overt pain behavior  Eyes: anicteric  HEENT: grossly intact  Neck: supple, symmetric, trachea midline and no masses   Pulmonary:even and unlabored  Cardiovascular:No edema or pitting edema present  Skin:Normal without rashes or lesions and well hydrated  Psychiatric:Mood and affect appropriate  Neurologic:Cranial Nerves II-XII grossly intact Sensation grossly intact; no clonus negative bunn's  Reflexes 2+ and brisk  SLR negative bilaterally  Spurling's maneuver negative bilaterally  Musculoskeletal:normal gait  5/5 strength bilaterally with AROM in all extremities  Slight pain limited weakness with left hip flexion, knee extension  Normal heel toe and tip toe walking  Significant pain with cervical and lumbar facet loading bilaterally and with lateral spine rotation  TTP over cervical and lumbar paraspinal muscles  Negative davie's test, negative gaenslen's negative SIJ loading bilaterally  Imaging    MRI CERVICAL SPINE WITHOUT CONTRAST     INDICATION: M54 12:  Radiculopathy, cervical region      COMPARISON:  5/9/2021     TECHNIQUE:  Sagittal T1, sagittal T2, sagittal inversion recovery, axial T2, axial  2D merge     IMAGE QUALITY:  Diagnostic     FINDINGS:     ALIGNMENT:  There is trace anterolisthesis of C3-C4      MARROW SIGNAL:  Normal marrow signal is identified within the visualized bony structures  No discrete marrow lesion      CERVICAL AND VISUALIZED THORACIC CORD:  Normal signal within the visualized cord      PREVERTEBRAL AND PARASPINAL SOFT TISSUES:  Normal      VISUALIZED POSTERIOR FOSSA:  The visualized posterior fossa demonstrates no abnormal signal      CERVICAL DISC SPACES:     C2-C3:  Normal      C3-C4:  There is left-sided facet arthrosis  There is no significant canal stenosis or foraminal narrowing      C4-C5:  There is a disc osteophyte complex with uncovertebral spurring  There is facet arthrosis  There is mild left foraminal narrowing  There is no significant canal stenosis      C5-C6:  There is a disc osteophyte complex with left-sided uncovertebral spurring  There is mild left foraminal encroachment      C6-C7:  No significant canal stenosis or foraminal narrowing      C7-T1:  No significant canal stenosis or foraminal narrowing  MRI LUMBAR SPINE WITHOUT CONTRAST     INDICATION: M54 12: Radiculopathy, cervical region      COMPARISON:  None      TECHNIQUE:  Sagittal T1, sagittal T2, sagittal inversion recovery, axial T1 and axial T2, coronal T2     IMAGE QUALITY:  Diagnostic     FINDINGS:     VERTEBRAL BODIES:  There are 5 lumbar type vertebral bodies  Normal alignment of the lumbar spine  No spondylolysis or spondylolisthesis  No scoliosis  No compression fracture  No suspicious marrow signal abnormality  Hemangioma within the L1   vertebral body      SACRUM:  Normal signal within the sacrum  No evidence of insufficiency or stress fracture      DISTAL CORD AND CONUS:  Normal size and signal within the distal cord and conus    Conus medullaris terminates at T12-L1      PARASPINAL SOFT TISSUES:  Paraspinal soft tissues are unremarkable      LOWER THORACIC DISC SPACES:  Normal disc height and signal   No disc herniation, canal stenosis or foraminal narrowing      LUMBAR DISC SPACES:     L1-L2:  No significant canal stenosis or foraminal narrowing      L2-L3:  No significant canal stenosis or foraminal narrowing      L3-L4:  Minimal bulge  No significant canal stenosis or foraminal narrowing      L4-L5:  Mild bulge, asymmetric to the right annular fissure  No significant canal stenosis  Mild foraminal narrowing      L5-S1:  Bulging annulus  Facet arthrosis  No significant canal stenosis  Mild right foraminal narrowing      IMPRESSION:     Mild degenerative changes of the lumbar spine, as described above  CT CERVICAL SPINE - WITHOUT CONTRAST     INDICATION:   TRAUMA      COMPARISON:  None      TECHNIQUE:  CT examination of the cervical spine was performed without intravenous contrast   Contiguous axial images were obtained  Sagittal and coronal reconstructions were performed        Radiation dose length product (DLP) for this visit:  355 mGy-cm   This examination, like all CT scans performed in the [facility], was performed utilizing techniques to minimize radiation dose exposure, including the use of iterative   reconstruction and automated exposure control        IMAGE QUALITY:  Diagnostic      FINDINGS:     ALIGNMENT:  Normal alignment of the cervical spine   No subluxation      VERTEBRAL BODIES:  No fracture      DEGENERATIVE CHANGES:  No significant cervical degenerative changes are noted      PREVERTEBRAL AND PARASPINAL SOFT TISSUES:  Unremarkable      THORACIC INLET:  Normal      IMPRESSION:     No cervical spine fracture or traumatic malalignment      The study was marked in EPIC for immediate notification

## 2022-04-04 NOTE — PATIENT INSTRUCTIONS
Lumbar Facet Block   WHAT YOU NEED TO KNOW:   A lumbar facet block is a procedure used to decrease inflammation in your lower spine  Medicines are injected at facet joints in your lower back  Facet joints are found at the back of each vertebrae  HOW TO PREPARE:   The week before your procedure:   · Your healthcare provider will talk to you about how to prepare for your procedure  Arrange to have someone drive you home after the procedure  · Tell your provider about all the medicines you currently take  He or she will tell you if you need to stop any medicine before the procedure, and when to stop  He or she will tell you what medicines to take or not take on the day of your procedure  · You may need blood or urine tests before your procedure  You may also need x-rays, a CT scan, or an MRI  Tell your healthcare provider if you have ever had an allergic reaction to contrast liquid  Do not enter the MRI room with anything metal  Metal can cause serious damage  Tell the provider if you have any metal in or on your body  The night before your procedure: You may be told not to eat or drink anything after midnight  The day of your procedure:   · Take only the medicines your healthcare provider told you to take  · You or a close family member will be asked to sign a legal document called a consent form  It gives healthcare providers permission to do the procedure or surgery  It also explains the problems that may happen, and your choices  Make sure all your questions are answered before you sign this form  · Healthcare providers may insert an intravenous tube (IV) into your vein  A vein in the arm is usually chosen  Through the IV tube, you may be given liquids and medicine  · An anesthesiologist will talk to you before your surgery  You may need medicine to keep you asleep or numb an area of your body during surgery   Tell healthcare providers if you or anyone in your family has had a problem with anesthesia in the past     WHAT WILL HAPPEN:   What will happen:   · You will lie on your stomach, with your body slightly turned to the side  A pillow may be placed under your abdomen, or you may be asked to bend one or both knees  · A needle will be inserted into the facet joint in your lower back  Your surgeon may use contrast liquid with an x-ray or CT to help guide the needle  He or she will inject medicines, such as steroids, to decrease inflammation  After your procedure: You will be taken to a room to rest until you are fully awake  You will be monitored closely for any problems  Do not get out of bed until your healthcare provider says it is okay  Your provider may have you move the area to see if you still have pain  You may then be able to go home  CONTACT YOUR HEALTHCARE PROVIDER IF:   · You have a fever, a cold, or the flu  · You have questions or concerns about your procedure  RISKS:   You may bleed more than expected or get an infection  Nerves, blood vessels, or muscles may be damaged  You may have numbness in other areas  You may still have lower back or leg pain  CARE AGREEMENT:   You have the right to help plan your care  Learn about your health condition and how it may be treated  Discuss treatment options with your healthcare providers to decide what care you want to receive  You always have the right to refuse treatment  © Copyright [employer] 2022 Information is for End User's use only and may not be sold, redistributed or otherwise used for commercial purposes  All illustrations and images included in CareNotes® are the copyrighted property of A D A M , Inc  or [facility] Leo Mazariegos   The above information is an  only  It is not intended as medical advice for individual conditions or treatments  Talk to your doctor, nurse or pharmacist before following any medical regimen to see if it is safe and effective for you      Lumbar Radiofrequency Ablation   WHAT YOU NEED TO KNOW:   What do I need to know about lumbar radiofrequency ablation? Lumbar radiofrequency ablation (RFA) is a procedure used to treat facet joint pain in your lower back  Facet joints are found at the back of each vertebra  A needle electrode is used to send electrical currents to the nerves in your facet joint  The electrical currents create heat that damages the nerve so it cannot send pain signals  How do I prepare for lumbar RFA? Your healthcare provider will talk to you about how to prepare for this procedure  He may tell you not to eat or drink anything after midnight on the day of your procedure  He will tell you what medicines to take or not take on the day of your procedure  What will happen during lumbar RFA? · You will lie on your stomach  You will be given local anesthesia to numb the area of your back where the needle electrode will be inserted  You may be given a sedative to help keep you relaxed  You may still feel pressure or pushing during the procedure, but you should not feel any pain  Your healthcare provider will use fluoroscopy (a type of x-ray) to guide the needle electrode to the nerves near your facet joint  · Your healthcare provider may touch the affected nerve to make sure the needle electrode is in the right place  You will feel tingling or pressure when he does this  He will then apply local anesthesia to the nerve to numb it  This will prevent you from feeling pain when he applies heat to the nerve  Your healthcare provider will then apply heat to the nerve using the needle electrode  He may need to apply heat to more than one nerve  He will remove the needle electrode and apply a bandage over the area  What are the risks of lumbar RFA? You may have pain, numbness, tingling, or burning in the area where the lumbar RFA was done  These normally go away within 6 weeks  The needle electrode may injure your spinal nerves   This may cause permanent leg weakness or nerve pain  CARE AGREEMENT:   You have the right to help plan your care  Learn about your health condition and how it may be treated  Discuss treatment options with your healthcare providers to decide what care you want to receive  You always have the right to refuse treatment  The above information is an  only  It is not intended as medical advice for individual conditions or treatments  Talk to your doctor, nurse or pharmacist before following any medical regimen to see if it is safe and effective for you  © Copyright [employer] 2022 Information is for End User's use only and may not be sold, redistributed or otherwise used for commercial purposes   All illustrations and images included in CareNotes® are the copyrighted property of [employer]  or [address] [place] [address]

## 2022-04-06 ENCOUNTER — REMOTE DEVICE CLINIC VISIT (OUTPATIENT)
Dept: CARDIOLOGY CLINIC | Facility: CLINIC | Age: 52
End: 2022-04-06
Payer: COMMERCIAL

## 2022-04-06 ENCOUNTER — PATIENT MESSAGE (OUTPATIENT)
Dept: CARDIOLOGY CLINIC | Facility: CLINIC | Age: 52
End: 2022-04-06

## 2022-04-06 DIAGNOSIS — Z95.818 PRESENCE OF OTHER CARDIAC IMPLANTS AND GRAFTS: Primary | ICD-10-CM

## 2022-04-06 PROCEDURE — 93298 REM INTERROG DEV EVAL SCRMS: CPT | Performed by: INTERNAL MEDICINE

## 2022-04-06 PROCEDURE — G2066 INTER DEVC REMOTE 30D: HCPCS | Performed by: INTERNAL MEDICINE

## 2022-04-06 NOTE — PROGRESS NOTES
MDT [id number]/ ACTIVE SYSTEM IS MRI CONDITIONAL   CARELINK TRANSMISSION: LOOP RECORDER  PRESENTING RHYTHM NSR @ 85 BPM  BATTERY STATUS "OK " NO PATIENT OR DEVICE ACTIVATED EPISODES  NORMAL DEVICE FUNCTION   DL

## 2022-05-16 ENCOUNTER — TELEMEDICINE (OUTPATIENT)
Dept: FAMILY MEDICINE CLINIC | Facility: CLINIC | Age: 52
End: 2022-05-16
Payer: COMMERCIAL

## 2022-05-16 VITALS — BODY MASS INDEX: 22.88 KG/M2 | WEIGHT: 151 LBS | HEIGHT: 68 IN

## 2022-05-16 DIAGNOSIS — R21 RASH: ICD-10-CM

## 2022-05-16 DIAGNOSIS — M25.562 ARTHRALGIA OF BOTH KNEES: Primary | ICD-10-CM

## 2022-05-16 DIAGNOSIS — E83.39 HYPOPHOSPHATEMIA: ICD-10-CM

## 2022-05-16 DIAGNOSIS — M25.561 ARTHRALGIA OF BOTH KNEES: Primary | ICD-10-CM

## 2022-05-16 DIAGNOSIS — E83.51 HYPOCALCEMIA: ICD-10-CM

## 2022-05-16 PROCEDURE — 99213 OFFICE O/P EST LOW 20 MIN: CPT | Performed by: PHYSICIAN ASSISTANT

## 2022-05-16 NOTE — LETTER
May 16, 2022     Patient: Jack Morris  YOB: 1970  Date of Visit: 5/16/2022      To Whom it May Concern:    Jack Morris is under my professional care  Dano was seen in my office on 5/16/2022  Dano may return to work on May 17th if improved       If you have any questions or concerns, please don't hesitate to call           Sincerely,          Benny Lyman, Brian, PA-C          CC: No Recipients

## 2022-05-16 NOTE — PROGRESS NOTES
Virtual Regular Visit    Verification of patient location:    Patient is located in the following state in which I hold an active license [place]      Assessment/Plan:    Problem List Items Addressed This Visit    None     Visit Diagnoses     Arthralgia of both knees    -  Primary    Relevant Orders    Lyme Antibody Profile with reflex to WB    Rash        Hypocalcemia        Relevant Orders    Comprehensive metabolic panel    Hypophosphatemia        Relevant Orders    Comprehensive metabolic panel               Reason for visit is   Chief Complaint   Patient presents with    Generalized Body Aches     Symptom onset of one week ago    Nasal Congestion    Fatigue        Encounter provider Luanne Reveles PA-C    Provider located at [address] [place], [place] [address]  [address] [address]      Recent Visits  No visits were found meeting these conditions  Showing recent visits within past 7 days and meeting all other requirements  Today's Visits  Date Type Provider Dept   05/16/22 [facility]   Showing today's visits and meeting all other requirements  Future Appointments  No visits were found meeting these conditions  Showing future appointments within next 150 days and meeting all other requirements       The patient was identified by name and date of birth  Tashia Martinez was informed that this is a telemedicine visit and that the visit is being conducted through [address] [place] [address] Now and patient was informed that this is a secure, HIPAA-compliant platform  She agrees to proceed     My office door was closed  No one else was in the room  She acknowledged consent and understanding of privacy and security of the video platform  The patient has agreed to participate and understands they can discontinue the visit at any time  Patient is aware this is a billable service  He attempted to connect the a m  while platform  And well with restarted the total of 3 times  She could see me and hear me and I could hear her by not able to connect her video despite multiple attempts  At that time, we abandoned the telehealth visit after the 3 events of failure for that platform and the remainder of the visit was spent via phone  Subjective  Mell Stroud is a 46 y o  female she has requested this visit as a result of her having ongoing arthralgias and muscle aches over at least the last she previously developed a rash and being outside  The rash is macular and red without any pustules discharge or pruritus  HPI this is a 69-year-old female who complains is worse is the within  [employer] system  She is a lot of heavy lifting  She in any acute and abrupt change in her physical condition along arthralgias and myalgias  She has been any  She has pain her back and neck area  This is different than the pain vision  He is also a of pain in her knees and ankles  Her pain is most severe when she is standing from seated position  She has been taking Advil but this is not very helpful  Patient admits having her hands swollen today  She does admit when the yesterday and thinks this might be exacerbated the swelling in her hands  We reviewed her past labs February  She was noted to have hypocalcemia the 0 2 with an elevated PTH of 102 low phosphate  Abnormalities in her calcium may also lead to some muscle irritability and we are going to recheck this at this time as part of shared decision making with the patient  She generally feels unwell  She is concerned about potentially having Lyme disease because of the abrupt musculoskeletal system  Number had some have occurred rather abruptly  In her mind is on, reviewed to for doxycycline for 2 weeks  Patient does have a follow-up appointment on June 24th with Xochitl Leal  A total of 20 minutes was spent rendering care for this patient    This time included the connection difficulty time spent connecting with the patient with ultimately phone call be completed as result of the and 1 platform not visibly showing patient and then loss of inability to reconnect with the AM well platform  Patient was prescribed magnesium which helped with calcium absorption  She also takes an over-the-counter [employer] brand the revised    Past Medical History:   Diagnosis Date    Allergic 1974    Seasonal    Anxiety     Arthritis     Colitis     Noted on colonoscopy 04/24/2020      Depression     Disease of thyroid gland     Gastritis     Noted on EGD 04/24/2020    Gastroparesis     Glaucoma     Headache(784 0)     Hyperlipidemia     Hypertension     Hypothyroidism     Lyme disease     Migraine     Osteoporosis     Scoliosis     SVT (supraventricular tachycardia) (HCC)     Visual impairment     VT (ventricular tachycardia) (HCC)        Past Surgical History:   Procedure Laterality Date    BREAST BIOPSY Right 10/21/2020    papilloma    COLONOSCOPY      EGD      FINGER SURGERY      left pinky    FL GUIDED NEEDLE PLAC BX/ASP/INJ  3/17/2022    HYSTERECTOMY      age- 45    HYSTERECTOMY W/ SALPINGO-OOPHERECTOMY  05/2008    NERVE BLOCK Bilateral 3/17/2022    Procedure: L3, L4, L5 BLOCK MEDIAL BRANCH;  Surgeon: Nina Pan MD;  Location: [facility];  Service: Pain Management     OOPHORECTOMY Bilateral     age- 45    US GUIDED BREAST BIOPSY RIGHT COMPLETE Right 10/21/2020       Current Outpatient Medications   Medication Sig Dispense Refill    ALPRAZolam (XANAX) 0 5 mg tablet Take 1 tablet (0 5 mg total) by mouth 30 min pre-procedure 4 tablet 0    atorvastatin (LIPITOR) 40 mg tablet Take 1 tablet (40 mg total) by mouth daily 90 tablet 1    b complex vitamins capsule Take 1 capsule by mouth daily      bimatoprost (Lumigan) 0 01 % ophthalmic drops Lumigan 0 01 % eye drops      Calcium Carbonate-Vit D-Min (CALCIUM 1200 PO) Take by mouth daily       Cholecalciferol (VITAMIN D3 PO) Take 1,000 mg by mouth      gabapentin (NEURONTIN) 100 mg capsule Take 1 capsule (100 mg total) by mouth 3 (three) times a day 90 capsule 0    Glucosamine-Chondroitin 500-400 MG CAPS Take by mouth daily       hydrOXYzine HCL (ATARAX) 25 mg tablet Take 1 tablet (25 mg total) by mouth as needed for anxiety 30 tablet 1    Inulin (FIBER CHOICE PO) Take by mouth      irbesartan (AVAPRO) 75 mg tablet Take 1 tablet (75 mg total) by mouth daily 30 tablet 11    levothyroxine 100 mcg tablet Take 1 tablet (100 mcg total) by mouth daily 90 tablet 1    LORazepam (ATIVAN) 0 5 mg tablet Take 0 5 mg by mouth as needed      Magnesium 250 MG TABS Take 1 tablet (250 mg total) by mouth daily 30 tablet 0    Methyl Salicylate-Lido-Menthol (LidoPro) 4-4-5 % PTCH LidoPro 4 %-4 %-5 % topical patch   APPLY 1 PATCH TO THE AFFECTED AREA EVERY 8 TO 12 HOURS AS NEEDED  MAX OF 2 PATCHES PER DAY      metoprolol succinate (TOPROL-XL) 25 mg 24 hr tablet Take 1 tablet (25 mg total) by mouth daily 90 tablet 4    multivitamin (THERAGRAN) TABS Take 1 tablet by mouth daily      Omega-3 Fatty Acids (FISH OIL OMEGA-3 PO) Take by mouth      omeprazole (PriLOSEC) 40 MG capsule       SUMAtriptan (IMITREX) 100 mg tablet Take 100 mg by mouth as needed      Thiamine HCl (VITAMIN B1 PO) Take 1 capsule by mouth daily       No current facility-administered medications for this visit  No Known Allergies    Review of Systems:  Patient and any neck back muscle pain  Swelling and pain in her knees and ankles bilaterally  Pain is worse when attempting to sit from seated position  These symptoms have been present for least a week of with closer to 10 days  He also had an exacerbation her symptoms related to restless legs  Her symptoms have not responded to Advil cold and Sinus  She is not having respiratory tract symptoms at this time  She denies any chest pain heart palpitations dizziness lightheadedness syncope or presyncope    She acute    Video Exam    Vitals:    05/16/22 1024   Weight: 68 5 kg (151 lb)   Height: 5' 8" (1 727 m)       Physical Exam  patient was appropriate in answering questions  She was lucid and cooperative with the exam   Because this turned into a telephone visit when the video could not be shown on the platform, I was not able to perform physical exam   Patient was able to speak in full sentences without having any dyspnea  She did note a rash and describes a rash but this could not be    I spent 20 minutes directly with the patient during this visit    VIRTUAL VISIT DISCLAIMER      Flora Townsend verbally agrees to participate in [employer]  Pt is aware that [employer] could be limited without vital signs or the ability to perform a full hands-on physical Melchor Alex understands she or the provider may request at any time to terminate the video visit and request the patient to seek care or treatment in person

## 2022-05-17 ENCOUNTER — TELEPHONE (OUTPATIENT)
Dept: CARDIOLOGY CLINIC | Facility: CLINIC | Age: 52
End: 2022-05-17

## 2022-05-17 ENCOUNTER — APPOINTMENT (OUTPATIENT)
Dept: LAB | Facility: HOSPITAL | Age: 52
End: 2022-05-17
Payer: COMMERCIAL

## 2022-05-17 DIAGNOSIS — E83.51 HYPOCALCEMIA: ICD-10-CM

## 2022-05-17 DIAGNOSIS — E78.2 MIXED HYPERLIPIDEMIA: ICD-10-CM

## 2022-05-17 DIAGNOSIS — M25.562 ARTHRALGIA OF BOTH KNEES: ICD-10-CM

## 2022-05-17 DIAGNOSIS — M25.561 ARTHRALGIA OF BOTH KNEES: ICD-10-CM

## 2022-05-17 DIAGNOSIS — E83.39 HYPOPHOSPHATEMIA: ICD-10-CM

## 2022-05-17 LAB
ALBUMIN SERPL BCP-MCNC: 4.2 G/DL (ref 3.5–5)
ALP SERPL-CCNC: 54 U/L (ref 46–116)
ALT SERPL W P-5'-P-CCNC: 28 U/L (ref 12–78)
ANION GAP SERPL CALCULATED.3IONS-SCNC: 10 MMOL/L (ref 4–13)
AST SERPL W P-5'-P-CCNC: 20 U/L (ref 5–45)
BILIRUB SERPL-MCNC: 0.51 MG/DL (ref 0.2–1)
BUN SERPL-MCNC: 10 MG/DL (ref 5–25)
CALCIUM SERPL-MCNC: 9 MG/DL (ref 8.3–10.1)
CHLORIDE SERPL-SCNC: 103 MMOL/L (ref 100–108)
CHOLEST SERPL-MCNC: 200 MG/DL
CO2 SERPL-SCNC: 26 MMOL/L (ref 21–32)
CREAT SERPL-MCNC: 0.84 MG/DL (ref 0.6–1.3)
GFR SERPL CREATININE-BSD FRML MDRD: 80 ML/MIN/1.73SQ M
GLUCOSE P FAST SERPL-MCNC: 87 MG/DL (ref 65–99)
HDLC SERPL-MCNC: 53 MG/DL
LDLC SERPL CALC-MCNC: 111 MG/DL (ref 0–100)
NONHDLC SERPL-MCNC: 147 MG/DL
POTASSIUM SERPL-SCNC: 3.7 MMOL/L (ref 3.5–5.3)
PROT SERPL-MCNC: 7.8 G/DL (ref 6.4–8.2)
SODIUM SERPL-SCNC: 139 MMOL/L (ref 136–145)
TRIGL SERPL-MCNC: 180 MG/DL

## 2022-05-17 PROCEDURE — 80061 LIPID PANEL: CPT

## 2022-05-17 PROCEDURE — 36415 COLL VENOUS BLD VENIPUNCTURE: CPT

## 2022-05-17 PROCEDURE — 86618 LYME DISEASE ANTIBODY: CPT

## 2022-05-17 PROCEDURE — 80053 COMPREHEN METABOLIC PANEL: CPT

## 2022-05-17 PROCEDURE — 86617 LYME DISEASE ANTIBODY: CPT

## 2022-05-17 NOTE — TELEPHONE ENCOUNTER
----- Message from Wiliam Gilbert 82 sent at 5/17/2022 12:41 PM EDT -----  Please let patient know that her cholesterol remains just slightly above goal  , goal < 100  If she has been taking atorvastatin faithfully we should consider switch to rosuvastatin 40 mg  Thank you!

## 2022-05-17 NOTE — TELEPHONE ENCOUNTER
Call to patient, she stated that her blood work was fasting and that she is not interested in starting another med  She would prefer to stay on the atorvastatin

## 2022-05-18 LAB — B BURGDOR IGG+IGM SER-ACNC: 134

## 2022-05-19 LAB
B BURGDOR IGG PATRN SER IB-IMP: NEGATIVE
B BURGDOR IGM PATRN SER IB-IMP: NEGATIVE
B BURGDOR18KD IGG SER QL IB: ABNORMAL
B BURGDOR23KD IGG SER QL IB: ABNORMAL
B BURGDOR23KD IGM SER QL IB: ABNORMAL
B BURGDOR28KD IGG SER QL IB: ABNORMAL
B BURGDOR30KD IGG SER QL IB: ABNORMAL
B BURGDOR39KD IGG SER QL IB: ABNORMAL
B BURGDOR39KD IGM SER QL IB: ABNORMAL
B BURGDOR41KD IGG SER QL IB: PRESENT
B BURGDOR41KD IGM SER QL IB: ABNORMAL
B BURGDOR45KD IGG SER QL IB: ABNORMAL
B BURGDOR58KD IGG SER QL IB: ABNORMAL
B BURGDOR66KD IGG SER QL IB: ABNORMAL
B BURGDOR93KD IGG SER QL IB: ABNORMAL

## 2022-05-24 ENCOUNTER — TELEPHONE (OUTPATIENT)
Dept: ENDOCRINOLOGY | Facility: CLINIC | Age: 52
End: 2022-05-24

## 2022-05-24 ENCOUNTER — OFFICE VISIT (OUTPATIENT)
Dept: ENDOCRINOLOGY | Facility: CLINIC | Age: 52
End: 2022-05-24
Payer: COMMERCIAL

## 2022-05-24 ENCOUNTER — APPOINTMENT (OUTPATIENT)
Dept: LAB | Facility: HOSPITAL | Age: 52
End: 2022-05-24
Payer: COMMERCIAL

## 2022-05-24 VITALS
BODY MASS INDEX: 22.58 KG/M2 | WEIGHT: 149 LBS | DIASTOLIC BLOOD PRESSURE: 72 MMHG | SYSTOLIC BLOOD PRESSURE: 118 MMHG | HEIGHT: 68 IN | HEART RATE: 78 BPM

## 2022-05-24 DIAGNOSIS — E21.3 HYPERPARATHYROIDISM (HCC): ICD-10-CM

## 2022-05-24 DIAGNOSIS — E03.8 OTHER SPECIFIED HYPOTHYROIDISM: ICD-10-CM

## 2022-05-24 DIAGNOSIS — E03.8 HYPOTHYROIDISM DUE TO HASHIMOTO'S THYROIDITIS: ICD-10-CM

## 2022-05-24 DIAGNOSIS — M81.0 OSTEOPOROSIS, UNSPECIFIED OSTEOPOROSIS TYPE, UNSPECIFIED PATHOLOGICAL FRACTURE PRESENCE: Primary | ICD-10-CM

## 2022-05-24 DIAGNOSIS — M25.50 POLYARTHRALGIA: ICD-10-CM

## 2022-05-24 DIAGNOSIS — E06.3 HYPOTHYROIDISM DUE TO HASHIMOTO'S THYROIDITIS: ICD-10-CM

## 2022-05-24 DIAGNOSIS — E06.3 HYPOTHYROIDISM DUE TO HASHIMOTO'S THYROIDITIS: Primary | ICD-10-CM

## 2022-05-24 DIAGNOSIS — M81.0 OSTEOPOROSIS, UNSPECIFIED OSTEOPOROSIS TYPE, UNSPECIFIED PATHOLOGICAL FRACTURE PRESENCE: ICD-10-CM

## 2022-05-24 DIAGNOSIS — E03.8 HYPOTHYROIDISM DUE TO HASHIMOTO'S THYROIDITIS: Primary | ICD-10-CM

## 2022-05-24 LAB
ALBUMIN SERPL BCP-MCNC: 4.1 G/DL (ref 3.5–5)
ALP SERPL-CCNC: 57 U/L (ref 46–116)
ALT SERPL W P-5'-P-CCNC: 18 U/L (ref 12–78)
ANION GAP SERPL CALCULATED.3IONS-SCNC: 8 MMOL/L (ref 4–13)
AST SERPL W P-5'-P-CCNC: 11 U/L (ref 5–45)
BILIRUB SERPL-MCNC: 0.34 MG/DL (ref 0.2–1)
BUN SERPL-MCNC: 15 MG/DL (ref 5–25)
CALCIUM SERPL-MCNC: 8.8 MG/DL (ref 8.3–10.1)
CHLORIDE SERPL-SCNC: 104 MMOL/L (ref 100–108)
CO2 SERPL-SCNC: 30 MMOL/L (ref 21–32)
CREAT SERPL-MCNC: 0.91 MG/DL (ref 0.6–1.3)
ERYTHROCYTE [SEDIMENTATION RATE] IN BLOOD: 18 MM/HOUR (ref 0–29)
GFR SERPL CREATININE-BSD FRML MDRD: 72 ML/MIN/1.73SQ M
GLUCOSE SERPL-MCNC: 91 MG/DL (ref 65–140)
MAGNESIUM SERPL-MCNC: 2.1 MG/DL (ref 1.6–2.6)
PHOSPHATE SERPL-MCNC: 3.4 MG/DL (ref 2.7–4.5)
POTASSIUM SERPL-SCNC: 3.5 MMOL/L (ref 3.5–5.3)
PROT SERPL-MCNC: 7.6 G/DL (ref 6.4–8.2)
PTH-INTACT SERPL-MCNC: 36.8 PG/ML (ref 18.4–80.1)
SODIUM SERPL-SCNC: 142 MMOL/L (ref 136–145)
T4 FREE SERPL-MCNC: 1.11 NG/DL (ref 0.76–1.46)
TSH SERPL DL<=0.05 MIU/L-ACNC: 0.91 UIU/ML (ref 0.45–4.5)

## 2022-05-24 PROCEDURE — 83735 ASSAY OF MAGNESIUM: CPT

## 2022-05-24 PROCEDURE — 84100 ASSAY OF PHOSPHORUS: CPT

## 2022-05-24 PROCEDURE — 99214 OFFICE O/P EST MOD 30 MIN: CPT | Performed by: STUDENT IN AN ORGANIZED HEALTH CARE EDUCATION/TRAINING PROGRAM

## 2022-05-24 PROCEDURE — 83970 ASSAY OF PARATHORMONE: CPT

## 2022-05-24 PROCEDURE — 85652 RBC SED RATE AUTOMATED: CPT

## 2022-05-24 PROCEDURE — 80053 COMPREHEN METABOLIC PANEL: CPT

## 2022-05-24 PROCEDURE — 84443 ASSAY THYROID STIM HORMONE: CPT

## 2022-05-24 PROCEDURE — 84439 ASSAY OF FREE THYROXINE: CPT

## 2022-05-24 PROCEDURE — 36415 COLL VENOUS BLD VENIPUNCTURE: CPT

## 2022-05-24 NOTE — ASSESSMENT & PLAN NOTE
PTH mildly elevated in light of normal serum calcium & low phosphorus & wnl Vit D  Encourage 1200 mg daily calcium intake & continuing Vit D  Will follow up repeat PTH levels

## 2022-05-24 NOTE — ASSESSMENT & PLAN NOTE
Patient with multiple year history of reclast  Was briefly on denosumab x1 year before it was abruptly stopped  CTX obtained recently in the lower half of normal range, perhaps suggesting some ongoing effects of reclast, however a single value interpreted in isolation is challenging  Last DXA showed osteopenia  Will monitor clinically   Next DXA Oct 2023

## 2022-05-24 NOTE — TELEPHONE ENCOUNTER
Pt called, she made an appointment for this afternoon, she is asking if she should have labs drawn first before seeing you? She said she has been feeling more tired, achy lately    Her last appointment was 2/16/21, no future labs ordered

## 2022-06-03 ENCOUNTER — PATIENT MESSAGE (OUTPATIENT)
Dept: CARDIOLOGY CLINIC | Facility: CLINIC | Age: 52
End: 2022-06-03

## 2022-06-03 DIAGNOSIS — I10 ESSENTIAL HYPERTENSION: ICD-10-CM

## 2022-06-06 RX ORDER — IRBESARTAN 75 MG/1
75 TABLET ORAL DAILY
Qty: 90 TABLET | Refills: 3 | Status: SHIPPED | OUTPATIENT
Start: 2022-06-06

## 2022-06-06 NOTE — TELEPHONE ENCOUNTER
Called and spoke with patient as she requested her old dose of medication  She confirmed that was a mistake  Will refill the irbesartan 75 mg dose  She will contact our office if she has actually been taking the irbesartan/HCTZ 150/12 5 mg tab

## 2022-06-13 ENCOUNTER — HOSPITAL ENCOUNTER (OUTPATIENT)
Dept: RADIOLOGY | Facility: HOSPITAL | Age: 52
Discharge: HOME | End: 2022-06-13
Payer: COMMERCIAL

## 2022-06-13 DIAGNOSIS — M54.40 LOW BACK PAIN WITH SCIATICA, SCIATICA LATERALITY UNSPECIFIED, UNSPECIFIED BACK PAIN LATERALITY, UNSPECIFIED CHRONICITY: ICD-10-CM

## 2022-06-13 PROCEDURE — 72200 X-RAY EXAM SI JOINTS: CPT

## 2022-06-13 NOTE — CONSULTS
Consultation with [facility] note - being evaluated again for ankylosing spondylitis as she is HLAB 27 positive with SI joint tenderness

## 2022-06-17 ENCOUNTER — APPOINTMENT (OUTPATIENT)
Dept: LAB | Facility: HOSPITAL | Age: 52
End: 2022-06-17
Payer: COMMERCIAL

## 2022-06-17 DIAGNOSIS — M14.88: ICD-10-CM

## 2022-06-17 LAB
CRP SERPL QL: 1.1 MG/L
ERYTHROCYTE [SEDIMENTATION RATE] IN BLOOD: 18 MM/HOUR (ref 0–29)

## 2022-06-17 PROCEDURE — 86200 CCP ANTIBODY: CPT

## 2022-06-17 PROCEDURE — 36415 COLL VENOUS BLD VENIPUNCTURE: CPT

## 2022-06-17 PROCEDURE — 83520 IMMUNOASSAY QUANT NOS NONAB: CPT

## 2022-06-17 PROCEDURE — 85652 RBC SED RATE AUTOMATED: CPT

## 2022-06-17 PROCEDURE — 86140 C-REACTIVE PROTEIN: CPT

## 2022-06-21 LAB — CCP AB SER IA-ACNC: 1.9

## 2022-07-01 ENCOUNTER — OFFICE VISIT (OUTPATIENT)
Dept: PAIN MEDICINE | Facility: CLINIC | Age: 52
End: 2022-07-01
Payer: COMMERCIAL

## 2022-07-01 VITALS
HEART RATE: 70 BPM | DIASTOLIC BLOOD PRESSURE: 62 MMHG | WEIGHT: 143 LBS | OXYGEN SATURATION: 98 % | TEMPERATURE: 98.4 F | SYSTOLIC BLOOD PRESSURE: 100 MMHG | HEIGHT: 68 IN | BODY MASS INDEX: 21.67 KG/M2

## 2022-07-01 DIAGNOSIS — M47.816 LUMBAR SPONDYLOSIS: Primary | ICD-10-CM

## 2022-07-01 PROCEDURE — 99214 OFFICE O/P EST MOD 30 MIN: CPT | Performed by: ANESTHESIOLOGY

## 2022-07-01 RX ORDER — METHYLPREDNISOLONE ACETATE 80 MG/ML
80 INJECTION, SUSPENSION INTRA-ARTICULAR; INTRALESIONAL; INTRAMUSCULAR; PARENTERAL; SOFT TISSUE ONCE
Status: CANCELLED | OUTPATIENT
Start: 2022-07-01 | End: 2022-07-01

## 2022-07-01 RX ORDER — LIDOCAINE HYDROCHLORIDE 10 MG/ML
10 INJECTION, SOLUTION EPIDURAL; INFILTRATION; INTRACAUDAL; PERINEURAL ONCE
Status: CANCELLED | OUTPATIENT
Start: 2022-07-01 | End: 2022-07-01

## 2022-07-01 RX ORDER — DULOXETIN HYDROCHLORIDE 30 MG/1
CAPSULE, DELAYED RELEASE ORAL
COMMUNITY

## 2022-07-01 RX ORDER — BUPIVACAINE HCL/PF 2.5 MG/ML
10 VIAL (ML) INJECTION ONCE
Status: CANCELLED | OUTPATIENT
Start: 2022-07-01 | End: 2022-07-01

## 2022-07-01 NOTE — PATIENT INSTRUCTIONS
Core Strengthening Exercises   WHAT YOU NEED TO KNOW:   Your core includes the muscles of your lower back, hip, pelvis, and abdomen  Core strengthening exercises help heal and strengthen these muscles  This helps prevent another injury, and keeps your pelvis, spine, and hips in the correct position  DISCHARGE INSTRUCTIONS:   Contact your healthcare provider if:   You have sharp or worsening pain during exercise or at rest     You have questions or concerns about your shoulder exercises  Safety tips:  Talk to your healthcare provider before you start an exercise program  A physical therapist can teach you how to do core strengthening exercises safely  Do the exercises on a mat or firm surface  A firm surface will support your spine and prevent low back pain  Do not do these exercises on a bed  Move slowly and smoothly  Avoid fast or jerky motions  Stop if you feel pain  Core exercises should not be painful  Stop if you feel pain  Breathe normally during core exercises  Do not hold your breath  This may cause an increase in blood pressure and prevent muscle strengthening  Your healthcare provider will tell you when to inhale and exhale during the exercise  Begin all of your exercises with abdominal bracing  Abdominal bracing helps warm up your core muscles  You can also practice abdominal bracing throughout the day  Lie on your back with your knees bent and feet flat on the floor  Place your arms in a relaxed position beside your body  Tighten your abdominal muscles  Pull your belly button in and up toward your spine  Hold for 5 seconds  Relax your muscles  Repeat 10 times  Core strengthening exercises: Your healthcare provider will tell you how often to do these exercises  The provider will also tell you how many repetitions of each exercise you should do  Hold each exercise for 5 seconds or as directed  As you get stronger, increase your hold to 10 to 15 seconds   You can do some of these exercises on a stability ball, or with a weight  Ask your healthcare provider how to use a stability ball or weight for these exercises:  Bridging:  Lie on your back with your knees bent and feet flat on the floor  Rest your arms at your side  Tighten your buttocks, and then lift your hips 1 inch off the floor  Hold for 5 seconds  When you can do this exercise without pain for 10 seconds, increase the distance you lift your hips  A good goal is to be able to lift your hips so that your shoulders, hips, and knees are in a straight line  Dead bug:  Lie on your back with your knees bent and feet flat on the floor  Place your arms in a relaxed position beside your body  Begin with abdominal bracing  Next, raise one leg, keeping your knee bent  Hold for 5 seconds  Repeat with the other leg  When you can do this exercise without pain for 10 to 15 seconds, you may raise one straight leg and hold  Repeat with the other leg  Quadruped:  Place your hands and knees on the floor  Keep your wrists directly below your shoulders and your knees directly below your hips  Pull your belly button in toward your spine  Do not flatten or arch your back  Tighten your abdominal muscles below your belly button  Hold for 5 seconds  When you can do this exercise without pain for 10 to 15 seconds, you may extend one arm and hold  Repeat on the other side  Side bridge exercises:      Standing side bridge:  Stand next to a wall and extend one arm toward the wall  Place your palm flat on the wall with your fingers pointing upward  Begin with abdominal bracing  Next, without moving your feet, slowly bend your arm to 90 degrees  Hold for 5 seconds  Repeat on the other side  When you can do this exercise without pain for 10 to 15 seconds, you may do the bent leg side bridge on the floor  Bent leg side bridge:  Lie on one side with your legs, hips, and shoulders in a straight line   Prop yourself up onto your forearm so your elbow is directly below your shoulder  Bend your knees back to 90 degrees  Begin with abdominal bracing  Next, lift your hips and balance yourself on your forearm and knees  Hold for 5 seconds  Repeat on the other side  When you can do this exercise without pain for 10 to 15 seconds, you may do the straight leg side bridge on the floor  Straight leg side bridge:  Lie on one side with your legs, hips, and shoulders in a straight line  Prop yourself up onto your forearm so your elbow is directly below your shoulder  Begin with abdominal bracing  Lift your hips off the floor and balance yourself on your forearm and the outside of your flexed foot  Do not let your ankle bend sideways  Hold for 5 seconds  Repeat on the other side  When you can do this exercise without pain for 10 to 15 seconds, ask your healthcare provider for more advanced exercises  Superman:  Lie on your stomach  Extend your arms forward on the floor  Tighten your abdominal muscles and lift your right hand and left leg off the floor  Hold this position  Slowly return to the starting position  Tighten your abdominal muscles and lift your left hand and right leg off the floor  Hold this position  Slowly return to the starting position  Clam:  Lie on your side with your knees bent  Put your bottom arm under your head to keep your neck in line  Put your top hand on your hip to keep your pelvis from moving  Put your heels together, and keep them together during this exercise  Slowly raise your top knee toward the ceiling  Then lower your leg so your knees are together  Repeat this exercise 10 times  Then switch sides and do the exercise 10 times with the other leg  Curl up:  Lie on your back with your knees bent and feet flat on the floor  Place your hands, palms down, underneath your lower back  Next, with your elbows on the floor, lift your shoulders and chest 2 to 3 inches off the floor   Keep your head in line with your shoulders  Hold this position  Slowly return to the starting position  Straight leg raises:  Lie on your back with one leg straight  Bend the other knee and place your foot flat on the floor  Tighten your abdominal muscles  Keep your leg straight and slowly lift it straight up 6 to 12 inches off the floor  Hold this position  Lower your leg slowly  Do as many repetitions as directed on this side  Repeat with the other leg  Plank:  Lie on your stomach  Bend your elbows and place your forearms flat on the floor  Lift your chest, stomach, and knees off the floor  Make sure your elbows are below your shoulders  Your body should be in a straight line  Do not let your hips or lower back sink to the ground  Squeeze your abdominal muscles together and hold for 15 seconds  To make this exercise harder, hold for 30 seconds or lift 1 leg at a time  Bicycles:  Lie on your back  Bend both knees and bring them toward your chest  Your calves should be parallel to the floor  Place the palms of your hands on the back of your head  Straighten your right leg and keep it lifted 2 inches off the floor  Raise your head and shoulders off the floor and twist towards your left  Keep your head and shoulders lifted  Bend your right knee while you straighten your left leg  Keep your left leg 2 inches off the floor  Twist your head and chest towards the left leg  Continue to straighten 1 leg at a time and twist        Follow up with your doctor as directed:  Write down your questions so you remember to ask them during your visits  © Copyright [employer] 2022 Information is for End User's use only and may not be sold, redistributed or otherwise used for commercial purposes  All illustrations and images included in CareNotes® are the copyrighted property of [employer]  or [facility] Leo Mazariegos   The above information is an  only   It is not intended as medical advice for individual conditions or treatments  Talk to your doctor, nurse or pharmacist before following any medical regimen to see if it is safe and effective for you  Lumbar Facet Block   WHAT YOU NEED TO KNOW:   A lumbar facet block is a procedure used to decrease inflammation in your lower spine  Medicines are injected at facet joints in your lower back  Facet joints are found at the back of each vertebrae  DISCHARGE INSTRUCTIONS:   Call your local emergency number (911 in the [address] [facility]) if:   You have sudden chest pain or trouble breathing  Call your doctor if:   You have a severe headache  Your feet are numb, tingly, cool, or pale  You have a fever or chills  Your procedure site is red, swollen, or draining pus  You have nausea or are vomiting  You have questions or concerns about your condition or care  Self-care:   Do not take pain medicines until directed  Your healthcare provider will tell you when to start using your usual pain medicines again  This will help him or her see if the facet block worked  Return to your daily activities as directed  Your provider may tell you to rest or do light activities the day of your procedure  You may be able to return to normal activities the next day  Keep a pain record, if directed  You may be asked to keep a pain record for a few days  This will help your provider see if the facet block worked  Include when you have pain, and how severe it is  Include anything that relieves the pain or makes it worse, such as certain movements  Bring the record with you to all follow-up visits  Go to physical therapy as directed  A physical therapist teaches you exercises to help improve movement and strength, and to decrease pain  Follow up with your doctor as directed:  Write down your questions so you remember to ask them during your visits    © Copyright [employer] 2022 Information is for End User's use only and may not be sold, redistributed or otherwise used for commercial purposes  All illustrations and images included in CareNotes® are the copyrighted property of [employer] A M [employer] Inc  or Al Mazariegos   The above information is an  only  It is not intended as medical advice for individual conditions or treatments  Talk to your doctor, nurse or pharmacist before following any medical regimen to see if it is safe and effective for you  Lumbar Radiofrequency Ablation   WHAT YOU NEED TO KNOW:   What do I need to know about lumbar radiofrequency ablation? Lumbar radiofrequency ablation (RFA) is a procedure used to treat facet joint pain in your lower back  Facet joints are found at the back of each vertebra  A needle electrode is used to send electrical currents to the nerves in your facet joint  The electrical currents create heat that damages the nerve so it cannot send pain signals  How do I prepare for lumbar RFA? Your healthcare provider will talk to you about how to prepare for this procedure  He may tell you not to eat or drink anything after midnight on the day of your procedure  He will tell you what medicines to take or not take on the day of your procedure  What will happen during lumbar RFA? You will lie on your stomach  You will be given local anesthesia to numb the area of your back where the needle electrode will be inserted  You may be given a sedative to help keep you relaxed  You may still feel pressure or pushing during the procedure, but you should not feel any pain  Your healthcare provider will use fluoroscopy (a type of x-ray) to guide the needle electrode to the nerves near your facet joint  Your healthcare provider may touch the affected nerve to make sure the needle electrode is in the right place  You will feel tingling or pressure when he does this  He will then apply local anesthesia to the nerve to numb it  This will prevent you from feeling pain when he applies heat to the nerve   Your healthcare provider will then apply heat to the nerve using the needle electrode  He may need to apply heat to more than one nerve  He will remove the needle electrode and apply a bandage over the area  What are the risks of lumbar RFA? You may have pain, numbness, tingling, or burning in the area where the lumbar RFA was done  These normally go away within 6 weeks  The needle electrode may injure your spinal nerves  This may cause permanent leg weakness or nerve pain  CARE AGREEMENT:   You have the right to help plan your care  Learn about your health condition and how it may be treated  Discuss treatment options with your healthcare providers to decide what care you want to receive  You always have the right to refuse treatment  The above information is an  only  It is not intended as medical advice for individual conditions or treatments  Talk to your doctor, nurse or pharmacist before following any medical regimen to see if it is safe and effective for you  © Copyright [employer] 2022 Information is for End User's use only and may not be sold, redistributed or otherwise used for commercial purposes   All illustrations and images included in CareNotes® are the copyrighted property of A [employer] A M , Inc  or [address] [place] [address]

## 2022-07-01 NOTE — H&P (VIEW-ONLY)
Assessment  1  Lumbar spondylosis - Bilateral  -     Case request operating room: BLOCK MEDIAL BRANCH L3, L4, L5 #2; Standing  -     Case request operating room: BLOCK MEDIAL BRANCH L3, L4, L5 #2    Greater than 90% relief of pain with improved ability to participate with IADLs after bilateral L3, L4, L5 medial branch blocks #1 for one week  Previously reported the following symptomatology:     Neck and low back pain described primarily are radicular features  Pain radiates in C6 and C7 dermatomal distribution from neck to hands bilaterally as well as primarily arthritic in nature in low back; additionally with radicular features in L3 and L4 dermatomal distribution right greater than left  5/5 strength bilaterally with active range of motion movements in upper lower extremities  Slight pain limited weakness with left hip flexion, knee extension  Negative Spurling's maneuver, negative SLR bilaterally  Significant tenderness to palpation with lumbar cervical facet loading maneuvers  She is currently engaged with PT for both her neck and low back with modest relief of the pain  She has not trialed medications other than OTC tylenol and ibuprofen  MRI cervical spine noncontrast, MRI lumbar spine noncontrast show mild compressive disease with spondyloarthropathy  Will proceed with multimodal pain therapy; counseled regarding lifestyle modifications including PT  Plan  -bilateral L3, L4, L5 medial branch blocks #2; f/u 2 weeks post procedure  -gabapentin 100 mg t i d  Ordered for patient; has since tapered counseled regarding sedative effects of taking this medication and provided up titration calendar  Counseled not to take medication while driving or operating heavy machinery/using stairs  -meloxicam 7 5 mg b i d  P r n   pain  Patient has since tapered  Patient has since tapered   Patient educated regarding bleeding risk of taking this medication not taking any other nonsteroidal anti-inflammatory medications while taking this medication; counseled thoroughly regarding potential risk of Cardiovascular injury, Kidney injury, Gastrointestinal ulceration/bleeding  Patient voiced understanding  -physical therapy for right-sided lumbar radiculopathy, lumbar facet arthropathy; Physician directed home exercise plan as per [employer] demonstrated and handouts provided that patient plans to participate with for 1 hour, twice a week for the next 6 weeks  There are risks associated with opioid medications, including dependence, addiction and tolerance  The patient understands and agrees to use these medications only as prescribed  Potential side effects of the medications include, but are not limited to, constipation, drowsiness, addiction, impaired judgment and risk of fatal overdose if not taken as prescribed  The patient was warned against driving while taking sedation medications  Sharing medications is a felony  At this point in time, the patient is showing no signs of addiction, abuse, diversion or suicidal ideation  [facility] Prescription Drug Monitoring Program report was reviewed and was appropriate      Complete risks and benefits including bleeding, infection, tissue reaction, nerve injury and allergic reaction were discussed  The approach was demonstrated using models and literature was provided  Verbal and written consent was obtained  My impressions and treatment recommendations were discussed in detail with the patient who verbalized understanding and had no further questions  Discharge instructions were provided  I personally saw and examined the patient and I agree with the above discussed plan of care  My impressions and treatment recommendations were discussed in detail with the patient who verbalized understanding and had no further questions  Discharge instructions were provided  I personally saw and examined the patient and I agree with the above discussed plan of care      New Medications Ordered This Visit   Medications    DULoxetine (CYMBALTA) 30 mg delayed release capsule     Sig: duloxetine 30 mg capsule,delayed release       History of Present Illness    Greater than 90% relief of pain with improved ability to participate with IADLs after bilateral L3, L4, L5 medial branch blocks #1 for one week  Previously reported the following symptomatology:     Pau Cid is a 46 y o  female with past medical history of hypertension, hypothyroidism, hypercholesterolemia, migraine headaches presenting with neck and low back pain described primarily radicular features in neck and axial/arthritic features in low back  Neck pain radiates into the C6 and C7 dermatomal distribution bilaterally accompanied by pain limited weakness numbness and paresthesias  In low back radicular pain travels into the bilateral L3 and L4 dermatomal distribution, right greater than left; however in low back, features are mostly arthritic in nature  She describes lancinating features of the pain  She has been to formal physical therapy for both neck and low back pain with modest relief of her symptoms  She has trialed over-the-counter NSAIDs as well as Tylenol with modest relief of the pain  Her pain significantly impacts independent activities of daily living and overall function, 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  She denies any bowel or bladder abnormality, incontinence, gait instability, headaches or dizziness  I have personally reviewed and/or updated the patient's past medical history, past surgical history, family history, social history, current medications, allergies, and vital signs today  Review of Systems   Constitutional: Positive for activity change  HENT: Negative  Eyes: Negative  Respiratory: Negative  Cardiovascular: Negative  Gastrointestinal: Negative  Endocrine: Negative  Genitourinary: Negative      Musculoskeletal: Positive for arthralgias, back pain, myalgias, neck pain and neck stiffness  Skin: Negative  Allergic/Immunologic: Negative  Neurological: Positive for weakness and numbness  Hematological: Negative  Psychiatric/Behavioral: Negative  All other systems reviewed and are negative  Patient Active Problem List   Diagnosis    Palpitations    Hypertension    Hypokalemia    SVT (supraventricular tachycardia) (HCC)    VT (ventricular tachycardia) (HCC)    Hyperlipidemia    Depression    Osteoporosis    Arthritis    Other specified hypothyroidism    Migraine    Glaucoma    Anxiety    Restless legs    History of syncope    Polyarthralgia    Hyperparathyroidism ([address] )       Past Medical History:   Diagnosis Date    Allergic 1974    Seasonal    Anxiety     Arthritis     Colitis     Noted on colonoscopy 04/24/2020      Depression     Disease of thyroid gland     Gastritis     Noted on EGD 04/24/2020    Gastroparesis     Glaucoma     Headache(784 0)     Hyperlipidemia     Hypertension     Hypothyroidism     Lyme disease     Migraine     Osteoporosis     Scoliosis     SVT (supraventricular tachycardia) (HCC)     Visual impairment     VT (ventricular tachycardia) (HCC)        Past Surgical History:   Procedure Laterality Date    BREAST BIOPSY Right 10/21/2020    papilloma    COLONOSCOPY      EGD      FINGER SURGERY      left pinky    FL GUIDED NEEDLE PLAC BX/ASP/INJ  3/17/2022    HYSTERECTOMY      age- 45    HYSTERECTOMY W/ SALPINGO-OOPHERECTOMY  05/2008    NERVE BLOCK Bilateral 3/17/2022    Procedure: L3, L4, L5 BLOCK MEDIAL BRANCH;  Surgeon: Laina Fowler MD;  Location: [facility];  Service: Pain Management     OOPHORECTOMY Bilateral     age- 45    US GUIDED BREAST BIOPSY RIGHT COMPLETE Right 10/21/2020       Family History   Problem Relation Age of Onset    Peripheral vascular disease Mother     Glaucoma Mother     Prostate cancer Father     Hypothyroidism Sister     No Known Problems Daughter     Colon cancer Maternal Grandmother     No Known Problems Maternal Grandfather     Arthritis Paternal Grandmother     No Known Problems Paternal Grandfather     No Known Problems Daughter     No Known Problems Maternal Aunt     No Known Problems Maternal Aunt     Skin cancer Paternal Aunt     No Known Problems Paternal Aunt        Social History     Occupational History    Not on file   Tobacco Use    Smoking status: Former Smoker     Packs/day: 1 00     Years: 10 00     Pack years: 10 00     Types: Cigarettes     Quit date: 1/1/2007     Years since quitting: 15 5    Smokeless tobacco: Never Used    Tobacco comment: quit 2007   Vaping Use    Vaping Use: Never used   Substance and Sexual Activity    Alcohol use: Never    Drug use: Never    Sexual activity: Yes     Birth control/protection: Post-menopausal     Comment: hysterectomy       Current Outpatient Medications on File Prior to Visit   Medication Sig    atorvastatin (LIPITOR) 40 mg tablet Take 1 tablet (40 mg total) by mouth daily    Calcium Carbonate-Vit D-Min (CALCIUM 1200 PO) Take by mouth daily     Cholecalciferol (VITAMIN D3 PO) Take 1,000 mg by mouth    DULoxetine (CYMBALTA) 30 mg delayed release capsule duloxetine 30 mg capsule,delayed release    Glucosamine-Chondroitin 500-400 MG CAPS Take by mouth daily     hydrOXYzine HCL (ATARAX) 25 mg tablet Take 1 tablet (25 mg total) by mouth as needed for anxiety    irbesartan (AVAPRO) 75 mg tablet Take 1 tablet (75 mg total) by mouth daily    levothyroxine 100 mcg tablet Take 1 tablet (100 mcg total) by mouth daily    Magnesium 250 MG TABS Take 1 tablet (250 mg total) by mouth daily    metoprolol succinate (TOPROL-XL) 25 mg 24 hr tablet Take 1 tablet (25 mg total) by mouth daily    multivitamin (THERAGRAN) TABS Take 1 tablet by mouth daily    Omega-3 Fatty Acids (FISH OIL OMEGA-3 PO) Take by mouth    omeprazole (PriLOSEC) 40 MG capsule     SUMAtriptan (IMITREX) 100 mg tablet Take 100 mg by mouth as needed    ALPRAZolam (XANAX) 0 5 mg tablet Take 1 tablet (0 5 mg total) by mouth 30 min pre-procedure (Patient not taking: No sig reported)    b complex vitamins capsule Take 1 capsule by mouth daily (Patient not taking: No sig reported)    bimatoprost (Lumigan) 0 01 % ophthalmic drops Lumigan 0 01 % eye drops (Patient not taking: No sig reported)    gabapentin (NEURONTIN) 100 mg capsule Take 1 capsule (100 mg total) by mouth 3 (three) times a day (Patient not taking: No sig reported)    Inulin (FIBER CHOICE PO) Take by mouth (Patient not taking: No sig reported)    LORazepam (ATIVAN) 0 5 mg tablet Take 0 5 mg by mouth as needed (Patient not taking: No sig reported)    Methyl Salicylate-Lido-Menthol (LidoPro) 4-4-5 % PTCH LidoPro 4 %-4 %-5 % topical patch   APPLY 1 PATCH TO THE AFFECTED AREA EVERY 8 TO 12 HOURS AS NEEDED  MAX OF 2 PATCHES PER DAY (Patient not taking: No sig reported)    Thiamine HCl (VITAMIN B1 PO) Take 1 capsule by mouth daily (Patient not taking: No sig reported)     No current facility-administered medications on file prior to visit  No Known Allergies      Physical Exam    /62 (BP Location: Right arm, Patient Position: Sitting, Cuff Size: Standard)   Pulse 70   Temp 98 4 °F (36 9 °C)   Ht 5' 8" (1 727 m)   Wt 64 9 kg (143 lb)   SpO2 98%   BMI 21 74 kg/m²     Constitutional: normal, well developed, well nourished, alert, in no distress and non-toxic and no overt pain behavior  Eyes: anicteric  HEENT: grossly intact  Neck: supple, symmetric, trachea midline and no masses   Pulmonary:even and unlabored  Cardiovascular:No edema or pitting edema present  Skin:Normal without rashes or lesions and well hydrated  Psychiatric:Mood and affect appropriate  Neurologic:Cranial Nerves II-XII grossly intact Sensation grossly intact; no clonus negative bunn's  Reflexes 2+ and brisk  SLR negative bilaterally  Spurling's maneuver negative bilaterally    Musculoskeletal:normal gait  5/5 strength bilaterally with AROM in all extremities  Slight pain limited weakness with left hip flexion, knee extension  Normal heel toe and tip toe walking  Significant pain with cervical and lumbar facet loading bilaterally and with lateral spine rotation  TTP over cervical and lumbar paraspinal muscles  Negative davie's test, negative gaenslen's negative SIJ loading bilaterally  Imaging    MRI CERVICAL SPINE WITHOUT CONTRAST     INDICATION: M54 12: Radiculopathy, cervical region      COMPARISON:  5/9/2021     TECHNIQUE:  Sagittal T1, sagittal T2, sagittal inversion recovery, axial T2, axial  2D merge     IMAGE QUALITY:  Diagnostic     FINDINGS:     ALIGNMENT:  There is trace anterolisthesis of C3-C4      MARROW SIGNAL:  Normal marrow signal is identified within the visualized bony structures  No discrete marrow lesion      CERVICAL AND VISUALIZED THORACIC CORD:  Normal signal within the visualized cord      PREVERTEBRAL AND PARASPINAL SOFT TISSUES:  Normal      VISUALIZED POSTERIOR FOSSA:  The visualized posterior fossa demonstrates no abnormal signal      CERVICAL DISC SPACES:     C2-C3:  Normal      C3-C4:  There is left-sided facet arthrosis  There is no significant canal stenosis or foraminal narrowing      C4-C5:  There is a disc osteophyte complex with uncovertebral spurring  There is facet arthrosis  There is mild left foraminal narrowing  There is no significant canal stenosis      C5-C6:  There is a disc osteophyte complex with left-sided uncovertebral spurring  There is mild left foraminal encroachment      C6-C7:  No significant canal stenosis or foraminal narrowing      C7-T1:  No significant canal stenosis or foraminal narrowing  MRI LUMBAR SPINE WITHOUT CONTRAST     INDICATION: M54 12:  Radiculopathy, cervical region      COMPARISON:  None      TECHNIQUE:  Sagittal T1, sagittal T2, sagittal inversion recovery, axial T1 and axial T2, coronal T2     IMAGE QUALITY: Diagnostic     FINDINGS:     VERTEBRAL BODIES:  There are 5 lumbar type vertebral bodies  Normal alignment of the lumbar spine  No spondylolysis or spondylolisthesis  No scoliosis  No compression fracture  No suspicious marrow signal abnormality  Hemangioma within the L1   vertebral body      SACRUM:  Normal signal within the sacrum  No evidence of insufficiency or stress fracture      DISTAL CORD AND CONUS:  Normal size and signal within the distal cord and conus  Conus medullaris terminates at T12-L1      PARASPINAL SOFT TISSUES:  Paraspinal soft tissues are unremarkable      LOWER THORACIC DISC SPACES:  Normal disc height and signal   No disc herniation, canal stenosis or foraminal narrowing      LUMBAR DISC SPACES:     L1-L2:  No significant canal stenosis or foraminal narrowing      L2-L3:  No significant canal stenosis or foraminal narrowing      L3-L4:  Minimal bulge  No significant canal stenosis or foraminal narrowing      L4-L5:  Mild bulge, asymmetric to the right annular fissure  No significant canal stenosis  Mild foraminal narrowing      L5-S1:  Bulging annulus  Facet arthrosis  No significant canal stenosis  Mild right foraminal narrowing      IMPRESSION:     Mild degenerative changes of the lumbar spine, as described above  CT CERVICAL SPINE - WITHOUT CONTRAST     INDICATION:   TRAUMA      COMPARISON:  None      TECHNIQUE:  CT examination of the cervical spine was performed without intravenous contrast   Contiguous axial images were obtained  Sagittal and coronal reconstructions were performed        Radiation dose length product (DLP) for this visit:  355 mGy-cm     This examination, like all CT scans performed in the [facility], was performed utilizing techniques to minimize radiation dose exposure, including the use of iterative   reconstruction and automated exposure control        IMAGE QUALITY:  Diagnostic      FINDINGS:     ALIGNMENT:  Normal alignment of the cervical spine   No subluxation      VERTEBRAL BODIES:  No fracture      DEGENERATIVE CHANGES:  No significant cervical degenerative changes are noted      PREVERTEBRAL AND PARASPINAL SOFT TISSUES:  Unremarkable      THORACIC INLET:  Normal      IMPRESSION:     No cervical spine fracture or traumatic malalignment      The study was marked in EPIC for immediate notification

## 2022-07-01 NOTE — PROGRESS NOTES
Assessment  1  Lumbar spondylosis - Bilateral  -     Case request operating room: BLOCK MEDIAL BRANCH L3, L4, L5 #2; Standing  -     Case request operating room: BLOCK MEDIAL BRANCH L3, L4, L5 #2    Greater than 90% relief of pain with improved ability to participate with IADLs after bilateral L3, L4, L5 medial branch blocks #1 for one week  Previously reported the following symptomatology:     Neck and low back pain described primarily are radicular features  Pain radiates in C6 and C7 dermatomal distribution from neck to hands bilaterally as well as primarily arthritic in nature in low back; additionally with radicular features in L3 and L4 dermatomal distribution right greater than left  5/5 strength bilaterally with active range of motion movements in upper lower extremities  Slight pain limited weakness with left hip flexion, knee extension  Negative Spurling's maneuver, negative SLR bilaterally  Significant tenderness to palpation with lumbar cervical facet loading maneuvers  She is currently engaged with PT for both her neck and low back with modest relief of the pain  She has not trialed medications other than OTC tylenol and ibuprofen  MRI cervical spine noncontrast, MRI lumbar spine noncontrast show mild compressive disease with spondyloarthropathy  Will proceed with multimodal pain therapy; counseled regarding lifestyle modifications including PT  Plan  -bilateral L3, L4, L5 medial branch blocks #2; f/u 2 weeks post procedure  -gabapentin 100 mg t i d  Ordered for patient; has since tapered counseled regarding sedative effects of taking this medication and provided up titration calendar  Counseled not to take medication while driving or operating heavy machinery/using stairs  -meloxicam 7 5 mg b i d  P r n   pain  Patient has since tapered  Patient has since tapered   Patient educated regarding bleeding risk of taking this medication not taking any other nonsteroidal anti-inflammatory medications while taking this medication; counseled thoroughly regarding potential risk of Cardiovascular injury, Kidney injury, Gastrointestinal ulceration/bleeding  Patient voiced understanding  -physical therapy for right-sided lumbar radiculopathy, lumbar facet arthropathy; Physician directed home exercise plan as per [facility] demonstrated and handouts provided that patient plans to participate with for 1 hour, twice a week for the next 6 weeks  There are risks associated with opioid medications, including dependence, addiction and tolerance  The patient understands and agrees to use these medications only as prescribed  Potential side effects of the medications include, but are not limited to, constipation, drowsiness, addiction, impaired judgment and risk of fatal overdose if not taken as prescribed  The patient was warned against driving while taking sedation medications  Sharing medications is a felony  At this point in time, the patient is showing no signs of addiction, abuse, diversion or suicidal ideation  [place] Prescription Drug Monitoring Program report was reviewed and was appropriate      Complete risks and benefits including bleeding, infection, tissue reaction, nerve injury and allergic reaction were discussed  The approach was demonstrated using models and literature was provided  Verbal and written consent was obtained  My impressions and treatment recommendations were discussed in detail with the patient who verbalized understanding and had no further questions  Discharge instructions were provided  I personally saw and examined the patient and I agree with the above discussed plan of care  My impressions and treatment recommendations were discussed in detail with the patient who verbalized understanding and had no further questions  Discharge instructions were provided  I personally saw and examined the patient and I agree with the above discussed plan of care      New Medications Ordered This Visit   Medications    DULoxetine (CYMBALTA) 30 mg delayed release capsule     Sig: duloxetine 30 mg capsule,delayed release       History of Present Illness    Greater than 90% relief of pain with improved ability to participate with IADLs after bilateral L3, L4, L5 medial branch blocks #1 for one week  Previously reported the following symptomatology:     Cammy Peña is a 46 y o  female with past medical history of hypertension, hypothyroidism, hypercholesterolemia, migraine headaches presenting with neck and low back pain described primarily radicular features in neck and axial/arthritic features in low back  Neck pain radiates into the C6 and C7 dermatomal distribution bilaterally accompanied by pain limited weakness numbness and paresthesias  In low back radicular pain travels into the bilateral L3 and L4 dermatomal distribution, right greater than left; however in low back, features are mostly arthritic in nature  She describes lancinating features of the pain  She has been to formal physical therapy for both neck and low back pain with modest relief of her symptoms  She has trialed over-the-counter NSAIDs as well as Tylenol with modest relief of the pain  Her pain significantly impacts independent activities of daily living and overall function, 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  She denies any bowel or bladder abnormality, incontinence, gait instability, headaches or dizziness  I have personally reviewed and/or updated the patient's past medical history, past surgical history, family history, social history, current medications, allergies, and vital signs today  Review of Systems   Constitutional: Positive for activity change  HENT: Negative  Eyes: Negative  Respiratory: Negative  Cardiovascular: Negative  Gastrointestinal: Negative  Endocrine: Negative  Genitourinary: Negative      Musculoskeletal: Positive for arthralgias, back pain, myalgias, neck pain and neck stiffness  Skin: Negative  Allergic/Immunologic: Negative  Neurological: Positive for weakness and numbness  Hematological: Negative  Psychiatric/Behavioral: Negative  All other systems reviewed and are negative  Patient Active Problem List   Diagnosis    Palpitations    Hypertension    Hypokalemia    SVT (supraventricular tachycardia) (HCC)    VT (ventricular tachycardia) (HCC)    Hyperlipidemia    Depression    Osteoporosis    Arthritis    Other specified hypothyroidism    Migraine    Glaucoma    Anxiety    Restless legs    History of syncope    Polyarthralgia    Hyperparathyroidism ([address] )       Past Medical History:   Diagnosis Date    Allergic 1974    Seasonal    Anxiety     Arthritis     Colitis     Noted on colonoscopy 04/24/2020      Depression     Disease of thyroid gland     Gastritis     Noted on EGD 04/24/2020    Gastroparesis     Glaucoma     Headache(784 0)     Hyperlipidemia     Hypertension     Hypothyroidism     Lyme disease     Migraine     Osteoporosis     Scoliosis     SVT (supraventricular tachycardia) (HCC)     Visual impairment     VT (ventricular tachycardia) (HCC)        Past Surgical History:   Procedure Laterality Date    BREAST BIOPSY Right 10/21/2020    papilloma    COLONOSCOPY      EGD      FINGER SURGERY      left pinky    FL GUIDED NEEDLE PLAC BX/ASP/INJ  3/17/2022    HYSTERECTOMY      age- 45    HYSTERECTOMY W/ SALPINGO-OOPHERECTOMY  05/2008    NERVE BLOCK Bilateral 3/17/2022    Procedure: L3, L4, L5 BLOCK MEDIAL BRANCH;  Surgeon: Marian Edmondson MD;  Location: [facility];  Service: Pain Management     OOPHORECTOMY Bilateral     age- 45    US GUIDED BREAST BIOPSY RIGHT COMPLETE Right 10/21/2020       Family History   Problem Relation Age of Onset    Peripheral vascular disease Mother     Glaucoma Mother     Prostate cancer Father     Hypothyroidism Sister     No Known Problems Daughter     Colon cancer Maternal Grandmother     No Known Problems Maternal Grandfather     Arthritis Paternal Grandmother     No Known Problems Paternal Grandfather     No Known Problems Daughter     No Known Problems Maternal Aunt     No Known Problems Maternal Aunt     Skin cancer Paternal Aunt     No Known Problems Paternal Aunt        Social History     Occupational History    Not on file   Tobacco Use    Smoking status: Former Smoker     Packs/day: 1 00     Years: 10 00     Pack years: 10 00     Types: Cigarettes     Quit date: 1/1/2007     Years since quitting: 15 5    Smokeless tobacco: Never Used    Tobacco comment: quit 2007   Vaping Use    Vaping Use: Never used   Substance and Sexual Activity    Alcohol use: Never    Drug use: Never    Sexual activity: Yes     Birth control/protection: Post-menopausal     Comment: hysterectomy       Current Outpatient Medications on File Prior to Visit   Medication Sig    atorvastatin (LIPITOR) 40 mg tablet Take 1 tablet (40 mg total) by mouth daily    Calcium Carbonate-Vit D-Min (CALCIUM 1200 PO) Take by mouth daily     Cholecalciferol (VITAMIN D3 PO) Take 1,000 mg by mouth    DULoxetine (CYMBALTA) 30 mg delayed release capsule duloxetine 30 mg capsule,delayed release    Glucosamine-Chondroitin 500-400 MG CAPS Take by mouth daily     hydrOXYzine HCL (ATARAX) 25 mg tablet Take 1 tablet (25 mg total) by mouth as needed for anxiety    irbesartan (AVAPRO) 75 mg tablet Take 1 tablet (75 mg total) by mouth daily    levothyroxine 100 mcg tablet Take 1 tablet (100 mcg total) by mouth daily    Magnesium 250 MG TABS Take 1 tablet (250 mg total) by mouth daily    metoprolol succinate (TOPROL-XL) 25 mg 24 hr tablet Take 1 tablet (25 mg total) by mouth daily    multivitamin (THERAGRAN) TABS Take 1 tablet by mouth daily    Omega-3 Fatty Acids (FISH OIL OMEGA-3 PO) Take by mouth    omeprazole (PriLOSEC) 40 MG capsule     SUMAtriptan (IMITREX) 100 mg tablet Take 100 mg by mouth as needed    ALPRAZolam (XANAX) 0 5 mg tablet Take 1 tablet (0 5 mg total) by mouth 30 min pre-procedure (Patient not taking: No sig reported)    b complex vitamins capsule Take 1 capsule by mouth daily (Patient not taking: No sig reported)    bimatoprost (Lumigan) 0 01 % ophthalmic drops Lumigan 0 01 % eye drops (Patient not taking: No sig reported)    gabapentin (NEURONTIN) 100 mg capsule Take 1 capsule (100 mg total) by mouth 3 (three) times a day (Patient not taking: No sig reported)    Inulin (FIBER CHOICE PO) Take by mouth (Patient not taking: No sig reported)    LORazepam (ATIVAN) 0 5 mg tablet Take 0 5 mg by mouth as needed (Patient not taking: No sig reported)    Methyl Salicylate-Lido-Menthol (LidoPro) 4-4-5 % PTCH LidoPro 4 %-4 %-5 % topical patch   APPLY 1 PATCH TO THE AFFECTED AREA EVERY 8 TO 12 HOURS AS NEEDED  MAX OF 2 PATCHES PER DAY (Patient not taking: No sig reported)    Thiamine HCl (VITAMIN B1 PO) Take 1 capsule by mouth daily (Patient not taking: No sig reported)     No current facility-administered medications on file prior to visit  No Known Allergies      Physical Exam    /62 (BP Location: Right arm, Patient Position: Sitting, Cuff Size: Standard)   Pulse 70   Temp 98 4 °F (36 9 °C)   Ht 5' 8" (1 727 m)   Wt 64 9 kg (143 lb)   SpO2 98%   BMI 21 74 kg/m²     Constitutional: normal, well developed, well nourished, alert, in no distress and non-toxic and no overt pain behavior  Eyes: anicteric  HEENT: grossly intact  Neck: supple, symmetric, trachea midline and no masses   Pulmonary:even and unlabored  Cardiovascular:No edema or pitting edema present  Skin:Normal without rashes or lesions and well hydrated  Psychiatric:Mood and affect appropriate  Neurologic:Cranial Nerves II-XII grossly intact Sensation grossly intact; no clonus negative bunn's  Reflexes 2+ and brisk  SLR negative bilaterally  Spurling's maneuver negative bilaterally    Musculoskeletal:normal gait  5/5 strength bilaterally with AROM in all extremities  Slight pain limited weakness with left hip flexion, knee extension  Normal heel toe and tip toe walking  Significant pain with cervical and lumbar facet loading bilaterally and with lateral spine rotation  TTP over cervical and lumbar paraspinal muscles  Negative davie's test, negative gaenslen's negative SIJ loading bilaterally  Imaging    MRI CERVICAL SPINE WITHOUT CONTRAST     INDICATION: M54 12: Radiculopathy, cervical region      COMPARISON:  5/9/2021     TECHNIQUE:  Sagittal T1, sagittal T2, sagittal inversion recovery, axial T2, axial  2D merge     IMAGE QUALITY:  Diagnostic     FINDINGS:     ALIGNMENT:  There is trace anterolisthesis of C3-C4      MARROW SIGNAL:  Normal marrow signal is identified within the visualized bony structures  No discrete marrow lesion      CERVICAL AND VISUALIZED THORACIC CORD:  Normal signal within the visualized cord      PREVERTEBRAL AND PARASPINAL SOFT TISSUES:  Normal      VISUALIZED POSTERIOR FOSSA:  The visualized posterior fossa demonstrates no abnormal signal      CERVICAL DISC SPACES:     C2-C3:  Normal      C3-C4:  There is left-sided facet arthrosis  There is no significant canal stenosis or foraminal narrowing      C4-C5:  There is a disc osteophyte complex with uncovertebral spurring  There is facet arthrosis  There is mild left foraminal narrowing  There is no significant canal stenosis      C5-C6:  There is a disc osteophyte complex with left-sided uncovertebral spurring  There is mild left foraminal encroachment      C6-C7:  No significant canal stenosis or foraminal narrowing      C7-T1:  No significant canal stenosis or foraminal narrowing  MRI LUMBAR SPINE WITHOUT CONTRAST     INDICATION: M54 12:  Radiculopathy, cervical region      COMPARISON:  None      TECHNIQUE:  Sagittal T1, sagittal T2, sagittal inversion recovery, axial T1 and axial T2, coronal T2     IMAGE QUALITY: Diagnostic     FINDINGS:     VERTEBRAL BODIES:  There are 5 lumbar type vertebral bodies  Normal alignment of the lumbar spine  No spondylolysis or spondylolisthesis  No scoliosis  No compression fracture  No suspicious marrow signal abnormality  Hemangioma within the L1   vertebral body      SACRUM:  Normal signal within the sacrum  No evidence of insufficiency or stress fracture      DISTAL CORD AND CONUS:  Normal size and signal within the distal cord and conus  Conus medullaris terminates at T12-L1      PARASPINAL SOFT TISSUES:  Paraspinal soft tissues are unremarkable      LOWER THORACIC DISC SPACES:  Normal disc height and signal   No disc herniation, canal stenosis or foraminal narrowing      LUMBAR DISC SPACES:     L1-L2:  No significant canal stenosis or foraminal narrowing      L2-L3:  No significant canal stenosis or foraminal narrowing      L3-L4:  Minimal bulge  No significant canal stenosis or foraminal narrowing      L4-L5:  Mild bulge, asymmetric to the right annular fissure  No significant canal stenosis  Mild foraminal narrowing      L5-S1:  Bulging annulus  Facet arthrosis  No significant canal stenosis  Mild right foraminal narrowing      IMPRESSION:     Mild degenerative changes of the lumbar spine, as described above  CT CERVICAL SPINE - WITHOUT CONTRAST     INDICATION:   TRAUMA      COMPARISON:  None      TECHNIQUE:  CT examination of the cervical spine was performed without intravenous contrast   Contiguous axial images were obtained  Sagittal and coronal reconstructions were performed        Radiation dose length product (DLP) for this visit:  355 mGy-cm     This examination, like all CT scans performed in the [place], was performed utilizing techniques to minimize radiation dose exposure, including the use of iterative   reconstruction and automated exposure control        IMAGE QUALITY:  Diagnostic      FINDINGS:     ALIGNMENT:  Normal alignment of the cervical spine   No subluxation      VERTEBRAL BODIES:  No fracture      DEGENERATIVE CHANGES:  No significant cervical degenerative changes are noted      PREVERTEBRAL AND PARASPINAL SOFT TISSUES:  Unremarkable      THORACIC INLET:  Normal      IMPRESSION:     No cervical spine fracture or traumatic malalignment      The study was marked in EPIC for immediate notification

## 2022-07-05 ENCOUNTER — TELEPHONE (OUTPATIENT)
Dept: PAIN MEDICINE | Facility: CLINIC | Age: 52
End: 2022-07-05

## 2022-07-06 ENCOUNTER — REMOTE DEVICE CLINIC VISIT (OUTPATIENT)
Dept: CARDIOLOGY CLINIC | Facility: CLINIC | Age: 52
End: 2022-07-06
Payer: COMMERCIAL

## 2022-07-06 DIAGNOSIS — Z95.818 PRESENCE OF OTHER CARDIAC IMPLANTS AND GRAFTS: Primary | ICD-10-CM

## 2022-07-06 PROCEDURE — G2066 INTER DEVC REMOTE 30D: HCPCS | Performed by: INTERNAL MEDICINE

## 2022-07-06 PROCEDURE — 93298 REM INTERROG DEV EVAL SCRMS: CPT | Performed by: INTERNAL MEDICINE

## 2022-07-06 NOTE — PROGRESS NOTES
AVE [id number]/ ACTIVE SYSTEM IS MRI CONDITIONAL   CARELINK TRANSMISSION: LOOP RECORDER  PRESENTING RHYTHM NSR @ 64 BPM   BATTERY STATUS "OK " NO PATIENT OR DEVICE ACTIVATED EPISODES  NORMAL DEVICE FUNCTION   DL

## 2022-07-08 ENCOUNTER — HOSPITAL ENCOUNTER (OUTPATIENT)
Facility: HOSPITAL | Age: 52
Setting detail: OUTPATIENT SURGERY
Discharge: HOME | End: 2022-07-08
Attending: ANESTHESIOLOGY | Admitting: ANESTHESIOLOGY
Payer: COMMERCIAL

## 2022-07-08 ENCOUNTER — APPOINTMENT (OUTPATIENT)
Dept: RADIOLOGY | Facility: HOSPITAL | Age: 52
End: 2022-07-08
Payer: COMMERCIAL

## 2022-07-08 VITALS
RESPIRATION RATE: 18 BRPM | BODY MASS INDEX: 21.67 KG/M2 | WEIGHT: 143 LBS | SYSTOLIC BLOOD PRESSURE: 127 MMHG | DIASTOLIC BLOOD PRESSURE: 64 MMHG | OXYGEN SATURATION: 100 % | HEART RATE: 58 BPM | HEIGHT: 68 IN | TEMPERATURE: 97.9 F

## 2022-07-08 PROCEDURE — 64493 INJ PARAVERT F JNT L/S 1 LEV: CPT | Performed by: ANESTHESIOLOGY

## 2022-07-08 PROCEDURE — 64494 INJ PARAVERT F JNT L/S 2 LEV: CPT | Performed by: ANESTHESIOLOGY

## 2022-07-08 RX ORDER — METHYLPREDNISOLONE ACETATE 80 MG/ML
80 INJECTION, SUSPENSION INTRA-ARTICULAR; INTRALESIONAL; INTRAMUSCULAR; PARENTERAL; SOFT TISSUE ONCE
Status: COMPLETED | OUTPATIENT
Start: 2022-07-08 | End: 2022-07-08

## 2022-07-08 RX ORDER — LIDOCAINE HYDROCHLORIDE 10 MG/ML
10 INJECTION, SOLUTION EPIDURAL; INFILTRATION; INTRACAUDAL; PERINEURAL ONCE
Status: COMPLETED | OUTPATIENT
Start: 2022-07-08 | End: 2022-07-08

## 2022-07-08 RX ORDER — BUPIVACAINE HCL/PF 2.5 MG/ML
10 VIAL (ML) INJECTION ONCE
Status: COMPLETED | OUTPATIENT
Start: 2022-07-08 | End: 2022-07-08

## 2022-07-08 NOTE — INTERVAL H&P NOTE
H&P reviewed  After examining the patient I find no changes in the patients condition since the H&P had been written      Vitals:    07/08/22 0727   BP: 130/67   Pulse: 66   Resp: 18   Temp: 97 7 °F (36 5 °C)   SpO2: 100%

## 2022-07-08 NOTE — PROCEDURES
Pre-procedure Diagnosis: Lumbar Facet Arthropathy  Post-procedure Diagnosis: Lumbar Facet Arthropathy  Procedure Title(s):  [BILATERAL L3, L4, L5] medial branch nerve blocks [(CONFIRMATORY)]  Attending Surgeon:   Billie Yousif MD  Anesthesia:   Local     Indications: The patient is a 46y o  year-old female with a diagnosis of lumbar facet arthropathy  The patient's history and physical exam were reviewed  The risks, benefits and alternatives to the procedure were discussed, and all questions were answered to the patient's satisfaction  The patient agreed to proceed, and written informed consent was obtained  Procedure in Detail: The patient was brought into the procedure room and placed in the prone position on the fluoroscopy table  The area of the lumbar spine was prepped with chloraprep and then draped in a sterile manner  AP fluoroscopy was used to identify the [L3-L5] levels on the [LEFT] side  The C-arm was obliqued to visualize the junction of the superior articulate process and transverse process  The sacral ala was identified and marked  The skin in these identified areas was anesthetized with 1% lidocaine  A 22-gauge, 3½-inch spinal needle was advanced toward each of these points under fluoroscopic guidance  Once bone was contacted, negative aspiration was confirmed and [1-mL] of a [6mL]mixture of [5mL] [0 25% bupivicaine] and 1mL of 80mg/mL Depomedrol was injected at each level  (The same procedure was performed on the RIGHT side )    After the procedure was completed, the patient's back was cleaned and bandages were placed at the needle insertion sites  Disposition: The patient tolerated the procedure well, and there were no apparent complications  The patient was taken to the recovery area where written discharge instructions for the procedure were given  The patient was given a pain diary to determine if the patient's pain improves following the injection   Once the diary is returned we will consider next appropriate course of treatment      Postoperative pain relief [WAS] significant    Estimated Blood Loss: None  Specimens Obtained: N/A

## 2022-07-08 NOTE — DISCHARGE INSTRUCTIONS
YOUR 2 WEEK FOLLOW UP HAS BEEN SCHEDULED; IF YOU WISH TO CHANGE THE FOLLOW UP, PLEASE CALL THE [facility] AT [place]: [phone]  MEDIAL BRANCH BLOCK DISCHARGE INSTRUCTIONS  ACTIVITY  Please do activities that will bring the normal pain that we are rating  For example, if vacuuming or walking increases the painm do that  Leonel twill give the most accurate response to the diary  You may shower, but no tub baths today, or applied heat  CARE OF THE INJECTION SITE  This area may be numb for several hours after the injection  Notify the [facility] if you have any of the following: redness, drainage, swelling or fever above 100°F     SPECIAL INSTRUCTIONS  Please return the MBB diary to our office by mail, fax, or drop it off  MEDICATIONS  Please do not take any break through or short acting pain medications for 8 hours after the block  Continue to take all routine medications  Our office may have instructed you to hold some medications  You may resume _______________________________________________  If you have any problems specifically related to your procedure, please call our office at [phone]  Problems not related to your procedure should be directed at your primary care physician  Lumbar Radiofrequency Ablation   WHAT YOU NEED TO KNOW:   What do I need to know about lumbar radiofrequency ablation? Lumbar radiofrequency ablation (RFA) is a procedure used to treat facet joint pain in your lower back  Facet joints are found at the back of each vertebra  A needle electrode is used to send electrical currents to the nerves in your facet joint  The electrical currents create heat that damages the nerve so it cannot send pain signals  How do I prepare for lumbar RFA? Your healthcare provider will talk to you about how to prepare for this procedure  He may tell you not to eat or drink anything after midnight on the day of your procedure   He will tell you what medicines to take or not take on the day of your procedure  What will happen during lumbar RFA? You will lie on your stomach  You will be given local anesthesia to numb the area of your back where the needle electrode will be inserted  You may be given a sedative to help keep you relaxed  You may still feel pressure or pushing during the procedure, but you should not feel any pain  Your healthcare provider will use fluoroscopy (a type of x-ray) to guide the needle electrode to the nerves near your facet joint  Your healthcare provider may touch the affected nerve to make sure the needle electrode is in the right place  You will feel tingling or pressure when he does this  He will then apply local anesthesia to the nerve to numb it  This will prevent you from feeling pain when he applies heat to the nerve  Your healthcare provider will then apply heat to the nerve using the needle electrode  He may need to apply heat to more than one nerve  He will remove the needle electrode and apply a bandage over the area  What are the risks of lumbar RFA? You may have pain, numbness, tingling, or burning in the area where the lumbar RFA was done  These normally go away within 6 weeks  The needle electrode may injure your spinal nerves  This may cause permanent leg weakness or nerve pain  CARE AGREEMENT:   You have the right to help plan your care  Learn about your health condition and how it may be treated  Discuss treatment options with your healthcare providers to decide what care you want to receive  You always have the right to refuse treatment  The above information is an  only  It is not intended as medical advice for individual conditions or treatments  Talk to your doctor, nurse or pharmacist before following any medical regimen to see if it is safe and effective for you    © Copyright [employer] 2022 Information is for End User's use only and may not be sold, redistributed or otherwise used for commercial purposes   All illustrations and images included in CareNotes® are the copyrighted property of A D A M , Inc  or [facility]

## 2022-07-08 NOTE — OP NOTE
OPERATIVE REPORT  PATIENT NAME: Melissa Apple    :  1970  MRN: [id number]  Pt Location:  GI ROOM 01    SURGERY DATE: 2022    Surgeon(s) and Role: Roxana Combs MD - Primary    Preop Diagnosis:  Lumbar spondylosis [M47 816]    Post-Op Diagnosis Codes:     * Lumbar spondylosis [M47 816]    Procedure(s) (LRB):  BLOCK MEDIAL BRANCH L3, L4, L5 #2 (Bilateral)    Specimen(s):  * No specimens in log *    Estimated Blood Loss:   Minimal    Drains:  * No LDAs found *    Anesthesia Type:   Local    Operative Indications:  Lumbar spondylosis [M47 816]    Operative Findings:  Bilateral L3, L4, L5 medial branch nerve regions identified under fluoroscopic guidance       Complications:   None    Procedure and Technique:  Please see detailed procedure note     I was present for the entire procedure    Patient Disposition:  PACU       SIGNATURE: Carola Peace MD  DATE: 2022  TIME: 8:11 AM

## 2022-07-18 ENCOUNTER — HOSPITAL ENCOUNTER (OUTPATIENT)
Dept: NUCLEAR MEDICINE | Facility: HOSPITAL | Age: 52
Discharge: HOME | End: 2022-07-18
Payer: COMMERCIAL

## 2022-07-18 DIAGNOSIS — R10.11 RIGHT UPPER QUADRANT PAIN: ICD-10-CM

## 2022-07-18 DIAGNOSIS — R11.2 NAUSEA WITH VOMITING, UNSPECIFIED: ICD-10-CM

## 2022-07-18 PROCEDURE — 78227 HEPATOBIL SYST IMAGE W/DRUG: CPT

## 2022-07-18 PROCEDURE — A9537 TC99M MEBROFENIN: HCPCS

## 2022-07-18 RX ADMIN — SINCALIDE 1.3 MCG: 5 INJECTION, POWDER, LYOPHILIZED, FOR SOLUTION INTRAVENOUS at 09:15

## 2022-08-01 ENCOUNTER — TELEPHONE (OUTPATIENT)
Dept: FAMILY MEDICINE CLINIC | Facility: CLINIC | Age: 52
End: 2022-08-01

## 2022-08-01 NOTE — TELEPHONE ENCOUNTER
Reschedule her follow-up for next week and if she is feeling better then she can have her shingles vaccine at that time

## 2022-08-01 NOTE — TELEPHONE ENCOUNTER
Patient called she tested positive for Covid yesterday, she has a follow up apt tomorrow and a 2nd shingles shot on Friday, just wondering how far out should we reschedule, these appointments, Please advise

## 2022-08-08 ENCOUNTER — PATIENT MESSAGE (OUTPATIENT)
Dept: ENDOCRINOLOGY | Facility: CLINIC | Age: 52
End: 2022-08-08

## 2022-08-08 ENCOUNTER — OFFICE VISIT (OUTPATIENT)
Dept: FAMILY MEDICINE CLINIC | Facility: CLINIC | Age: 52
End: 2022-08-08
Payer: COMMERCIAL

## 2022-08-08 VITALS
BODY MASS INDEX: 22.52 KG/M2 | WEIGHT: 148.6 LBS | HEART RATE: 89 BPM | TEMPERATURE: 95.5 F | OXYGEN SATURATION: 97 % | HEIGHT: 68 IN | SYSTOLIC BLOOD PRESSURE: 110 MMHG | DIASTOLIC BLOOD PRESSURE: 70 MMHG

## 2022-08-08 DIAGNOSIS — U07.1 COVID-19: Primary | ICD-10-CM

## 2022-08-08 DIAGNOSIS — M81.0 OSTEOPOROSIS, UNSPECIFIED OSTEOPOROSIS TYPE, UNSPECIFIED PATHOLOGICAL FRACTURE PRESENCE: Primary | ICD-10-CM

## 2022-08-08 DIAGNOSIS — E03.9 ACQUIRED HYPOTHYROIDISM: ICD-10-CM

## 2022-08-08 DIAGNOSIS — I47.20 VT (VENTRICULAR TACHYCARDIA): ICD-10-CM

## 2022-08-08 DIAGNOSIS — G25.81 RESTLESS LEGS: ICD-10-CM

## 2022-08-08 DIAGNOSIS — G43.809 OTHER MIGRAINE WITHOUT STATUS MIGRAINOSUS, NOT INTRACTABLE: ICD-10-CM

## 2022-08-08 DIAGNOSIS — R53.83 OTHER FATIGUE: ICD-10-CM

## 2022-08-08 DIAGNOSIS — K21.9 GASTROESOPHAGEAL REFLUX DISEASE WITHOUT ESOPHAGITIS: ICD-10-CM

## 2022-08-08 DIAGNOSIS — R09.81 NASAL CONGESTION: ICD-10-CM

## 2022-08-08 DIAGNOSIS — M54.50 ACUTE BILATERAL LOW BACK PAIN WITHOUT SCIATICA: ICD-10-CM

## 2022-08-08 DIAGNOSIS — I47.1 SVT (SUPRAVENTRICULAR TACHYCARDIA) (HCC): ICD-10-CM

## 2022-08-08 DIAGNOSIS — E78.2 MIXED HYPERLIPIDEMIA: ICD-10-CM

## 2022-08-08 DIAGNOSIS — F41.9 ANXIETY: ICD-10-CM

## 2022-08-08 DIAGNOSIS — F33.0 MILD EPISODE OF RECURRENT MAJOR DEPRESSIVE DISORDER (HCC): ICD-10-CM

## 2022-08-08 DIAGNOSIS — I10 PRIMARY HYPERTENSION: ICD-10-CM

## 2022-08-08 PROBLEM — E87.6 HYPOKALEMIA: Status: RESOLVED | Noted: 2020-09-11 | Resolved: 2022-08-08

## 2022-08-08 PROBLEM — Z87.898 HISTORY OF SYNCOPE: Status: RESOLVED | Noted: 2022-03-12 | Resolved: 2022-08-08

## 2022-08-08 PROBLEM — E21.3 HYPERPARATHYROIDISM (HCC): Status: RESOLVED | Noted: 2022-05-24 | Resolved: 2022-08-08

## 2022-08-08 PROCEDURE — 99214 OFFICE O/P EST MOD 30 MIN: CPT | Performed by: NURSE PRACTITIONER

## 2022-08-08 NOTE — LETTER
August 8, 2022     Patient: Armand Duran  YOB: 1970  Date of Visit: 8/8/2022      To Whom it May Concern:    Armand Duran is under my professional care  Vicente Srivastava was seen in my office on 8/8/2022  [place] Srivastava may return to work for her next scheduled shift  If you have any questions or concerns, please don't hesitate to call           Sincerely,          Georgie Perez

## 2022-08-08 NOTE — PROGRESS NOTES
Assessment/Plan:       Diagnoses and all orders for this visit:    COVID-19    Other migraine without status migrainosus, not intractable    Gastroesophageal reflux disease without esophagitis    Restless legs    Primary hypertension    Mixed hyperlipidemia    Anxiety    Acute bilateral low back pain without sciatica    Acquired hypothyroidism    Mild episode of recurrent major depressive disorder (HCC)    SVT (supraventricular tachycardia) (HCC)    VT (ventricular tachycardia) (HCC)    Nasal congestion    Other fatigue      Will have her wait an additional 2 weeks for her 2nd shingles vaccine due to her recent positive COVID test       Back pain is tolerable  SVT and VT controlled - no recent episodes  Continue with PPI  Continue with current dose of levothyroxine at this time  Recommended she take claritin or allegra for ongoing nasal congestion post covid  BP in acceptable range    She may return to work tomorrow  Subjective:      Patient ID: Danny Butt is a 46 y o  female  Here today for a follow-up  Recent hx of COVID - positive home test on 08/01 - reports still with some nasal congestion and fatigue  Hx of GERD - controlled  Hx of ongoing back pain - stable, still with pain but controlled  Hx of Restless legs - reports she is tolerating it  Anxiety and depression controlled with cymbalta  The following portions of the patient's history were reviewed and updated as appropriate: allergies, current medications, past family history, past medical history, past social history, past surgical history and problem list     Review of Systems   Constitutional: Negative  HENT: Negative  Respiratory: Negative  Cardiovascular: Negative  Gastrointestinal: Negative  Musculoskeletal:        See HPI   Neurological: Negative  All other systems reviewed and are negative          Objective:      /70 (BP Location: Left arm, Patient Position: Sitting, Cuff Size: Standard)   Pulse 89   Temp (!) 95 5 °F (35 3 °C)   Ht 5' 8" (1 727 m)   Wt 67 4 kg (148 lb 9 6 oz)   SpO2 97%   BMI 22 59 kg/m²          Physical Exam  Constitutional:       Appearance: Normal appearance  Cardiovascular:      Rate and Rhythm: Normal rate and regular rhythm  Pulmonary:      Effort: Pulmonary effort is normal       Breath sounds: Normal breath sounds  Neurological:      Mental Status: She is alert  Psychiatric:         Mood and Affect: Mood normal          Behavior: Behavior normal          Thought Content:  Thought content normal          Judgment: Judgment normal

## 2022-08-10 ENCOUNTER — APPOINTMENT (OUTPATIENT)
Dept: LAB | Facility: HOSPITAL | Age: 52
End: 2022-08-10
Attending: STUDENT IN AN ORGANIZED HEALTH CARE EDUCATION/TRAINING PROGRAM
Payer: COMMERCIAL

## 2022-08-10 DIAGNOSIS — M81.0 OSTEOPOROSIS, UNSPECIFIED OSTEOPOROSIS TYPE, UNSPECIFIED PATHOLOGICAL FRACTURE PRESENCE: ICD-10-CM

## 2022-08-10 LAB
ALBUMIN SERPL BCP-MCNC: 4 G/DL (ref 3.5–5)
ALP SERPL-CCNC: 73 U/L (ref 46–116)
ALT SERPL W P-5'-P-CCNC: 29 U/L (ref 12–78)
ANION GAP SERPL CALCULATED.3IONS-SCNC: 9 MMOL/L (ref 4–13)
AST SERPL W P-5'-P-CCNC: 20 U/L (ref 5–45)
BILIRUB SERPL-MCNC: 0.54 MG/DL (ref 0.2–1)
BUN SERPL-MCNC: 12 MG/DL (ref 5–25)
CALCIUM SERPL-MCNC: 8.9 MG/DL (ref 8.3–10.1)
CHLORIDE SERPL-SCNC: 103 MMOL/L (ref 96–108)
CO2 SERPL-SCNC: 28 MMOL/L (ref 21–32)
CREAT SERPL-MCNC: 0.89 MG/DL (ref 0.6–1.3)
GFR SERPL CREATININE-BSD FRML MDRD: 74 ML/MIN/1.73SQ M
GLUCOSE SERPL-MCNC: 97 MG/DL (ref 65–140)
PHOSPHATE SERPL-MCNC: 3.7 MG/DL (ref 2.7–4.5)
POTASSIUM SERPL-SCNC: 4 MMOL/L (ref 3.5–5.3)
PROT SERPL-MCNC: 7.7 G/DL (ref 6.4–8.4)
PTH-INTACT SERPL-MCNC: 49.5 PG/ML (ref 18.4–80.1)
SODIUM SERPL-SCNC: 140 MMOL/L (ref 135–147)

## 2022-08-10 PROCEDURE — 80053 COMPREHEN METABOLIC PANEL: CPT

## 2022-08-10 PROCEDURE — 84100 ASSAY OF PHOSPHORUS: CPT

## 2022-08-10 PROCEDURE — 83970 ASSAY OF PARATHORMONE: CPT

## 2022-08-10 PROCEDURE — 36415 COLL VENOUS BLD VENIPUNCTURE: CPT

## 2022-08-18 DIAGNOSIS — E03.9 ACQUIRED HYPOTHYROIDISM: ICD-10-CM

## 2022-08-19 RX ORDER — LEVOTHYROXINE SODIUM 0.1 MG/1
100 TABLET ORAL DAILY
Qty: 90 TABLET | Refills: 0 | Status: SHIPPED | OUTPATIENT
Start: 2022-08-19 | End: 2022-10-03 | Stop reason: SDUPTHER

## 2022-08-22 ENCOUNTER — CLINICAL SUPPORT (OUTPATIENT)
Dept: FAMILY MEDICINE CLINIC | Facility: CLINIC | Age: 52
End: 2022-08-22
Payer: COMMERCIAL

## 2022-08-22 VITALS
OXYGEN SATURATION: 99 % | TEMPERATURE: 97.6 F | BODY MASS INDEX: 22.67 KG/M2 | SYSTOLIC BLOOD PRESSURE: 130 MMHG | HEIGHT: 68 IN | WEIGHT: 149.6 LBS | HEART RATE: 76 BPM | DIASTOLIC BLOOD PRESSURE: 80 MMHG

## 2022-08-22 DIAGNOSIS — Z23 ENCOUNTER FOR IMMUNIZATION: Primary | ICD-10-CM

## 2022-08-22 PROCEDURE — 90750 HZV VACC RECOMBINANT IM: CPT

## 2022-08-22 PROCEDURE — 90471 IMMUNIZATION ADMIN: CPT

## 2022-09-08 ENCOUNTER — OFFICE VISIT (OUTPATIENT)
Dept: CARDIOLOGY CLINIC | Facility: CLINIC | Age: 52
End: 2022-09-08
Payer: COMMERCIAL

## 2022-09-08 VITALS
HEIGHT: 68 IN | OXYGEN SATURATION: 98 % | DIASTOLIC BLOOD PRESSURE: 62 MMHG | HEART RATE: 86 BPM | BODY MASS INDEX: 22.73 KG/M2 | WEIGHT: 150 LBS | SYSTOLIC BLOOD PRESSURE: 118 MMHG

## 2022-09-08 DIAGNOSIS — I47.20 VT (VENTRICULAR TACHYCARDIA): ICD-10-CM

## 2022-09-08 DIAGNOSIS — E78.2 MIXED HYPERLIPIDEMIA: ICD-10-CM

## 2022-09-08 DIAGNOSIS — R00.2 PALPITATIONS: Primary | ICD-10-CM

## 2022-09-08 DIAGNOSIS — I10 PRIMARY HYPERTENSION: ICD-10-CM

## 2022-09-08 DIAGNOSIS — E03.9 HYPOTHYROIDISM, UNSPECIFIED TYPE: ICD-10-CM

## 2022-09-08 DIAGNOSIS — Z87.898 HISTORY OF SYNCOPE: ICD-10-CM

## 2022-09-08 DIAGNOSIS — I47.1 SVT (SUPRAVENTRICULAR TACHYCARDIA) (HCC): ICD-10-CM

## 2022-09-08 PROCEDURE — 99213 OFFICE O/P EST LOW 20 MIN: CPT | Performed by: INTERNAL MEDICINE

## 2022-09-08 NOTE — PATIENT INSTRUCTIONS
Continue monitoring symptoms  I will check device transmission tomorrow and call you and let you know if it showed anything
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
## 2022-09-09 ENCOUNTER — TELEPHONE (OUTPATIENT)
Dept: CARDIOLOGY CLINIC | Facility: CLINIC | Age: 52
End: 2022-09-09

## 2022-09-12 ENCOUNTER — APPOINTMENT (OUTPATIENT)
Dept: LAB | Facility: HOSPITAL | Age: 52
End: 2022-09-12
Attending: INTERNAL MEDICINE
Payer: COMMERCIAL

## 2022-09-12 DIAGNOSIS — E78.2 MIXED HYPERLIPIDEMIA: ICD-10-CM

## 2022-09-12 LAB
CHOLEST SERPL-MCNC: 159 MG/DL
HDLC SERPL-MCNC: 62 MG/DL
LDLC SERPL CALC-MCNC: 70 MG/DL (ref 0–100)
NONHDLC SERPL-MCNC: 97 MG/DL
TRIGL SERPL-MCNC: 135 MG/DL

## 2022-09-12 PROCEDURE — 80061 LIPID PANEL: CPT

## 2022-09-12 PROCEDURE — 36415 COLL VENOUS BLD VENIPUNCTURE: CPT

## 2022-09-14 ENCOUNTER — TELEPHONE (OUTPATIENT)
Dept: CARDIOLOGY CLINIC | Facility: CLINIC | Age: 52
End: 2022-09-14

## 2022-09-14 NOTE — TELEPHONE ENCOUNTER
----- Message from Wiliam Gilbert 82 sent at 9/13/2022  5:15 PM EDT -----  Please let patient know that her cholesterol improved nicely! LDL now at 70, which is great  Thank you!

## 2022-09-15 ENCOUNTER — CLINICAL SUPPORT (OUTPATIENT)
Dept: NUTRITION | Facility: CLINIC | Age: 52
End: 2022-09-15
Payer: COMMERCIAL

## 2022-09-15 VITALS — WEIGHT: 149.2 LBS | BODY MASS INDEX: 22.69 KG/M2

## 2022-09-15 DIAGNOSIS — K31.84 GASTROPARESIS: ICD-10-CM

## 2022-09-15 PROCEDURE — 97802 MEDICAL NUTRITION INDIV IN: CPT | Performed by: DIETITIAN, REGISTERED

## 2022-09-15 NOTE — PROGRESS NOTES
Initial Nutrition Assessment Form    Patient Name: Michelle Lopez    YOB: 1970    Sex: Female     Assessment Date: 9/15/2022  Start Time: 8:40 AM Stop Time: 9:47 AM Total Minutes: 67 min     Data:  Present at session: self   Parent/Patient Concerns: "Sometimes my gas is so bad I can't stand myself  I also want to lose weight "   Medical Dx/Reason for Referral: gastroparesis   Past Medical History:   Diagnosis Date    Allergic 1974    Seasonal    Anxiety     Arthritis     Colitis     Noted on colonoscopy 04/24/2020      Depression     Disease of thyroid gland     Gastritis     Noted on EGD 04/24/2020    Gastroparesis     Glaucoma     Headache(784 0)     Hyperlipidemia     Hypertension     Hypothyroidism     Lyme disease     Migraine     Osteoporosis     Scoliosis     SVT (supraventricular tachycardia) (HCC)     Visual impairment     VT (ventricular tachycardia) (HCC)        Current Outpatient Medications   Medication Sig Dispense Refill    ALPRAZolam (XANAX) 0 5 mg tablet Take 1 tablet (0 5 mg total) by mouth 30 min pre-procedure 4 tablet 0    atorvastatin (LIPITOR) 40 mg tablet Take 1 tablet (40 mg total) by mouth daily 90 tablet 1    Calcium Carbonate-Vit D-Min (CALCIUM 1200 PO) Take by mouth daily       Cholecalciferol (VITAMIN D3 PO) Take 1,000 mg by mouth      DULoxetine (CYMBALTA) 30 mg delayed release capsule duloxetine 30 mg capsule,delayed release      Glucosamine-Chondroitin 500-400 MG CAPS Take by mouth daily       hydrOXYzine HCL (ATARAX) 25 mg tablet Take 1 tablet (25 mg total) by mouth as needed for anxiety 30 tablet 1    irbesartan (AVAPRO) 75 mg tablet Take 1 tablet (75 mg total) by mouth daily 90 tablet 3    levothyroxine 100 mcg tablet Take 1 tablet (100 mcg total) by mouth daily 90 tablet 0    Magnesium 250 MG TABS Take 1 tablet (250 mg total) by mouth daily 30 tablet 0    metoprolol succinate (TOPROL-XL) 25 mg 24 hr tablet Take 1 tablet (25 mg total) by mouth daily 90 tablet 4    multivitamin (THERAGRAN) TABS Take 1 tablet by mouth daily      Omega-3 Fatty Acids (FISH OIL OMEGA-3 PO) Take by mouth      omeprazole (PriLOSEC) 40 MG capsule       scopolamine (TRANSDERM-SCOP) 1 mg/3 days TD 72 hr patch Place 1 mg on the skin (Patient not taking: Reported on 9/8/2022)      SUMAtriptan (IMITREX) 100 mg tablet Take 100 mg by mouth as needed       No current facility-administered medications for this visit  Additional Meds/Supplements: n/a   Special Learning Needs: None, pt works as PCA   Height: HC Readings from Last 5 Encounters:   No data found for [facility]       Weight: Wt Readings from Last 10 Encounters:   09/08/22 68 kg (150 lb)   08/22/22 67 9 kg (149 lb 9 6 oz)   08/08/22 67 4 kg (148 lb 9 6 oz)   07/08/22 64 9 kg (143 lb)   07/01/22 64 9 kg (143 lb)   05/24/22 67 6 kg (149 lb)   05/16/22 68 5 kg (151 lb)   04/04/22 68 8 kg (151 lb 9 6 oz)   03/23/22 70 4 kg (155 lb 3 2 oz)   03/17/22 70 8 kg (156 lb)     Estimated body mass index is 22 81 kg/m² as calculated from the following:    Height as of 9/8/22: 5' 8" (1 727 m)  Weight as of 9/8/22: 68 kg (150 lb)  Recent Weight Change: [x]Yes     []No  Amount: Pt reports she previously weighed 140lb a few years ago  She was trying to maintain this weight but after recovering from [place] she started gaining weight to 150lbs range  Energy Needs: Adnre Older Equation: 6515 - 2009 kcal (mifflin x 1 3 - 1 5 activity)   No Known Allergies    Social History     Substance and Sexual Activity   Alcohol Use Never    none   Social History     Tobacco Use   Smoking Status Former Smoker    Packs/day: 1 00    Years: 10 00    Pack years: 10 00    Types: Cigarettes    Quit date: 1/1/2007    Years since quitting: 15 7   Smokeless Tobacco Never Used   Tobacco Comment    quit 2007    none   Who shops? patient   Who cooks? patient   Exercise: None additional to her job, was walking before when her dog was alive  Plans to get another dog soon  Prior Counseling? []Yes     [x]No  When:      Why:         Diet Hx:  Breakfast: Diet: None  Coffee, black every morning  Cereal such as Special K or honey bunches of oats or grapenuts with almond milk  Banana sliced into cereal     Lunch: 1/2 sandwich made with whole wheat bread, lunch meat and cheese  Whole Foods: Typically is feeling so bloated and gassy that she skips dinner  Had this pattern for about a year now  May have HID Globals italian ice (31 g sugar per serving) or homemade watermelon frozen dessert with added sugar       Snacks: AM - diego crackers or saltine crackers throughout the day  water throughout the day  HS -      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 [employer]  Reports that she has been having gas, bloating, diarrhea that is uncontrolled/needs to run to the bathroom for about a year now  Pt was dx with gastroparesis along with diarrhea at GI office, differential dx for diarrhea including microscopic colitis, IBD, IBS, chronic infection, malabsorption, EPI or NSAID use  Pt reports she is depressed, she enjoys eating but because of symptoms has not been able to enjoy food  Nutrition Diagnosis:   Altered gastrointestinal function  related to Changes in the GI tract motility (i e  gastroparesis) as evidenced by  Abnormal gastric emptying and/or small bowel transit time + diarrhea       Medical Nutrition Therapy Intervention:  [x]Individualized Meal Plan--Discussed low fat, low fiber diet with small frequent meals  Use of rice crispies cereal or 50/50 mix with honey bunches of oats to reduce fiber content  Try lactaid milk instead of almond milk to reduce added sugar and improve nutrient content  Try [place] yogurt like [employer]ik[employer] Triple Zero or [employer] Sugar for probiotics  Canned fruit packed in 100% juice at lunch, unsalted saltines crackers instead of chips  White bread instead of whole grain or 1 slice each to reduce fiber intake   Frozen watermelon without added sugar or [place] ice cream bars instead of italian ice  Postum or hot tea instead of coffee in the morning  Limit added sugars/sorbitol/caffeine  []Understanding Lab Values   []Basic Pathophysiology of Disease [x]Food/Medication Interactions--?cymbalta contributing to diarrhea/n/v symptoms? [x]Food Diary--Monitor symptoms and keep log of foods that may be triggering diarrhea symptoms  [x]Exercise--Pt reports she will start increasing walking if she does purchase a new dog which may also help with depression  []Lifestyle/Behavior Modification Techniques []Medication, Mechanism of Action   []Label Reading []Self Blood Glucose Monitoring   [x]Weight/BMI Goals--Pt desires to weigh 140lb which would place pt at BMI of 21  Current body weight is within appropriate BMI, discussed with pt, does not need to lose weight though 140lb goal weight is appropriate too  []Other -   Handouts provided:  Gastroparesis Nutrition Therapy  Diarrhea Nutrition Therapy          Comprehension: []Excellent  []Very Good  [x]Good  []Fair   []Poor    Receptivity: []Excellent  []Very Good  [x]Good  []Fair   []Poor    Expected Compliance: []Excellent  []Very Good  [x]Good  []Fair   []Poor        Goals:  1  Pt to reduce fiber intake to < 3 g fiber per serving of food selections by follow up  2  Pt to keep weight within 140-150lb range by follow up  3  Pt to limit added sugar between 0-10 g per day  No follow-ups on file    Labs:  CMP  Lab Results   Component Value Date    K 4 0 08/10/2022     08/10/2022    CO2 28 08/10/2022    BUN 12 08/10/2022    CREATININE 0 89 08/10/2022    GLUF 87 05/17/2022    CALCIUM 8 9 08/10/2022    AST 20 08/10/2022    ALT 29 08/10/2022    ALKPHOS 73 08/10/2022    EGFR 74 08/10/2022       BMP  Lab Results   Component Value Date    CALCIUM 8 9 08/10/2022    K 4 0 08/10/2022    CO2 28 08/10/2022     08/10/2022    BUN 12 08/10/2022    CREATININE 0 89 08/10/2022       Lipids  No results found for: CHOL  Lab Results   Component Value Date    HDL 62 09/12/2022    HDL 53 05/17/2022    HDL 65 03/23/2022     Lab Results   Component Value Date    LDLCALC 70 09/12/2022    LDLCALC 111 (H) 05/17/2022    LDLCALC 94 03/23/2022     Lab Results   Component Value Date    TRIG 135 09/12/2022    TRIG 180 (H) 05/17/2022    TRIG 149 03/23/2022     No results found for: CHOLHDL    Hemoglobin A1C  Lab Results   Component Value Date    HGBA1C 5 0 03/23/2022       Fasting Glucose  Lab Results   Component Value Date    GLUF 87 05/17/2022       Insulin     Thyroid  No results found for: TSH, U9HABRB, Y8LWUMG, THYROIDAB    Hepatic Function Panel  Lab Results   Component Value Date    ALT 29 08/10/2022    AST 20 08/10/2022    ALKPHOS 73 08/10/2022       Celiac Disease Antibody Panel  No results found for: ENDOMYSIAL IGA, GLIADIN IGA, GLIADIN IGG, IGA, TISSUE TRANSGLUT AB, TTG IGA   Iron  Lab Results   Component Value Date    IRON 68 12/02/2021    TIBC 335 12/02/2021    FERRITIN 71 12/02/2021            Rosana Rodriguez RD, LDN  [facility]  [address]  [place] [place] [address]

## 2022-10-02 NOTE — ASSESSMENT & PLAN NOTE
Patient is currently on atorvastatin  40 mg daily  Lipid panel 2/11/2022: C 225  T 150  H 71  L 124  Jennifer Soulier Should have repeat lipid panel 2/2023

## 2022-10-02 NOTE — ASSESSMENT & PLAN NOTE
Patient continued to have intermittent episodes of palpitations at her initial office visit  She described her heart racing at times  Patient did undergo ZIO monitor 01//2020 which showed predominantly normal sinus rhythm with average heart rate of 78 beats per minute with rare PVCs and PACs  ZIO did show 1 run of NSVT lasting 7 beats and 2 runs of SVT with the longest lasting 9 beats (no diary entries to correlate with symptoms)  Continue magnesium 250 mg daily  Patient has cut back on caffeine intake and has been trying to manage stress better  She will continue to monitor symptoms  Continue metoprolol succinate 25 mg daily  Continue magnesium supplementation  She reports that she has not had any recent palpitations since she has cut back her caffeine even further  Loop recorder for syncopal episode in setting of NSVT and ongoing palpitations  Reports a 'sensation' in her chest today and sent loop transmission (not yet available for review)

## 2022-10-02 NOTE — ASSESSMENT & PLAN NOTE
Only two short runs of SVT noted on ZIO 1/2020  Continue metoprolol succinate 25 mg daily  Continue magnesium 250 mg daily  Currently has implantable loop recorder which was placed 5/27/2021  Palpitations have been rare since last office visit

## 2022-10-02 NOTE — ASSESSMENT & PLAN NOTE
Patient now following with endocrinologist Dr Jordan López  Currently taking levothyroxine 100 mcg daily  We reviewed importance of thyroid regulation in setting of SVT history

## 2022-10-03 ENCOUNTER — APPOINTMENT (OUTPATIENT)
Dept: LAB | Facility: CLINIC | Age: 52
End: 2022-10-03
Payer: COMMERCIAL

## 2022-10-03 ENCOUNTER — OFFICE VISIT (OUTPATIENT)
Dept: ENDOCRINOLOGY | Facility: CLINIC | Age: 52
End: 2022-10-03
Payer: COMMERCIAL

## 2022-10-03 VITALS
SYSTOLIC BLOOD PRESSURE: 110 MMHG | BODY MASS INDEX: 21.67 KG/M2 | HEIGHT: 68 IN | DIASTOLIC BLOOD PRESSURE: 60 MMHG | WEIGHT: 143 LBS | HEART RATE: 84 BPM

## 2022-10-03 DIAGNOSIS — M81.0 OSTEOPOROSIS, UNSPECIFIED OSTEOPOROSIS TYPE, UNSPECIFIED PATHOLOGICAL FRACTURE PRESENCE: ICD-10-CM

## 2022-10-03 DIAGNOSIS — E03.9 ACQUIRED HYPOTHYROIDISM: ICD-10-CM

## 2022-10-03 DIAGNOSIS — E03.9 ACQUIRED HYPOTHYROIDISM: Primary | ICD-10-CM

## 2022-10-03 LAB
T4 FREE SERPL-MCNC: 1.09 NG/DL (ref 0.76–1.46)
TSH SERPL DL<=0.05 MIU/L-ACNC: 0.28 UIU/ML (ref 0.45–4.5)

## 2022-10-03 PROCEDURE — 84439 ASSAY OF FREE THYROXINE: CPT

## 2022-10-03 PROCEDURE — 36415 COLL VENOUS BLD VENIPUNCTURE: CPT

## 2022-10-03 PROCEDURE — 99214 OFFICE O/P EST MOD 30 MIN: CPT | Performed by: STUDENT IN AN ORGANIZED HEALTH CARE EDUCATION/TRAINING PROGRAM

## 2022-10-03 PROCEDURE — 84443 ASSAY THYROID STIM HORMONE: CPT

## 2022-10-03 RX ORDER — LEVOTHYROXINE SODIUM 0.1 MG/1
100 TABLET ORAL DAILY
Qty: 90 TABLET | Refills: 2 | Status: SHIPPED | OUTPATIENT
Start: 2022-10-03 | End: 2022-10-07 | Stop reason: SDUPTHER

## 2022-10-03 NOTE — ASSESSMENT & PLAN NOTE
Currently stable on levothyroxine 100 mcg daily   Will update thyroid function tests then update script as appropriate

## 2022-10-03 NOTE — PROGRESS NOTES
Maria G Guerra 46 y o  female MRN: [id number]    Encounter: [id number]      Assessment/Plan     Problem List Items Addressed This Visit        Endocrine    Hypothyroidism - Primary     Currently stable on levothyroxine 100 mcg daily  Will update thyroid function tests then update script as appropriate         Relevant Orders    T4, free Lab Collect    TSH, 3rd generation Lab Collect       Musculoskeletal and Integument    Osteoporosis     Stable  History of reclast use roughly 6 years  Last DXA within osteopenic range and patient without any bone related events  Continue calcium and vit D supplementation  Will arrange for next DXA Oct 2023         Relevant Orders    DXA bone density spine hip and pelvis          CC: Hypothyroidism, osteoporosis    History of Present Illness     HPI:    Dano returns today in follow up  She denies any interval changes in health since last visit  She is on a stable dose of levothyroxine 100 mcg daily, which she is tolerating uneventfully  She denies any hyperthyroid symptoms  She does have fatigue which is longstanding  For bone health, she takes vit D and calcium supplementation daily  She denies any kidney stones or fractures  She has a multiple year history of reclast use, but has been stable off therapy without any bone related events  Review of Systems   Constitutional: Positive for fatigue  Negative for diaphoresis and unexpected weight change  HENT: Negative for trouble swallowing and voice change  Eyes: Negative for visual disturbance  Respiratory: Negative for shortness of breath  Cardiovascular: Negative for chest pain, palpitations and leg swelling  Gastrointestinal: Negative for abdominal pain, nausea and vomiting  Endocrine: Negative for heat intolerance  Musculoskeletal: Negative for arthralgias and myalgias  Skin: Negative for rash and wound  Neurological: Negative for tremors     Psychiatric/Behavioral: Negative for agitation and behavioral problems  All other systems reviewed and are negative  Historical Information   Past Medical History:   Diagnosis Date    Allergic 1974    Seasonal    Anxiety     Arthritis     Colitis     Noted on colonoscopy 04/24/2020      Depression     Disease of thyroid gland     Gastritis     Noted on EGD 04/24/2020    Gastroparesis     Glaucoma     Headache(784 0)     Hyperlipidemia     Hypertension     Hypothyroidism     Lyme disease     Migraine     Osteoporosis     Scoliosis     SVT (supraventricular tachycardia) (HCC)     Visual impairment     VT (ventricular tachycardia)      Past Surgical History:   Procedure Laterality Date    BREAST BIOPSY Right 10/21/2020    papilloma    COLONOSCOPY      EGD      FINGER SURGERY      left pinky    FL GUIDED NEEDLE PLAC BX/ASP/INJ  3/17/2022    FL GUIDED NEEDLE PLAC BX/ASP/INJ  7/8/2022    HYSTERECTOMY      age- 45    HYSTERECTOMY W/ SALPINGO-OOPHERECTOMY  05/2008    NERVE BLOCK Bilateral 3/17/2022    Procedure: L3, L4, L5 BLOCK MEDIAL BRANCH;  Surgeon: Billie Yousif MD;  Location: [facility];  Service: Pain Management     NERVE BLOCK Bilateral 7/8/2022    Procedure: BLOCK MEDIAL BRANCH L3, L4, L5 #2;  Surgeon: Billie Yousif MD;  Location: [facility];  Service: Pain Management     OOPHORECTOMY Bilateral     age- 41   [address] [facility] Right 10/21/2020     Social History   Social History     Substance and Sexual Activity   Alcohol Use Never     Social History     Substance and Sexual Activity   Drug Use Never     Social History     Tobacco Use   Smoking Status Former Smoker    Packs/day: 1 00    Years: 10 00    Pack years: 10 00    Types: Cigarettes    Quit date: 1/1/2007    Years since quitting: 15 7   Smokeless Tobacco Never Used   Tobacco Comment    quit 2007     Family History:   Family History   Problem Relation Age of Onset    Peripheral vascular disease Mother     Glaucoma Mother     Prostate cancer Father  Hypothyroidism Sister     No Known Problems Daughter     Colon cancer Maternal Grandmother     No Known Problems Maternal Grandfather     Arthritis Paternal Grandmother     No Known Problems Paternal Grandfather     No Known Problems Daughter     No Known Problems Maternal Aunt     No Known Problems Maternal Aunt     Skin cancer Paternal Aunt     No Known Problems Paternal Aunt        Meds/Allergies   Current Outpatient Medications   Medication Sig Dispense Refill    ALPRAZolam (XANAX) 0 5 mg tablet Take 1 tablet (0 5 mg total) by mouth 30 min pre-procedure 4 tablet 0    atorvastatin (LIPITOR) 40 mg tablet Take 1 tablet (40 mg total) by mouth daily 90 tablet 1    Calcium Carbonate-Vit D-Min (CALCIUM 1200 PO) Take by mouth daily       Cholecalciferol (VITAMIN D3 PO) Take 1,000 mg by mouth      DULoxetine (CYMBALTA) 30 mg delayed release capsule duloxetine 30 mg capsule,delayed release      Glucosamine-Chondroitin 500-400 MG CAPS Take by mouth daily       hydrOXYzine HCL (ATARAX) 25 mg tablet Take 1 tablet (25 mg total) by mouth as needed for anxiety 30 tablet 1    irbesartan (AVAPRO) 75 mg tablet Take 1 tablet (75 mg total) by mouth daily 90 tablet 3    Magnesium 250 MG TABS Take 1 tablet (250 mg total) by mouth daily 30 tablet 0    metoprolol succinate (TOPROL-XL) 25 mg 24 hr tablet Take 1 tablet (25 mg total) by mouth daily 90 tablet 4    multivitamin (THERAGRAN) TABS Take 1 tablet by mouth daily      Omega-3 Fatty Acids (FISH OIL OMEGA-3 PO) Take by mouth      omeprazole (PriLOSEC) 40 MG capsule       SUMAtriptan (IMITREX) 100 mg tablet Take 100 mg by mouth as needed      levothyroxine 100 mcg tablet Take 1 tablet (100 mcg total) by mouth daily 90 tablet 2    scopolamine (TRANSDERM-SCOP) 1 mg/3 days TD 72 hr patch Place 1 mg on the skin (Patient not taking: No sig reported)       No current facility-administered medications for this visit       No Known Allergies    Objective   Vitals: Blood pressure 110/60, pulse 84, height 5' 8" (1 727 m), weight 64 9 kg (143 lb)  Physical Exam  Vitals reviewed  Constitutional:       General: She is not in acute distress  Appearance: Normal appearance  HENT:      Head: Normocephalic and atraumatic  Nose: Nose normal    Eyes:      General: No scleral icterus  Conjunctiva/sclera: Conjunctivae normal    Neck:      Thyroid: No thyroid mass, thyromegaly or thyroid tenderness  Cardiovascular:      Rate and Rhythm: Normal rate and regular rhythm  Pulses: Normal pulses  Heart sounds: No murmur heard  Pulmonary:      Effort: Pulmonary effort is normal  No respiratory distress  Abdominal:      Tenderness: There is no abdominal tenderness  Musculoskeletal:      Cervical back: Neck supple  Lymphadenopathy:      Cervical: No cervical adenopathy  Skin:     General: Skin is warm and dry  Neurological:      General: No focal deficit present  Mental Status: She is alert  Psychiatric:         Mood and Affect: Mood normal          Behavior: Behavior normal          The history was obtained from the review of the chart, patient      Lab Results:   Lab Results   Component Value Date/Time    TSH 3RD GENERATON 0 913 05/24/2022 03:30 PM    TSH 3RD GENERATON 2 215 02/28/2022 08:19 AM    TSH 3RD GENERATON 1 464 02/11/2022 08:12 AM    Free T4 1 11 05/24/2022 03:30 PM    Free T4 1 13 02/28/2022 08:19 AM    Free T4 1 13 02/11/2022 08:12 AM     Component      Latest Ref Rng & Units 5/17/2022 5/24/2022 8/10/2022 9/12/2022   Sodium      135 - 147 mmol/L 139 142 140    Potassium      3 5 - 5 3 mmol/L 3 7 3 5 4 0    Chloride      96 - 108 mmol/L 103 104 103    CO2      21 - 32 mmol/L 26 30 28    Anion Gap      4 - 13 mmol/L 10 8 9    BUN      5 - 25 mg/dL 10 15 12    Creatinine      0 60 - 1 30 mg/dL 0 84 0 91 0 89    GLUCOSE FASTING      65 - 99 mg/dL 87      Calcium      8 3 - 10 1 mg/dL 9 0 8 8 8 9    AST      5 - 45 U/L 20 11 20    ALT      12 - 78 U/L 28 18 29    Alkaline Phosphatase      46 - 116 U/L 54 57 73    Total Protein      6 4 - 8 4 g/dL 7 8 7 6 7 7    Albumin      3 5 - 5 0 g/dL 4 2 4 1 4 0    TOTAL BILIRUBIN      0 20 - 1 00 mg/dL 0 51 0 34 0 54    eGFR      ml/min/1 73sq m 80 72 74    Glucose, Random      65 - 140 mg/dL  91 97    Cholesterol      See Comment mg/dL 200   159   Triglycerides      See Comment mg/dL 180 (H)   135   HDL      >=50 mg/dL 53   62   LDL Calculated      0 - 100 mg/dL 111 (H)   70   Non-HDL Cholesterol      mg/dl 147   97   TSH 3RD GENERATON      0 450 - 4 500 uIU/mL  0 913     Free T4      0 76 - 1 46 ng/dL  1 11     Phosphorus      2 7 - 4 5 mg/dL  3 4 3 7    Magnesium      1 6 - 2 6 mg/dL  2 1     PARATHYROID HORMONE      18 4 - 80 1 pg/mL  36 8 49 5    Sed Rate      0 - 29 mm/hour  18             Imaging Studies:      10/22/2021  CENTRAL DXA SCAN     CLINICAL HISTORY:  59-year-old postmenopausal  female with a family history of low impact parenteral hip fracture  Osteoporosis screening  OTHER RISK FACTORS:  None      PHARMACOLOGIC THERAPY FOR OSTEOPOROSIS:  Prolia      TECHNIQUE: Bone densitometry was performed using a Hologic Horizon A  bone densitometer  Regions of interest appear properly placed       COMPARISON: Outside studies performed on a dissimilar DEXA unit, most recent November 13, 2019            RESULTS:   LUMBAR SPINE L1-L4 :   BMD  0 881  gm/cm2   T-score -1 5    These values are artifactually elevated due to the presence of scoliosis with spondylosis      LEFT  TOTAL HIP:   BMD:  0 886  gm/cm2   T-score:  -0 5     LEFT  FEMORAL NECK:   BMD:  0 710  gm/cm2   T score: -1 2               IMPRESSION:  1  Low bone mass (OSTEOPENIA)  [Based on the lumbar spine]     2  Since the prior study, there has been a slight DECREASE in the total bone mineral densities of both the lumbar spine and left hip      It should be noted however that the examinations were performed on dissimilar DXA units      I have personally reviewed pertinent reports  Portions of the record may have been created with voice recognition software  Occasional wrong word or "sound a like" substitutions may have occurred due to the inherent limitations of voice recognition software  Read the chart carefully and recognize, using context, where substitutions have occurred

## 2022-10-03 NOTE — ASSESSMENT & PLAN NOTE
Stable  History of reclast use roughly 6 years  Last DXA within osteopenic range and patient without any bone related events  Continue calcium and vit D supplementation   Will arrange for next DXA Oct 2023

## 2022-10-04 DIAGNOSIS — E03.9 ACQUIRED HYPOTHYROIDISM: Primary | ICD-10-CM

## 2022-10-05 ENCOUNTER — REMOTE DEVICE CLINIC VISIT (OUTPATIENT)
Dept: CARDIOLOGY CLINIC | Facility: CLINIC | Age: 52
End: 2022-10-05
Payer: COMMERCIAL

## 2022-10-05 DIAGNOSIS — Z95.818 PRESENCE OF OTHER CARDIAC IMPLANTS AND GRAFTS: Primary | ICD-10-CM

## 2022-10-05 PROCEDURE — 93298 REM INTERROG DEV EVAL SCRMS: CPT | Performed by: INTERNAL MEDICINE

## 2022-10-05 PROCEDURE — G2066 INTER DEVC REMOTE 30D: HCPCS | Performed by: INTERNAL MEDICINE

## 2022-10-05 NOTE — PROGRESS NOTES
MDT UDR61/ ACTIVE SYSTEM IS MRI CONDITIONAL   CARELINK TRANSMISSION:  BATTERY VOLTAGE ADEQUATE   CURRENT ECG SHOWS NSR 76 BPM   NO PATIENT OR DEVICE ACTIVATED EPISODES   NORMAL DEVICE FUNCTION   RG

## 2022-10-07 DIAGNOSIS — E03.9 ACQUIRED HYPOTHYROIDISM: ICD-10-CM

## 2022-10-07 RX ORDER — LEVOTHYROXINE SODIUM 0.1 MG/1
100 TABLET ORAL DAILY
Qty: 90 TABLET | Refills: 3 | Status: SHIPPED | OUTPATIENT
Start: 2022-10-07

## 2022-10-14 NOTE — PATIENT INSTRUCTIONS
You may receive a survey in the mail, or by e-mail, please fill it out COMPLETELY, and let us know how we did! Please remember to sign up for your [employer] kat to check you lab results, send us messages, and schedule appointments  If you have questions about the [employer] option, please call us! Thank you again for choosing [facility]! Wellness Visit for Adults   AMBULATORY CARE:   A wellness visit  is when you see your healthcare provider to get screened for health problems  Your healthcare provider will also give you advice on how to stay healthy  Write down your questions so you remember to ask them  Ask your healthcare provider how often you should have a wellness visit  What happens at a wellness visit:  Your healthcare provider will ask about your health, and your family history of health problems  This includes high blood pressure, heart disease, and cancer  He or she will ask if you have symptoms that concern you, if you smoke, and about your mood  You may also be asked about your intake of medicines, supplements, food, and alcohol  Any of the following may be done:  · Your weight  will be checked  Your height may also be checked so your body mass index (BMI) can be calculated  Your BMI shows if you are at a healthy weight  · Your blood pressure  and heart rate will be checked  Your temperature may also be checked  · Blood and urine tests  may be done  Blood tests may be done to check your cholesterol levels  Abnormal cholesterol levels increase your risk for heart disease and stroke  You may also need a blood or urine test to check for diabetes if you are at increased risk  Urine tests may be done to look for signs of an infection or kidney disease  · A physical exam  includes checking your heartbeat and lungs with a stethoscope  Your healthcare provider may also check your skin to look for sun damage  · Screening tests  may be recommended   A screening test is done to check for diseases that may not cause symptoms  The screening tests you may need depend on your age, gender, family history, and lifestyle habits  For example, colorectal screening may be recommended if you are 48years old or older  Screening tests you need if you are a woman:   · A Pap smear  is used to screen for cervical cancer  Pap smears are usually done every 3 to 5 years depending on your age  You may need them more often if you have had abnormal Pap smear test results in the past  Ask your healthcare provider how often you should have a Pap smear  · A mammogram  is an x-ray of your breasts to screen for breast cancer  Experts recommend mammograms every 2 years starting at age 48 years  You may need a mammogram at age 52 years or younger if you have an increased risk for breast cancer  Talk to your healthcare provider about when you should start having mammograms and how often you need them  Vaccines you may need:   · Get an influenza vaccine  every year  The influenza vaccine protects you from the flu  Several types of viruses cause the flu  The viruses change over time, so new vaccines are made each year  · Get a tetanus-diphtheria (Td) booster vaccine  every 10 years  This vaccine protects you against tetanus and diphtheria  Tetanus is a severe infection that may cause painful muscle spasms and lockjaw  Diphtheria is a severe bacterial infection that causes a thick covering in the back of your mouth and throat  · Get a human papillomavirus (HPV) vaccine  if you are female and aged 23 to 32 or male 23 to 24 and never received it  This vaccine protects you from HPV infection  HPV is the most common infection spread by sexual contact  HPV may also cause vaginal, penile, and anal cancers  · Get a pneumococcal vaccine  if you are aged 72 years or older  The pneumococcal vaccine is an injection given to protect you from pneumococcal disease   Pneumococcal disease is an infection caused by pneumococcal bacteria  The infection may cause pneumonia, meningitis, or an ear infection  · Get a shingles vaccine  if you are 60 or older, even if you have had shingles before  The shingles vaccine is an injection to protect you from the varicella-zoster virus  This is the same virus that causes chickenpox  Shingles is a painful rash that develops in people who had chickenpox or have been exposed to the virus  How to eat healthy:  My Plate is a model for planning healthy meals  It shows the types and amounts of foods that should go on your plate  Fruits and vegetables make up about half of your plate, and grains and protein make up the other half  A serving of dairy is included on the side of your plate  The amount of calories and serving sizes you need depends on your age, gender, weight, and height  Examples of healthy foods are listed below:  · Eat a variety of vegetables  such as dark green, red, and orange vegetables  You can also include canned vegetables low in sodium (salt) and frozen vegetables without added butter or sauces  · Eat a variety of fresh fruits , canned fruit in 100% juice, frozen fruit, and dried fruit  · Include whole grains  At least half of the grains you eat should be whole grains  Examples include whole-wheat bread, wheat pasta, brown rice, and whole-grain cereals such as oatmeal     · Eat a variety of protein foods such as seafood (fish and shellfish), lean meat, and poultry without skin (turkey and chicken)  Examples of lean meats include pork leg, shoulder, or tenderloin, and beef round, sirloin, tenderloin, and extra lean ground beef  Other protein foods include eggs and egg substitutes, beans, peas, soy products, nuts, and seeds  · Choose low-fat dairy products such as skim or 1% milk or low-fat yogurt, cheese, and cottage cheese  · Limit unhealthy fats  such as butter, hard margarine, and shortening       Exercise:  Exercise at least 30 minutes per day on most days of the week  Some examples of exercise include walking, biking, dancing, and swimming  You can also fit in more physical activity by taking the stairs instead of the elevator or parking farther away from stores  Include muscle strengthening activities 2 days each week  Regular exercise provides many health benefits  It helps you manage your weight, and decreases your risk for type 2 diabetes, heart disease, stroke, and high blood pressure  Exercise can also help improve your mood  Ask your healthcare provider about the best exercise plan for you  General health and safety guidelines:   · Do not smoke  Nicotine and other chemicals in cigarettes and cigars can cause lung damage  Ask your healthcare provider for information if you currently smoke and need help to quit  E-cigarettes or smokeless tobacco still contain nicotine  Talk to your healthcare provider before you use these products  · Limit alcohol  A drink of alcohol is 12 ounces of beer, 5 ounces of wine, or 1½ ounces of liquor  · Lose weight, if needed  Being overweight increases your risk of certain health conditions  These include heart disease, high blood pressure, type 2 diabetes, and certain types of cancer  · Protect your skin  Do not sunbathe or use tanning beds  Use sunscreen with a SPF 15 or higher  Apply sunscreen at least 15 minutes before you go outside  Reapply sunscreen every 2 hours  Wear protective clothing, hats, and sunglasses when you are outside  · Drive safely  Always wear your seatbelt  Make sure everyone in your car wears a seatbelt  A seatbelt can save your life if you are in an accident  Do not use your cell phone when you are driving  This could distract you and cause an accident  Pull over if you need to make a call or send a text message  · Practice safe sex  Use latex condoms if are sexually active and have more than one partner   Your healthcare provider may recommend screening tests for sexually transmitted infections (STIs)  · Wear helmets, lifejackets, and protective gear  Always wear a helmet when you ride a bike or motorcycle, go skiing, or play sports that could cause a head injury  Wear protective equipment when you play sports  Wear a lifejacket when you are on a boat or doing water sports  © Copyright [address] Information is for End User's use only and may not be sold, redistributed or otherwise used for commercial purposes  All illustrations and images included in CareNotes® are the copyrighted property of [employer]  or [address] [place], [place] [address]je[address]   The above information is an  only  It is not intended as medical advice for individual conditions or treatments  Talk to your doctor, nurse or pharmacist before following any medical regimen to see if it is safe and effective for you  unknown

## 2022-10-18 ENCOUNTER — APPOINTMENT (OUTPATIENT)
Dept: LAB | Facility: HOSPITAL | Age: 52
End: 2022-10-18
Payer: COMMERCIAL

## 2022-10-18 DIAGNOSIS — M79.606 GENERALIZED PAIN OF KNEE REGION: ICD-10-CM

## 2022-10-18 LAB — CRP SERPL QL: <0.5 MG/L

## 2022-10-18 PROCEDURE — 86140 C-REACTIVE PROTEIN: CPT

## 2022-10-24 ENCOUNTER — HOSPITAL ENCOUNTER (OUTPATIENT)
Dept: RADIOLOGY | Facility: CLINIC | Age: 52
Discharge: HOME | End: 2022-10-24
Payer: COMMERCIAL

## 2022-10-24 VITALS — HEIGHT: 68 IN | WEIGHT: 143 LBS | BODY MASS INDEX: 21.67 KG/M2

## 2022-10-24 DIAGNOSIS — Z12.31 ENCOUNTER FOR SCREENING MAMMOGRAM FOR MALIGNANT NEOPLASM OF BREAST: ICD-10-CM

## 2022-10-24 PROCEDURE — 77067 SCR MAMMO BI INCL CAD: CPT

## 2022-10-24 PROCEDURE — 77063 BREAST TOMOSYNTHESIS BI: CPT

## 2022-10-28 ENCOUNTER — APPOINTMENT (EMERGENCY)
Dept: RADIOLOGY | Facility: HOSPITAL | Age: 52
End: 2022-10-28
Payer: COMMERCIAL

## 2022-10-28 ENCOUNTER — HOSPITAL ENCOUNTER (EMERGENCY)
Facility: HOSPITAL | Age: 52
Discharge: HOME | End: 2022-10-28
Attending: EMERGENCY MEDICINE
Payer: COMMERCIAL

## 2022-10-28 VITALS
WEIGHT: 153.88 LBS | TEMPERATURE: 97.1 F | DIASTOLIC BLOOD PRESSURE: 69 MMHG | BODY MASS INDEX: 23.32 KG/M2 | HEART RATE: 63 BPM | OXYGEN SATURATION: 100 % | RESPIRATION RATE: 16 BRPM | SYSTOLIC BLOOD PRESSURE: 122 MMHG | HEIGHT: 68 IN

## 2022-10-28 DIAGNOSIS — R55 NEAR SYNCOPE: Primary | ICD-10-CM

## 2022-10-28 LAB
ALBUMIN SERPL BCP-MCNC: 4 G/DL (ref 3.5–5)
ALP SERPL-CCNC: 85 U/L (ref 46–116)
ALT SERPL W P-5'-P-CCNC: 28 U/L (ref 12–78)
ANION GAP SERPL CALCULATED.3IONS-SCNC: 8 MMOL/L (ref 4–13)
AST SERPL W P-5'-P-CCNC: 19 U/L (ref 5–45)
BASOPHILS # BLD AUTO: 0.07 THOUSANDS/ÂΜL (ref 0–0.1)
BASOPHILS NFR BLD AUTO: 1 % (ref 0–1)
BILIRUB SERPL-MCNC: 0.36 MG/DL (ref 0.2–1)
BUN SERPL-MCNC: 11 MG/DL (ref 5–25)
CALCIUM SERPL-MCNC: 8.9 MG/DL (ref 8.3–10.1)
CARDIAC TROPONIN I PNL SERPL HS: <2 NG/L
CHLORIDE SERPL-SCNC: 104 MMOL/L (ref 96–108)
CO2 SERPL-SCNC: 29 MMOL/L (ref 21–32)
CREAT SERPL-MCNC: 0.97 MG/DL (ref 0.6–1.3)
EOSINOPHIL # BLD AUTO: 0.11 THOUSAND/ÂΜL (ref 0–0.61)
EOSINOPHIL NFR BLD AUTO: 2 % (ref 0–6)
ERYTHROCYTE [DISTWIDTH] IN BLOOD BY AUTOMATED COUNT: 12.6 % (ref 11.6–15.1)
GFR SERPL CREATININE-BSD FRML MDRD: 67 ML/MIN/1.73SQ M
GLUCOSE SERPL-MCNC: 114 MG/DL (ref 65–140)
HCT VFR BLD AUTO: 37.5 % (ref 34.8–46.1)
HGB BLD-MCNC: 12 G/DL (ref 11.5–15.4)
IMM GRANULOCYTES # BLD AUTO: 0.02 THOUSAND/UL (ref 0–0.2)
IMM GRANULOCYTES NFR BLD AUTO: 0 % (ref 0–2)
LYMPHOCYTES # BLD AUTO: 2.17 THOUSANDS/ÂΜL (ref 0.6–4.47)
LYMPHOCYTES NFR BLD AUTO: 30 % (ref 14–44)
MCH RBC QN AUTO: 30.8 PG (ref 26.8–34.3)
MCHC RBC AUTO-ENTMCNC: 32 G/DL (ref 31.4–37.4)
MCV RBC AUTO: 96 FL (ref 82–98)
MONOCYTES # BLD AUTO: 0.46 THOUSAND/ÂΜL (ref 0.17–1.22)
MONOCYTES NFR BLD AUTO: 6 % (ref 4–12)
NEUTROPHILS # BLD AUTO: 4.32 THOUSANDS/ÂΜL (ref 1.85–7.62)
NEUTS SEG NFR BLD AUTO: 61 % (ref 43–75)
NRBC BLD AUTO-RTO: 0 /100 WBCS
PLATELET # BLD AUTO: 284 THOUSANDS/UL (ref 149–390)
PMV BLD AUTO: 10.6 FL (ref 8.9–12.7)
POTASSIUM SERPL-SCNC: 3.5 MMOL/L (ref 3.5–5.3)
PROT SERPL-MCNC: 7.5 G/DL (ref 6.4–8.4)
RBC # BLD AUTO: 3.89 MILLION/UL (ref 3.81–5.12)
SODIUM SERPL-SCNC: 141 MMOL/L (ref 135–147)
TSH SERPL DL<=0.05 MIU/L-ACNC: 0.77 UIU/ML (ref 0.45–4.5)
WBC # BLD AUTO: 7.15 THOUSAND/UL (ref 4.31–10.16)

## 2022-10-28 PROCEDURE — 85025 COMPLETE CBC W/AUTO DIFF WBC: CPT | Performed by: PHYSICIAN ASSISTANT

## 2022-10-28 PROCEDURE — 84484 ASSAY OF TROPONIN QUANT: CPT | Performed by: PHYSICIAN ASSISTANT

## 2022-10-28 PROCEDURE — 36415 COLL VENOUS BLD VENIPUNCTURE: CPT | Performed by: PHYSICIAN ASSISTANT

## 2022-10-28 PROCEDURE — 71046 X-RAY EXAM CHEST 2 VIEWS: CPT

## 2022-10-28 PROCEDURE — 80053 COMPREHEN METABOLIC PANEL: CPT | Performed by: PHYSICIAN ASSISTANT

## 2022-10-28 PROCEDURE — 84443 ASSAY THYROID STIM HORMONE: CPT | Performed by: PHYSICIAN ASSISTANT

## 2022-10-28 RX ORDER — RIMEGEPANT SULFATE 75 MG/75MG
TABLET, ORALLY DISINTEGRATING ORAL
COMMUNITY
Start: 2022-10-24

## 2022-10-28 RX ORDER — ONDANSETRON 2 MG/ML
4 INJECTION INTRAMUSCULAR; INTRAVENOUS ONCE
Status: COMPLETED | OUTPATIENT
Start: 2022-10-28 | End: 2022-10-28

## 2022-10-28 RX ORDER — METHYLPREDNISOLONE 4 MG/1
TABLET ORAL
COMMUNITY
Start: 2022-10-17

## 2022-10-28 RX ADMIN — SODIUM CHLORIDE 1000 ML: 0.9 INJECTION, SOLUTION INTRAVENOUS at 11:42

## 2022-10-28 RX ADMIN — ONDANSETRON 4 MG: 2 INJECTION INTRAMUSCULAR; INTRAVENOUS at 11:41

## 2022-10-28 NOTE — ED NOTES
Pt in no acute distress  Ambulates with a steady gait   Verbalizes understanding of discharge instructions       Chandrakant Almonte RN  10/28/22 7102

## 2022-10-28 NOTE — ED PROVIDER NOTES
History  Chief Complaint   Patient presents with   • Syncope     Pt arrives as a medical emergency as staff on med surge  Pt had a syncopal episode while sitting in a chair, pt was looking pale and diaphoretic and then had syncopal event  Pt now awake and alert  Bgl 80    46year old female presents the emergency department for evaluation of a near syncopal episode  Patient works as a hospital tech on inpatient floor  Patient states she was assisting a patient to the bathroom when she felt "weird " Has difficulty describing this further  States she felt "not right" had "pain all over" and felt as if she wanted to lay down  Reports she did have mild nausea  No vomiting  States she attempted to see the nurse manager however the door was closed  States she went to sit down because she was not feeling well  Reports the nurse who she was working with asked her to do something and noticed she looked pale and sweaty  Patient states the nurse asked her she felt okay and she said no; then the medical emergency was called  Patient denies any complete syncope  Denies fall off chair  States symptoms improved when she laid down  Denies chest pain or shortness of breath  Denies palpations  She is unable to specify if she felt dizzy or lightheaded  Just states she did not feel right  Patient states she did have a syncopal episode previously on mother's Day of this year  States since she has followed with cardiology and has had a loop recorder which she reports has been normal   Patient additionally reports she follows with Neurology for history of migraines  She denies any headache, visual changes  She denies any unilateral weakness or facial droop  There was no reported seizure-like activity  Patient denies any loss of bowel or bladder control  Patient states she feels significantly improved at this time, currently lying down with IV fluids running  Today patient has drank coffee and 1 glass of water    Has had 2 cookies and a handful of pretzels  History provided by:  Patient  Syncope  Episode history:  Single  Most recent episode: Today  Chronicity:  New  Context: standing up    Relieved by:  Lying down  Associated symptoms: diaphoresis, malaise/fatigue and nausea    Associated symptoms: no anxiety, no chest pain, no confusion, no difficulty breathing, no dizziness, no fever, no focal sensory loss, no focal weakness, no headaches, no palpitations, no recent fall, no recent injury, no recent surgery, no rectal bleeding, no seizures, no shortness of breath, no visual change, no vomiting and no weakness        Prior to Admission Medications   Prescriptions Last Dose Informant Patient Reported? Taking?    ALPRAZolam (XANAX) 0 5 mg tablet   No No   Sig: Take 1 tablet (0 5 mg total) by mouth 30 min pre-procedure   BLACK ELDERBERRY,BERRY-FLOWER, PO   Yes No   Sig: Take by mouth   Calcium Carbonate-Vit D-Min (CALCIUM 1200 PO)   Yes No   Sig: Take by mouth daily    Cholecalciferol (VITAMIN D3 PO)   Yes No   Sig: Take 1,000 mg by mouth   DULoxetine (CYMBALTA) 30 mg delayed release capsule   Yes No   Sig: duloxetine 30 mg capsule,delayed release   Glucosamine-Chondroitin 500-400 MG CAPS   Yes No   Sig: Take by mouth daily    Magnesium 250 MG TABS   No No   Sig: Take 1 tablet (250 mg total) by mouth daily   Nurtec 75 MG TBDP   Yes No   Omega-3 Fatty Acids (FISH OIL OMEGA-3 PO)   Yes No   Sig: Take by mouth   SUMAtriptan (IMITREX) 100 mg tablet   Yes No   Sig: Take 100 mg by mouth as needed   atorvastatin (LIPITOR) 40 mg tablet   No No   Sig: Take 1 tablet (40 mg total) by mouth daily   hydrOXYzine HCL (ATARAX) 25 mg tablet   No No   Sig: Take 1 tablet (25 mg total) by mouth as needed for anxiety   irbesartan (AVAPRO) 75 mg tablet   No No   Sig: Take 1 tablet (75 mg total) by mouth daily   levothyroxine 100 mcg tablet   No No   Sig: Take 1 tablet (100 mcg total) by mouth daily   methylPREDNISolone 4 MG tablet therapy pack Yes No   metoprolol succinate (TOPROL-XL) 25 mg 24 hr tablet   No No   Sig: Take 1 tablet (25 mg total) by mouth daily   multivitamin (THERAGRAN) TABS   Yes No   Sig: Take 1 tablet by mouth daily   omeprazole (PriLOSEC) 40 MG capsule   Yes No   scopolamine (TRANSDERM-SCOP) 1 mg/3 days TD 72 hr patch   Yes No   Sig: Place 1 mg on the skin   Patient not taking: No sig reported      Facility-Administered Medications: None       Past Medical History:   Diagnosis Date   • Allergic 1974    Seasonal   • Anxiety    • Arthritis    • Colitis     Noted on colonoscopy 04/24/2020     • Depression    • Disease of thyroid gland    • Gastritis     Noted on EGD 04/24/2020   • Gastroparesis    • Glaucoma    • Headache(784 0)    • Hyperlipidemia    • Hypertension    • Hypothyroidism    • Lyme disease    • Migraine    • Osteoporosis    • Scoliosis    • SVT (supraventricular tachycardia) (HCC)    • Visual impairment    • VT (ventricular tachycardia)        Past Surgical History:   Procedure Laterality Date   • BREAST BIOPSY Right 10/21/2020    papilloma   • COLONOSCOPY     • EGD     • FINGER SURGERY      left pinky   • FL GUIDED NEEDLE PLAC BX/ASP/INJ  03/17/2022   • FL GUIDED NEEDLE PLAC BX/ASP/INJ  07/08/2022   • HYSTERECTOMY  2008   • HYSTERECTOMY W/ SALPINGO-OOPHERECTOMY  05/2008   • NERVE BLOCK Bilateral 03/17/2022    Procedure: L3, L4, L5 BLOCK MEDIAL BRANCH;  Surgeon: Nacho Camarillo MD;  Location: [facility];  Service: Pain Management    • NERVE BLOCK Bilateral 07/08/2022    Procedure: BLOCK MEDIAL BRANCH L3, L4, L5 #2;  Surgeon: Nacho Camarillo MD;  Location: [facility];  Service: Pain Management    • OOPHORECTOMY Bilateral 2008   • US GUIDED BREAST BIOPSY RIGHT COMPLETE Right 10/21/2020       Family History   Problem Relation Age of Onset   • Peripheral vascular disease Mother    • Glaucoma Mother    • Prostate cancer Father    • Hypothyroidism Sister    • No Known Problems Daughter    • No Known Problems Daughter    • Colon cancer Maternal Grandmother    • No Known Problems Maternal Grandfather    • Arthritis Paternal Grandmother    • No Known Problems Paternal Grandfather    • No Known Problems Maternal Aunt    • No Known Problems Maternal Aunt    • Skin cancer Paternal Aunt    • No Known Problems Paternal Aunt    • Breast cancer Neg Hx    • Breast cancer additional onset Neg Hx    • Endometrial cancer Neg Hx    • BRCA2 Positive Neg Hx    • BRCA2 Negative Neg Hx    • BRCA1 Positive Neg Hx    • BRCA1 Negative Neg Hx    • BRCA 1/2 Neg Hx    • Ovarian cancer Neg Hx      I have reviewed and agree with the history as documented  E-Cigarette/Vaping   • E-Cigarette Use Never User      E-Cigarette/Vaping Substances   • Nicotine No    • THC No    • CBD No    • Flavoring No    • Other No    • Unknown No      Social History     Tobacco Use   • Smoking status: Former Smoker     Packs/day: 1 00     Years: 10 00     Pack years: 10 00     Types: Cigarettes     Quit date: 1/1/2007     Years since quitting: 15 8   • Smokeless tobacco: Never Used   • Tobacco comment: quit 2007   Vaping Use   • Vaping Use: Never used   Substance Use Topics   • Alcohol use: Never   • Drug use: Never       Review of Systems   Constitutional: Positive for diaphoresis and malaise/fatigue  Negative for appetite change, chills, fatigue and fever  HENT: Negative  Respiratory: Negative  Negative for cough, choking, chest tightness and shortness of breath  Cardiovascular: Positive for syncope  Negative for chest pain, palpitations and leg swelling  Gastrointestinal: Positive for nausea  Negative for abdominal pain, diarrhea and vomiting  Musculoskeletal: Negative  Skin: Negative  Neurological: Positive for syncope and light-headedness  Negative for dizziness, tremors, focal weakness, seizures, facial asymmetry, speech difficulty, weakness, numbness and headaches  Psychiatric/Behavioral: Negative for confusion     All other systems reviewed and are negative  Physical Exam  Physical Exam  Vitals and nursing note reviewed  Constitutional:       General: She is not in acute distress  Appearance: Normal appearance  She is not ill-appearing, toxic-appearing or diaphoretic  HENT:      Head: Normocephalic and atraumatic  Mouth/Throat:      Mouth: Mucous membranes are moist       Pharynx: Oropharynx is clear  No oropharyngeal exudate or posterior oropharyngeal erythema  Eyes:      Conjunctiva/sclera: Conjunctivae normal       Pupils: Pupils are equal, round, and reactive to light  Cardiovascular:      Rate and Rhythm: Normal rate and regular rhythm  Pulmonary:      Effort: Pulmonary effort is normal  No respiratory distress  Breath sounds: Normal breath sounds  No stridor  No wheezing, rhonchi or rales  Chest:      Chest wall: No tenderness  Abdominal:      General: Abdomen is flat  Bowel sounds are normal  There is no distension  Palpations: Abdomen is soft  Tenderness: There is no abdominal tenderness  There is no guarding  Musculoskeletal:         General: No swelling or tenderness  Normal range of motion  Cervical back: Normal range of motion and neck supple  No tenderness  Skin:     General: Skin is warm and dry  Coloration: Skin is pale  Findings: No bruising, erythema, lesion or rash  Neurological:      General: No focal deficit present  Mental Status: She is alert and oriented to person, place, and time  GCS: GCS eye subscore is 4  GCS verbal subscore is 5  GCS motor subscore is 6  Cranial Nerves: Cranial nerves are intact  No dysarthria  Sensory: Sensation is intact  No sensory deficit  Motor: Motor function is intact  No weakness or tremor  Coordination: Coordination is intact     Psychiatric:         Mood and Affect: Mood normal          Behavior: Behavior normal          Vital Signs  ED Triage Vitals [10/28/22 1109]   Temperature Pulse Respirations Blood Pressure SpO2   (!) 97 1 °F (36 2 °C) 73 18 136/74 100 %      Temp src Heart Rate Source Patient Position - Orthostatic VS BP Location FiO2 (%)   -- -- -- -- --      Pain Score       No Pain           Vitals:    10/28/22 1109 10/28/22 1115 10/28/22 1130   BP: 136/74 133/70 122/69   Pulse: 73 68 63         Visual Acuity  Visual Acuity    Flowsheet Row Most Recent Value   L Pupil Size (mm) 3   R Pupil Size (mm) 3          ED Medications  Medications   sodium chloride 0 9 % bolus 1,000 mL (0 mL Intravenous Stopped 10/28/22 1212)   ondansetron (ZOFRAN) injection 4 mg (4 mg Intravenous Given 10/28/22 1141)       Diagnostic Studies  Results Reviewed     Procedure Component Value Units Date/Time    HS Troponin 0hr (reflex protocol) [[id number]]  (Normal) Collected: 10/28/22 1124    Lab Status: Final result Specimen: Blood from Arm, Left Updated: 10/28/22 1154     hs TnI 0hr <2 ng/L     Comprehensive metabolic panel [[id number]] Collected: 10/28/22 1124    Lab Status: Final result Specimen: Blood from Arm, Left Updated: 10/28/22 1150     Sodium 141 mmol/L      Potassium 3 5 mmol/L      Chloride 104 mmol/L      CO2 29 mmol/L      ANION GAP 8 mmol/L      BUN 11 mg/dL      Creatinine 0 97 mg/dL      Glucose 114 mg/dL      Calcium 8 9 mg/dL      AST 19 U/L      ALT 28 U/L      Alkaline Phosphatase 85 U/L      Total Protein 7 5 g/dL      Albumin 4 0 g/dL      Total Bilirubin 0 36 mg/dL      eGFR 67 ml/min/1 73sq m     Narrative:      Neil guidelines for Chronic Kidney Disease (CKD):   •  Stage 1 with normal or high GFR (GFR > 90 mL/min/1 73 square meters)  •  Stage 2 Mild CKD (GFR = 60-89 mL/min/1 73 square meters)  •  Stage 3A Moderate CKD (GFR = 45-59 mL/min/1 73 square meters)  •  Stage 3B Moderate CKD (GFR = 30-44 mL/min/1 73 square meters)  •  Stage 4 Severe CKD (GFR = 15-29 mL/min/1 73 square meters)  •  Stage 5 End Stage CKD (GFR <15 mL/min/1 73 square meters)  Note: GFR calculation is accurate only with a steady state creatinine    CBC and differential [[id number]] Collected: 10/28/22 1124    Lab Status: Final result Specimen: Blood from Arm, Left Updated: 10/28/22 1132     WBC 7 15 Thousand/uL      RBC 3 89 Million/uL      Hemoglobin 12 0 g/dL      Hematocrit 37 5 %      MCV 96 fL      MCH 30 8 pg      MCHC 32 0 g/dL      RDW 12 6 %      MPV 10 6 fL      Platelets 776 Thousands/uL      nRBC 0 /100 WBCs      Neutrophils Relative 61 %      Immat GRANS % 0 %      Lymphocytes Relative 30 %      Monocytes Relative 6 %      Eosinophils Relative 2 %      Basophils Relative 1 %      Neutrophils Absolute 4 32 Thousands/µL      Immature Grans Absolute 0 02 Thousand/uL      Lymphocytes Absolute 2 17 Thousands/µL      Monocytes Absolute 0 46 Thousand/µL      Eosinophils Absolute 0 11 Thousand/µL      Basophils Absolute 0 07 Thousands/µL     TSH [[id number]] Collected: 10/28/22 1124    Lab Status: In process Specimen: Blood from Arm, Left Updated: 10/28/22 1129                 XR chest 2 views   Final Result by Kristen Munoz MD (10/28 1218)      No acute cardiopulmonary disease  Workstation performed: [id number]                    Procedures  ECG 12 Lead Documentation Only    Date/Time: 10/28/2022 11:10 AM  Performed by: Abbie Thompson PA-C  Authorized by: Abbie Thompson PA-C     Indications / Diagnosis:  Near syncope   Patient location:  ED  Interpretation:     Interpretation: non-specific    Rate:     ECG rate:  69    ECG rate assessment: normal    Rhythm:     Rhythm: sinus rhythm    Ectopy:     Ectopy: PAC    QRS:     QRS axis:  Normal    QRS intervals:  Normal  Conduction:     Conduction: normal    ST segments:     ST segments:  Non-specific  T waves:     T waves: normal               ED Course  ED Course as of 10/28/22 1616   Fri Oct 28, 2022   1143 WBC: 7 15   1157 hs TnI 0hr: <2   7426 ED interpretation chest x-ray negative for acute cardiopulmonary findings    Discussed results and findings with patient thus far  Patient states she feels at baseline  Requesting to return back to work  Will trial ambulation  Recommended patient return home to rest and recover  SBIRT 20yo+    Flowsheet Row Most Recent Value   SBIRT (23 yo +)    In order to provide better care to our patients, we are screening all of our patients for alcohol and drug use  Would it be okay to ask you these screening questions? No Filed at: 10/28/2022 1125                    [facility]  Number of Diagnoses or Management Options  Near syncope: new and requires workup  Diagnosis management comments: 45 yo female presented to the ED for evaluation due to near syncope  Pale on arrival  Vitals and medical record reviewed  EKG non-ischemic  Trop neg  electrolytes normal  ED inter of chest xray negative for acute cardio pulmonary findgins  Patient felt improved status post lying, IV fluids  She is able to stand ambulate without difficulty  Will follow up with Cardiology  We discussed symptomatic treatment, appropriate follow-up, strict return precautions  Patient was agreeable to this treatment plan, she was clinically and hemodynamically stable for discharge       Amount and/or Complexity of Data Reviewed  Clinical lab tests: ordered and reviewed  Tests in the radiology section of CPT®: ordered and reviewed  Review and summarize past medical records: yes  Independent visualization of images, tracings, or specimens: yes        Disposition  Final diagnoses:   Near syncope     Time reflects when diagnosis was documented in both MDM as applicable and the Disposition within this note     Time User Action Codes Description Comment    10/28/2022 12:12 PM Timi Hermosillo Add [R55] Near syncope       ED Disposition     ED Disposition   Discharge    Condition   Stable    Date/Time   Fri Oct 28, 2022 12:12 PM    Jo Greco discharge to home/self care                 Follow-up Information     Follow up With Specialties Details Why [address], [address] [facility] Medicine, Nurse Practitioner   [address] [facility] [address] [facility], [address] [facility] Cardiology   [address] [facility]  [address]  [address] [facility]  [phone]            Discharge Medication List as of 10/28/2022 12:13 PM      CONTINUE these medications which have NOT CHANGED    Details   ALPRAZolam (XANAX) 0 5 mg tablet Take 1 tablet (0 5 mg total) by mouth 30 min pre-procedure, Starting Mon 4/4/2022, Normal      atorvastatin (LIPITOR) 40 mg tablet Take 1 tablet (40 mg total) by mouth daily, Starting Fri 1/28/2022, Normal      BLACK ELDERBERRY,BERRY-FLOWER, PO Take by mouth, Historical Med      Calcium Carbonate-Vit D-Min (CALCIUM 1200 PO) Take by mouth daily , Historical Med      Cholecalciferol (VITAMIN D3 PO) Take 1,000 mg by mouth, Historical Med      DULoxetine (CYMBALTA) 30 mg delayed release capsule duloxetine 30 mg capsule,delayed release, Historical Med      Glucosamine-Chondroitin 500-400 MG CAPS Take by mouth daily , Historical Med      hydrOXYzine HCL (ATARAX) 25 mg tablet Take 1 tablet (25 mg total) by mouth as needed for anxiety, Starting Mon 9/20/2021, Normal      irbesartan (AVAPRO) 75 mg tablet Take 1 tablet (75 mg total) by mouth daily, Starting Mon 6/6/2022, Normal      levothyroxine 100 mcg tablet Take 1 tablet (100 mcg total) by mouth daily, Starting Fri 10/7/2022, Normal      Magnesium 250 MG TABS Take 1 tablet (250 mg total) by mouth daily, Starting Wed 10/26/2022, Normal      methylPREDNISolone 4 MG tablet therapy pack Historical Med      metoprolol succinate (TOPROL-XL) 25 mg 24 hr tablet Take 1 tablet (25 mg total) by mouth daily, Starting Wed 10/26/2022, Normal      multivitamin (THERAGRAN) TABS Take 1 tablet by mouth daily, Historical Med      Nurtec 75 MG TBDP Starting Mon 10/24/2022, Historical Med      Omega-3 Fatty Acids (FISH OIL OMEGA-3 PO) Take by mouth, Historical Med      omeprazole (PriLOSEC) 40 MG capsule Starting Wed 2/16/2022, Historical Med      scopolamine (TRANSDERM-SCOP) 1 mg/3 days TD 72 hr patch Place 1 mg on the skin, Starting Tue 7/12/2022, Historical Med      SUMAtriptan (IMITREX) 100 mg tablet Take 100 mg by mouth as needed, Starting Thu 12/31/2020, Historical Med             No discharge procedures on file      PDMP Review     None          ED Provider  Electronically Signed by           Ida Connor PA-C  10/28/22 9947

## 2022-10-28 NOTE — Clinical Note
Екатерина Monsalve was seen and treated in our emergency department on 10/28/2022  Diagnosis:     Clara Simon  is off the rest of the shift today  She may return on this date: If you have any questions or concerns, please don't hesitate to call        Jenny Ryan PA-C    ______________________________           _______________          _______________  Hospital Representative                              Date                                Time

## 2022-10-28 NOTE — ED NOTES
Pt ambulated with a steady gait  Denies cp or dizziness   States she feels back to normal     Indra Berman RN  10/28/22 1269

## 2022-10-28 NOTE — DISCHARGE INSTRUCTIONS
Please continue follow-up with your cardiologist   Please take it easy today  Rest get plenty of fluids and food  Please moves slowly from a lying or sitting position into a standing position    Please return with any new or worsening symptoms

## 2022-10-31 ENCOUNTER — TELEPHONE (OUTPATIENT)
Dept: CARDIOLOGY CLINIC | Facility: CLINIC | Age: 52
End: 2022-10-31

## 2022-10-31 ENCOUNTER — OFFICE VISIT (OUTPATIENT)
Dept: FAMILY MEDICINE CLINIC | Facility: CLINIC | Age: 52
End: 2022-10-31

## 2022-10-31 VITALS
HEIGHT: 68 IN | BODY MASS INDEX: 23.22 KG/M2 | OXYGEN SATURATION: 98 % | TEMPERATURE: 99.2 F | WEIGHT: 153.22 LBS | SYSTOLIC BLOOD PRESSURE: 122 MMHG | DIASTOLIC BLOOD PRESSURE: 82 MMHG | HEART RATE: 76 BPM

## 2022-10-31 DIAGNOSIS — L23.89 ALLERGIC CONTACT DERMATITIS DUE TO OTHER AGENTS: ICD-10-CM

## 2022-10-31 DIAGNOSIS — E03.9 HYPOTHYROIDISM (ACQUIRED): ICD-10-CM

## 2022-10-31 DIAGNOSIS — M70.51 SUPRAPATELLAR BURSITIS OF RIGHT KNEE: ICD-10-CM

## 2022-10-31 DIAGNOSIS — F41.1 ANXIETY STATE: ICD-10-CM

## 2022-10-31 DIAGNOSIS — R55 VASOVAGAL NEAR SYNCOPE: Primary | ICD-10-CM

## 2022-10-31 DIAGNOSIS — I47.1 SVT (SUPRAVENTRICULAR TACHYCARDIA) (HCC): ICD-10-CM

## 2022-10-31 LAB
ATRIAL RATE: 69 BPM
GLUCOSE SERPL-MCNC: 92 MG/DL (ref 65–140)
P AXIS: 77 DEGREES
PR INTERVAL: 154 MS
QRS AXIS: 70 DEGREES
QRSD INTERVAL: 88 MS
QT INTERVAL: 422 MS
QTC INTERVAL: 452 MS
T WAVE AXIS: 45 DEGREES
VENTRICULAR RATE: 69 BPM

## 2022-10-31 NOTE — PATIENT INSTRUCTIONS
Patient is here today with primary complaint related to an ER visit that she had on 10/28 and due to ongoing right knee pain  Patient was at work and developed presyncopal episode with hypotension diaphoresis and feeling very weak and lightheaded  She does wear compression hose at work and was not under any increased stress  She had been eating well without any GI complaints whatsoever  This has happened to her in May of 2021 when she actually had a true syncopal episode  She has a history of SVT and VT and had a loop recorder which they interrogated and there were no signs of abnormal heart rhythm during the vasovagal episode  These episodes are not frequent she did have residual hangover from the presyncopal episode the following day but yesterday and today she was back to her normal self  She is going to continue to stay well hydrated and laid down if the presyncopal episode occurs again  Patient also is complaining of right knee pain in the suprapatellar area  She was unable to deal quadriceps stretch today as the pain was too intense  She is willing to go to physical therapy for quadriceps stretching along with but dialyses of applying heat to the suprapatellar area  I will check her in 1 month  Patient's blood pressure is well controlled  She is adherent with her medications  She has not been sick otherwise

## 2022-10-31 NOTE — TELEPHONE ENCOUNTER
Patient can be seen in the next 4 weeks  Device interrogation showed no cardiac abnormality  She should see her PCP this week

## 2022-10-31 NOTE — TELEPHONE ENCOUNTER
Received a message from the call center  Pt was in the ER needs a f/up appt with cardio  Please let me know as to when she would need a f/up and what day can we squeeze her in

## 2022-10-31 NOTE — PROGRESS NOTES
Assessment/Plan:       1  Vasovagal near syncope    2  Suprapatellar bursitis of right knee  -     Ambulatory Referral to Physical Therapy; Future; Expected date: 11/01/2022    3  Hypothyroidism (acquired)    4  Anxiety state    5  SVT (supraventricular tachycardia) (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 as if she was going to pass out  He had eaten well and had not had any unusual stress  Patient states that she ultimately was taken to the emergency department because she was pale and diaphoretic  She was hypotensive  She was given bolus of IV fluids improved and was discharged home  She was well enough to drive home without incident  She continued to feel unwell the next day which was 1029 but by 10 30 and 1031 today she was feeling much improved  She had a previous actual syncopal episode in May of 2021  She does have a loop recorder that was interrogated and there were no abnormal cardiac findings during these episodes  Patient is complaining of significant pain above the right knee  The area gets swollen very tender and she has an inability to bend her knee  She has applied heat and ice and has taken ibuprofen Tylenol and Aleve without significant improvement  He has episodes of suprapatellar knee pain occur without provocation and can occur spontaneously  There is no predictable pattern to this pain  Patient has never injured her right knee in the past   She is taking care of her mother and working full-time and does admit to some increased stress  She has increased abdominal gaseous distension particularly in the right lower quadrant of the abdomen and this area was hurting her when she was seen in the emergency department 3 days ago    She admits to having anxiety and sometimes feeling trapped and this can also provoke other symptoms  She is adherent with all of her medications  She has hypothyroidism that she takes levothyroxine  She also takes metoprolol to control the SVT and also her documented ventricular tachycardia  A total of 35 minutes was spent rendering care for this patient  This time included review of the patient's electronic medical record, performing the history and physical, reviewing appropriate labs and/or images, developing a treatment and assessment plan, answering patient's questions and concerns, and documenting the patient visit  We will see the patient in 1 month  Subjective:      Patient ID: Reg Del Cid is a 46 y o  female  HPI:  This 22-year-old female is going to establish care here cause for primary care  I had seen the patient once on a video visit and she had been seen previously in him for primary care  Overall, the patient's health is quite strong  She does have a loop recorder to record any cardiac events and when she pushed the button on the loop recorder she was not having any adverse cardiovascular dysrhythmias  She had no palpitations  She denies dizziness or vertigo  Patient's episode caused her to become diaphoretic and feel as if she was going to pass out  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 that she had no stressful event provoking this  She also had a normal breakfast and the event occurred at 10:00 a m  [facility] She was wearing compression hose during the episode  She had an actual syncopal event in May of 2021 and was told this was secondary to visit vagal syncope  Patient has not been sick recently  She admitted some abdominal distension check only on the right side of the abdomen but has not been passing a lot of gas  She denied any diarrhea constipation nausea or vomiting  She denied any excessive pain in her right knee prior to this episode      The following portions of the patient's history were reviewed and updated as appropriate: allergies, current medications, past family history, past medical history, past social history, past surgical history, and problem list     Review of Systems  :  Patient has not been sick recently  She denies any visual disturbances  She does see Neurology for migraine headaches  She admits having a migraine recently and has a slight head problem at this time  She states that she was if she is not having abdominal pain she denies any shortness of breath  She denies any muscle aches or pains except in the right knee  Objective:      /82 (BP Location: Right arm, Patient Position: Sitting)   Pulse 76   Temp 99 2 °F (37 3 °C) (Tympanic)   Ht 5' 8" (1 727 m)   Wt 69 5 kg (153 lb 3 5 oz)   SpO2 98%   BMI 23 30 kg/m²          Physical Exam  well-developed well-nourished 28-year-old female was alert oriented x3  She is cooperative with exam   She is answering questions appropriately and has no dyspnea  Patient is funduscopic exam showed PERRLA EOMs are intact  No gross abnormalities seen on funduscopic exam   Patient has no nuchal adenopathy  No carotid bruits  Patient's heart is regular rate without murmur or gallop  PMI was nondisplaced  Lungs are clear to auscultation  Abdomen is slightly distended with some mild tenderness on pressure in the right upper and mid quadrant  No rebound or referred pain  Air is tympanic on percussion  Extremities patient has adequate peripheral pulses without peripheral edema  Knee exam:  Patient has tenderness in the suprapatellar area and difficulty with knee bending on the right knee only  No obvious swelling was seen at this time  She was unable to do a quadriceps stretch at this time even with opposite hip and to tried to stretch

## 2022-10-31 NOTE — TELEPHONE ENCOUNTER
Pt called in to schedule a follow up appt  Pt was seen in the ER yesterday  Please call pt to schedule

## 2022-11-10 ENCOUNTER — EVALUATION (OUTPATIENT)
Dept: PHYSICAL THERAPY | Facility: CLINIC | Age: 52
End: 2022-11-10

## 2022-11-10 DIAGNOSIS — M70.51 SUPRAPATELLAR BURSITIS OF RIGHT KNEE: Primary | ICD-10-CM

## 2022-11-10 DIAGNOSIS — M25.561 CHRONIC PAIN OF RIGHT KNEE: ICD-10-CM

## 2022-11-10 DIAGNOSIS — G89.29 CHRONIC PAIN OF RIGHT KNEE: ICD-10-CM

## 2022-11-10 NOTE — PROGRESS NOTES
PT Evaluation     Today's date: 11/10/2022  Patient name: Maik Schultz  : 1970  MRN: [id number]  Referring provider: Rubia Lin*  Dx:   Encounter Diagnosis     ICD-10-CM    1  Suprapatellar bursitis of right knee  M70 51 Ambulatory Referral to Physical Therapy   2  Chronic pain of right knee  M25 561     G89 29        Start Time: 1540  Stop Time: 1620  Total time in clinic (min): 40 minutes    Assessment  Assessment details: Joe Chung is a 46 y o female who presents to OP PT with signs and symptoms consistent with suprapatellar bursitis of the R knee  Upon examination, PT notes key impairments of decrease knee ROM/strength and static/dynamic balance  Due to this patient is limited with performing ADL/IADLs, negotiate stairs, standing/sitting, squatting and sleeping  Additionally, patient is restricted in participating in social gatherings, recreational activities, and full duty at work  Patient will benefit from skilled PT to address above impairments with POC consisting of functional ROM, stretching, strengthening, balance, analgesic modalities and manual therapy in order to maximize functional independence  Thank you for your referral!   Impairments: abnormal or restricted ROM, impaired balance, impaired physical strength, lacks appropriate home exercise program, pain with function and weight-bearing intolerance  Understanding of Dx/Px/POC: good   Prognosis: good    Goals  STG(In 4 weeks)  Patient will initiate HEP  Patient will decrease knee pain from 10/10 to 5/10 in order to improve QOL  Patient will increase  ROM by 0-5 in order to improve ability to negotiate stairs  Patient will increase strength by 1 MMT in order to improve ability to negotiate stairs  Patient will increase FOTO score from 19 to 31 to improve QOL  LTG(In 8 weeks)  Patient will decrease knee pain from 10/10 to 1/10  in order to improve QOL    Patient will increase knee ROM  by 5-10 in order to improve ability to   Patient will increase knee strength by 1-2  MMT in order to to be able to perform ADL/IADLs  Patient will increase FOTO score from 31 to 48 in order to improve QOL  Patient will be independent with HEP at D/c  Plan  Planned modality interventions: cryotherapy, electrical stimulation/Russian stimulation, thermotherapy: hydrocollator packs and TENS  Planned therapy interventions: balance, functional ROM exercises, graded exercise, home exercise program, joint mobilization, manual therapy, massage, patient education, therapeutic activities, strengthening, stretching, therapeutic exercise and therapeutic training  Frequency: 2x week  Duration in weeks: 8  Treatment plan discussed with: patient        Subjective Evaluation    History of Present Illness  Date of onset: 10/1/2022  Mechanism of injury: Patient indicated that she started experiencing significant R knee pain 1 month ago on 10/01/22 when she was walking/standing on her feet for long period of time  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 and this put more stress/pressure on her knee which she thinks re-aggravated her R knee from initial injury  Quality of life: good    Pain  Current pain ratin  At best pain ratin  At worst pain rating: 10  Location: Right knee   Quality: burning  Relieving factors: medications, relaxation and rest  Aggravating factors: running, stair climbing, walking, standing and sitting  Progression: no change    Treatments  Current treatment: physical therapy  Patient Goals  Patient goals for therapy: decreased pain, improved balance, increased motion, increased strength, independence with ADLs/IADLs and return to work  Patient goal: Get back to walking           Objective     Active Range of Motion   Left Knee   Flexion: 125 degrees   Extension: 0 degrees     Right Knee   Flexion: 110 degrees with pain  Extension: 4 degrees   Left Ankle/Foot   Normal active range of motion    Right Ankle/Foot   Normal active range of motion    Mobility   Patellar Mobility:   Left Knee   WFL: medial, lateral, superior and inferior  Right Knee   WFL: lateral and superior  Hypomobile: medial and inferior     Strength/Myotome Testing     Left Ankle/Foot   Dorsiflexion: 5  Plantar flexion: 5    Right Ankle/Foot   Dorsiflexion: 5  Plantar flexion: 5    Tests     Left Knee   Positive patellar apprehension, patellar compression and patella-femoral grind  Negative anterior drawer, posterior drawer, Thessaly's test at 5 degrees, valgus stress test at 0 degrees and valgus stress test at 30 degrees  Right Knee   Negative anterior drawer, posterior drawer, Thessaly's test at 5 degrees, valgus stress test at 0 degrees and valgus stress test at 30 degrees  Flowsheet Rows    Flowsheet Row Most Recent Value   PT/OT G-Codes    Current Score 19   Projected Score 48             Precautions:  Fall risk        Manuals 11/10            Patella mobs             Gastroc stretching                                        Neuro Re-Ed                                                                                                        Ther Ex             Nu-step             TR/HR             TKE             Step-ups: fwd/lat             QS             SAQ             SLR             Adduction ball squeeze             Bridges             Claimshells                          Ther Activity                                       Gait Training                                       Modalities             CP             TENS

## 2022-11-11 ENCOUNTER — HOSPITAL ENCOUNTER (OUTPATIENT)
Dept: RADIOLOGY | Facility: CLINIC | Age: 52
End: 2022-11-11

## 2022-11-11 ENCOUNTER — OFFICE VISIT (OUTPATIENT)
Dept: PODIATRY | Age: 52
End: 2022-11-11

## 2022-11-11 ENCOUNTER — OFFICE VISIT (OUTPATIENT)
Dept: PAIN MEDICINE | Facility: CLINIC | Age: 52
End: 2022-11-11

## 2022-11-11 VITALS
HEIGHT: 68 IN | DIASTOLIC BLOOD PRESSURE: 78 MMHG | HEART RATE: 63 BPM | SYSTOLIC BLOOD PRESSURE: 108 MMHG | BODY MASS INDEX: 23.49 KG/M2 | TEMPERATURE: 99.2 F | RESPIRATION RATE: 20 BRPM | WEIGHT: 155 LBS

## 2022-11-11 VITALS
SYSTOLIC BLOOD PRESSURE: 108 MMHG | DIASTOLIC BLOOD PRESSURE: 78 MMHG | BODY MASS INDEX: 23.52 KG/M2 | WEIGHT: 155.2 LBS | HEIGHT: 68 IN

## 2022-11-11 DIAGNOSIS — M79.672 FOOT PAIN, BILATERAL: ICD-10-CM

## 2022-11-11 DIAGNOSIS — M79.671 FOOT PAIN, BILATERAL: ICD-10-CM

## 2022-11-11 DIAGNOSIS — M79.672 FOOT PAIN, BILATERAL: Primary | ICD-10-CM

## 2022-11-11 DIAGNOSIS — M47.816 LUMBAR SPONDYLOSIS: Primary | ICD-10-CM

## 2022-11-11 DIAGNOSIS — M79.671 FOOT PAIN, BILATERAL: Primary | ICD-10-CM

## 2022-11-11 RX ORDER — LIDOCAINE HYDROCHLORIDE 10 MG/ML
10 INJECTION, SOLUTION EPIDURAL; INFILTRATION; INTRACAUDAL; PERINEURAL ONCE
OUTPATIENT
Start: 2022-11-11 | End: 2022-11-11

## 2022-11-11 RX ORDER — BUPIVACAINE HCL/PF 2.5 MG/ML
5 VIAL (ML) INJECTION ONCE
OUTPATIENT
Start: 2022-11-11 | End: 2022-11-11

## 2022-11-11 RX ORDER — METHYLPREDNISOLONE ACETATE 80 MG/ML
80 INJECTION, SUSPENSION INTRA-ARTICULAR; INTRALESIONAL; INTRAMUSCULAR; PARENTERAL; SOFT TISSUE ONCE
OUTPATIENT
Start: 2022-11-11 | End: 2022-11-11

## 2022-11-11 NOTE — PATIENT INSTRUCTIONS
Foot Pain Home Therapy    Stretch!! Place painful foot in back with heel on ground and lean against wall  Do not lift heel off of ground  You will feel the stretch in the calf muscles and possibly the heel  Hold the stretch for 20-30 seconds  Do this at least 5-6 times per day  It is best done after exercise when your muscles are warm  Local ice massage  Freeze a 20oz bottle of water  Roll your foot over the ice bottle along the arch  Try this for 20 minutes, 2-3 times per day  Wear supportive shoes at all times  Avoid flip-flops, flat sandals, barefoot walking (never walk barefoot, even at home)  Generally avoid shoes that are too flexible and bend in the arch  Your shoes should only slightly bend in the toe area, not the middle  Running sneakers are often the best choice  Supportive sneaker brands: Helen Spinner, New Balance, Asics, [employer]  Supportive daily shoe brands: Vionic, Orthofeet Silvana, Dansko, Olive branch, Kelleen Crimes, Birkenstock  Supportive home shoes: Juan Manuel Maravilla (recovery slides)  Purchase over the counter topical pain creams such as Voltarin gel, biofreeze, or CBD cream - will need to apply 2-3 times per day for benefit  Look in to over the counter shoe inserts/arch supports such as Superfeet, Powerstep or Spenco  These are all available on [employer] as well as their individual website's  Achilles Tendon Stretching Exercises    A) Standing Gastrocnemius stretch  Place hands on wall or chair  If using wall, put your hands at eye level  Step the leg you want to stretch behind you  Keep your back heel on the floor and point your toes straight ahead or slightly inward towards the heel of the opposite foot  Bend your knee toward the wall while keeping your back leg straight  Lean toward the wall until you feel a gentle stretch in you calf of the straight leg  Don't lean so far that you feel pain  Hold for 15 seconds  Complete 3 reps      B) Standing soleus stretch  Place your hands on the wall or chair  If using wall, put your hands at eye level  Step the leg you want to stretch behind you (your back foot will need to be closer to the front foot than the above stretch)  Keep your back heel on the floor and point your toes straight ahead or slightly inward towards the heel of the opposite foot  Bend both your front and back knee at the same time (may help to stick your butt out)  You do not need to lean towards the wall, just bend the knees  Lean toward the wall until you feel a gentle stretch in you calf of the straight leg  Don't lean so far that you feel pain  Hold for 15 seconds  Complete 3 reps  Keep these tips and tricks in mind to get the most out of your stretching; Take your time - move slowly, whether you are deepening into a stretch or changing positions  This will limit the risk of injury & discomfort  Avoid bouncing - quick sudden movements will only worsen achilles tendon issues  Stay relaxed during stretch  Keep your heel down and toes straight ahead or slightly inward - this will allow the achilles tendon to stretch properly  Stop if you feel pain - Don't strain or force your muscle  If you feel sharp pain, stop immediately

## 2022-11-11 NOTE — PROGRESS NOTES
Assessment/Plan:     Diagnoses and all orders for this visit:    Foot pain, bilateral  -     X-ray foot right 3+ views; Future  -     X-ray foot left 3+ views; Future  -     Orthotics B/L  -     Ambulatory referral to Physical Therapy; Future          Imaging Reviewed at this visit (I personally reviewed//independently interpreted images and reports in PACS)  · XR left foot WB 3 views 11/11/22: Significantly increased calcaneal inclination angle  I note first ray elevatus  No significant fractures or dislocations noted  · XR right foot WB 3 views 11/11/22: Significantly increased calcaneal inclination angle  No significant fractures or dislocations noted  IMPRESSION:  · B/L foot pain with cavus foot type, chronic  Differential diagnosis include chronic plantar fasciitis, Alvarado's nerve entrapment, intersection syndrome of the FHL and FDL at the knot of Jcarlos,  polyneuropathy, L5 and S1 nerve root compression, Drake metatarsalgia, compartment syndrome of the deep flexor compartment, neurogenic intermittent claudication, arterial intermittent claudication, venous insufficiency, degenerative changes (calcaneal spurs, arthrosis of the joints of the foot), and inflammatory conditions of the ligaments and fascia of the foot and ankle  PLAN:  · I reviewed clinical exam and radiographic imaging (XR) with patient in detail today  I have discussed with the patient the pathophysiology of this diagnosis and reviewed how the examination correlates with this diagnosis  I explained at length the biomechanical abnormalities (cavus foot type) leading to the significant overload of the plantar fascia and medial ankle  I stressed the importance of proper shoe gear and arch supports to reposition foot to reduce tension on soft tissue structures and give cushion  Instructions were given for conservative care which was also demonstrated during the clinical visit   I stressed the importance of achilles tendon stretching, icing and applying voltarin gel or biofreeze (OTC) at least 3x/day  I recommend stiff bottomed sneakers/shoes (ex Asics, Vionic, New balance, Ramirez, etc) for daily use and Gavin Ventura (recovery slides) for in home use  I rx'd CMOs   I ordered the addition of PT at this visit to aide in mobility and pain reduction   RTC 6 weeks      Subjective:      Patient ID: Samuel Coleman is a 46 y o  female  Val Jain presents to clinic today concerning B/L foot chronic heel pain  Notes this has been going on for 20+ years and she is just seeking attention at this time  Notes they always feel tired, sore 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)  Denies N/T/B to toes  Does note she had to have special shoes as a child due to her possibly walking with toes inward  The following portions of the patient's history were reviewed and updated as appropriate: allergies, current medications, past family history, past medical history, past social history, past surgical history and problem list     Review of Systems   Constitutional: Negative for activity change, chills and fever  HENT: Negative  Respiratory: Negative for cough, chest tightness and shortness of breath  Cardiovascular: Negative for chest pain and leg swelling  Endocrine: Negative  Genitourinary: Negative  Musculoskeletal: Positive for gait problem (B/L foot pain)  Neurological: Negative for numbness  Psychiatric/Behavioral: Negative  Negative for agitation and behavioral problems  Objective:      /78 (BP Location: Left arm, Patient Position: Sitting, Cuff Size: Standard)   Ht 5' 8" (1 727 m)   Wt 70 4 kg (155 lb 3 2 oz)   BMI 23 60 kg/m²          Physical Exam  Constitutional:       Appearance: Normal appearance  She is not ill-appearing  Cardiovascular:      Pulses: Normal pulses  Comments: Bilateral DP & PT pulses 2/4  CRT WNL  Pedal hair present  Feet warm and well perfused     Pulmonary: Effort: Pulmonary effort is normal  No respiratory distress  Musculoskeletal:         General: Tenderness (Right and left foot pain at plantar calcaneus at medial tubercle at insertion of plantar fascia - total plantar fascia band is tight with windlass mechanism ) present  Normal range of motion  Comments: There is no significant decrease in ankle DF with knee extended and flexed indicating gastroc-soleal equinus  B/L WB exam exhibits high arch foot type, heel position subtle varus  Bilateral ankle, STJ, TNJ, TMTJ, MTPJ ROM WNL and without pain  LE muscle strength WNL  Skin:     General: Skin is warm  Capillary Refill: Capillary refill takes less than 2 seconds  Findings: No erythema or lesion  Neurological:      General: No focal deficit present  Mental Status: She is alert and oriented to person, place, and time  Sensory: No sensory deficit  Comments: Gross sensation intact  Denies N/T/B to B/L feet      Psychiatric:         Mood and Affect: Mood normal

## 2022-11-11 NOTE — LETTER
November 11, 2022     Evonne Stevens, [address] [address] [place]    Patient: Joe Ba   YOB: 1970   Date of Visit: 11/11/2022       Dear Dr Roshan Red: Thank you for referring Joe Ba to me for evaluation  Below are my notes for this consultation  If you have questions, please do not hesitate to call me  I look forward to following your patient along with you  Sincerely,        Vane Maxwell DPM        CC: No Recipients  Chiara Balbuena  11/11/2022  9:10 AM  Signed  Assessment/Plan:     Diagnoses and all orders for this visit:    Foot pain, bilateral  -     X-ray foot right 3+ views; Future  -     X-ray foot left 3+ views; Future  -     Orthotics B/L  -     Ambulatory referral to Physical Therapy; Future          Imaging Reviewed at this visit (I personally reviewed//independently interpreted images and reports in PACS)  · XR left foot WB 3 views 11/11/22: Significantly increased calcaneal inclination angle  I note first ray elevatus  No significant fractures or dislocations noted  · XR right foot WB 3 views 11/11/22: Significantly increased calcaneal inclination angle  No significant fractures or dislocations noted  IMPRESSION:  · B/L foot pain with cavus foot type, chronic  Differential diagnosis include chronic plantar fasciitis, Alvarado's nerve entrapment, intersection syndrome of the FHL and FDL at the knot of Jcarlos,  polyneuropathy, L5 and S1 nerve root compression, Drake metatarsalgia, compartment syndrome of the deep flexor compartment, neurogenic intermittent claudication, arterial intermittent claudication, venous insufficiency, degenerative changes (calcaneal spurs, arthrosis of the joints of the foot), and inflammatory conditions of the ligaments and fascia of the foot and ankle  PLAN:  · I reviewed clinical exam and radiographic imaging (XR) with patient in detail today   I have discussed with the patient the pathophysiology of this diagnosis and reviewed how the examination correlates with this diagnosis  I explained at length the biomechanical abnormalities (cavus foot type) leading to the significant overload of the plantar fascia and medial ankle  I stressed the importance of proper shoe gear and arch supports to reposition foot to reduce tension on soft tissue structures and give cushion  Instructions were given for conservative care which was also demonstrated during the clinical visit  I stressed the importance of achilles tendon stretching, icing and applying voltarin gel or biofreeze (OTC) at least 3x/day  I recommend stiff bottomed sneakers/shoes (ex Asics, Vionic, New balance, Ramirez, etc) for daily use and Oofos, Parkerka (recovery slides) for in home use  I rx'd CMOs   I ordered the addition of PT at this visit to aide in mobility and pain reduction   RTC 6 weeks      Subjective:      Patient ID: Prosper Daniels is a 46 y o  female  Harmony Amel presents to clinic today concerning B/L foot chronic heel pain  Notes this has been going on for 20+ years and she is just seeking attention at this time  Notes they always feel tired, sore 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)  Denies N/T/B to toes  Does note she had to have special shoes as a child due to her possibly walking with toes inward  The following portions of the patient's history were reviewed and updated as appropriate: allergies, current medications, past family history, past medical history, past social history, past surgical history and problem list     Review of Systems   Constitutional: Negative for activity change, chills and fever  HENT: Negative  Respiratory: Negative for cough, chest tightness and shortness of breath  Cardiovascular: Negative for chest pain and leg swelling  Endocrine: Negative  Genitourinary: Negative  Musculoskeletal: Positive for gait problem (B/L foot pain)     Neurological: Negative for numbness  Psychiatric/Behavioral: Negative  Negative for agitation and behavioral problems  Objective:      /78 (BP Location: Left arm, Patient Position: Sitting, Cuff Size: Standard)   Ht 5' 8" (1 727 m)   Wt 70 4 kg (155 lb 3 2 oz)   BMI 23 60 kg/m²          Physical Exam  Constitutional:       Appearance: Normal appearance  She is not ill-appearing  Cardiovascular:      Pulses: Normal pulses  Comments: Bilateral DP & PT pulses 2/4  CRT WNL  Pedal hair present  Feet warm and well perfused  Pulmonary:      Effort: Pulmonary effort is normal  No respiratory distress  Musculoskeletal:         General: Tenderness (Right and left foot pain at plantar calcaneus at medial tubercle at insertion of plantar fascia - total plantar fascia band is tight with windlass mechanism ) present  Normal range of motion  Comments: There is no significant decrease in ankle DF with knee extended and flexed indicating gastroc-soleal equinus  B/L WB exam exhibits high arch foot type, heel position subtle varus  Bilateral ankle, STJ, TNJ, TMTJ, MTPJ ROM WNL and without pain  LE muscle strength WNL  Skin:     General: Skin is warm  Capillary Refill: Capillary refill takes less than 2 seconds  Findings: No erythema or lesion  Neurological:      General: No focal deficit present  Mental Status: She is alert and oriented to person, place, and time  Sensory: No sensory deficit  Comments: Gross sensation intact  Denies N/T/B to B/L feet      Psychiatric:         Mood and Affect: Mood normal

## 2022-11-11 NOTE — PATIENT INSTRUCTIONS
Lumbar Radiofrequency Ablation   WHAT YOU NEED TO KNOW:   What do I need to know about lumbar radiofrequency ablation? Lumbar radiofrequency ablation (RFA) is a procedure used to treat facet joint pain in your lower back  Facet joints are found at the back of each vertebra  A needle electrode is used to send electrical currents to the nerves in your facet joint  The electrical currents create heat that damages the nerve so it cannot send pain signals  How do I prepare for lumbar RFA? Your healthcare provider will talk to you about how to prepare for this procedure  He may tell you not to eat or drink anything after midnight on the day of your procedure  He will tell you what medicines to take or not take on the day of your procedure  What will happen during lumbar RFA? You will lie on your stomach  You will be given local anesthesia to numb the area of your back where the needle electrode will be inserted  You may be given a sedative to help keep you relaxed  You may still feel pressure or pushing during the procedure, but you should not feel any pain  Your healthcare provider will use fluoroscopy (a type of x-ray) to guide the needle electrode to the nerves near your facet joint  Your healthcare provider may touch the affected nerve to make sure the needle electrode is in the right place  You will feel tingling or pressure when he does this  He will then apply local anesthesia to the nerve to numb it  This will prevent you from feeling pain when he applies heat to the nerve  Your healthcare provider will then apply heat to the nerve using the needle electrode  He may need to apply heat to more than one nerve  He will remove the needle electrode and apply a bandage over the area  What are the risks of lumbar RFA? You may have pain, numbness, tingling, or burning in the area where the lumbar RFA was done  These normally go away within 6 weeks  The needle electrode may injure your spinal nerves   This may cause permanent leg weakness or nerve pain  CARE AGREEMENT:   You have the right to help plan your care  Learn about your health condition and how it may be treated  Discuss treatment options with your healthcare providers to decide what care you want to receive  You always have the right to refuse treatment  The above information is an  only  It is not intended as medical advice for individual conditions or treatments  Talk to your doctor, nurse or pharmacist before following any medical regimen to see if it is safe and effective for you  © Copyright [employer] 2022 Information is for End User's use only and may not be sold, redistributed or otherwise used for commercial purposes   All illustrations and images included in CareNotes® are the copyrighted property of [employer]  or [address] [place] [address]

## 2022-11-11 NOTE — PROGRESS NOTES
Assessment  1  Lumbar spondylosis  -     Case request operating room: RHIZOTOMY LUMBAR L3, L4, L5 medial branch nerves; Standing  -     Case request operating room: RHIZOTOMY LUMBAR L3, L4, L5 medial branch nerves    Greater than 90% relief of pain with improved ability to participate with IADLs after bilateral L3, L4, L5 medial branch blocks #1 and #2 for one week at a time  Previously reported the following symptomatology:     Neck and low back pain described primarily are radicular features  Pain radiates in C6 and C7 dermatomal distribution from neck to hands bilaterally as well as primarily arthritic in nature in low back; additionally with radicular features in L3 and L4 dermatomal distribution right greater than left  5/5 strength bilaterally with active range of motion movements in upper lower extremities  Slight pain limited weakness with left hip flexion, knee extension  Negative Spurling's maneuver, negative SLR bilaterally  Significant tenderness to palpation with lumbar cervical facet loading maneuvers  She is currently engaged with PT for both her neck and low back with modest relief of the pain  She has not trialed medications other than OTC tylenol and ibuprofen  MRI cervical spine noncontrast, MRI lumbar spine noncontrast show mild compressive disease with spondyloarthropathy  Will proceed with multimodal pain therapy; counseled regarding lifestyle modifications including PT  Plan  -bilateral L3, L4, L5 medial branch nerve radiofrequency ablation with IV sedation/anesthesia; f/u 2 weeks post procedure  -gabapentin 100 mg t i d  Ordered for patient; has since tapered counseled regarding sedative effects of taking this medication and provided up titration calendar  Counseled not to take medication while driving or operating heavy machinery/using stairs  -meloxicam 7 5 mg b i d  P r n   pain  Patient has since tapered  Patient has since tapered   Patient educated regarding bleeding risk of taking this medication not taking any other nonsteroidal anti-inflammatory medications while taking this medication; counseled thoroughly regarding potential risk of Cardiovascular injury, Kidney injury, Gastrointestinal ulceration/bleeding  Patient voiced understanding  -physical therapy for right-sided lumbar radiculopathy, lumbar facet arthropathy; Physician directed home exercise plan as per [facility] demonstrated and handouts provided that patient plans to participate with for 1 hour, twice a week for the next 6 weeks  There are risks associated with opioid medications, including dependence, addiction and tolerance  The patient understands and agrees to use these medications only as prescribed  Potential side effects of the medications include, but are not limited to, constipation, drowsiness, addiction, impaired judgment and risk of fatal overdose if not taken as prescribed  The patient was warned against driving while taking sedation medications  Sharing medications is a felony  At this point in time, the patient is showing no signs of addiction, abuse, diversion or suicidal ideation  [place] Prescription Drug Monitoring Program report was reviewed and was appropriate      Complete risks and benefits including bleeding, infection, tissue reaction, nerve injury and allergic reaction were discussed  The approach was demonstrated using models and literature was provided  Verbal and written consent was obtained  My impressions and treatment recommendations were discussed in detail with the patient who verbalized understanding and had no further questions  Discharge instructions were provided  I personally saw and examined the patient and I agree with the above discussed plan of care  My impressions and treatment recommendations were discussed in detail with the patient who verbalized understanding and had no further questions  Discharge instructions were provided   I personally saw and examined the patient and I agree with the above discussed plan of care  New Medications Ordered This Visit   Medications   • BLACK ELDERBERRY PO     Sig: Take by mouth   • MAGNESIUM PO     Sig: magnesium 250 mg tablet   • METHYLPREDNISOLONE PO     Sig: methylprednisolone 4 mg tablets in a dose pack   taper as directed       History of Present Illness    Greater than 90% relief of pain with improved ability to participate with IADLs after bilateral L3, L4, L5 medial branch blocks #1 and #2 for one week at at time  Previously reported the following symptomatology:     Fabián Suarez is a 46 y o  female with past medical history of hypertension, hypothyroidism, hypercholesterolemia, migraine headaches presenting with neck and low back pain described primarily radicular features in neck and axial/arthritic features in low back  Neck pain radiates into the C6 and C7 dermatomal distribution bilaterally accompanied by pain limited weakness numbness and paresthesias  In low back radicular pain travels into the bilateral L3 and L4 dermatomal distribution, right greater than left; however in low back, features are mostly arthritic in nature  She describes lancinating features of the pain  She has been to formal physical therapy for both neck and low back pain with modest relief of her symptoms  She has trialed over-the-counter NSAIDs as well as Tylenol with modest relief of the pain  Her pain significantly impacts independent activities of daily living and overall function, 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  She denies any bowel or bladder abnormality, incontinence, gait instability, headaches or dizziness  I have personally reviewed and/or updated the patient's past medical history, past surgical history, family history, social history, current medications, allergies, and vital signs today  Review of Systems   Constitutional: Positive for activity change  HENT: Negative  Eyes: Negative  Respiratory: Negative  Cardiovascular: Negative  Gastrointestinal: Negative  Endocrine: Negative  Genitourinary: Negative  Musculoskeletal: Positive for arthralgias, back pain, myalgias, neck pain and neck stiffness  Skin: Negative  Allergic/Immunologic: Negative  Neurological: Positive for weakness and numbness  Hematological: Negative  Psychiatric/Behavioral: Negative  All other systems reviewed and are negative  Patient Active Problem List   Diagnosis   • Palpitations   • Hypertension   • SVT (supraventricular tachycardia) (HCC)   • VT (ventricular tachycardia)   • Hyperlipidemia   • Depression   • Osteoporosis   • Arthritis   • Hypothyroidism   • Migraine   • Glaucoma   • Anxiety   • Restless legs   • History of syncope   • Polyarthralgia   • Lumbar spondylosis   • Gastroesophageal reflux disease without esophagitis       Past Medical History:   Diagnosis Date   • Allergic 1974    Seasonal   • Anxiety    • Arthritis    • Colitis     Noted on colonoscopy 04/24/2020     • Depression    • Disease of thyroid gland    • Gastritis     Noted on EGD 04/24/2020   • Gastroparesis    • Glaucoma    • Headache(784 0)    • Hyperlipidemia    • Hypertension    • Hypothyroidism    • Lyme disease    • Migraine    • Osteoporosis    • Scoliosis    • SVT (supraventricular tachycardia) (HCC)    • Visual impairment    • VT (ventricular tachycardia)        Past Surgical History:   Procedure Laterality Date   • BREAST BIOPSY Right 10/21/2020    papilloma   • COLONOSCOPY     • EGD     • FINGER SURGERY      left pinky   • FL GUIDED NEEDLE PLAC BX/ASP/INJ  03/17/2022   • FL GUIDED NEEDLE PLAC BX/ASP/INJ  07/08/2022   • HYSTERECTOMY  2008   • HYSTERECTOMY W/ SALPINGO-OOPHERECTOMY  05/2008   • NERVE BLOCK Bilateral 03/17/2022    Procedure: L3, L4, L5 BLOCK MEDIAL BRANCH;  Surgeon: José Luis Le MD;  Location: [facility];  Service: Pain Management    • NERVE BLOCK Bilateral 07/08/2022    Procedure: BLOCK MEDIAL BRANCH L3, L4, L5 #2;  Surgeon: Ira Cordero MD;  Location: [facility];  Service: Pain Management    • OOPHORECTOMY Bilateral 2008   • US GUIDED BREAST BIOPSY RIGHT COMPLETE Right 10/21/2020       Family History   Problem Relation Age of Onset   • Peripheral vascular disease Mother    • Glaucoma Mother    • Prostate cancer Father    • Hypothyroidism Sister    • No Known Problems Daughter    • No Known Problems Daughter    • Colon cancer Maternal Grandmother    • No Known Problems Maternal Grandfather    • Arthritis Paternal Grandmother    • No Known Problems Paternal Grandfather    • No Known Problems Maternal Aunt    • No Known Problems Maternal Aunt    • Skin cancer Paternal Aunt    • No Known Problems Paternal Aunt    • Breast cancer Neg Hx    • Breast cancer additional onset Neg Hx    • Endometrial cancer Neg Hx    • BRCA2 Positive Neg Hx    • BRCA2 Negative Neg Hx    • BRCA1 Positive Neg Hx    • BRCA1 Negative Neg Hx    • BRCA 1/2 Neg Hx    • Ovarian cancer Neg Hx        Social History     Occupational History   • Not on file   Tobacco Use   • Smoking status: Former Smoker     Packs/day: 1 00     Years: 10 00     Pack years: 10 00     Types: Cigarettes     Quit date: 1/1/2007     Years since quitting: 15 8   • Smokeless tobacco: Never Used   • Tobacco comment: quit 2007   Vaping Use   • Vaping Use: Never used   Substance and Sexual Activity   • Alcohol use: Never   • Drug use: Never   • Sexual activity: Yes     Birth control/protection: Post-menopausal     Comment: hysterectomy       Current Outpatient Medications on File Prior to Visit   Medication Sig   • ALPRAZolam (XANAX) 0 5 mg tablet Take 1 tablet (0 5 mg total) by mouth 30 min pre-procedure   • atorvastatin (LIPITOR) 40 mg tablet Take 1 tablet (40 mg total) by mouth daily   • BLACK ELDERBERRY PO Take by mouth   • BLACK ELDERBERRY,BERRY-FLOWER, PO Take by mouth   • Calcium Carbonate-Vit D-Min (CALCIUM 1200 PO) Take by mouth daily    • Cholecalciferol (VITAMIN D3 PO) Take 1,000 mg by mouth   • DULoxetine (CYMBALTA) 30 mg delayed release capsule duloxetine 30 mg capsule,delayed release   • Glucosamine-Chondroitin 500-400 MG CAPS Take by mouth daily    • hydrOXYzine HCL (ATARAX) 25 mg tablet Take 1 tablet (25 mg total) by mouth as needed for anxiety   • irbesartan (AVAPRO) 75 mg tablet Take 1 tablet (75 mg total) by mouth daily   • levothyroxine 100 mcg tablet Take 1 tablet (100 mcg total) by mouth daily   • Magnesium 250 MG TABS Take 1 tablet (250 mg total) by mouth daily   • MAGNESIUM PO magnesium 250 mg tablet   • methylPREDNISolone 4 MG tablet therapy pack    • METHYLPREDNISOLONE PO methylprednisolone 4 mg tablets in a dose pack   taper as directed   • metoprolol succinate (TOPROL-XL) 25 mg 24 hr tablet Take 1 tablet (25 mg total) by mouth daily   • multivitamin (THERAGRAN) TABS Take 1 tablet by mouth daily   • Nurtec 75 MG TBDP    • Omega-3 Fatty Acids (FISH OIL OMEGA-3 PO) Take by mouth   • omeprazole (PriLOSEC) 40 MG capsule    • scopolamine (TRANSDERM-SCOP) 1 mg/3 days TD 72 hr patch Place 1 mg on the skin (Patient not taking: No sig reported)   • SUMAtriptan (IMITREX) 100 mg tablet Take 100 mg by mouth as needed     No current facility-administered medications on file prior to visit  No Known Allergies      Physical Exam    /78   Pulse 63   Temp 99 2 °F (37 3 °C)   Resp 20   Ht 5' 8" (1 727 m)   Wt 70 3 kg (155 lb)   BMI 23 57 kg/m²     Constitutional: normal, well developed, well nourished, alert, in no distress and non-toxic and no overt pain behavior  Eyes: anicteric  HEENT: grossly intact  Neck: supple, symmetric, trachea midline and no masses   Pulmonary:even and unlabored  Cardiovascular:No edema or pitting edema present  Skin:Normal without rashes or lesions and well hydrated  Psychiatric:Mood and affect appropriate  Neurologic:Cranial Nerves II-XII grossly intact Sensation grossly intact; no clonus negative bunn's  Reflexes 2+ and brisk   SLR negative bilaterally  Spurling's maneuver negative bilaterally  Musculoskeletal:normal gait  5/5 strength bilaterally with AROM in all extremities  Slight pain limited weakness with left hip flexion, knee extension  Normal heel toe and tip toe walking  Significant pain with cervical and lumbar facet loading bilaterally and with lateral spine rotation  TTP over cervical and lumbar paraspinal muscles  Negative davie's test, negative gaenslen's negative SIJ loading bilaterally  Imaging    MRI CERVICAL SPINE WITHOUT CONTRAST     INDICATION: M54 12: Radiculopathy, cervical region      COMPARISON:  5/9/2021     TECHNIQUE:  Sagittal T1, sagittal T2, sagittal inversion recovery, axial T2, axial  2D merge     IMAGE QUALITY:  Diagnostic     FINDINGS:     ALIGNMENT:  There is trace anterolisthesis of C3-C4      MARROW SIGNAL:  Normal marrow signal is identified within the visualized bony structures  No discrete marrow lesion      CERVICAL AND VISUALIZED THORACIC CORD:  Normal signal within the visualized cord      PREVERTEBRAL AND PARASPINAL SOFT TISSUES:  Normal      VISUALIZED POSTERIOR FOSSA:  The visualized posterior fossa demonstrates no abnormal signal      CERVICAL DISC SPACES:     C2-C3:  Normal      C3-C4:  There is left-sided facet arthrosis  There is no significant canal stenosis or foraminal narrowing      C4-C5:  There is a disc osteophyte complex with uncovertebral spurring  There is facet arthrosis  There is mild left foraminal narrowing  There is no significant canal stenosis      C5-C6:  There is a disc osteophyte complex with left-sided uncovertebral spurring  There is mild left foraminal encroachment      C6-C7:  No significant canal stenosis or foraminal narrowing      C7-T1:  No significant canal stenosis or foraminal narrowing  MRI LUMBAR SPINE WITHOUT CONTRAST     INDICATION: M54 12:  Radiculopathy, cervical region      COMPARISON:  None      TECHNIQUE:  Sagittal T1, sagittal T2, sagittal inversion recovery, axial T1 and axial T2, coronal T2     IMAGE QUALITY:  Diagnostic     FINDINGS:     VERTEBRAL BODIES:  There are 5 lumbar type vertebral bodies  Normal alignment of the lumbar spine  No spondylolysis or spondylolisthesis  No scoliosis  No compression fracture  No suspicious marrow signal abnormality  Hemangioma within the L1   vertebral body      SACRUM:  Normal signal within the sacrum  No evidence of insufficiency or stress fracture      DISTAL CORD AND CONUS:  Normal size and signal within the distal cord and conus  Conus medullaris terminates at T12-L1      PARASPINAL SOFT TISSUES:  Paraspinal soft tissues are unremarkable      LOWER THORACIC DISC SPACES:  Normal disc height and signal   No disc herniation, canal stenosis or foraminal narrowing      LUMBAR DISC SPACES:     L1-L2:  No significant canal stenosis or foraminal narrowing      L2-L3:  No significant canal stenosis or foraminal narrowing      L3-L4:  Minimal bulge  No significant canal stenosis or foraminal narrowing      L4-L5:  Mild bulge, asymmetric to the right annular fissure  No significant canal stenosis  Mild foraminal narrowing      L5-S1:  Bulging annulus  Facet arthrosis  No significant canal stenosis  Mild right foraminal narrowing      IMPRESSION:     Mild degenerative changes of the lumbar spine, as described above  CT CERVICAL SPINE - WITHOUT CONTRAST     INDICATION:   TRAUMA      COMPARISON:  None      TECHNIQUE:  CT examination of the cervical spine was performed without intravenous contrast   Contiguous axial images were obtained  Sagittal and coronal reconstructions were performed        Radiation dose length product (DLP) for this visit:  355 mGy-cm     This examination, like all CT scans performed in the [facility], was performed utilizing techniques to minimize radiation dose exposure, including the use of iterative   reconstruction and automated exposure control        IMAGE QUALITY:  Diagnostic      FINDINGS:     ALIGNMENT:  Normal alignment of the cervical spine   No subluxation      VERTEBRAL BODIES:  No fracture      DEGENERATIVE CHANGES:  No significant cervical degenerative changes are noted      PREVERTEBRAL AND PARASPINAL SOFT TISSUES:  Unremarkable      THORACIC INLET:  Normal      IMPRESSION:     No cervical spine fracture or traumatic malalignment      The study was marked in EPIC for immediate notification

## 2022-11-14 ENCOUNTER — OFFICE VISIT (OUTPATIENT)
Dept: PHYSICAL THERAPY | Facility: CLINIC | Age: 52
End: 2022-11-14

## 2022-11-14 DIAGNOSIS — M70.51 SUPRAPATELLAR BURSITIS OF RIGHT KNEE: ICD-10-CM

## 2022-11-14 DIAGNOSIS — G89.29 CHRONIC PAIN OF RIGHT KNEE: Primary | ICD-10-CM

## 2022-11-14 DIAGNOSIS — M25.561 CHRONIC PAIN OF RIGHT KNEE: Primary | ICD-10-CM

## 2022-11-14 NOTE — PROGRESS NOTES
Daily Note     Today's date: 2022  Patient name: Quita Cervantes  : 1970  MRN: [id number]  Referring provider: Jaycee Polo*  Dx:   Encounter Diagnosis     ICD-10-CM    1  Chronic pain of right knee  M25 561     G89 29    2  Suprapatellar bursitis of right knee  M70 51                   Subjective: "Today's a good day " Pt c/o's suprapatellar pain 6/10 at arrival  Pt notes this is a typical pain level for R knee  Objective: See treatment diary below      Assessment: Tolerated treatment well  Patient would benefit from continued PT For improved strength, flexibility and overall function  Initiated pt program as per flow sheet; Pt challenged appropriately, especially with SLR, requiring min A x 1 to complete set  These also increased R patellar pain slightly, but decreased by end of today's session  Plan: Continue per plan of care  Precautions:  Fall risk        Manuals 11/10 11/14           Patella mobs  [address]           Gastroc stretching   TODD                                     Neuro Re-Ed                                                                                                        Ther Ex             Nu-step  10' L1           TR/HR             TKE             Step-ups: fwd/lat             QS  5'' 2x10           SAQ  5'' 2x10           SLR  x5 with assist           Adduction ball squeeze  5'' 2x10           Bridges  5'' 2x10           Claimshells  5'' 2x10                        Ther Activity                                       Gait Training                                       Modalities             CP  Declined           TENS

## 2022-11-23 ENCOUNTER — OFFICE VISIT (OUTPATIENT)
Dept: PHYSICAL THERAPY | Facility: CLINIC | Age: 52
End: 2022-11-23

## 2022-11-23 DIAGNOSIS — M70.51 SUPRAPATELLAR BURSITIS OF RIGHT KNEE: Primary | ICD-10-CM

## 2022-11-23 NOTE — PROGRESS NOTES
Daily Note     Today's date: 2022  Patient name: Zainab Olivas  : 1970  MRN: [id number]  Referring provider: Di Ornelas*  Dx:   Encounter Diagnosis     ICD-10-CM    1  Suprapatellar bursitis of right knee  M70 51       2  Chronic pain of right knee  M25 561     G89 29                      Subjective: Patient reports no new c/o pain today  Objective: See treatment diary below      Assessment: Tolerated treatment well  Patient exhibited good technique with therapeutic exercises and would benefit from continued PT to increase R knee ROM/strength and endurance to improve mobility  Plan: Continue per plan of care  Precautions:  Fall risk        Manuals 11/10 11/14 11/23 11/25    Patella mobs  [address] 5' 5'    Gastroc stretching   TODD +marco 10' 5'                    Neuro Re-Ed                                                                Ther Ex        Nu-step  10' L1 10' L3 10' L3    TR/HR    6" 2x10    TKE    10x5" GTB    Step-ups: fwd/lat        QS  5'' 2x10 5'' 2x10 5" 2x10    SAQ  5'' 2x10 5'' 2x10 5" 2x10    SLR  x5 with assist NT F/A/E  10x ea    Adduction ball squeeze  5'' 2x10 5'' 2x10 5" 2x10    Bridges  5'' 2x10 5'' 2x10 5" 2x10    Clamshells  5'' 2x10 5'' 2x10 5" 2x10    Reverse clamshells   5'' 2x10 5" 2x10    Leg Press    55# 5x            HEP    10'    Gait Training                        Modalities        CP  Declined      TENS

## 2022-11-23 NOTE — PROGRESS NOTES
Daily Note     Today's date: 2022  Patient name: Dar Isbell  : 1970  MRN: [id number]  Referring provider: Misty Joaquin*  Dx:   Encounter Diagnosis     ICD-10-CM    1  Suprapatellar bursitis of right knee  M70 51                      Subjective: Pt notes no new complaints      Objective: See treatment diary below      Assessment:  Pt tolerated treatment session fair  She reports muscle discomfort in distal quad along with ms fatigue  Pt only able to perform clamshells and bridges through partial ROM  Pt would benefit from continued OP PT services  Plan: Continue per plan of care  Precautions:  Fall risk        Manuals 11/10 11/14 11/23     Patella mobs  [address] 5'     Gastroc stretching   TODD +marco 10'                     Neuro Re-Ed                                                                Ther Ex        Nu-step  10' L1 10' L3     TR/HR        TKE        Step-ups: fwd/lat        QS  5'' 2x10 5'' 2x10     SAQ  5'' 2x10 5'' 2x10     SLR  x5 with assist NT     Adduction ball squeeze  5'' 2x10 5'' 2x10     Bridges  5'' 2x10 5'' 2x10     Clamshells  5'' 2x10 5'' 2x10     Reverse clamshells   5'' 2x10                             Gait Training                        Modalities        CP  Declined      TENS

## 2022-11-25 ENCOUNTER — OFFICE VISIT (OUTPATIENT)
Dept: PHYSICAL THERAPY | Facility: CLINIC | Age: 52
End: 2022-11-25

## 2022-11-25 DIAGNOSIS — M70.51 SUPRAPATELLAR BURSITIS OF RIGHT KNEE: Primary | ICD-10-CM

## 2022-11-25 DIAGNOSIS — M25.561 CHRONIC PAIN OF RIGHT KNEE: ICD-10-CM

## 2022-11-25 DIAGNOSIS — G89.29 CHRONIC PAIN OF RIGHT KNEE: ICD-10-CM

## 2022-11-25 NOTE — PROGRESS NOTES
Daily Note     Today's date: 2022  Patient name: Maik Schultz  : 1970  MRN: [id number]  Referring provider: Rubia Lin*  Dx: No diagnosis found  Subjective: ***      Objective: See treatment diary below      Assessment: Tolerated treatment {Tolerated treatment :[id number]}  Patient exhibited good technique with therapeutic exercises and would benefit from continued PT to increase R knee ROM/strength and endurance to improve mobility  Plan: Continue per plan of care  Precautions:  Fall risk        Manuals 11/10 11/14 11/23 11/25 11/28   Patella mobs  [address] 5' 5'    Gastroc stretching   TODD +marco 10' 5'                    Neuro Re-Ed                                                               Ther Ex       Nu-step  10' L1 10' L3 10' L3    TR/HR    6" 2x10    TKE    10x5" GTB    Step-ups: fwd/lat        QS  5'' 2x10 5'' 2x10 5" 2x10    SAQ  5'' 2x10 5'' 2x10 5" 2x10    SLR  x5 with assist NT F/A/E  10x ea    Adduction ball squeeze  5'' 2x10 5'' 2x10 5" 2x10    Bridges  5'' 2x10 5'' 2x10 5" 2x10    Clamshells  5'' 2x10 5'' 2x10 5" 2x10    Reverse clamshells   5'' 2x10 5" 2x10    Leg Press    55# 5x            HEP    10'    Gait Training                       Modalities       CP  Declined      TENS

## 2022-11-28 ENCOUNTER — APPOINTMENT (OUTPATIENT)
Dept: PHYSICAL THERAPY | Facility: CLINIC | Age: 52
End: 2022-11-28

## 2022-11-29 ENCOUNTER — OFFICE VISIT (OUTPATIENT)
Dept: PHYSICAL THERAPY | Facility: CLINIC | Age: 52
End: 2022-11-29

## 2022-11-29 DIAGNOSIS — M25.561 CHRONIC PAIN OF RIGHT KNEE: ICD-10-CM

## 2022-11-29 DIAGNOSIS — G89.29 CHRONIC PAIN OF RIGHT KNEE: ICD-10-CM

## 2022-11-29 DIAGNOSIS — M70.51 SUPRAPATELLAR BURSITIS OF RIGHT KNEE: Primary | ICD-10-CM

## 2022-11-29 NOTE — PROGRESS NOTES
Daily Note     Today's date: 2022  Patient name: Sudarshan Odonnell  : 1970  MRN: [id number]  Referring provider: Hao Orona*  Dx:   Encounter Diagnosis     ICD-10-CM    1  Suprapatellar bursitis of right knee  M70 51       2  Chronic pain of right knee  M25 561     G89 29                      Subjective: Patient reports soreness in B knee this morning  Objective: See treatment diary below      Assessment: Tolerated treatment well  Patient exhibited good technique with therapeutic exercises and would benefit from continued PT to increase R knee ROM/strength and endurance to improve mobility  Plan: Continue per plan of care  Precautions:  Fall risk        Manuals 11/10 11/14 11/23 11/25 11/29   Patella mobs  [address] 5' 5' 5'   Gastroc stretching   TODD +marco 10' 5' 5'                   Neuro Re-Ed                                                               Ther Ex       Nu-step  10' L1 10' L3 10' L3 10' L3   TR/HR    6" 2x10 6" 2x10   TKE    10x5" GTB 10x5" GTB   Step-ups: fwd/lat        QS  5'' 2x10 5'' 2x10 5" 2x10 5" 2x10   SAQ  5'' 2x10 5'' 2x10 5" 2x10 5" 2x10   SLR  x5 with assist NT F/A/E  10x ea F/A/E 15x ea   Adduction ball squeeze  5'' 2x10 5'' 2x10 5" 2x10 5" 2x10   Bridges  5'' 2x10 5'' 2x10 5" 2x10 5" 2x10   Clamshells  5'' 2x10 5'' 2x10 5" 2x10 5" 2x10   Reverse clamshells   5'' 2x10 5" 2x10 5" 2x10   Leg Press    55# 5x 55# 2x10           HEP    10'    Gait Training                       Modalities       CP  Declined      TENS

## 2022-12-06 ENCOUNTER — CLINICAL SUPPORT (OUTPATIENT)
Dept: NUTRITION | Facility: CLINIC | Age: 52
End: 2022-12-06

## 2022-12-06 VITALS — WEIGHT: 157.2 LBS | BODY MASS INDEX: 23.9 KG/M2

## 2022-12-06 DIAGNOSIS — K31.84 GASTROPARESIS: ICD-10-CM

## 2022-12-06 NOTE — PROGRESS NOTES
Follow-Up Nutrition Assessment Form    Patient Name: Quita Cervantes    YOB: 1970    Sex: Female      Follow Up Date: 12/6/2022  Start Time: 3:30 PM Stop Time: 4:08 PM Total Minutes: 38 min     Data:  Present at session: self   Parent/Patient Concerns: "My weight keeps going up and I don't enjoy food anymore "   Medical Dx/Reason for Referral: Gastroparesis   Past Medical History:   Diagnosis Date   • Allergic 1974    Seasonal   • Anxiety    • Arthritis    • Colitis     Noted on colonoscopy 04/24/2020     • Depression    • Disease of thyroid gland    • Gastritis     Noted on EGD 04/24/2020   • Gastroparesis    • Glaucoma    • Headache(784 0)    • Hyperlipidemia    • Hypertension    • Hypothyroidism    • Lyme disease    • Migraine    • Osteoporosis    • Scoliosis    • SVT (supraventricular tachycardia) (HCC)    • Visual impairment    • VT (ventricular tachycardia)        Current Outpatient Medications   Medication Sig Dispense Refill   • ALPRAZolam (XANAX) 0 5 mg tablet Take 1 tablet (0 5 mg total) by mouth 30 min pre-procedure 4 tablet 0   • atorvastatin (LIPITOR) 40 mg tablet Take 1 tablet (40 mg total) by mouth daily 90 tablet 1   • BLACK ELDERBERRY PO Take by mouth     • BLACK ELDERBERRY,BERRY-FLOWER, PO Take by mouth     • Calcium Carbonate-Vit D-Min (CALCIUM 1200 PO) Take by mouth daily      • Cholecalciferol (VITAMIN D3 PO) Take 1,000 mg by mouth     • DULoxetine (CYMBALTA) 30 mg delayed release capsule duloxetine 30 mg capsule,delayed release     • Glucosamine-Chondroitin 500-400 MG CAPS Take by mouth daily      • hydrOXYzine HCL (ATARAX) 25 mg tablet Take 1 tablet (25 mg total) by mouth as needed for anxiety 30 tablet 1   • irbesartan (AVAPRO) 75 mg tablet Take 1 tablet (75 mg total) by mouth daily 90 tablet 3   • levothyroxine 100 mcg tablet Take 1 tablet (100 mcg total) by mouth daily 90 tablet 3   • Magnesium 250 MG TABS Take 1 tablet (250 mg total) by mouth daily 90 tablet 3   • MAGNESIUM PO magnesium 250 mg tablet     • methylPREDNISolone 4 MG tablet therapy pack      • METHYLPREDNISOLONE PO methylprednisolone 4 mg tablets in a dose pack   taper as directed     • metoprolol succinate (TOPROL-XL) 25 mg 24 hr tablet Take 1 tablet (25 mg total) by mouth daily 90 tablet 1   • multivitamin (THERAGRAN) TABS Take 1 tablet by mouth daily     • Nurtec 75 MG TBDP      • Omega-3 Fatty Acids (FISH OIL OMEGA-3 PO) Take by mouth     • omeprazole (PriLOSEC) 40 MG capsule      • scopolamine (TRANSDERM-SCOP) 1 mg/3 days TD 72 hr patch Place 1 mg on the skin (Patient not taking: No sig reported)     • SUMAtriptan (IMITREX) 100 mg tablet Take 100 mg by mouth as needed       No current facility-administered medications for this visit  Additional Meds/Supplements: n/a   Barriers to Learning: None   Labs:    Height: Ht Readings from Last 3 Encounters:   11/11/22 5' 8" (1 727 m)   11/11/22 5' 8" (1 727 m)   10/31/22 5' 8" (1 727 m)      Weight: Wt Readings from Last 10 Encounters:   11/11/22 70 3 kg (155 lb)   11/11/22 70 4 kg (155 lb 3 2 oz)   10/31/22 69 5 kg (153 lb 3 5 oz)   10/28/22 69 8 kg (153 lb 14 1 oz)   10/24/22 64 9 kg (143 lb)   10/03/22 64 9 kg (143 lb)   09/15/22 67 7 kg (149 lb 3 2 oz)   09/08/22 68 kg (150 lb)   08/22/22 67 9 kg (149 lb 9 6 oz)   08/08/22 67 4 kg (148 lb 9 6 oz)     Estimated body mass index is 23 57 kg/m² as calculated from the following:    Height as of 11/11/22: 5' 8" (1 727 m)  Weight as of 11/11/22: 70 3 kg (155 lb)  Wt  Change Since Last Visit: [x]Yes     []No  Amount: Pt's weight increased from 149lb to 157lb since initial appointment on 9/15/22      Energy Needs: [facility] Equation: 2445 - 2009 kcal (mifflin x 1 5 activity, minus 500 kcal for 1 lb weight loss per week, pt desiring to lose weight)   Pain Screen: Are you having pain now? No      Assessment:  Pt says she has been following with PT for bursitis, says tx have been helpful   Diarrhea is improving, says having regular formed BMs now, previously was having severe urgency  Ongoing N/V, "no matter what I eat " Pt appeared defeated/depressed, says "I am defeated " Pt is upset that she can't enjoy foods like she used to  Reports no desire to eat/appetite though continued weight gain  Does admit to increased snacking on trail mix and cookies  Pt has been trying to decrease intake of added sugars/reading nutrition labels to identify content  Has switched to white bread instead of whole grain  Says she has been tolerating lactaid milk and probiotic trail mix very well  Pt reports she wants to lose weight, even with weight gain from last appointment pt is still at a healthy BMI  New Goals:   1  Pt to have modest weight loss of 7-10lb upon follow up  2  Pt to continue to have regular formed BMs daily  3        Initial PES:  Altered gastrointestinal function  related to Changes in the GI tract motility (i e  gastroparesis) as evidenced by  Abnormal gastric emptying and/or small bowel transit time + diarrhea     New PES: No Change      New Problem List:  1  bursitis   2    3          Medical Nutrition Therapy Intervention:  [x]Individualized Meal Plan--Discussed use of nonfat lactaid milk instead of whole milk, activia and kefir products for probiotics  Plans for scope in January, will follow up and review results/changes to diet accordingly  Did try discussing other lunch options/variety though pt did not appear interested/says foods are either unappealing or she enjoys them and will "regret it later"  []Understanding Lab Values   []Basic Pathophysiology of Disease []Food/Medication Interactions   [x]Food Diary--Pt to begin calorie tracking as she has personal goal of weight loss, encouraged 5202-2816 kcal daily   []Exercise   []Lifestyle/Behavior Modification Techniques []Medication, Mechanism of Action   []Label Reading []Self Blood Glucose Monitoring   [x]Weight/BMI Goals--Discussed with pt that nutritionally she does not need to lose weight, BMI of 23 9  Pt reports her goal weight is 140lb, discussed modest weight loss to meet pt's personal goals   []Other -   Handouts provided: N/V nutrition therapy   Other Notes:        Comprehension: []Excellent  []Very Good  [x]Good  []Fair   []Poor    Receptivity: []Excellent  []Very Good  [x]Good  []Fair   []Poor    Expected Compliance: []Excellent  []Very Good  [x]Good  []Fair   []Poor      Labs:  CMP  Lab Results   Component Value Date    K 3 5 10/28/2022     10/28/2022    CO2 29 10/28/2022    BUN 11 10/28/2022    CREATININE 0 97 10/28/2022    GLUF 87 05/17/2022    CALCIUM 8 9 10/28/2022    AST 19 10/28/2022    ALT 28 10/28/2022    ALKPHOS 85 10/28/2022    EGFR 67 10/28/2022       BMP  Lab Results   Component Value Date    CALCIUM 8 9 10/28/2022    K 3 5 10/28/2022    CO2 29 10/28/2022     10/28/2022    BUN 11 10/28/2022    CREATININE 0 97 10/28/2022       Lipids  No results found for: CHOL  Lab Results   Component Value Date    HDL 62 09/12/2022    HDL 53 05/17/2022    HDL 65 03/23/2022     Lab Results   Component Value Date    LDLCALC 70 09/12/2022    LDLCALC 111 (H) 05/17/2022    LDLCALC 94 03/23/2022     Lab Results   Component Value Date    TRIG 135 09/12/2022    TRIG 180 (H) 05/17/2022    TRIG 149 03/23/2022     No results found for: CHOLHDL    Hemoglobin A1C  Lab Results   Component Value Date    HGBA1C 5 0 03/23/2022       Fasting Glucose  Lab Results   Component Value Date    GLUF 87 05/17/2022       Insulin     Thyroid  No results found for: TSH, W5IKXQI, F1LZLVN, THYROIDAB    Hepatic Function Panel  Lab Results   Component Value Date    ALT 28 10/28/2022    AST 19 10/28/2022    ALKPHOS 85 10/28/2022       Celiac Disease Antibody Panel  No results found for: ENDOMYSIAL IGA, GLIADIN IGA, GLIADIN IGG, IGA, TISSUE TRANSGLUT AB, TTG IGA   Iron  Lab Results   Component Value Date    IRON 68 12/02/2021    TIBC 335 12/02/2021    FERRITIN 71 12/02/2021 Vitamins  No results found for: VITAMIN B2   No results found for: NICOTINAMIDE, NICOTINIC ACID   No results found for: VITAMINB6  No results found for: [id number]  No results found for: VITB5  No results found for: V0CJDNOU  No results found for: THYROGLB  No results found for: VITAMIN K   No results found for: 25-HYDROXY VIT D   No components found for: Hari Juarez [address], RD, LDN  [address]   [address] [place] [address]

## 2022-12-08 ENCOUNTER — APPOINTMENT (OUTPATIENT)
Dept: PHYSICAL THERAPY | Facility: CLINIC | Age: 52
End: 2022-12-08

## 2022-12-08 ENCOUNTER — TELEPHONE (OUTPATIENT)
Dept: RADIOLOGY | Facility: CLINIC | Age: 52
End: 2022-12-08

## 2022-12-08 ENCOUNTER — APPOINTMENT (OUTPATIENT)
Dept: RADIOLOGY | Facility: HOSPITAL | Age: 52
End: 2022-12-08

## 2022-12-08 ENCOUNTER — ANESTHESIA EVENT (OUTPATIENT)
Dept: GASTROENTEROLOGY | Facility: HOSPITAL | Age: 52
End: 2022-12-08

## 2022-12-08 ENCOUNTER — HOSPITAL ENCOUNTER (OUTPATIENT)
Facility: HOSPITAL | Age: 52
Setting detail: OUTPATIENT SURGERY
Discharge: HOME | End: 2022-12-08
Attending: ANESTHESIOLOGY | Admitting: ANESTHESIOLOGY

## 2022-12-08 ENCOUNTER — ANESTHESIA (OUTPATIENT)
Dept: GASTROENTEROLOGY | Facility: HOSPITAL | Age: 52
End: 2022-12-08

## 2022-12-08 VITALS
DIASTOLIC BLOOD PRESSURE: 70 MMHG | SYSTOLIC BLOOD PRESSURE: 143 MMHG | TEMPERATURE: 97.7 F | WEIGHT: 155 LBS | BODY MASS INDEX: 23.49 KG/M2 | OXYGEN SATURATION: 98 % | HEART RATE: 59 BPM | RESPIRATION RATE: 20 BRPM | HEIGHT: 68 IN

## 2022-12-08 RX ORDER — PROPOFOL 10 MG/ML
INJECTION, EMULSION INTRAVENOUS AS NEEDED
Status: DISCONTINUED | OUTPATIENT
Start: 2022-12-08 | End: 2022-12-08

## 2022-12-08 RX ORDER — SODIUM CHLORIDE, SODIUM LACTATE, POTASSIUM CHLORIDE, CALCIUM CHLORIDE 600; 310; 30; 20 MG/100ML; MG/100ML; MG/100ML; MG/100ML
INJECTION, SOLUTION INTRAVENOUS CONTINUOUS PRN
Status: DISCONTINUED | OUTPATIENT
Start: 2022-12-08 | End: 2022-12-08

## 2022-12-08 RX ORDER — LIDOCAINE HYDROCHLORIDE 10 MG/ML
INJECTION, SOLUTION EPIDURAL; INFILTRATION; INTRACAUDAL; PERINEURAL AS NEEDED
Status: DISCONTINUED | OUTPATIENT
Start: 2022-12-08 | End: 2022-12-08 | Stop reason: HOSPADM

## 2022-12-08 RX ORDER — DEXMEDETOMIDINE HYDROCHLORIDE 100 UG/ML
INJECTION, SOLUTION INTRAVENOUS AS NEEDED
Status: DISCONTINUED | OUTPATIENT
Start: 2022-12-08 | End: 2022-12-08

## 2022-12-08 RX ORDER — LIDOCAINE HYDROCHLORIDE 10 MG/ML
10 INJECTION, SOLUTION EPIDURAL; INFILTRATION; INTRACAUDAL; PERINEURAL ONCE
Status: DISCONTINUED | OUTPATIENT
Start: 2022-12-08 | End: 2022-12-08 | Stop reason: HOSPADM

## 2022-12-08 RX ORDER — METHYLPREDNISOLONE ACETATE 80 MG/ML
INJECTION, SUSPENSION INTRA-ARTICULAR; INTRALESIONAL; INTRAMUSCULAR; SOFT TISSUE AS NEEDED
Status: DISCONTINUED | OUTPATIENT
Start: 2022-12-08 | End: 2022-12-08 | Stop reason: HOSPADM

## 2022-12-08 RX ORDER — FENTANYL CITRATE 50 UG/ML
INJECTION, SOLUTION INTRAMUSCULAR; INTRAVENOUS AS NEEDED
Status: DISCONTINUED | OUTPATIENT
Start: 2022-12-08 | End: 2022-12-08

## 2022-12-08 RX ORDER — LIDOCAINE HYDROCHLORIDE 20 MG/ML
INJECTION, SOLUTION EPIDURAL; INFILTRATION; INTRACAUDAL; PERINEURAL AS NEEDED
Status: DISCONTINUED | OUTPATIENT
Start: 2022-12-08 | End: 2022-12-08 | Stop reason: HOSPADM

## 2022-12-08 RX ORDER — BUPIVACAINE HYDROCHLORIDE 2.5 MG/ML
INJECTION, SOLUTION EPIDURAL; INFILTRATION; INTRACAUDAL AS NEEDED
Status: DISCONTINUED | OUTPATIENT
Start: 2022-12-08 | End: 2022-12-08 | Stop reason: HOSPADM

## 2022-12-08 RX ORDER — PROPOFOL 10 MG/ML
INJECTION, EMULSION INTRAVENOUS CONTINUOUS PRN
Status: DISCONTINUED | OUTPATIENT
Start: 2022-12-08 | End: 2022-12-08

## 2022-12-08 RX ORDER — MIDAZOLAM HYDROCHLORIDE 2 MG/2ML
INJECTION, SOLUTION INTRAMUSCULAR; INTRAVENOUS AS NEEDED
Status: DISCONTINUED | OUTPATIENT
Start: 2022-12-08 | End: 2022-12-08

## 2022-12-08 RX ORDER — BUPIVACAINE HCL/PF 2.5 MG/ML
5 VIAL (ML) INJECTION ONCE
Status: DISCONTINUED | OUTPATIENT
Start: 2022-12-08 | End: 2022-12-08 | Stop reason: HOSPADM

## 2022-12-08 RX ORDER — METHYLPREDNISOLONE ACETATE 80 MG/ML
80 INJECTION, SUSPENSION INTRA-ARTICULAR; INTRALESIONAL; INTRAMUSCULAR; PARENTERAL; SOFT TISSUE ONCE
Status: DISCONTINUED | OUTPATIENT
Start: 2022-12-08 | End: 2022-12-08 | Stop reason: HOSPADM

## 2022-12-08 RX ADMIN — MIDAZOLAM 2 MG: 1 INJECTION INTRAMUSCULAR; INTRAVENOUS at 07:33

## 2022-12-08 RX ADMIN — PROPOFOL 40 MG: 10 INJECTION, EMULSION INTRAVENOUS at 07:42

## 2022-12-08 RX ADMIN — DEXMEDETOMIDINE HYDROCHLORIDE 8 MCG: 100 INJECTION, SOLUTION INTRAVENOUS at 07:42

## 2022-12-08 RX ADMIN — PROPOFOL 100 MCG/KG/MIN: 10 INJECTION, EMULSION INTRAVENOUS at 07:44

## 2022-12-08 RX ADMIN — SODIUM CHLORIDE, SODIUM LACTATE, POTASSIUM CHLORIDE, AND CALCIUM CHLORIDE: .6; .31; .03; .02 INJECTION, SOLUTION INTRAVENOUS at 07:35

## 2022-12-08 RX ADMIN — DEXMEDETOMIDINE HYDROCHLORIDE 4 MCG: 100 INJECTION, SOLUTION INTRAVENOUS at 07:47

## 2022-12-08 RX ADMIN — PROPOFOL 20 MG: 10 INJECTION, EMULSION INTRAVENOUS at 07:43

## 2022-12-08 RX ADMIN — FENTANYL CITRATE 50 MCG: 50 INJECTION INTRAMUSCULAR; INTRAVENOUS at 07:33

## 2022-12-08 NOTE — ANESTHESIA PREPROCEDURE EVALUATION
Procedure:  RHIZOTOMY LUMBAR L3, L4, L5 medial branch nerves (Bilateral: Spine Lumbar)    Relevant Problems   ANESTHESIA (within normal limits)      CARDIO   (+) Hyperlipidemia   (+) Hypertension   (+) Migraine   (+) SVT (supraventricular tachycardia) (HCC)      ENDO   (+) Hypothyroidism      GI/HEPATIC   (+) Gastroesophageal reflux disease without esophagitis      MUSCULOSKELETAL   (+) Arthritis   (+) Lumbar spondylosis      NEURO/PSYCH   (+) Anxiety   (+) Depression   (+) History of syncope   (+) Migraine      Other   (+) Restless legs        Physical Exam    Airway    Mallampati score: I  TM Distance: >3 FB  Neck ROM: full     Dental   No notable dental hx     Cardiovascular      Pulmonary      Other Findings        Anesthesia Plan  ASA Score- 2     Anesthesia Type- IV sedation with anesthesia with ASA Monitors  Additional Monitors:   Airway Plan:           Plan Factors-Exercise tolerance (METS): >4 METS  Chart reviewed  EKG reviewed  Existing labs reviewed  Patient summary reviewed  Patient is not a current smoker  Induction-     Postoperative Plan-     Informed Consent- Anesthetic plan and risks discussed with patient  I personally reviewed this patient with the CRNA  Discussed and agreed on the Anesthesia Plan with the CRNA  aCrolynn Becerril

## 2022-12-08 NOTE — ANESTHESIA POSTPROCEDURE EVALUATION
Post-Op Assessment Note    CV Status:  Stable  Pain Score: 0    Pain management: adequate  Multimodal analgesia used between 6 hours prior to anesthesia start to PACU discharge    Mental Status:  Alert and awake   Hydration Status:  Euvolemic and stable   PONV Controlled:  None   Airway Patency:  Patent   Two or more mitigation strategies used for obstructive sleep apnea   Post Op Vitals Reviewed: Yes      Staff: CRNA         No notable events documented      /62 (12/08/22 0810)    Temp 97 7 °F (36 5 °C) (12/08/22 0810)    Pulse 67 (12/08/22 0810)   Resp 18 (12/08/22 0810)    SpO2 100 % (12/08/22 0810)

## 2022-12-08 NOTE — INTERVAL H&P NOTE
H&P reviewed  After examining the patient I find no changes in the patients condition since the H&P had been written      Vitals:    12/08/22 0651   BP: 128/70   Pulse: 64   Resp: 18   Temp: 97 8 °F (36 6 °C)   SpO2: 99%

## 2022-12-08 NOTE — DISCHARGE INSTRUCTIONS
YOUR 2 WEEK FOLLOW UP HAS BEEN SCHEDULED; IF YOU WISH TO CHANGE THE FOLLOW UP, PLEASE CALL THE [facility]: [phone]    Lumbar Radiofrequency Ablation   WHAT YOU NEED TO KNOW:   Lumbar radiofrequency ablation (RFA) is a procedure used to treat facet joint pain in your lower back  Facet joints are found at the back of each vertebra  A needle electrode is used to send electrical currents to the nerves in your facet joint  The electrical currents create heat that damages the nerve so it cannot send pain signals  DISCHARGE INSTRUCTIONS:   Seek care immediately if:   You cannot move your leg  You cannot control your urine or bowel movements  You have severe pain in your lower back  Contact your healthcare provider if:   You have leg weakness  You develop new symptoms  You have questions or concerns about your condition or care  Medicines:   Pain medicine  may be given  Ask how to take this medicine safely  Take your medicine as directed  Contact your healthcare provider if you think your medicine is not helping or if you have side effects  Tell him or her if you are allergic to any medicine  Keep a list of the medicines, vitamins, and herbs you take  Include the amounts, and when and why you take them  Bring the list or the pill bottles to follow-up visits  Carry your medicine list with you in case of an emergency  Follow up with your healthcare provider as directed:  Write down your questions so you remember to ask them during your visits  Activity:  Do not drive a car or operate machinery within 24 hours after your procedure  Ask your healthcare provider about any other activities you should avoid  © Copyright [employer] 2022 Information is for End User's use only and may not be sold, redistributed or otherwise used for commercial purposes   All illustrations and images included in CareNotes® are the copyrighted property of [employer]  or [address]  The above information is an  only  It is not intended as medical advice for individual conditions or treatments  Talk to your doctor, nurse or pharmacist before following any medical regimen to see if it is safe and effective for you

## 2022-12-08 NOTE — PROCEDURES
Pre-procedure Diagnosis: Lumbar facet arthropathy  Post-procedure Diagnosis: Lumbar facet arthropathy  Operation Title(s):  1  Radiofrequency ablation of [BILATERAL] L3, L4, L5 medial branch nerves      2  Intraoperative fluoroscopy  Attending Surgeon:   Brigid Sanchez MD  Anesthesia:   Local    Indications: The patient is a 46y o  year-old female with a diagnosis of lumbar facet arthropathy  The patient's history and physical exam were reviewed  The patient has previously undergone diagnostic and confirmatory lumbar medial branch blocks  Fluoroscopy is being used for the precise placement of the needles at the lumbar medial branch nerves  The risks, benefits and alternatives to the procedure were discussed, and all questions were answered to the patient's satisfaction  The patient agreed to proceed, and written informed consent was obtained  Procedure in Detail: The patient was brought into the procedure room and placed in the prone position on the fluoroscopy table  The low back and upper buttock were prepped with chloraprep times two and draped in a sterile manner  AP fluoroscopy was used to identify the lumbar levels on the [LEFT] side  The fluoroscope beam was then obliqued to better visualize the junction of the superior articular process and transverse process on the [LEFT] side and then tilted caudally about 25 degrees  An 18-gauge, 100mm length, 10mm curved active tip radiofrequency probe was advanced toward the targeted points until bone was contacted  Multiple fluoroscopic views were made to ensure placement of the needle tip at the appropriate location of the medial branch nerve  Motor stimulation was performed at 2 Hz and 1 2 volts generating a twitch in the paraspinal muscles with no motor activity in the lower extremities  Next, AP fluoroscopy was used to identify the L5-S1 level  The fluoroscope been was tilted cephalad to visualize the sacral ala   The fluoroscope beam was then tilted about 45 degrees from that point and the skin and subcutaneous tissues in these identified areas were anesthetized with 1% lidocaine  An 18-gauge, 100mm length, 10mm curved active tip radiofrequency probe was advanced toward the targeted points until bone was contacted  Motor stimulation was performed at 2 Hz and 1 2 volts generating a twitch in the paraspinal muscles with no motor activity in the lower extremities  0 5ml of 2% lidocaine was injected prior to lesioning, which was performed for 90 seconds at 80 degrees centigrade  The lesion was repeated once more after slight repositioning of the needles on an oblique view  Once the lesions were complete, 1ml of a solution consisting of 5mL 0 25% bupivacaine and 1 mL Depo-medrol (80mg/mL) was injected through each needle and then removed with a 1% lidocaine flush  The patient's back was cleaned, and bandages were placed at the needle insertion site  The same procedure was repeated at the lumbar levels on the [RIGHT] side    Disposition: The patient tolerated the procedure well and there were no apparent complications  Vital signs remained stable throughout the procedure  The patient was taken to the recovery area where written discharge instructions for the procedure were given      Estimated Blood Loss: None  Specimens Obtained: N/A

## 2022-12-08 NOTE — OP NOTE
OPERATIVE REPORT  PATIENT NAME: Nelly Silveira    :  1970  MRN: [id number]  Pt Location:  GI ROOM 01    SURGERY DATE: 2022    Surgeon(s) and Role: Patricia Ortega MD - Primary    Preop Diagnosis:  Lumbar spondylosis [M47 816]    Post-Op Diagnosis Codes:     * Lumbar spondylosis [M47 816]    Procedure(s) (LRB):  RHIZOTOMY LUMBAR L3, L4, L5 medial branch nerves (Bilateral)    Specimen(s):  * No specimens in log *    Estimated Blood Loss:   Minimal    Drains:  * No LDAs found *    Anesthesia Type:   IV Sedation with Anesthesia    Operative Indications:  Lumbar spondylosis [M47 816]    Operative Findings:  Bilateral L3, L4, L5 medial branch nerve regions identified under fluoroscopic guidance   Appropriate motor testing performed prior to radiofrequency lesioning of medial branch nerve regions    Complications:   None    Procedure and Technique:  Please see detailed procedure note    I was present for the entire procedure    Patient Disposition:  PACU     SIGNATURE: Chong Driscoll MD  DATE: 2022  TIME: 8:09 AM

## 2022-12-12 ENCOUNTER — OFFICE VISIT (OUTPATIENT)
Dept: CARDIOLOGY CLINIC | Facility: CLINIC | Age: 52
End: 2022-12-12

## 2022-12-12 DIAGNOSIS — Z87.898 HISTORY OF SYNCOPE: Primary | ICD-10-CM

## 2022-12-12 DIAGNOSIS — E78.2 MIXED HYPERLIPIDEMIA: ICD-10-CM

## 2022-12-12 DIAGNOSIS — E03.9 HYPOTHYROIDISM, UNSPECIFIED TYPE: ICD-10-CM

## 2022-12-12 DIAGNOSIS — I47.20 VT (VENTRICULAR TACHYCARDIA): ICD-10-CM

## 2022-12-12 DIAGNOSIS — I10 PRIMARY HYPERTENSION: ICD-10-CM

## 2022-12-12 DIAGNOSIS — I47.1 SVT (SUPRAVENTRICULAR TACHYCARDIA) (HCC): ICD-10-CM

## 2022-12-12 NOTE — PATIENT INSTRUCTIONS
No episodes noted on your loop  Your blood pressure was low at the time of your episode and is very good today  Please monitor your blood pressure morning and evening  I recommend OMRON BP cuffs  Send an updated through the portal in the next couple weeks with your blood pressure readings as we may need to further adjust your medication to prevent low blood pressure

## 2022-12-12 NOTE — PROGRESS NOTES
Cardiology Follow Up    Williamsnt Nilsa  1970  [id number]  [facility]  [address] [facility] [address]  [address]  [phone]    Kristie Nguyen presents for routine follow up of syncope, HTN, HLD  History of NSVT and SVT on ZIO monitoring  1  History of syncope  Assessment & Plan:  Patient with two episodes of syncope and collapse in 2021  Concern exists for arrhythmia given ZIO with short run of NSVT  Not orthostatic on exam today  Implantable loop recorder placed May 27 2021 with no events noted on loop recorder thus far  Near syncopal event while working 10/28/2022  Nurses on duty were unable to obtain manual BP at the time of symptoms  Loop recorder interrogation reveals SR, HR 52-68 bpm at the time of symptoms  I have asked patient to monitor blood pressures 1-2 times daily at home  She may require further adjustment of her antihypertensive regimen  Will continue to monitor  2  SVT (supraventricular tachycardia) ([address] )  Assessment & Plan:  Only two short runs of SVT noted on ZIO 1/2020  Continue metoprolol succinate 25 mg daily  Continue magnesium 250 mg daily  Currently has implantable loop recorder which was placed 5/27/2021  Palpitations have been rare since last office visit  3  VT (ventricular tachycardia)  Assessment & Plan:  One short run of NSVT lasting 7 beats noted on ZIO 1/2020  Echocardiogram 1/7/2020 with normal LVEF (65-70%)  Nonischemic nuclear stress test 03/18/2020  Continue metoprolol succinate 25 mg daily  Continue magnesium 250 mg daily  Patient with syncopal episode x 2  Implantable loop recorder placed 5/27/2021  Most recent syncopal episode occurred with the ILR in place and showed no arrhythmia when interrogated  4  Primary hypertension  Assessment & Plan:  Blood pressure is well controlled     Irbesartan hydrochlorothiazide 150/12 5 mg daily discontinued and replaced with irbesartan 75 mg daily 5/2021 due to relatively low blood pressures  Now with an episode of near syncope - nurses unable to obtain manual BP at time of symptoms  I have asked patient to monitor BP 1-2 times daily and report readings after 2 weeks  May require adjustment of anti-hypertensive regimen  Continue metoprolol succinate 25 mg daily  5  Mixed hyperlipidemia  Assessment & Plan:  Patient is currently on atorvastatin  40 mg daily  Lipid panel 2/11/2022: C 225  T 150  H 71  L 124  Should have repeat lipid panel 2/2023  6  Hypothyroidism, unspecified type  Assessment & Plan:  Patient now following with endocrinologist Dr Catalino Manning  Currently taking levothyroxine 100 mcg daily  We reviewed importance of thyroid regulation in setting of SVT history  HPI  Marylen Estelle has a past medical history of SVT, NSVT, Lyme disease, HTN, HLD, migraines, hypothyroidism, glaucoma, osteoporosis and anxiety       She was previously being followed by Dr Keenan Mitchell ([address] [place] [address] Cardiology) for evaluation of chest pain and palpitations  Echocardiogram 1/7/2020 was unremarkable   ZIO monitor 01/27/2020 showed an average heart rate of 78 beats per minute with rare PACs and PVCs  One 7 beat run of NSVT was noted and 2 runs of SVT were noted with the longest lasting 9 beats   Nuclear exercise stress test 03/18/2020 showed no evidence of ischemia      She established with Dr Yocasta Puente on 9/11/2020  At that time she was maintained on metoprolol succinate for SVT/VT and palpitations  She denied chest pain or shortness of breath  She continued with intermittent palpitations  She also reported episodes of heart racing associated with lightheadedness and dizziness  ECG at that visit was unremarkable with mild nonspecific ST and T wave abnormality which were unchanged from her prior ECG  She was instructed to initiate magnesium supplementation  Updated blood work was ordered as well   She did note less palpitations at 11/18/2020 follow up      She returned for follow up 5/18/2021 where she reported an episode where she had woken up in a cold sweat with pressure/squeezing in her left upper abdomen/lower chest  She was in a fog and next thing she new she was on the floor of her kitchen  She did hit her head  She vomited afterward  She was seen in the ER within a few hours  She reported feeling weak and lightheaded  She admitted that she had passed out in the past during a mammogram  Her TSH was low and medications were adjusted by her PCP  Orthostatic BP's were negative  Her irbesartan/HCTZ 150/12 5 mg was discontinued and replaced with irbesartan 75 mg daily and timing was changed to am instead of pm  She was referred for implantable loop recorder, which was inserted 5/27/2021      She followed up with me on 66/30/2021  Loop recorder was in place  She denied any events since placement at that time      She followed up in our office 8/5/2021 after a recurrent syncopal event from the weekend  She reached out via the patient portal on 8/1/2021 when she experienced an episode overnight where she woke with heart racing, chest pressure, lightheadedness, diaphoresis  No N/V with this episode  She rushed ot the kitchen and woke on the floor  She did activate her loop recorder and was able to go back to sleep without any further issues  She denied residual symptoms upon awakening  Her loop transmission was unrevealing  Thyroid level remained abnormal at the time and she was referred to endocrinology for management      She followed up with Dr Kevin Escobar 9/24/2021 at which time she had no recurrent syncope  She had met with the endocrinologist 9/22/21 where levothyroxine was adjusted and she was screened for iron deficiency  She followed up most recently with Dr Kevin Escobar on 9/8/2022 at which time she was feeling well  She had triggered her loop recorder that day as she felt a "sensation" in her chest earlier in the day  She had Covid infection 7/2022   Noted occasional positional lightheadedness  Palpitations had been rare  Sleep study 10/2021 was negative for BLANCHE  She was limiting caffeine  12/12/2022: Tomasz Lowe presents today for routine follow up  We reviewed the results of her recent loop recorder interrogation 10/28/2022 which showed presenting rhythm of SR, rate 52-68 bpm at time of triggered event  She states that morning she was having a good day, no stress  She suddenly felt very weak, not necessarily lightheaded  No chest pain  She became pale and diaphoretic  She felt as though she could not walk  The nurses on the unit attempted to check her BP but were unable to obtain a manual reading  She was given IV fluids in the ER and BP improved to 192'G systolic  She denies recurrent symptoms  She is tolerating activity otherwise  Not monitoring BP at home  No chest pain, shortness of breath or dyspnea on exertion  No lightheadedness  No edema  She states palpitations have been very minimal  She is taking her medications as prescribed  Medical Problems     Problem List     SVT (supraventricular tachycardia) (HCC)    History of syncope    VT (ventricular tachycardia)    Hypertension    Hyperlipidemia    Hypothyroidism    Palpitations    Depression    Osteoporosis    Arthritis    Migraine    Glaucoma    Anxiety    Restless legs    Polyarthralgia    Lumbar spondylosis    Gastroesophageal reflux disease without esophagitis        Past Medical History:   Diagnosis Date   • Allergic 1974    Seasonal   • Anxiety    • Arthritis    • Colitis     Noted on colonoscopy 04/24/2020     • Depression    • Disease of thyroid gland    • Gastritis     Noted on EGD 04/24/2020   • Gastroparesis    • Glaucoma    • Headache(784 0)    • Hyperlipidemia    • Hypertension    • Hypothyroidism    • Lyme disease    • Migraine    • Osteoporosis    • Scoliosis    • SVT (supraventricular tachycardia) (HCC)    • Visual impairment    • VT (ventricular tachycardia)      Social History Socioeconomic History   • Marital status: /Civil Union     Spouse name: Not on file   • Number of children: Not on file   • Years of education: Not on file   • Highest education level: Not on file   Occupational History   • Not on file   Tobacco Use   • Smoking status: Former     Packs/day: 1 00     Years: 10 00     Pack years: 10 00     Types: Cigarettes     Quit date: 1/1/2007     Years since quitting: 15 9   • Smokeless tobacco: Never   • Tobacco comments:     quit 2007   Vaping Use   • Vaping Use: Never used   Substance and Sexual Activity   • Alcohol use: Never   • Drug use: Never   • Sexual activity: Yes     Birth control/protection: Post-menopausal     Comment: hysterectomy   Other Topics Concern   • Not on file   Social History Narrative   • Not on file     Social Determinants of Health     Financial Resource Strain: Not on file   Food Insecurity: Not on file   Transportation Needs: Not on file   Physical Activity: Not on file   Stress: Not on file   Social Connections: Not on file   Intimate Partner Violence: Not on file   Housing Stability: Not on file      Family History   Problem Relation Age of Onset   • Peripheral vascular disease Mother    • Glaucoma Mother    • Prostate cancer Father    • Hypothyroidism Sister    • No Known Problems Daughter    • No Known Problems Daughter    • Colon cancer Maternal Grandmother    • No Known Problems Maternal Grandfather    • Arthritis Paternal Grandmother    • No Known Problems Paternal Grandfather    • No Known Problems Maternal Aunt    • No Known Problems Maternal Aunt    • Skin cancer Paternal Aunt    • No Known Problems Paternal Aunt    • Breast cancer Neg Hx    • Breast cancer additional onset Neg Hx    • Endometrial cancer Neg Hx    • BRCA2 Positive Neg Hx    • BRCA2 Negative Neg Hx    • BRCA1 Positive Neg Hx    • BRCA1 Negative Neg Hx    • BRCA 1/2 Neg Hx    • Ovarian cancer Neg Hx      Past Surgical History:   Procedure Laterality Date   • BREAST BIOPSY Right 10/21/2020    papilloma   • COLONOSCOPY     • EGD     • FINGER SURGERY      left pinky   • FL GUIDED NEEDLE PLAC BX/ASP/INJ  03/17/2022   • FL GUIDED NEEDLE PLAC BX/ASP/INJ  07/08/2022   • HYSTERECTOMY  2008   • HYSTERECTOMY W/ SALPINGO-OOPHERECTOMY  05/2008   • NERVE BLOCK Bilateral 03/17/2022    Procedure: L3, L4, L5 BLOCK MEDIAL BRANCH;  Surgeon: Rajiv Messina MD;  Location: [facility];  Service: Pain Management    • NERVE BLOCK Bilateral 07/08/2022    Procedure: BLOCK MEDIAL BRANCH L3, L4, L5 #2;  Surgeon: Rajiv Messina MD;  Location: [facility];  Service: Pain Management    • OOPHORECTOMY Bilateral 2008   • RHIZOTOMY Bilateral 12/8/2022    Procedure: RHIZOTOMY LUMBAR L3, L4, L5 medial branch nerves;  Surgeon: Rajiv Messina MD;  Location: [facility];  Service: Pain Management    • US GUIDED BREAST BIOPSY RIGHT COMPLETE Right 10/21/2020       Current Outpatient Medications:   •  ALPRAZolam (XANAX) 0 5 mg tablet, Take 1 tablet (0 5 mg total) by mouth 30 min pre-procedure, Disp: 4 tablet, Rfl: 0  •  atorvastatin (LIPITOR) 40 mg tablet, Take 1 tablet (40 mg total) by mouth daily, Disp: 90 tablet, Rfl: 1  •  BLACK ELDERBERRY PO, Take by mouth, Disp: , Rfl:   •  Calcium Carbonate-Vit D-Min (CALCIUM 1200 PO), Take by mouth daily , Disp: , Rfl:   •  Cholecalciferol (VITAMIN D3 PO), Take 1,000 mg by mouth, Disp: , Rfl:   •  dicyclomine (BENTYL) 10 mg capsule, , Disp: , Rfl:   •  DULoxetine (CYMBALTA) 30 mg delayed release capsule, duloxetine 30 mg capsule,delayed release, Disp: , Rfl:   •  Glucosamine-Chondroitin 500-400 MG CAPS, Take by mouth daily , Disp: , Rfl:   •  hydrOXYzine HCL (ATARAX) 25 mg tablet, Take 1 tablet (25 mg total) by mouth as needed for anxiety, Disp: 30 tablet, Rfl: 1  •  irbesartan (AVAPRO) 75 mg tablet, Take 1 tablet (75 mg total) by mouth daily, Disp: 90 tablet, Rfl: 3  •  levothyroxine 100 mcg tablet, Take 1 tablet (100 mcg total) by mouth daily, Disp: 90 tablet, Rfl: 3  • Lumigan 0 01 % ophthalmic drops, , Disp: , Rfl:   •  Magnesium 250 MG TABS, Take 1 tablet (250 mg total) by mouth daily, Disp: 90 tablet, Rfl: 3  •  metoprolol succinate (TOPROL-XL) 25 mg 24 hr tablet, Take 1 tablet (25 mg total) by mouth daily, Disp: 90 tablet, Rfl: 1  •  multivitamin (THERAGRAN) TABS, Take 1 tablet by mouth daily, Disp: , Rfl:   •  Nurtec 75 MG TBDP, , Disp: , Rfl:   •  Omega-3 Fatty Acids (FISH OIL OMEGA-3 PO), Take by mouth, Disp: , Rfl:   •  omeprazole (PriLOSEC) 40 MG capsule, , Disp: , Rfl:   •  SUMAtriptan (IMITREX) 100 mg tablet, Take 100 mg by mouth as needed, Disp: , Rfl:   •  BLACK ELDERBERRY,BERRY-FLOWER, PO, Take by mouth (Patient not taking: Reported on 12/12/2022), Disp: , Rfl:   •  MAGNESIUM PO, magnesium 250 mg tablet (Patient not taking: Reported on 12/12/2022), Disp: , Rfl:   •  scopolamine (TRANSDERM-SCOP) 1 mg/3 days TD 72 hr patch, Place 1 mg on the skin (Patient not taking: Reported on 9/8/2022), Disp: , Rfl:   No Known Allergies    Labs:     Chemistry        Component Value Date/Time    K 3 5 10/28/2022 1124     10/28/2022 1124    CO2 29 10/28/2022 1124    BUN 11 10/28/2022 1124    CREATININE 0 97 10/28/2022 1124        Component Value Date/Time    CALCIUM 8 9 10/28/2022 1124    ALKPHOS 85 10/28/2022 1124    AST 19 10/28/2022 1124    ALT 28 10/28/2022 1124        Cardiac Test Results:     Lipid panel 2/11/2022: C 225  T 150  H 71  L 124        ECG 5/9/2021: Normal sinus rhythm  Nonspecific ST abnormality       Lipid panel 4/13/2021: C 189  T 188  H 52  L 99       ECG 09/11/2020:  Normal sinus rhythm  Possible septal MI (more likely related to lead placement)  Nonspecific ST/T wave abnormality      Nuclear Stress test 3/18/2020:   Helio  5:01  7 1 METs  90% MPHR  Deconditioned heart rate response to exercise  No evidence of scar  No evidence of ischemia    Calculated ejection fraction 93%      ZIO 1/27/2020:   1-the patient had a ZIO monitor placed   Analysis is based on 13 days and 14 hours  2-predominant rhythm is sinus  3-the minimum heart rate is 50 with the maximum heart rate being 151 and the average being 78 when in a sinus rhythm  4-there is a rare PVC   There was one run of NSVT consisting of 7 beats  5-there is a rare PAC   There were 2 runs of SVT with the longest being 9 beats  6- there are no pauses greater than 3 0 seconds  7-there are no episodes of second-degree heart block type II or third-degree heart block  8-there were no diary entries placed  9-there were no acute ST/T wave changes on the strips that were reviewed    No comparison ZIO monitor     Echocardiogram 1/7/2020:   Normal left ventricular chamber size  Normal left ventricular systolic function  Normal regional wall motion  Normal left ventricular wall thickness  Estimated left ventricular ejection fraction is 65-70%      Mild mitral regurgitation       Normal pulmonary artery systolic pressure  Normal diastolic function      Visually Estimated LV Ejection Fraction is:70%    Review of Systems   Constitutional: Negative  HENT: Negative  Cardiovascular: Positive for near-syncope and palpitations (very minimal)  Negative for chest pain, dyspnea on exertion, irregular heartbeat, leg swelling, orthopnea and syncope  Respiratory: Negative for cough, shortness of breath and snoring  Endocrine: Negative  Skin: Negative  Musculoskeletal: Negative  Gastrointestinal: Negative  Genitourinary: Negative  Neurological: Negative  Psychiatric/Behavioral: Negative  Vitals:    12/12/22 1437   BP: 120/72   Pulse: 73   Temp: (!) 96 7 °F (35 9 °C)   SpO2: 100%     Vitals:    12/12/22 1437   Weight: 70 9 kg (156 lb 6 4 oz)     Height: 5' 8" (172 7 cm)   Body mass index is 23 78 kg/m²  Physical Exam  Vitals and nursing note reviewed  Constitutional:       General: She is not in acute distress  Appearance: She is well-developed  She is not diaphoretic     HENT:      Head: Normocephalic and atraumatic  Neck:      Thyroid: No thyromegaly  Vascular: No carotid bruit or JVD  Cardiovascular:      Rate and Rhythm: Normal rate and regular rhythm  No extrasystoles are present  Pulses: Intact distal pulses  Radial pulses are 2+ on the right side and 2+ on the left side  Heart sounds: Normal heart sounds, S1 normal and S2 normal  No murmur heard  Comments: No edema  Pulmonary:      Effort: Pulmonary effort is normal       Breath sounds: Normal breath sounds  Abdominal:      General: There is no distension  Palpations: Abdomen is soft  Tenderness: There is no abdominal tenderness  Musculoskeletal:         General: Normal range of motion  Cervical back: Normal range of motion and neck supple  Lymphadenopathy:      Cervical: No cervical adenopathy  Skin:     General: Skin is warm and dry  Neurological:      Mental Status: She is alert and oriented to person, place, and time  Cranial Nerves: No cranial nerve deficit  Psychiatric:         Mood and Affect: Mood and affect normal          Speech: Speech normal          Behavior: Behavior normal  Behavior is cooperative           Cognition and Memory: Cognition and memory normal

## 2022-12-13 VITALS
HEART RATE: 72 BPM | TEMPERATURE: 96.7 F | SYSTOLIC BLOOD PRESSURE: 130 MMHG | WEIGHT: 156.4 LBS | DIASTOLIC BLOOD PRESSURE: 76 MMHG | OXYGEN SATURATION: 100 % | BODY MASS INDEX: 23.7 KG/M2 | HEIGHT: 68 IN

## 2022-12-13 NOTE — ASSESSMENT & PLAN NOTE
Blood pressure is well controlled  Irbesartan hydrochlorothiazide 150/12 5 mg daily discontinued and replaced with irbesartan 75 mg daily 5/2021 due to relatively low blood pressures  Now with an episode of near syncope - nurses unable to obtain manual BP at time of symptoms  I have asked patient to monitor BP 1-2 times daily and report readings after 2 weeks  May require adjustment of anti-hypertensive regimen  Continue metoprolol succinate 25 mg daily

## 2022-12-13 NOTE — ASSESSMENT & PLAN NOTE
Patient is currently on atorvastatin  40 mg daily  Lipid panel 2/11/2022: C 225  T 150  H 71  L 124  Should have repeat lipid panel 2/2023

## 2022-12-13 NOTE — ASSESSMENT & PLAN NOTE
Patient now following with endocrinologist Dr Kurtis Denny  Currently taking levothyroxine 100 mcg daily  We reviewed importance of thyroid regulation in setting of SVT history

## 2022-12-13 NOTE — ASSESSMENT & PLAN NOTE
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

## 2022-12-21 ENCOUNTER — OFFICE VISIT (OUTPATIENT)
Dept: PAIN MEDICINE | Facility: CLINIC | Age: 52
End: 2022-12-21

## 2022-12-21 VITALS
WEIGHT: 157 LBS | TEMPERATURE: 97.3 F | BODY MASS INDEX: 23.79 KG/M2 | RESPIRATION RATE: 20 BRPM | HEIGHT: 68 IN | SYSTOLIC BLOOD PRESSURE: 100 MMHG | DIASTOLIC BLOOD PRESSURE: 64 MMHG | HEART RATE: 75 BPM

## 2022-12-21 DIAGNOSIS — M53.3 SACROILIAC JOINT DYSFUNCTION OF RIGHT SIDE: Primary | ICD-10-CM

## 2022-12-21 RX ORDER — METHYLPREDNISOLONE ACETATE 80 MG/ML
80 INJECTION, SUSPENSION INTRA-ARTICULAR; INTRALESIONAL; INTRAMUSCULAR; PARENTERAL; SOFT TISSUE ONCE
OUTPATIENT
Start: 2022-12-21 | End: 2022-12-21

## 2022-12-21 RX ORDER — BUPIVACAINE HCL/PF 2.5 MG/ML
4 VIAL (ML) INJECTION ONCE
OUTPATIENT
Start: 2022-12-21 | End: 2022-12-21

## 2022-12-21 RX ORDER — LIDOCAINE HYDROCHLORIDE 10 MG/ML
5 INJECTION, SOLUTION EPIDURAL; INFILTRATION; INTRACAUDAL; PERINEURAL ONCE
OUTPATIENT
Start: 2022-12-21 | End: 2022-12-21

## 2022-12-21 NOTE — PROGRESS NOTES
Assessment  1  Sacroiliac joint dysfunction of right side  -     Case request operating room: BLOCK / INJECTION SACROILIAC; Standing  -     Case request operating room: BLOCK / INJECTION SACROILIAC    Greater than 90% relief of pain with improved ability to participate with IADLs after bilateral L3, L4, L5 medial branch blocks #1 and #2 and subsequent radiofrequency ablation; describes now right sided axial low back pain consistent with arthritic features; +right sided SIJ loading, claudy finger test, GAENSLEN's, SEMAJ's  Risks discussed with regard to right SIJ injection  Previously reported the following symptomatology:     Neck and low back pain described primarily are radicular features  Pain radiates in C6 and C7 dermatomal distribution from neck to hands bilaterally as well as primarily arthritic in nature in low back; additionally with radicular features in L3 and L4 dermatomal distribution right greater than left  5/5 strength bilaterally with active range of motion movements in upper lower extremities  Slight pain limited weakness with left hip flexion, knee extension  Negative Spurling's maneuver, negative SLR bilaterally  Significant tenderness to palpation with lumbar cervical facet loading maneuvers  She is currently engaged with PT for both her neck and low back with modest relief of the pain  She has not trialed medications other than OTC tylenol and ibuprofen  MRI cervical spine noncontrast, MRI lumbar spine noncontrast show mild compressive disease with spondyloarthropathy  Will proceed with multimodal pain therapy; counseled regarding lifestyle modifications including PT  Plan  -Right SIJ injection; f/u 2 weeks post procedure  -gabapentin 100 mg t i d  Ordered for patient; has since tapered counseled regarding sedative effects of taking this medication and provided up titration calendar    Counseled not to take medication while driving or operating heavy machinery/using stairs  -meloxicam 7 5 mg b i d  P r n   pain  Patient has since tapered  Patient has since tapered  Patient educated regarding bleeding risk of taking this medication not taking any other nonsteroidal anti-inflammatory medications while taking this medication; counseled thoroughly regarding potential risk of Cardiovascular injury, Kidney injury, Gastrointestinal ulceration/bleeding  Patient voiced understanding  -physical therapy for right-sided lumbar radiculopathy, lumbar facet arthropathy, SIJ dysfunction right sided; Physician directed home exercise plan as per [facility] demonstrated and handouts provided that patient plans to participate with for 1 hour, twice a week for the next 6 weeks  There are risks associated with opioid medications, including dependence, addiction and tolerance  The patient understands and agrees to use these medications only as prescribed  Potential side effects of the medications include, but are not limited to, constipation, drowsiness, addiction, impaired judgment and risk of fatal overdose if not taken as prescribed  The patient was warned against driving while taking sedation medications  Sharing medications is a felony  At this point in time, the patient is showing no signs of addiction, abuse, diversion or suicidal ideation  [place] Prescription Drug Monitoring Program report was reviewed and was appropriate      Complete risks and benefits including bleeding, infection, tissue reaction, nerve injury and allergic reaction were discussed  The approach was demonstrated using models and literature was provided  Verbal and written consent was obtained  My impressions and treatment recommendations were discussed in detail with the patient who verbalized understanding and had no further questions  Discharge instructions were provided  I personally saw and examined the patient and I agree with the above discussed plan of care      No orders of the defined types were placed in this encounter  History of Present Illness    Greater than 90% relief of pain with improved ability to participate with IADLs after bilateral L3, L4, L5 medial branch blocks #1 and #2 and subsequent radiofrequency ablation; describes now right sided axial low back pain consistent with arthritic features; +right sided SIJ loading, claudy finger test, GAENSLEN's, SEMAJ's  Risks discussed with regard to right SIJ injection  Previously reported the following symptomatology:     Ewa Nevarez is a 46 y o  female with past medical history of hypertension, hypothyroidism, hypercholesterolemia, migraine headaches presenting with neck and low back pain described primarily radicular features in neck and axial/arthritic features in low back  Neck pain radiates into the C6 and C7 dermatomal distribution bilaterally accompanied by pain limited weakness numbness and paresthesias  In low back radicular pain travels into the bilateral L3 and L4 dermatomal distribution, right greater than left; however in low back, features are mostly arthritic in nature  She describes lancinating features of the pain  She has been to formal physical therapy for both neck and low back pain with modest relief of her symptoms  She has trialed over-the-counter NSAIDs as well as Tylenol with modest relief of the pain  Her pain significantly impacts independent activities of daily living and overall function, 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  She denies any bowel or bladder abnormality, incontinence, gait instability, headaches or dizziness  I have personally reviewed and/or updated the patient's past medical history, past surgical history, family history, social history, current medications, allergies, and vital signs today  Review of Systems   Constitutional: Positive for activity change  HENT: Negative  Eyes: Negative  Respiratory: Negative  Cardiovascular: Negative  Gastrointestinal: Negative      Endocrine: Negative  Genitourinary: Negative  Musculoskeletal: Positive for arthralgias, back pain, myalgias, neck pain and neck stiffness  Skin: Negative  Allergic/Immunologic: Negative  Neurological: Negative for weakness and numbness  Hematological: Negative  Psychiatric/Behavioral: Negative  All other systems reviewed and are negative  Patient Active Problem List   Diagnosis   • Palpitations   • Hypertension   • SVT (supraventricular tachycardia) (HCC)   • VT (ventricular tachycardia)   • Hyperlipidemia   • Depression   • Osteoporosis   • Arthritis   • Hypothyroidism   • Migraine   • Glaucoma   • Anxiety   • Restless legs   • History of syncope   • Polyarthralgia   • Lumbar spondylosis   • Gastroesophageal reflux disease without esophagitis   • Sacroiliac joint dysfunction of right side       Past Medical History:   Diagnosis Date   • Allergic 1974    Seasonal   • Anxiety    • Arthritis    • Colitis     Noted on colonoscopy 04/24/2020     • Depression    • Disease of thyroid gland    • Gastritis     Noted on EGD 04/24/2020   • Gastroparesis    • Glaucoma    • Headache(784 0)    • Hyperlipidemia    • Hypertension    • Hypothyroidism    • Lyme disease    • Migraine    • Osteoporosis    • Scoliosis    • SVT (supraventricular tachycardia) ([address] )    • Visual impairment    • VT (ventricular tachycardia)        Past Surgical History:   Procedure Laterality Date   • BREAST BIOPSY Right 10/21/2020    papilloma   • COLONOSCOPY     • EGD     • FINGER SURGERY      left pinky   • FL GUIDED NEEDLE PLAC BX/ASP/INJ  03/17/2022   • FL GUIDED NEEDLE PLAC BX/ASP/INJ  07/08/2022   • HYSTERECTOMY  2008   • HYSTERECTOMY W/ SALPINGO-OOPHERECTOMY  05/2008   • NERVE BLOCK Bilateral 03/17/2022    Procedure: L3, L4, L5 BLOCK MEDIAL BRANCH;  Surgeon: Leti Sales MD;  Location: [facility];  Service: Pain Management    • NERVE BLOCK Bilateral 07/08/2022    Procedure: BLOCK MEDIAL BRANCH L3, L4, L5 #2;  Surgeon: Yusra Sanchez Molly Parra MD;  Location: [facility];  Service: Pain Management    • OOPHORECTOMY Bilateral 2008   • RHIZOTOMY Bilateral 12/8/2022    Procedure: RHIZOTOMY LUMBAR L3, L4, L5 medial branch nerves;  Surgeon: Leti Sales MD;  Location: [facility];  Service: Pain Management    • US GUIDED BREAST BIOPSY RIGHT COMPLETE Right 10/21/2020       Family History   Problem Relation Age of Onset   • Peripheral vascular disease Mother    • Glaucoma Mother    • Prostate cancer Father    • Hypothyroidism Sister    • No Known Problems Daughter    • No Known Problems Daughter    • Colon cancer Maternal Grandmother    • No Known Problems Maternal Grandfather    • Arthritis Paternal Grandmother    • No Known Problems Paternal Grandfather    • No Known Problems Maternal Aunt    • No Known Problems Maternal Aunt    • Skin cancer Paternal Aunt    • No Known Problems Paternal Aunt    • Breast cancer Neg Hx    • Breast cancer additional onset Neg Hx    • Endometrial cancer Neg Hx    • BRCA2 Positive Neg Hx    • BRCA2 Negative Neg Hx    • BRCA1 Positive Neg Hx    • BRCA1 Negative Neg Hx    • BRCA 1/2 Neg Hx    • Ovarian cancer Neg Hx        Social History     Occupational History   • Not on file   Tobacco Use   • Smoking status: Former     Packs/day: 1 00     Years: 10 00     Pack years: 10 00     Types: Cigarettes     Quit date: 1/1/2007     Years since quitting: 15 9   • Smokeless tobacco: Never   • Tobacco comments:     quit 2007   Vaping Use   • Vaping Use: Never used   Substance and Sexual Activity   • Alcohol use: Never   • Drug use: Never   • Sexual activity: Yes     Birth control/protection: Post-menopausal     Comment: hysterectomy       Current Outpatient Medications on File Prior to Visit   Medication Sig   • atorvastatin (LIPITOR) 40 mg tablet Take 1 tablet (40 mg total) by mouth daily   • Calcium Carbonate-Vit D-Min (CALCIUM 1200 PO) Take by mouth daily    • Cholecalciferol (VITAMIN D3 PO) Take 1,000 mg by mouth   • DULoxetine (CYMBALTA) 30 mg delayed release capsule duloxetine 30 mg capsule,delayed release   • Glucosamine-Chondroitin 500-400 MG CAPS Take by mouth daily    • irbesartan (AVAPRO) 75 mg tablet Take 1 tablet (75 mg total) by mouth daily   • levothyroxine 100 mcg tablet Take 1 tablet (100 mcg total) by mouth daily   • Lumigan 0 01 % ophthalmic drops    • Magnesium 250 MG TABS Take 1 tablet (250 mg total) by mouth daily   • metoprolol succinate (TOPROL-XL) 25 mg 24 hr tablet Take 1 tablet (25 mg total) by mouth daily   • multivitamin (THERAGRAN) TABS Take 1 tablet by mouth daily   • Nurtec 75 MG TBDP    • Omega-3 Fatty Acids (FISH OIL OMEGA-3 PO) Take by mouth   • omeprazole (PriLOSEC) 40 MG capsule    • ALPRAZolam (XANAX) 0 5 mg tablet Take 1 tablet (0 5 mg total) by mouth 30 min pre-procedure (Patient not taking: Reported on 12/21/2022)   • BLACK ELDERBERRY PO Take by mouth (Patient not taking: Reported on 12/21/2022)   • BLACK ELDERBERRY,BERRY-FLOWER, PO Take by mouth (Patient not taking: Reported on 12/12/2022)   • dicyclomine (BENTYL) 10 mg capsule  (Patient not taking: Reported on 12/21/2022)   • hydrOXYzine HCL (ATARAX) 25 mg tablet Take 1 tablet (25 mg total) by mouth as needed for anxiety (Patient not taking: Reported on 12/21/2022)   • MAGNESIUM PO magnesium 250 mg tablet (Patient not taking: Reported on 12/12/2022)   • scopolamine (TRANSDERM-SCOP) 1 mg/3 days TD 72 hr patch Place 1 mg on the skin (Patient not taking: Reported on 9/8/2022)   • SUMAtriptan (IMITREX) 100 mg tablet Take 100 mg by mouth as needed (Patient not taking: Reported on 12/21/2022)     No current facility-administered medications on file prior to visit         No Known Allergies      Physical Exam    /64   Pulse 75   Temp (!) 97 3 °F (36 3 °C)   Resp 20   Ht 5' 8" (1 727 m)   Wt 71 2 kg (157 lb)   BMI 23 87 kg/m²     Constitutional: normal, well developed, well nourished, alert, in no distress and non-toxic and no overt pain behavior  Eyes: anicteric  HEENT: grossly intact  Neck: supple, symmetric, trachea midline and no masses   Pulmonary:even and unlabored  Cardiovascular:No edema or pitting edema present  Skin:Normal without rashes or lesions and well hydrated  Psychiatric:Mood and affect appropriate  Neurologic:Cranial Nerves II-XII grossly intact Sensation grossly intact; no clonus negative bunn's  Reflexes 2+ and brisk  SLR negative bilaterally  Spurling's maneuver negative bilaterally  Musculoskeletal:normal gait  5/5 strength bilaterally with AROM in all extremities  Slight pain limited weakness with left hip flexion, knee extension  Normal heel toe and tip toe walking  Significant pain with cervical and lumbar facet loading bilaterally and with lateral spine rotation  TTP over cervical and lumbar paraspinal muscles  +right sided SIJ loading, claudy finger test, GAENSLEN's, SEMAJ's  Imaging    MRI CERVICAL SPINE WITHOUT CONTRAST     INDICATION: M54 12: Radiculopathy, cervical region      COMPARISON:  5/9/2021     TECHNIQUE:  Sagittal T1, sagittal T2, sagittal inversion recovery, axial T2, axial  2D merge     IMAGE QUALITY:  Diagnostic     FINDINGS:     ALIGNMENT:  There is trace anterolisthesis of C3-C4      MARROW SIGNAL:  Normal marrow signal is identified within the visualized bony structures  No discrete marrow lesion      CERVICAL AND VISUALIZED THORACIC CORD:  Normal signal within the visualized cord      PREVERTEBRAL AND PARASPINAL SOFT TISSUES:  Normal      VISUALIZED POSTERIOR FOSSA:  The visualized posterior fossa demonstrates no abnormal signal      CERVICAL DISC SPACES:     C2-C3:  Normal      C3-C4:  There is left-sided facet arthrosis  There is no significant canal stenosis or foraminal narrowing      C4-C5:  There is a disc osteophyte complex with uncovertebral spurring  There is facet arthrosis  There is mild left foraminal narrowing    There is no significant canal stenosis      C5-C6:  There is a disc osteophyte complex with left-sided uncovertebral spurring  There is mild left foraminal encroachment      C6-C7:  No significant canal stenosis or foraminal narrowing      C7-T1:  No significant canal stenosis or foraminal narrowing  MRI LUMBAR SPINE WITHOUT CONTRAST     INDICATION: M54 12: Radiculopathy, cervical region      COMPARISON:  None      TECHNIQUE:  Sagittal T1, sagittal T2, sagittal inversion recovery, axial T1 and axial T2, coronal T2     IMAGE QUALITY:  Diagnostic     FINDINGS:     VERTEBRAL BODIES:  There are 5 lumbar type vertebral bodies  Normal alignment of the lumbar spine  No spondylolysis or spondylolisthesis  No scoliosis  No compression fracture  No suspicious marrow signal abnormality  Hemangioma within the L1   vertebral body      SACRUM:  Normal signal within the sacrum  No evidence of insufficiency or stress fracture      DISTAL CORD AND CONUS:  Normal size and signal within the distal cord and conus  Conus medullaris terminates at T12-L1      PARASPINAL SOFT TISSUES:  Paraspinal soft tissues are unremarkable      LOWER THORACIC DISC SPACES:  Normal disc height and signal   No disc herniation, canal stenosis or foraminal narrowing      LUMBAR DISC SPACES:     L1-L2:  No significant canal stenosis or foraminal narrowing      L2-L3:  No significant canal stenosis or foraminal narrowing      L3-L4:  Minimal bulge  No significant canal stenosis or foraminal narrowing      L4-L5:  Mild bulge, asymmetric to the right annular fissure  No significant canal stenosis  Mild foraminal narrowing      L5-S1:  Bulging annulus  Facet arthrosis  No significant canal stenosis  Mild right foraminal narrowing      IMPRESSION:     Mild degenerative changes of the lumbar spine, as described above      CT CERVICAL SPINE - WITHOUT CONTRAST     INDICATION:   TRAUMA      COMPARISON:  None      TECHNIQUE:  CT examination of the cervical spine was performed without intravenous contrast  Contiguous axial images were obtained  Sagittal and coronal reconstructions were performed        Radiation dose length product (DLP) for this visit:  355 mGy-cm   This examination, like all CT scans performed in the [facility], was performed utilizing techniques to minimize radiation dose exposure, including the use of iterative   reconstruction and automated exposure control        IMAGE QUALITY:  Diagnostic      FINDINGS:     ALIGNMENT:  Normal alignment of the cervical spine   No subluxation      VERTEBRAL BODIES:  No fracture      DEGENERATIVE CHANGES:  No significant cervical degenerative changes are noted      PREVERTEBRAL AND PARASPINAL SOFT TISSUES:  Unremarkable      THORACIC INLET:  Normal      IMPRESSION:     No cervical spine fracture or traumatic malalignment      The study was marked in EPIC for immediate notification

## 2022-12-21 NOTE — PATIENT INSTRUCTIONS
Core Strengthening Exercises   WHAT YOU NEED TO KNOW:   Your core includes the muscles of your lower back, hip, pelvis, and abdomen  Core strengthening exercises help heal and strengthen these muscles  This helps prevent another injury, and keeps your pelvis, spine, and hips in the correct position  DISCHARGE INSTRUCTIONS:   Contact your healthcare provider if:   You have sharp or worsening pain during exercise or at rest     You have questions or concerns about your shoulder exercises  Safety tips:  Talk to your healthcare provider before you start an exercise program  A physical therapist can teach you how to do core strengthening exercises safely  Do the exercises on a mat or firm surface  A firm surface will support your spine and prevent low back pain  Do not do these exercises on a bed  Move slowly and smoothly  Avoid fast or jerky motions  Stop if you feel pain  Core exercises should not be painful  Stop if you feel pain  Breathe normally during core exercises  Do not hold your breath  This may cause an increase in blood pressure and prevent muscle strengthening  Your healthcare provider will tell you when to inhale and exhale during the exercise  Begin all of your exercises with abdominal bracing  Abdominal bracing helps warm up your core muscles  You can also practice abdominal bracing throughout the day  Lie on your back with your knees bent and feet flat on the floor  Place your arms in a relaxed position beside your body  Tighten your abdominal muscles  Pull your belly button in and up toward your spine  Hold for 5 seconds  Relax your muscles  Repeat 10 times  Core strengthening exercises: Your healthcare provider will tell you how often to do these exercises  The provider will also tell you how many repetitions of each exercise you should do  Hold each exercise for 5 seconds or as directed  As you get stronger, increase your hold to 10 to 15 seconds   You can do some of these exercises on a stability ball, or with a weight  Ask your healthcare provider how to use a stability ball or weight for these exercises:  Bridging:  Lie on your back with your knees bent and feet flat on the floor  Rest your arms at your side  Tighten your buttocks, and then lift your hips 1 inch off the floor  Hold for 5 seconds  When you can do this exercise without pain for 10 seconds, increase the distance you lift your hips  A good goal is to be able to lift your hips so that your shoulders, hips, and knees are in a straight line  Dead bug:  Lie on your back with your knees bent and feet flat on the floor  Place your arms in a relaxed position beside your body  Begin with abdominal bracing  Next, raise one leg, keeping your knee bent  Hold for 5 seconds  Repeat with the other leg  When you can do this exercise without pain for 10 to 15 seconds, you may raise one straight leg and hold  Repeat with the other leg  Quadruped:  Place your hands and knees on the floor  Keep your wrists directly below your shoulders and your knees directly below your hips  Pull your belly button in toward your spine  Do not flatten or arch your back  Tighten your abdominal muscles below your belly button  Hold for 5 seconds  When you can do this exercise without pain for 10 to 15 seconds, you may extend one arm and hold  Repeat on the other side  Side bridge exercises:      Standing side bridge:  Stand next to a wall and extend one arm toward the wall  Place your palm flat on the wall with your fingers pointing upward  Begin with abdominal bracing  Next, without moving your feet, slowly bend your arm to 90 degrees  Hold for 5 seconds  Repeat on the other side  When you can do this exercise without pain for 10 to 15 seconds, you may do the bent leg side bridge on the floor  Bent leg side bridge:  Lie on one side with your legs, hips, and shoulders in a straight line   Prop yourself up onto your forearm so your elbow is directly below your shoulder  Bend your knees back to 90 degrees  Begin with abdominal bracing  Next, lift your hips and balance yourself on your forearm and knees  Hold for 5 seconds  Repeat on the other side  When you can do this exercise without pain for 10 to 15 seconds, you may do the straight leg side bridge on the floor  Straight leg side bridge:  Lie on one side with your legs, hips, and shoulders in a straight line  Prop yourself up onto your forearm so your elbow is directly below your shoulder  Begin with abdominal bracing  Lift your hips off the floor and balance yourself on your forearm and the outside of your flexed foot  Do not let your ankle bend sideways  Hold for 5 seconds  Repeat on the other side  When you can do this exercise without pain for 10 to 15 seconds, ask your healthcare provider for more advanced exercises  Superman:  Lie on your stomach  Extend your arms forward on the floor  Tighten your abdominal muscles and lift your right hand and left leg off the floor  Hold this position  Slowly return to the starting position  Tighten your abdominal muscles and lift your left hand and right leg off the floor  Hold this position  Slowly return to the starting position  Clam:  Lie on your side with your knees bent  Put your bottom arm under your head to keep your neck in line  Put your top hand on your hip to keep your pelvis from moving  Put your heels together, and keep them together during this exercise  Slowly raise your top knee toward the ceiling  Then lower your leg so your knees are together  Repeat this exercise 10 times  Then switch sides and do the exercise 10 times with the other leg  Curl up:  Lie on your back with your knees bent and feet flat on the floor  Place your hands, palms down, underneath your lower back  Next, with your elbows on the floor, lift your shoulders and chest 2 to 3 inches off the floor   Keep your head in line with your shoulders  Hold this position  Slowly return to the starting position  Straight leg raises:  Lie on your back with one leg straight  Bend the other knee and place your foot flat on the floor  Tighten your abdominal muscles  Keep your leg straight and slowly lift it straight up 6 to 12 inches off the floor  Hold this position  Lower your leg slowly  Do as many repetitions as directed on this side  Repeat with the other leg  Plank:  Lie on your stomach  Bend your elbows and place your forearms flat on the floor  Lift your chest, stomach, and knees off the floor  Make sure your elbows are below your shoulders  Your body should be in a straight line  Do not let your hips or lower back sink to the ground  Squeeze your abdominal muscles together and hold for 15 seconds  To make this exercise harder, hold for 30 seconds or lift 1 leg at a time  Bicycles:  Lie on your back  Bend both knees and bring them toward your chest  Your calves should be parallel to the floor  Place the palms of your hands on the back of your head  Straighten your right leg and keep it lifted 2 inches off the floor  Raise your head and shoulders off the floor and twist towards your left  Keep your head and shoulders lifted  Bend your right knee while you straighten your left leg  Keep your left leg 2 inches off the floor  Twist your head and chest towards the left leg  Continue to straighten 1 leg at a time and twist        Follow up with your doctor as directed:  Write down your questions so you remember to ask them during your visits  © Copyright [employer] 2022 Information is for End User's use only and may not be sold, redistributed or otherwise used for commercial purposes  All illustrations and images included in CareNotes® are the copyrighted property of [facility]  or [facility]   The above information is an  only   It is not intended as medical advice for individual conditions or treatments  Talk to your doctor, nurse or pharmacist before following any medical regimen to see if it is safe and effective for you

## 2022-12-21 NOTE — H&P (VIEW-ONLY)
Assessment  1  Sacroiliac joint dysfunction of right side  -     Case request operating room: BLOCK / INJECTION SACROILIAC; Standing  -     Case request operating room: BLOCK / INJECTION SACROILIAC    Greater than 90% relief of pain with improved ability to participate with IADLs after bilateral L3, L4, L5 medial branch blocks #1 and #2 and subsequent radiofrequency ablation; describes now right sided axial low back pain consistent with arthritic features; +right sided SIJ loading, claudy finger test, GAENSLEN's, SEMAJ's  Risks discussed with regard to right SIJ injection  Previously reported the following symptomatology:     Neck and low back pain described primarily are radicular features  Pain radiates in C6 and C7 dermatomal distribution from neck to hands bilaterally as well as primarily arthritic in nature in low back; additionally with radicular features in L3 and L4 dermatomal distribution right greater than left  5/5 strength bilaterally with active range of motion movements in upper lower extremities  Slight pain limited weakness with left hip flexion, knee extension  Negative Spurling's maneuver, negative SLR bilaterally  Significant tenderness to palpation with lumbar cervical facet loading maneuvers  She is currently engaged with PT for both her neck and low back with modest relief of the pain  She has not trialed medications other than OTC tylenol and ibuprofen  MRI cervical spine noncontrast, MRI lumbar spine noncontrast show mild compressive disease with spondyloarthropathy  Will proceed with multimodal pain therapy; counseled regarding lifestyle modifications including PT  Plan  -Right SIJ injection; f/u 2 weeks post procedure  -gabapentin 100 mg t i d  Ordered for patient; has since tapered counseled regarding sedative effects of taking this medication and provided up titration calendar    Counseled not to take medication while driving or operating heavy machinery/using stairs  -meloxicam 7 5 mg b i d  P r n   pain  Patient has since tapered  Patient has since tapered  Patient educated regarding bleeding risk of taking this medication not taking any other nonsteroidal anti-inflammatory medications while taking this medication; counseled thoroughly regarding potential risk of Cardiovascular injury, Kidney injury, Gastrointestinal ulceration/bleeding  Patient voiced understanding  -physical therapy for right-sided lumbar radiculopathy, lumbar facet arthropathy, SIJ dysfunction right sided; Physician directed home exercise plan as per [facility] demonstrated and handouts provided that patient plans to participate with for 1 hour, twice a week for the next 6 weeks  There are risks associated with opioid medications, including dependence, addiction and tolerance  The patient understands and agrees to use these medications only as prescribed  Potential side effects of the medications include, but are not limited to, constipation, drowsiness, addiction, impaired judgment and risk of fatal overdose if not taken as prescribed  The patient was warned against driving while taking sedation medications  Sharing medications is a felony  At this point in time, the patient is showing no signs of addiction, abuse, diversion or suicidal ideation  [place] Prescription Drug Monitoring Program report was reviewed and was appropriate      Complete risks and benefits including bleeding, infection, tissue reaction, nerve injury and allergic reaction were discussed  The approach was demonstrated using models and literature was provided  Verbal and written consent was obtained  My impressions and treatment recommendations were discussed in detail with the patient who verbalized understanding and had no further questions  Discharge instructions were provided  I personally saw and examined the patient and I agree with the above discussed plan of care      No orders of the defined types were placed in this encounter  History of Present Illness    Greater than 90% relief of pain with improved ability to participate with IADLs after bilateral L3, L4, L5 medial branch blocks #1 and #2 and subsequent radiofrequency ablation; describes now right sided axial low back pain consistent with arthritic features; +right sided SIJ loading, claudy finger test, GAENSLEN's, SEMAJ's  Risks discussed with regard to right SIJ injection  Previously reported the following symptomatology:     Bita Estrada is a 46 y o  female with past medical history of hypertension, hypothyroidism, hypercholesterolemia, migraine headaches presenting with neck and low back pain described primarily radicular features in neck and axial/arthritic features in low back  Neck pain radiates into the C6 and C7 dermatomal distribution bilaterally accompanied by pain limited weakness numbness and paresthesias  In low back radicular pain travels into the bilateral L3 and L4 dermatomal distribution, right greater than left; however in low back, features are mostly arthritic in nature  She describes lancinating features of the pain  She has been to formal physical therapy for both neck and low back pain with modest relief of her symptoms  She has trialed over-the-counter NSAIDs as well as Tylenol with modest relief of the pain  Her pain significantly impacts independent activities of daily living and overall function, 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  She denies any bowel or bladder abnormality, incontinence, gait instability, headaches or dizziness  I have personally reviewed and/or updated the patient's past medical history, past surgical history, family history, social history, current medications, allergies, and vital signs today  Review of Systems   Constitutional: Positive for activity change  HENT: Negative  Eyes: Negative  Respiratory: Negative  Cardiovascular: Negative  Gastrointestinal: Negative      Endocrine: Negative  Genitourinary: Negative  Musculoskeletal: Positive for arthralgias, back pain, myalgias, neck pain and neck stiffness  Skin: Negative  Allergic/Immunologic: Negative  Neurological: Negative for weakness and numbness  Hematological: Negative  Psychiatric/Behavioral: Negative  All other systems reviewed and are negative  Patient Active Problem List   Diagnosis   • Palpitations   • Hypertension   • SVT (supraventricular tachycardia) (HCC)   • VT (ventricular tachycardia)   • Hyperlipidemia   • Depression   • Osteoporosis   • Arthritis   • Hypothyroidism   • Migraine   • Glaucoma   • Anxiety   • Restless legs   • History of syncope   • Polyarthralgia   • Lumbar spondylosis   • Gastroesophageal reflux disease without esophagitis   • Sacroiliac joint dysfunction of right side       Past Medical History:   Diagnosis Date   • Allergic 1974    Seasonal   • Anxiety    • Arthritis    • Colitis     Noted on colonoscopy 04/24/2020     • Depression    • Disease of thyroid gland    • Gastritis     Noted on EGD 04/24/2020   • Gastroparesis    • Glaucoma    • Headache(784 0)    • Hyperlipidemia    • Hypertension    • Hypothyroidism    • Lyme disease    • Migraine    • Osteoporosis    • Scoliosis    • SVT (supraventricular tachycardia) ([address] )    • Visual impairment    • VT (ventricular tachycardia)        Past Surgical History:   Procedure Laterality Date   • BREAST BIOPSY Right 10/21/2020    papilloma   • COLONOSCOPY     • EGD     • FINGER SURGERY      left pinky   • FL GUIDED NEEDLE PLAC BX/ASP/INJ  03/17/2022   • FL GUIDED NEEDLE PLAC BX/ASP/INJ  07/08/2022   • HYSTERECTOMY  2008   • HYSTERECTOMY W/ SALPINGO-OOPHERECTOMY  05/2008   • NERVE BLOCK Bilateral 03/17/2022    Procedure: L3, L4, L5 BLOCK MEDIAL BRANCH;  Surgeon: Iris Cano MD;  Location: [facility];  Service: Pain Management    • NERVE BLOCK Bilateral 07/08/2022    Procedure: BLOCK MEDIAL BRANCH L3, L4, L5 #2;  Surgeon: Danyel Thacker Molly Parra MD;  Location: [facility];  Service: Pain Management    • OOPHORECTOMY Bilateral 2008   • RHIZOTOMY Bilateral 12/8/2022    Procedure: RHIZOTOMY LUMBAR L3, L4, L5 medial branch nerves;  Surgeon: Leti Sales MD;  Location: [facility];  Service: Pain Management    • US GUIDED BREAST BIOPSY RIGHT COMPLETE Right 10/21/2020       Family History   Problem Relation Age of Onset   • Peripheral vascular disease Mother    • Glaucoma Mother    • Prostate cancer Father    • Hypothyroidism Sister    • No Known Problems Daughter    • No Known Problems Daughter    • Colon cancer Maternal Grandmother    • No Known Problems Maternal Grandfather    • Arthritis Paternal Grandmother    • No Known Problems Paternal Grandfather    • No Known Problems Maternal Aunt    • No Known Problems Maternal Aunt    • Skin cancer Paternal Aunt    • No Known Problems Paternal Aunt    • Breast cancer Neg Hx    • Breast cancer additional onset Neg Hx    • Endometrial cancer Neg Hx    • BRCA2 Positive Neg Hx    • BRCA2 Negative Neg Hx    • BRCA1 Positive Neg Hx    • BRCA1 Negative Neg Hx    • BRCA 1/2 Neg Hx    • Ovarian cancer Neg Hx        Social History     Occupational History   • Not on file   Tobacco Use   • Smoking status: Former     Packs/day: 1 00     Years: 10 00     Pack years: 10 00     Types: Cigarettes     Quit date: 1/1/2007     Years since quitting: 15 9   • Smokeless tobacco: Never   • Tobacco comments:     quit 2007   Vaping Use   • Vaping Use: Never used   Substance and Sexual Activity   • Alcohol use: Never   • Drug use: Never   • Sexual activity: Yes     Birth control/protection: Post-menopausal     Comment: hysterectomy       Current Outpatient Medications on File Prior to Visit   Medication Sig   • atorvastatin (LIPITOR) 40 mg tablet Take 1 tablet (40 mg total) by mouth daily   • Calcium Carbonate-Vit D-Min (CALCIUM 1200 PO) Take by mouth daily    • Cholecalciferol (VITAMIN D3 PO) Take 1,000 mg by mouth   • DULoxetine (CYMBALTA) 30 mg delayed release capsule duloxetine 30 mg capsule,delayed release   • Glucosamine-Chondroitin 500-400 MG CAPS Take by mouth daily    • irbesartan (AVAPRO) 75 mg tablet Take 1 tablet (75 mg total) by mouth daily   • levothyroxine 100 mcg tablet Take 1 tablet (100 mcg total) by mouth daily   • Lumigan 0 01 % ophthalmic drops    • Magnesium 250 MG TABS Take 1 tablet (250 mg total) by mouth daily   • metoprolol succinate (TOPROL-XL) 25 mg 24 hr tablet Take 1 tablet (25 mg total) by mouth daily   • multivitamin (THERAGRAN) TABS Take 1 tablet by mouth daily   • Nurtec 75 MG TBDP    • Omega-3 Fatty Acids (FISH OIL OMEGA-3 PO) Take by mouth   • omeprazole (PriLOSEC) 40 MG capsule    • ALPRAZolam (XANAX) 0 5 mg tablet Take 1 tablet (0 5 mg total) by mouth 30 min pre-procedure (Patient not taking: Reported on 12/21/2022)   • BLACK ELDERBERRY PO Take by mouth (Patient not taking: Reported on 12/21/2022)   • BLACK ELDERBERRY,BERRY-FLOWER, PO Take by mouth (Patient not taking: Reported on 12/12/2022)   • dicyclomine (BENTYL) 10 mg capsule  (Patient not taking: Reported on 12/21/2022)   • hydrOXYzine HCL (ATARAX) 25 mg tablet Take 1 tablet (25 mg total) by mouth as needed for anxiety (Patient not taking: Reported on 12/21/2022)   • MAGNESIUM PO magnesium 250 mg tablet (Patient not taking: Reported on 12/12/2022)   • scopolamine (TRANSDERM-SCOP) 1 mg/3 days TD 72 hr patch Place 1 mg on the skin (Patient not taking: Reported on 9/8/2022)   • SUMAtriptan (IMITREX) 100 mg tablet Take 100 mg by mouth as needed (Patient not taking: Reported on 12/21/2022)     No current facility-administered medications on file prior to visit         No Known Allergies      Physical Exam    /64   Pulse 75   Temp (!) 97 3 °F (36 3 °C)   Resp 20   Ht 5' 8" (1 727 m)   Wt 71 2 kg (157 lb)   BMI 23 87 kg/m²     Constitutional: normal, well developed, well nourished, alert, in no distress and non-toxic and no overt pain behavior  Eyes: anicteric  HEENT: grossly intact  Neck: supple, symmetric, trachea midline and no masses   Pulmonary:even and unlabored  Cardiovascular:No edema or pitting edema present  Skin:Normal without rashes or lesions and well hydrated  Psychiatric:Mood and affect appropriate  Neurologic:Cranial Nerves II-XII grossly intact Sensation grossly intact; no clonus negative bunn's  Reflexes 2+ and brisk  SLR negative bilaterally  Spurling's maneuver negative bilaterally  Musculoskeletal:normal gait  5/5 strength bilaterally with AROM in all extremities  Slight pain limited weakness with left hip flexion, knee extension  Normal heel toe and tip toe walking  Significant pain with cervical and lumbar facet loading bilaterally and with lateral spine rotation  TTP over cervical and lumbar paraspinal muscles  +right sided SIJ loading, claudy finger test, GAENSLEN's, SEMAJ's  Imaging    MRI CERVICAL SPINE WITHOUT CONTRAST     INDICATION: M54 12: Radiculopathy, cervical region      COMPARISON:  5/9/2021     TECHNIQUE:  Sagittal T1, sagittal T2, sagittal inversion recovery, axial T2, axial  2D merge     IMAGE QUALITY:  Diagnostic     FINDINGS:     ALIGNMENT:  There is trace anterolisthesis of C3-C4      MARROW SIGNAL:  Normal marrow signal is identified within the visualized bony structures  No discrete marrow lesion      CERVICAL AND VISUALIZED THORACIC CORD:  Normal signal within the visualized cord      PREVERTEBRAL AND PARASPINAL SOFT TISSUES:  Normal      VISUALIZED POSTERIOR FOSSA:  The visualized posterior fossa demonstrates no abnormal signal      CERVICAL DISC SPACES:     C2-C3:  Normal      C3-C4:  There is left-sided facet arthrosis  There is no significant canal stenosis or foraminal narrowing      C4-C5:  There is a disc osteophyte complex with uncovertebral spurring  There is facet arthrosis  There is mild left foraminal narrowing    There is no significant canal stenosis      C5-C6:  There is a disc osteophyte complex with left-sided uncovertebral spurring  There is mild left foraminal encroachment      C6-C7:  No significant canal stenosis or foraminal narrowing      C7-T1:  No significant canal stenosis or foraminal narrowing  MRI LUMBAR SPINE WITHOUT CONTRAST     INDICATION: M54 12: Radiculopathy, cervical region      COMPARISON:  None      TECHNIQUE:  Sagittal T1, sagittal T2, sagittal inversion recovery, axial T1 and axial T2, coronal T2     IMAGE QUALITY:  Diagnostic     FINDINGS:     VERTEBRAL BODIES:  There are 5 lumbar type vertebral bodies  Normal alignment of the lumbar spine  No spondylolysis or spondylolisthesis  No scoliosis  No compression fracture  No suspicious marrow signal abnormality  Hemangioma within the L1   vertebral body      SACRUM:  Normal signal within the sacrum  No evidence of insufficiency or stress fracture      DISTAL CORD AND CONUS:  Normal size and signal within the distal cord and conus  Conus medullaris terminates at T12-L1      PARASPINAL SOFT TISSUES:  Paraspinal soft tissues are unremarkable      LOWER THORACIC DISC SPACES:  Normal disc height and signal   No disc herniation, canal stenosis or foraminal narrowing      LUMBAR DISC SPACES:     L1-L2:  No significant canal stenosis or foraminal narrowing      L2-L3:  No significant canal stenosis or foraminal narrowing      L3-L4:  Minimal bulge  No significant canal stenosis or foraminal narrowing      L4-L5:  Mild bulge, asymmetric to the right annular fissure  No significant canal stenosis  Mild foraminal narrowing      L5-S1:  Bulging annulus  Facet arthrosis  No significant canal stenosis  Mild right foraminal narrowing      IMPRESSION:     Mild degenerative changes of the lumbar spine, as described above      CT CERVICAL SPINE - WITHOUT CONTRAST     INDICATION:   TRAUMA      COMPARISON:  None      TECHNIQUE:  CT examination of the cervical spine was performed without intravenous contrast  Contiguous axial images were obtained  Sagittal and coronal reconstructions were performed        Radiation dose length product (DLP) for this visit:  355 mGy-cm   This examination, like all CT scans performed in the [facility], was performed utilizing techniques to minimize radiation dose exposure, including the use of iterative   reconstruction and automated exposure control        IMAGE QUALITY:  Diagnostic      FINDINGS:     ALIGNMENT:  Normal alignment of the cervical spine   No subluxation      VERTEBRAL BODIES:  No fracture      DEGENERATIVE CHANGES:  No significant cervical degenerative changes are noted      PREVERTEBRAL AND PARASPINAL SOFT TISSUES:  Unremarkable      THORACIC INLET:  Normal      IMPRESSION:     No cervical spine fracture or traumatic malalignment      The study was marked in EPIC for immediate notification

## 2022-12-22 ENCOUNTER — TELEPHONE (OUTPATIENT)
Dept: PAIN MEDICINE | Facility: CLINIC | Age: 52
End: 2022-12-22

## 2022-12-22 NOTE — TELEPHONE ENCOUNTER
Patient is calling to inquire whether Dr Amanda Fabian can change the procedure to a B/L SIJ  Please advise  Thanks!

## 2022-12-28 NOTE — DISCHARGE INSTR - AVS FIRST PAGE
YOUR 2 WEEK FOLLOW UP HAS BEEN SCHEDULED; IF YOU WISH TO CHANGE THE FOLLOW UP, PLEASE CALL THE SPINE AND PAIN CENTER AT [facility]: [phone]    Sacroiliac Joint Injection   WHAT YOU NEED TO KNOW:   A sacroiliac (SI) joint injection is done to diagnose or treat pain from sacroiliac joint syndrome  The pain caused by this syndrome may be felt in your lower back, buttocks, groin, and your thigh  DISCHARGE INSTRUCTIONS:   Seek care immediately if:   You have a fever  You have increased redness, or swelling around the injection site  You have drainage from the injection site  You are not able to walk or move your leg  Contact your healthcare provider if:   Your pain does not get better within 5 days  You have new symptoms  You have questions or concerns about your condition or care  Self-care:   Drink liquids as directed  Liquids will help flush the contrast material out of your body  Ask how much liquid to drink and which liquids are best for you  Apply ice to your injection site  Ice helps decrease swelling and pain  Use an ice pack, or put crushed ice in a plastic bag  Cover it with a towel and place it on your low back for 15 to 20 minutes every hour or as directed  Do not take a bath or get into a hot tub after your procedure  Take a shower instead  Soaking your puncture site in a bath or hot tub increases your risk for infection  Limit physical activity that causes pain  Rest as needed  Ask your healthcare provider how long you should limit activity  Keep a pain diary  Write down when your pain happens, how severe it is, and any other symptoms you have with your pain  A diary will help you keep track of your pain  It may also help your healthcare provider find out what is causing your pain  Follow up with your healthcare provider as directed: You may need more injections or other treatments  Bring your pain diary to your visits   Write down your questions so you remember to ask them during your visits  © Copyright [employer] 2022 Information is for End User's use only and may not be sold, redistributed or otherwise used for commercial purposes  All illustrations and images included in CareNotes® are the copyrighted property of [employer]  or [employer]  The above information is an  only  It is not intended as medical advice for individual conditions or treatments  Talk to your doctor, nurse or pharmacist before following any medical regimen to see if it is safe and effective for you

## 2022-12-28 NOTE — DISCHARGE INSTRUCTIONS
YOUR 2 WEEK FOLLOW UP HAS BEEN SCHEDULED; IF YOU WISH TO CHANGE THE FOLLOW UP, PLEASE CALL THE SPINE AND PAIN CENTER AT [facility]: [phone]    Sacroiliac Joint Injection   WHAT YOU NEED TO KNOW:   A sacroiliac (SI) joint injection is done to diagnose or treat pain from sacroiliac joint syndrome  The pain caused by this syndrome may be felt in your lower back, buttocks, groin, and your thigh  DISCHARGE INSTRUCTIONS:   Seek care immediately if:   You have a fever  You have increased redness, or swelling around the injection site  You have drainage from the injection site  You are not able to walk or move your leg  Contact your healthcare provider if:   Your pain does not get better within 5 days  You have new symptoms  You have questions or concerns about your condition or care  Self-care:   Drink liquids as directed  Liquids will help flush the contrast material out of your body  Ask how much liquid to drink and which liquids are best for you  Apply ice to your injection site  Ice helps decrease swelling and pain  Use an ice pack, or put crushed ice in a plastic bag  Cover it with a towel and place it on your low back for 15 to 20 minutes every hour or as directed  Do not take a bath or get into a hot tub after your procedure  Take a shower instead  Soaking your puncture site in a bath or hot tub increases your risk for infection  Limit physical activity that causes pain  Rest as needed  Ask your healthcare provider how long you should limit activity  Keep a pain diary  Write down when your pain happens, how severe it is, and any other symptoms you have with your pain  A diary will help you keep track of your pain  It may also help your healthcare provider find out what is causing your pain  Follow up with your healthcare provider as directed: You may need more injections or other treatments  Bring your pain diary to your visits   Write down your questions so you remember to ask them during your visits  © Copyright [employer] 2022 Information is for End User's use only and may not be sold, redistributed or otherwise used for commercial purposes  All illustrations and images included in CareNotes® are the copyrighted property of [employer]  or [employer]  The above information is an  only  It is not intended as medical advice for individual conditions or treatments  Talk to your doctor, nurse or pharmacist before following any medical regimen to see if it is safe and effective for you

## 2022-12-29 ENCOUNTER — APPOINTMENT (OUTPATIENT)
Dept: RADIOLOGY | Facility: HOSPITAL | Age: 52
End: 2022-12-29

## 2022-12-29 ENCOUNTER — HOSPITAL ENCOUNTER (OUTPATIENT)
Facility: HOSPITAL | Age: 52
Setting detail: OUTPATIENT SURGERY
Discharge: HOME | End: 2022-12-29
Attending: ANESTHESIOLOGY | Admitting: ANESTHESIOLOGY

## 2022-12-29 VITALS
SYSTOLIC BLOOD PRESSURE: 144 MMHG | HEIGHT: 68 IN | DIASTOLIC BLOOD PRESSURE: 67 MMHG | OXYGEN SATURATION: 98 % | HEART RATE: 66 BPM | WEIGHT: 157 LBS | RESPIRATION RATE: 18 BRPM | BODY MASS INDEX: 23.79 KG/M2 | TEMPERATURE: 97.7 F

## 2022-12-29 RX ORDER — BUPIVACAINE HYDROCHLORIDE 2.5 MG/ML
INJECTION, SOLUTION EPIDURAL; INFILTRATION; INTRACAUDAL AS NEEDED
Status: DISCONTINUED | OUTPATIENT
Start: 2022-12-29 | End: 2022-12-29 | Stop reason: HOSPADM

## 2022-12-29 RX ORDER — LIDOCAINE HYDROCHLORIDE 10 MG/ML
INJECTION, SOLUTION EPIDURAL; INFILTRATION; INTRACAUDAL; PERINEURAL AS NEEDED
Status: DISCONTINUED | OUTPATIENT
Start: 2022-12-29 | End: 2022-12-29 | Stop reason: HOSPADM

## 2022-12-29 RX ORDER — METHYLPREDNISOLONE ACETATE 80 MG/ML
INJECTION, SUSPENSION INTRA-ARTICULAR; INTRALESIONAL; INTRAMUSCULAR; SOFT TISSUE AS NEEDED
Status: DISCONTINUED | OUTPATIENT
Start: 2022-12-29 | End: 2022-12-29 | Stop reason: HOSPADM

## 2022-12-29 NOTE — OP NOTE
OPERATIVE REPORT  PATIENT NAME: Nico Dodson    :  1970  MRN: [id number]  Pt Location:  GI ROOM 01    SURGERY DATE: 2022    Surgeon(s) and Role:      Florencio Fabry, MD - Primary    Preop Diagnosis:  Sacroiliac joint dysfunction of both sides    Post-Op Diagnosis Codes:     * Sacroiliac joint dysfunction of right side [M53 3]    Procedure(s) (LRB):  BLOCK / INJECTION SACROILIAC (Bilateral)    Specimen(s):  * No specimens in log *    Estimated Blood Loss:   Minimal    Drains:  * No LDAs found *    Anesthesia Type:   Local    Operative Indications:  Sacroiliac joint dysfunction of both sides    Operative Findings:  Contrast spread into bilateral sacroiliac joints under fluoroscopic guidance    Complications:   None    Procedure and Technique:  Please see detailed procedure note     I was present for the entire procedure    Patient Disposition:  PACU     SIGNATURE: Sruthi Fields MD  DATE: 2022  TIME: 11:50 AM Routing refill request to provider for review/approval because:  Drug not on the [facility] refill protocol     Keely ORTIZN, RN

## 2022-12-29 NOTE — PROCEDURES
Pre-procedure Diagnosis: Sacroiliitis/Sacroiliac joint dysfunction  Post-procedure Diagnosis: Sacroiliitis/Sacroiliac joint dysfunction  Operation Title(s):  1  [BILATERAL] Sacroiliac joint injection      2  Intraoperative fluoroscopy  Attending Surgeon:   Bhakti Arguello MD  Anesthesia:   Local    Indications: The patient is a 46y o  year-old female with a diagnosis of sacroiliitis/Sacroiliac joint dysfunction  The patient's history and physical exam were reviewed  The risks, benefits and alternatives to the procedure were discussed, and all questions were answered to the patient's satisfaction  The patient agreed to proceed, and written informed consent was obtained  Procedure in Detail: The patient was brought into the procedure room and placed in the prone position on the fluoroscopy table  The low back and upper buttock were prepped with chloraprep and draped in a sterile manner  AP fluoroscopy was used to visualize the [RIGHT] sacroiliac joint  The fluoroscopic beam was then obliqued until the anterior and posterior margins of the joint were aligned  The inferior margin of the joint was identified and marked  The skin and subcutaneous tissues in the area were anesthetized with 1% lidocaine  A 22-gauge, 3½-inch spinal needle was advanced toward the identified point under fluoroscopic guidance  Once the targeted point was reached and the joint space was entered, negative aspiration was confirmed, and 1ml of Omnipaque 300 contrast was injected  The joint space was appropriately outlined  Then, after negative aspiration, a solution consisting of 4 5mL 0 25% bupivacaine and 0 5mL Depo-Medrol (80mg/ml) were easily injected  The needle was removed with a 1% lidocaine flush  (This same procedure was repeated on the LEFT side )    The patient's back was cleaned and a bandage was placed over the sites of needle insertion      Disposition: The patient tolerated the procedure well and there were no apparent complications  Vital signs remained stable throughout the procedure  The patient was taken to the recovery area where written discharge instructions for the procedure were given      Estimated Blood Loss: None  Specimens Obtained: N/A

## 2022-12-29 NOTE — INTERVAL H&P NOTE
H&P reviewed  After examining the patient I find no changes in the patients condition since the H&P had been written      Vitals:    12/29/22 1046   BP: 138/73   Pulse: 68   Resp: 18   Temp: 97 7 °F (36 5 °C)   SpO2: 99%

## 2023-01-03 ENCOUNTER — APPOINTMENT (OUTPATIENT)
Dept: LAB | Facility: HOSPITAL | Age: 53
End: 2023-01-03
Attending: STUDENT IN AN ORGANIZED HEALTH CARE EDUCATION/TRAINING PROGRAM

## 2023-01-03 DIAGNOSIS — E03.9 ACQUIRED HYPOTHYROIDISM: ICD-10-CM

## 2023-01-03 LAB — TSH SERPL DL<=0.05 MIU/L-ACNC: 2.66 UIU/ML (ref 0.45–4.5)

## 2023-01-04 DIAGNOSIS — E03.9 ACQUIRED HYPOTHYROIDISM: ICD-10-CM

## 2023-01-04 LAB — T4 FREE SERPL-MCNC: 1.06 NG/DL (ref 0.76–1.46)

## 2023-01-04 RX ORDER — LEVOTHYROXINE SODIUM 0.1 MG/1
100 TABLET ORAL DAILY
Qty: 90 TABLET | Refills: 3 | Status: SHIPPED | OUTPATIENT
Start: 2023-01-04

## 2023-01-05 ENCOUNTER — TELEPHONE (OUTPATIENT)
Dept: RADIOLOGY | Facility: MEDICAL CENTER | Age: 53
End: 2023-01-05

## 2023-01-05 NOTE — PROGRESS NOTES
PT Re-Evaluation     Today's date: 2023  Patient name: Nayana Catalan  : 1970  MRN: [id number]  Referring provider: Parker Salgado*  Dx:   Encounter Diagnosis     ICD-10-CM    1  Pes anserinus bursitis of right knee  M70 51       2  Chronic pain of right knee  M25 561     G89 29                      Assessment  Assessment details: Alonza Moritz is a 46 y o female who presents to OP PT with signs and symptoms consistent with suprapatellar bursitis of the R knee  Upon examination, PT notes key impairments of decrease knee ROM/strength and static/dynamic balance  Due to this patient is limited with performing ADL/IADLs, negotiate stairs, standing/sitting, squatting and sleeping  Additionally, patient is restricted in participating in social gatherings, recreational activities, and full duty at work  Patient will benefit from skilled PT to address above impairments with POC consisting of functional ROM, stretching, strengthening, balance, analgesic modalities and manual therapy in order to maximize functional independence  Thank you for your referral!   Impairments: abnormal or restricted ROM, impaired balance, impaired physical strength, lacks appropriate home exercise program, pain with function and weight-bearing intolerance  Understanding of Dx/Px/POC: good   Prognosis: good    Goals  STG(In 4 weeks)  Patient will initiate HEP  Patient will decrease knee pain from 10/10 to 5/10 in order to improve QOL  Patient will increase  ROM by 0-5 in order to improve ability to negotiate stairs  Patient will increase strength by 1 MMT in order to improve ability to negotiate stairs  Patient will increase FOTO score from 19 to 31 to improve QOL  LTG(In 8 weeks)  Patient will decrease knee pain from 10/10 to 1/10  in order to improve QOL    Patient will increase knee ROM  by 5-10 in order to improve ability to   Patient will increase knee strength by 1-2  MMT in order to to be able to perform ADL/IADLs  Patient will increase FOTO score from 31 to 48 in order to improve QOL  Patient will be independent with HEP at D/c  Plan  Planned modality interventions: cryotherapy, electrical stimulation/Russian stimulation, thermotherapy: hydrocollator packs and TENS  Planned therapy interventions: balance, functional ROM exercises, graded exercise, home exercise program, joint mobilization, manual therapy, massage, patient education, therapeutic activities, strengthening, stretching, therapeutic exercise and therapeutic training  Frequency: 2x week  Duration in weeks: 8  Treatment plan discussed with: patient        Subjective Evaluation    History of Present Illness  Date of onset: 10/1/2022  Mechanism of injury: Patient indicated that she started experiencing significant R knee pain 1 month ago on 10/01/22 when she was walking/standing on her feet for long period of time  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 and this put more stress/pressure on her knee which she thinks re-aggravated her R knee from initial injury  Presently the patient has attended 4 sessions of skilled therapy with no treatment for 3 weeks secondary to family issues and feels     Quality of life: good    Pain  Current pain ratin  At best pain ratin  At worst pain rating: 10  Location: Right knee   Quality: burning  Relieving factors: medications, relaxation and rest  Aggravating factors: running, stair climbing, walking, standing and sitting  Progression: no change    Treatments  Current treatment: physical therapy  Patient Goals  Patient goals for therapy: decreased pain, improved balance, increased motion, increased strength, independence with ADLs/IADLs and return to work  Patient goal: Get back to walking           Objective     Active Range of Motion   Left Knee   Flexion: 125 degrees Extension: 0 degrees     Right Knee   Flexion: 110 degrees with pain  Extension: 4 degrees   Left Ankle/Foot   Normal active range of motion    Right Ankle/Foot   Normal active range of motion    Mobility   Patellar Mobility:   Left Knee   WFL: medial, lateral, superior and inferior  Right Knee   WFL: lateral and superior  Hypomobile: medial and inferior     Strength/Myotome Testing     Left Ankle/Foot   Dorsiflexion: 5  Plantar flexion: 5    Right Ankle/Foot   Dorsiflexion: 5  Plantar flexion: 5    Tests     Left Knee   Positive patellar apprehension, patellar compression and patella-femoral grind  Negative anterior drawer, posterior drawer, Thessaly's test at 5 degrees, valgus stress test at 0 degrees and valgus stress test at 30 degrees  Right Knee   Negative anterior drawer, posterior drawer, Thessaly's test at 5 degrees, valgus stress test at 0 degrees and valgus stress test at 30 degrees  Precautions:  Fall risk

## 2023-01-06 ENCOUNTER — APPOINTMENT (OUTPATIENT)
Dept: PHYSICAL THERAPY | Facility: CLINIC | Age: 53
End: 2023-01-06

## 2023-01-06 ENCOUNTER — REMOTE DEVICE CLINIC VISIT (OUTPATIENT)
Dept: CARDIOLOGY CLINIC | Facility: CLINIC | Age: 53
End: 2023-01-06

## 2023-01-06 DIAGNOSIS — Z95.818 PRESENCE OF OTHER CARDIAC IMPLANTS AND GRAFTS: Primary | ICD-10-CM

## 2023-01-06 NOTE — PROGRESS NOTES
MDARTUR LKF85/ ACTIVE SYSTEM IS MRI CONDITIONAL   CARELINK TRANSMISSION: LOOP RECORDER  PRESENTING RHYTHM NSR @ 84 BPM  BATTERY STATUS "OK " 3 PT ACTIVATED EPISODES #2 PREVIOUSLY ADDRESSED IN ALERT  NO ALERT RECEIVED FOR #3 AND #4  NO NEW PATIENT OR DEVICE ACTIVATED EPISODES  NORMAL DEVICE FUNCTION   DL

## 2023-01-06 NOTE — PROGRESS NOTES
Daily Note     Today's date: 2023  Patient name: Alla Uribe  : 1970  MRN: [id number]  Referring provider: Sofie Ott*  Dx:   Encounter Diagnosis     ICD-10-CM    1  Pes anserinus bursitis of right knee  M70 51       2  Chronic pain of right knee  M25 561     G89 29                      Subjective: ***      Objective: See treatment diary below       Assessment: Tolerated treatment {Tolerated treatment :[id number]}  Patient exhibited good technique with therapeutic exercises and would benefit from continued PT to increase ROM/strength and endurance to improve mobility  Plan: Continue per plan of care  Precautions:  Fall risk        Manuals    Patella mobs [address] 5' 5' 5'    Gastroc stretching  TODD +marco 10' 5' 5'                    Neuro Re-Ed                                                              Ther Ex      Nu-step 10' L1 10' L3 10' L3 10' L3    TR/HR   6" 2x10 6" 2x10    TKE   10x5" GTB 10x5" GTB    Step-ups: fwd/lat        QS 5'' 2x10 5'' 2x10 5" 2x10 5" 2x10    SAQ 5'' 2x10 5'' 2x10 5" 2x10 5" 2x10    SLR x5 with assist NT F/A/E  10x ea F/A/E 15x ea    Adduction ball squeeze 5'' 2x10 5'' 2x10 5" 2x10 5" 2x10    Bridges 5'' 2x10 5'' 2x10 5" 2x10 5" 2x10    Clamshells 5'' 2x10 5'' 2x10 5" 2x10 5" 2x10    Reverse clamshells  5'' 2x10 5" 2x10 5" 2x10    Leg Press   55# 5x 55# 2x10            HEP   10'     Gait Training                      Modalities      CP Declined       TENS

## 2023-01-06 NOTE — PROGRESS NOTES
PT Re-Evaluation     Today's date: 2023  Patient name: Claudia Lopez  : 1970  MRN: [id number]  Referring provider: Marry Coronado*  Dx:   Encounter Diagnosis     ICD-10-CM    1  Pes anserinus bursitis of right knee  M70 51       2  Chronic pain of right knee  M25 561     G89 29                      Assessment  Assessment details: PT notes the patient with improved ROM t/o the right knee but continuation of decrease flexibility and strength t/o the right knee and LE with demonstration of impaired gait pattern and balance with need for continuation of skilled therapy for 4 weeks with focus on gait/balance, strengthening, manual therapy, analgesic modalities, and transition to HEP  Impairments: abnormal gait, abnormal or restricted ROM, activity intolerance, impaired balance, impaired physical strength, lacks appropriate home exercise program and pain with function  Understanding of Dx/Px/POC: good   Prognosis: good    Goals  STG(In 4 weeks)  Patient will initiate HEP  Patient will decrease knee pain from 10/10 to 5/10 in order to improve QOL  Patient will increase  ROM by 0-5 in order to improve ability to negotiate stairs  Patient will increase strength by 1 MMT in order to improve ability to negotiate stairs  Patient will increase FOTO score from 19 to 31 to improve QOL  LTG(In 8 weeks)  Patient will decrease knee pain from 10/10 to 1/10  in order to improve QOL  Patient will increase knee ROM  by 5-10 in order to improve ability to   Patient will increase knee strength by 1-2  MMT in order to to be able to perform ADL/IADLs  Patient will increase FOTO score from 31 to 48 in order to improve QOL  Patient will be independent with HEP at D/c  Plan  Plan details: Transition to HEP     Patient would benefit from: skilled physical therapy  Planned modality interventions: cryotherapy  Planned therapy interventions: balance, functional ROM exercises, graded exercise, home exercise program, joint mobilization, manual therapy, massage, patient education, therapeutic activities, strengthening, stretching, therapeutic exercise and therapeutic training  Frequency: 2x week  Duration in weeks: 4  Treatment plan discussed with: patient        Subjective Evaluation    History of Present Illness  Date of onset: 10/1/2022  Mechanism of injury: Patient indicated that she started experiencing significant R knee pain 1 month ago on 10/01/22 when she was walking/standing on her feet for long period of time  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 and this put more stress/pressure on her knee which she thinks re-aggravated her R knee from initial injury  Presently the patient has attended 5 sessions of skilled therapy with no treatment for 3 weeks secondary to family and secondary medical issues and feels 80-90% improvement since the start of therapy  Patient reports the knee is moving better but continuation of weakness in the LE with difficulty with squatting, lifting, carrying, ADL, transfers, and work duties  Quality of life: good    Pain  Current pain ratin  At best pain ratin  At worst pain ratin  Location: Right knee   Quality: burning  Relieving factors: medications, relaxation and rest  Aggravating factors: running, stair climbing, walking, standing and sitting  Progression: improved    Treatments  Current treatment: physical therapy  Patient Goals  Patient goals for therapy: decreased pain, improved balance, increased motion, increased strength, independence with ADLs/IADLs and return to work  Patient goal: Get back to walking  Objective     Tenderness     Right Knee   Tenderness in the patellar tendon and quadriceps tendon   No tenderness in the lateral joint line, LCL (distal), LCL (proximal), MCL (distal), MCL (proximal), medial joint line and pes anserinus  Neurological Testing     Reflexes   Left   Patellar (L4): normal (2+)  Achilles (S1): normal (2+)    Right   Patellar (L4): normal (2+)  Achilles (S1): normal (2+)    Active Range of Motion     Right Hip   Flexion: 49 degrees   Extension: 6 degrees   Abduction: WFL  External rotation (90/90): 19 degrees   Internal rotation (90/90): WFL  Left Knee   Flexion: 125 degrees   Extension: 0 degrees     Right Knee   Flexion: WFL and with pain  Extension: 0 degrees   Left Ankle/Foot   Normal active range of motion    Right Ankle/Foot   Normal active range of motion    Passive Range of Motion     Right Hip   Flexion: 58 degrees   Extension: 9 degrees   Abduction: WFL  External rotation (90/90): 25 degrees   Internal rotation (90/90): [facility]    Mobility   Patellar Mobility:   Left Knee   WFL: medial, lateral, superior and inferior  Right Knee   WFL: medial, lateral, superior and inferior    Strength/Myotome Testing     Right Hip   Planes of Motion   Flexion: 4-  Extension: 4+  Abduction: 4+  Adduction: 5  External rotation: 4  Internal rotation: 4    Left Ankle/Foot   Dorsiflexion: 5  Plantar flexion: 5    Right Ankle/Foot   Dorsiflexion: 5  Plantar flexion: 5    Tests     Right Hip   Negative SEMAJ and FADIR    90/90 SLR: Positive  SLR: Positive  Left Knee   Negative anterior drawer, posterior drawer, Thessaly's test at 5 degrees, valgus stress test at 0 degrees and valgus stress test at 30 degrees  Right Knee   Negative anterior drawer, lateral Rudy, medial Rudy, patellar apprehension, posterior drawer, Thessaly's test at 5 degrees, valgus stress test at 0 degrees and valgus stress test at 30 degrees  Ambulation     Observational Gait   Gait: antalgic   Walking speed and stride length within functional limits  Decreased right stance time       Additional Observational Gait Details  Decrease push off on the left              Precautions:  None       Manuals 11/14 11/23 11/25 11/29 1/9   Patella mobs [address] 5' 5' 5' DC    Gastroc stretching  TODD +marco 10' 5' 5' Bilat HS, hip ABD, Quad, piriformis and gastroc stretch with knee PA mobs   10 min                    Neuro Re-Ed   11/25 11/29    Front and lateral lunge      10x Each Bilat                                                    Ther Ex   11/25 11/29    Nu-step 10' L1 10' L3 10' L3 10' L3 10 min L4    TR/HR   6" 2x10 6" 2x10 NT   TKE   10x5" GTB 10x5" GTB 10x5" Hold   Green    Step-ups: fwd/lat        QS 5'' 2x10 5'' 2x10 5" 2x10 5" 2x10 HEP    SAQ 5'' 2x10 5'' 2x10 5" 2x10 5" 2x10 HEP    SLR x5 with assist NT F/A/E  10x ea F/A/E 15x ea HEP    Adduction ball squeeze 5'' 2x10 5'' 2x10 5" 2x10 5" 2x10 DC    Bridges 5'' 2x10 5'' 2x10 5" 2x10 5" 2x10 2x10    Clamshells 5'' 2x10 5'' 2x10 5" 2x10 5" 2x10 10x Bilat   Green    Reverse clamshells  5'' 2x10 5" 2x10 5" 2x10 2x10 Bilat    Leg Press   55# 5x 55# 2x10 65# 2x10 Bilat            HEP   10'     Gait Training   11/25 11/29                    Modalities   11/25 11/29    CP Declined    Declined    TENS

## 2023-01-09 ENCOUNTER — EVALUATION (OUTPATIENT)
Dept: PHYSICAL THERAPY | Facility: CLINIC | Age: 53
End: 2023-01-09

## 2023-01-09 DIAGNOSIS — M70.51 PES ANSERINUS BURSITIS OF RIGHT KNEE: Primary | ICD-10-CM

## 2023-01-09 DIAGNOSIS — M25.561 CHRONIC PAIN OF RIGHT KNEE: ICD-10-CM

## 2023-01-09 DIAGNOSIS — G89.29 CHRONIC PAIN OF RIGHT KNEE: ICD-10-CM

## 2023-01-11 ENCOUNTER — APPOINTMENT (OUTPATIENT)
Dept: PHYSICAL THERAPY | Facility: CLINIC | Age: 53
End: 2023-01-11

## 2023-01-17 ENCOUNTER — OFFICE VISIT (OUTPATIENT)
Dept: PHYSICAL THERAPY | Facility: CLINIC | Age: 53
End: 2023-01-17

## 2023-01-17 DIAGNOSIS — G89.29 CHRONIC PAIN OF RIGHT KNEE: Primary | ICD-10-CM

## 2023-01-17 DIAGNOSIS — M25.561 CHRONIC PAIN OF RIGHT KNEE: Primary | ICD-10-CM

## 2023-01-17 DIAGNOSIS — M70.51 PES ANSERINUS BURSITIS OF RIGHT KNEE: ICD-10-CM

## 2023-01-17 NOTE — PROGRESS NOTES
Daily Note     Today's date: 2023  Patient name: Sarah Moscoso  : 1970  MRN: [id number]  Referring provider: Patrick Jorge*  Dx:   Encounter Diagnosis     ICD-10-CM    1  Chronic pain of right knee  M25 561     G89 29       2  Pes anserinus bursitis of right knee  M70 51           Start Time: 1003  Stop Time: 1052  Total time in clinic (min): 49 minutes    Subjective: Patient indicated that her knee is feeling good today  Objective: See treatment diary below      Assessment: Therapeutic exercise program was completed with good technique and no previous pain or symptoms  Continue to recommend PT in order achieve maximal functional independence  Plan: Continue per plan of care        Precautions:  None       Manuals    Patella mobs [address] 5' 5' 5' DC    Gastroc stretching  TODD +marco 10' 5' 5' Bilat HS, hip ABD, Quad, piriformis and gastroc stretch with knee PA mobs   10 min                    Neuro Re-Ed       Front and lateral lunge      10x Each Bilat                                                    Ther Ex       Nu-step 10' L1 10' L3 10' L3 10' L3 10 min L4    TR/HR 2X10  6" 2x10 6" 2x10 NT   TKE NT  10x5" GTB 10x5" GTB 10x5" Hold   Green    Step-ups: fwd/lat        QS 5'' 2x10 5'' 2x10 5" 2x10 5" 2x10 HEP    SAQ 5'' 2x10 1#  5'' 2x10 5" 2x10 5" 2x10 HEP    SLR 10x 1# NT F/A/E  10x ea F/A/E 15x ea HEP    Adduction ball squeeze 5'' 2x10 5'' 2x10 5" 2x10 5" 2x10 DC    Bridges 5'' 2x10 5'' 2x10 5" 2x10 5" 2x10 2x10    Clamshells 5'' 2x10 5'' 2x10 5" 2x10 5" 2x10 10x Bilat   Green    Reverse clamshells 5" 2x10 5'' 2x10 5" 2x10 5" 2x10 2x10 Bilat    Leg Press 55# 2X10   55# 5x 55# 2x10 65# 2x10 Bilat            HEP   10'     Gait Training                       Modalities       CP Declined    Declined    TENS

## 2023-01-19 ENCOUNTER — OFFICE VISIT (OUTPATIENT)
Dept: PHYSICAL THERAPY | Facility: CLINIC | Age: 53
End: 2023-01-19

## 2023-01-19 DIAGNOSIS — G89.29 CHRONIC PAIN OF RIGHT KNEE: Primary | ICD-10-CM

## 2023-01-19 DIAGNOSIS — M25.561 CHRONIC PAIN OF RIGHT KNEE: Primary | ICD-10-CM

## 2023-01-19 DIAGNOSIS — M70.51 PES ANSERINUS BURSITIS OF RIGHT KNEE: ICD-10-CM

## 2023-01-19 NOTE — PROGRESS NOTES
Daily Note     Today's date: 2023  Patient name: James Pradhan  : 1970  MRN: [id number]  Referring provider: Bonnie Arodn*  Dx:   Encounter Diagnosis     ICD-10-CM    1  Chronic pain of right knee  M25 561     G89 29       2  Pes anserinus bursitis of right knee  M70 51           Start Time: 0945  Stop Time: 1031  Total time in clinic (min): 46 minutes    Subjective: Patient indicated that her knee is feeling pretty good this morning and she felt good after last session  Objective: See treatment diary below      Assessment: Therapeutic exercise program was completed with good technique and no previous pain or symptoms  Continue to recommend PT in order achieve maximal functional independence  Plan: Continue per plan of care        Precautions:  None       Manuals    Patella mobs [address] 5' 5' 5' DC    Gastroc stretching  TODD +marco 10' 5' 5' Bilat HS, hip ABD, Quad, piriformis and gastroc stretch with knee PA mobs   10 min                    Neuro Re-Ed       Front and lateral lunge      10x Each Bilat                                                    Ther Ex      Nu-step 10' L1 10' L3 10' L3 10' L3 10 min L4    TR/HR 2X10  6" 2x10 6" 2x10 NT   TKE NT  10x5" GTB 10x5" GTB 10x5" Hold   Green    Step-ups: fwd/lat        QS 5'' 2x10 5'' 2x10 5" 2x10 5" 2x10 HEP    SAQ 5'' 2x10 1#  5'' 2x10  1#  5" 2x10 5" 2x10 HEP    SLR 10x 1# NT F/A/E  10x ea F/A/E 15x ea HEP    Adduction ball squeeze 5'' 2x10 5'' 2x10 5" 2x10 5" 2x10 DC    Bridges 5'' 2x10 5'' 2x10 5" 2x10 5" 2x10 2x10    Clamshells 5'' 2x10 5'' 2x10 5" 2x10 5" 2x10 10x Bilat   Green    Reverse clamshells 5" 2x10 5'' 2x10 5" 2x10 5" 2x10 2x10 Bilat    Leg Press 55# 2X10  2x10 55# 55# 5x 55# 2x10 65# 2x10 Bilat            HEP   10'     Gait Training                       Modalities       CP Declined    Declined    TENS

## 2023-01-20 ENCOUNTER — ANESTHESIA (OUTPATIENT)
Dept: ANESTHESIOLOGY | Facility: HOSPITAL | Age: 53
End: 2023-01-20

## 2023-01-20 ENCOUNTER — ANESTHESIA (OUTPATIENT)
Dept: GASTROENTEROLOGY | Facility: HOSPITAL | Age: 53
End: 2023-01-20

## 2023-01-20 ENCOUNTER — HOSPITAL ENCOUNTER (OUTPATIENT)
Dept: GASTROENTEROLOGY | Facility: HOSPITAL | Age: 53
Setting detail: OUTPATIENT SURGERY
End: 2023-01-20
Attending: INTERNAL MEDICINE

## 2023-01-20 ENCOUNTER — ANESTHESIA EVENT (OUTPATIENT)
Dept: GASTROENTEROLOGY | Facility: HOSPITAL | Age: 53
End: 2023-01-20

## 2023-01-20 ENCOUNTER — ANESTHESIA EVENT (OUTPATIENT)
Dept: ANESTHESIOLOGY | Facility: HOSPITAL | Age: 53
End: 2023-01-20

## 2023-01-20 VITALS
BODY MASS INDEX: 23.79 KG/M2 | SYSTOLIC BLOOD PRESSURE: 114 MMHG | RESPIRATION RATE: 20 BRPM | DIASTOLIC BLOOD PRESSURE: 68 MMHG | TEMPERATURE: 98 F | WEIGHT: 157 LBS | HEART RATE: 68 BPM | OXYGEN SATURATION: 98 % | HEIGHT: 68 IN

## 2023-01-20 DIAGNOSIS — R55 VASOVAGAL SYNCOPE: ICD-10-CM

## 2023-01-20 DIAGNOSIS — R11.2 NAUSEA WITH VOMITING, UNSPECIFIED: ICD-10-CM

## 2023-01-20 RX ORDER — SODIUM CHLORIDE, SODIUM LACTATE, POTASSIUM CHLORIDE, CALCIUM CHLORIDE 600; 310; 30; 20 MG/100ML; MG/100ML; MG/100ML; MG/100ML
INJECTION, SOLUTION INTRAVENOUS CONTINUOUS PRN
Status: DISCONTINUED | OUTPATIENT
Start: 2023-01-20 | End: 2023-01-20

## 2023-01-20 RX ORDER — PROPOFOL 10 MG/ML
INJECTION, EMULSION INTRAVENOUS CONTINUOUS PRN
Status: DISCONTINUED | OUTPATIENT
Start: 2023-01-20 | End: 2023-01-20

## 2023-01-20 RX ORDER — LIDOCAINE HYDROCHLORIDE 20 MG/ML
INJECTION, SOLUTION EPIDURAL; INFILTRATION; INTRACAUDAL; PERINEURAL AS NEEDED
Status: DISCONTINUED | OUTPATIENT
Start: 2023-01-20 | End: 2023-01-20

## 2023-01-20 RX ORDER — PROPOFOL 10 MG/ML
INJECTION, EMULSION INTRAVENOUS AS NEEDED
Status: DISCONTINUED | OUTPATIENT
Start: 2023-01-20 | End: 2023-01-20

## 2023-01-20 RX ADMIN — PROPOFOL 50 MG: 10 INJECTION, EMULSION INTRAVENOUS at 10:36

## 2023-01-20 RX ADMIN — PROPOFOL 150 MG: 10 INJECTION, EMULSION INTRAVENOUS at 10:35

## 2023-01-20 RX ADMIN — PROPOFOL 50 MG: 10 INJECTION, EMULSION INTRAVENOUS at 10:38

## 2023-01-20 RX ADMIN — PROPOFOL 50 MG: 10 INJECTION, EMULSION INTRAVENOUS at 10:41

## 2023-01-20 RX ADMIN — LIDOCAINE HYDROCHLORIDE 50 MG: 20 INJECTION, SOLUTION EPIDURAL; INFILTRATION; INTRACAUDAL; PERINEURAL at 10:35

## 2023-01-20 RX ADMIN — SODIUM CHLORIDE, SODIUM LACTATE, POTASSIUM CHLORIDE, AND CALCIUM CHLORIDE: .6; .31; .03; .02 INJECTION, SOLUTION INTRAVENOUS at 10:32

## 2023-01-20 NOTE — H&P
History and Physical - [facility] at [address] 48 y o  female MRN: [id number]                  HPI: Saundra Vasquez is a 48y o  year old female who presents for EGD for evaluation of persistent nausea and vomiting and abdominal pain  REVIEW OF SYSTEMS: Per the HPI, and otherwise unremarkable  Historical Information   Past Medical History:   Diagnosis Date   • Allergic 1974    Seasonal   • Anxiety    • Arthritis    • Colitis     Noted on colonoscopy 04/24/2020     • Depression    • Disease of thyroid gland    • Gastritis     Noted on EGD 04/24/2020   • Gastroparesis    • Glaucoma    • Headache(784 0)    • Hyperlipidemia    • Hypertension    • Hypothyroidism    • Lyme disease    • Migraine    • Osteoporosis    • Scoliosis    • SVT (supraventricular tachycardia) (HCC)    • Visual impairment    • VT (ventricular tachycardia)      Past Surgical History:   Procedure Laterality Date   • BREAST BIOPSY Right 10/21/2020    papilloma   • COLONOSCOPY     • EGD     • FINGER SURGERY      left pinky   • FL GUIDED NEEDLE PLAC BX/ASP/INJ  03/17/2022   • FL GUIDED NEEDLE PLAC BX/ASP/INJ  07/08/2022   • HYSTERECTOMY  2008   • HYSTERECTOMY W/ SALPINGO-OOPHERECTOMY  05/2008   • NERVE BLOCK Bilateral 03/17/2022    Procedure: L3, L4, L5 BLOCK MEDIAL BRANCH;  Surgeon: Lala Maciel MD;  Location: [facility];  Service: Pain Management    • NERVE BLOCK Bilateral 07/08/2022    Procedure: BLOCK MEDIAL BRANCH L3, L4, L5 #2;  Surgeon: Lala Maciel MD;  Location: [facility];  Service: Pain Management    • OOPHORECTOMY Bilateral 2008   • NM INJECT SI JOINT ARTHRGRPHY&/ANES/STEROID W/JOHNNY Bilateral 12/29/2022    Procedure: BLOCK / INJECTION SACROILIAC;  Surgeon: Lala Maciel MD;  Location: [facility];  Service: Pain Management    • RHIZOTOMY Bilateral 12/8/2022    Procedure: RHIZOTOMY LUMBAR L3, L4, L5 medial branch nerves;  Surgeon: Lala Maciel MD;  Location: [facility];  Service: Pain Management    • [address] GUIDED BREAST BIOPSY RIGHT COMPLETE Right 10/21/2020     Social History   Social History     Substance and Sexual Activity   Alcohol Use Never     Social History     Substance and Sexual Activity   Drug Use Never     Social History     Tobacco Use   Smoking Status Former   • Packs/day: 1    • Years: 10 00   • Pack years: 10 00   • Types: Cigarettes   • Quit date: 2007   • Years since quittin 0   Smokeless Tobacco Never   Tobacco Comments    quit      Family History   Problem Relation Age of Onset   • Peripheral vascular disease Mother    • Glaucoma Mother    • Prostate cancer Father    • Hypothyroidism Sister    • No Known Problems Daughter    • No Known Problems Daughter    • Colon cancer Maternal Grandmother    • No Known Problems Maternal Grandfather    • Arthritis Paternal Grandmother    • No Known Problems Paternal Grandfather    • No Known Problems Maternal Aunt    • No Known Problems Maternal Aunt    • Skin cancer Paternal Aunt    • No Known Problems Paternal Aunt    • Breast cancer Neg Hx    • Breast cancer additional onset Neg Hx    • Endometrial cancer Neg Hx    • BRCA2 Positive Neg Hx    • BRCA2 Negative Neg Hx    • BRCA1 Positive Neg Hx    • BRCA1 Negative Neg Hx    • BRCA 1/2 Neg Hx    • Ovarian cancer Neg Hx        Meds/Allergies     (Not in a hospital admission)      No Known Allergies    Objective     Blood pressure 126/74, pulse 69, temperature 98 1 °F (36 7 °C), temperature source Oral, resp  rate 20, height 5' 8" (1 727 m), weight 71 2 kg (157 lb), SpO2 100 %  PHYSICAL EXAM    Gen: NAD  CV: RRR  CHEST: Clear  ABD: soft, NT/ND  EXT: no edema      ASSESSMENT/PLAN:  This is a 48y o  year old female here for EGD for evaluation of persistent nausea and vomiting and abdominal pain     Patient is stable and optimized for procedure      PLAN:   Procedure: EGD

## 2023-01-20 NOTE — ANESTHESIA PREPROCEDURE EVALUATION
"PULMONARY CRITICAL CARE Progress  NOTE      PATIENT IDENTIFICATION:  Name: Gregorio Winslow Jr.  MRN: [id number]  :  2001     Age: 19 y.o.  Sex: male    DATE OF Note:  2021   Referring Physician: Aileen Ahmadi MD                  Subjective:   Still a dry cough and some wheezing  No SOB no chest pain, no nausea or vomiting, no change in bowel habit, no dysuria,  no new  skin rash or itching.      Objective:  tMax 24 hrs: Temp (24hrs), Av.9 °F (36.6 °C), Min:97.3 °F (36.3 °C), Max:98.5 °F (36.9 °C)      Vitals Ranges:   Temp:  [97.3 °F (36.3 °C)-98.5 °F (36.9 °C)] 98.5 °F (36.9 °C)  Heart Rate:  [] 64  Resp:  [18-22] 20  BP: (119-137)/(51-69) 119/60    Intake and Output Last 3 Shifts:   I/O last 3 completed shifts:  In: 640 [P.O.:640]  Out: -     Exam:  /60   Pulse 64   Temp 98.5 °F (36.9 °C)   Resp 20   Ht 188 cm (74\")   Wt 56.7 kg (125 lb)   SpO2 98%   BMI 16.05 kg/m²     General Appearance:   Alert awake not in distress  HEENT:  Normocephalic, without obvious abnormality, Conjunctiva/corneas clear,.  Normal external ear canals, Nares normal, no drainage     Neck:  Supple, symmetrical, trachea midline. No JVD.  Lungs /Chest wall:   Bilateral basal rhonchi, respirations unlabored symmetrical wall movement.     Heart:  Regular rate and rhythm, systolic murmur PMI left sternal border  Abdomen: Soft, non-tender, no masses, no organomegaly.    Extremities: Trace edema no clubbing or Cyanosis        Medications:  No current facility-administered medications for this encounter.     Current Outpatient Medications:   •  albuterol sulfate  (90 Base) MCG/ACT inhaler, Inhale 2 puffs Every 6 (Six) Hours As Needed for Wheezing or Shortness of Air., Disp: 1 inhaler, Rfl: 0  •  budesonide-formoterol (SYMBICORT) 160-4.5 MCG/ACT inhaler, Inhale 2 puffs 2 (Two) Times a Day., Disp: 1 inhaler, Rfl: 0  •  ipratropium-albuterol (DUO-NEB) 0.5-2.5 mg/3 ml nebulizer, Take 3 mL by nebulization Every " Procedure:  PRE-OP ONLY    Relevant Problems   CARDIO   (+) Hyperlipidemia   (+) Hypertension   (+) Migraine   (+) SVT (supraventricular tachycardia) (HCC)      ENDO   (+) Hypothyroidism      GI/HEPATIC   (+) Gastroesophageal reflux disease without esophagitis      MUSCULOSKELETAL   (+) Arthritis   (+) Lumbar spondylosis      NEURO/PSYCH   (+) Anxiety   (+) Depression   (+) History of syncope   (+) Migraine        Physical Exam    Airway    Mallampati score: II  TM Distance: >3 FB  Neck ROM: full     Dental   No notable dental hx     Cardiovascular  Cardiovascular exam normal    Pulmonary  Pulmonary exam normal     Other Findings    10/28/22 EKG Sinus rhythm with Premature atrial complexes  Nonspecific ST abnormality  Abnormal ECG    Has loop recorder but has shown nothing        Anesthesia Plan  ASA Score- 2     Anesthesia Type- IV sedation with anesthesia with ASA Monitors  Additional Monitors:   Airway Plan:           Plan Factors-Exercise tolerance (METS): >4 METS  Chart reviewed  EKG reviewed  Imaging results reviewed  Existing labs reviewed  Patient summary reviewed  Patient is not a current smoker  Patient not instructed to abstain from smoking on day of procedure  Patient did not smoke on day of surgery  Induction- intravenous  Postoperative Plan-     Informed Consent- Anesthetic plan and risks discussed with patient  I personally reviewed this patient with the CRNA  Discussed and agreed on the Anesthesia Plan with the CRNA  Pebbles Richardson 4 (Four) Hours., Disp: , Rfl:   •  acetaminophen (TYLENOL) 325 MG tablet, Take 2 tablets by mouth Every 6 (Six) Hours As Needed for Mild Pain ., Disp:  , Rfl:   •  [START ON 1/6/2021] azithromycin (ZITHROMAX) 250 MG tablet, Take once a day for 2 days  Indications: Pneumonia, Disp: 2 tablet, Rfl: 0  •  famotidine (PEPCID) 20 MG tablet, Take 1 tablet by mouth 2 (Two) Times a Day., Disp: 60 tablet, Rfl: 0  •  montelukast (SINGULAIR) 10 MG tablet, Take 1 tablet by mouth Every Night., Disp: 30 tablet, Rfl: 5  •  ondansetron (Zofran) 4 MG tablet, Take 1 tablet by mouth Every 8 (Eight) Hours As Needed for Nausea or Vomiting., Disp: 20 tablet, Rfl: 0  •  predniSONE (DELTASONE) 10 MG tablet, 4 po q day x 4 days, 3 po q day x 4 days, 2 po q day x 4 days, 1 po q day x 4 days, Disp: 40 tablet, Rfl: 0    Data Review:  All labs (24hrs): No results found for this or any previous visit (from the past 24 hour(s)).     Imaging:  XR Chest 1 View  Narrative: XR CHEST 1 VW-     Date of Exam: 1/2/2021 4:13 PM     Indication: soa.     Comparison: None available.     Technique: A single view of the chest was obtained.     FINDINGS:      The heart size and pulmonary vessels are within normal limits. The  lungs are clear bilaterally. There are no pleural effusions. Bony thorax  is unremarkable.              Impression:    1. No acute cardiopulmonary disease.        Electronically Signed By-Gibran Wright MD On:1/2/2021 5:13 PM  This report was finalized on [id number] by  Gibran Wright MD.       ASSESSMENT:    Acute respiratory distress    Severe persistent asthma with acute exacerbation  Asthma attack       PLAN:  Okay to DC home on antibiotics  Bronchodilator  Inhaled corticosteroids  Electrolytes/ glycemic control  DVT and GI prophylaxis.  We will follow-up with him as outpatient    Total Critical care time in direct medical management (   ) minutes  Toño Mcgill MD. D, ABSM.     1/5/2021  19:17 EST

## 2023-01-20 NOTE — ANESTHESIA PREPROCEDURE EVALUATION
Procedure:  EGD    Relevant Problems   CARDIO   (+) Hyperlipidemia   (+) Hypertension   (+) Migraine   (+) SVT (supraventricular tachycardia) (HCC)      ENDO   (+) Hypothyroidism      GI/HEPATIC   (+) Gastroesophageal reflux disease without esophagitis      MUSCULOSKELETAL   (+) Arthritis   (+) Lumbar spondylosis      NEURO/PSYCH   (+) Anxiety   (+) Depression   (+) History of syncope   (+) Migraine        Physical Exam    Airway    Mallampati score: II  TM Distance: >3 FB  Neck ROM: full     Dental   No notable dental hx     Cardiovascular  Cardiovascular exam normal    Pulmonary  Pulmonary exam normal     Other Findings    10/28/22 EKG Sinus rhythm with Premature atrial complexes  Nonspecific ST abnormality  Abnormal ECG    Has loop recorder but has shown nothing        Anesthesia Plan  ASA Score- 2     Anesthesia Type- IV sedation with anesthesia with ASA Monitors  Additional Monitors:   Airway Plan:           Plan Factors-Exercise tolerance (METS): >4 METS  Chart reviewed  EKG reviewed  Imaging results reviewed  Existing labs reviewed  Patient summary reviewed  Patient is not a current smoker  Patient not instructed to abstain from smoking on day of procedure  Patient did not smoke on day of surgery  Induction- intravenous  Postoperative Plan-     Informed Consent- Anesthetic plan and risks discussed with patient  I personally reviewed this patient with the CRNA  Discussed and agreed on the Anesthesia Plan with the CRNA  Nivia Cushing

## 2023-01-20 NOTE — ANESTHESIA POSTPROCEDURE EVALUATION
Post-Op Assessment Note    CV Status:  Stable  Pain Score: 0    Pain management: adequate     Mental Status:  Sleepy   Hydration Status:  Stable   PONV Controlled:  Controlled   Airway Patency:  Patent      Post Op Vitals Reviewed: Yes      Staff: CRNA         No notable events documented      BP 99/58 (01/20/23 1045)    Temp 98 1 °F (36 7 °C) (01/20/23 1045)    Pulse 70 (01/20/23 1045)   Resp 20 (01/20/23 1045)    SpO2 98 % (01/20/23 1045)

## 2023-01-27 ENCOUNTER — OFFICE VISIT (OUTPATIENT)
Dept: PAIN MEDICINE | Facility: CLINIC | Age: 53
End: 2023-01-27

## 2023-01-27 ENCOUNTER — OFFICE VISIT (OUTPATIENT)
Dept: FAMILY MEDICINE CLINIC | Facility: CLINIC | Age: 53
End: 2023-01-27

## 2023-01-27 ENCOUNTER — OFFICE VISIT (OUTPATIENT)
Dept: PHYSICAL THERAPY | Facility: CLINIC | Age: 53
End: 2023-01-27

## 2023-01-27 ENCOUNTER — HOSPITAL ENCOUNTER (OUTPATIENT)
Dept: RADIOLOGY | Facility: CLINIC | Age: 53
End: 2023-01-27

## 2023-01-27 VITALS
HEIGHT: 68 IN | BODY MASS INDEX: 23.79 KG/M2 | WEIGHT: 156.97 LBS | DIASTOLIC BLOOD PRESSURE: 60 MMHG | OXYGEN SATURATION: 99 % | TEMPERATURE: 98.4 F | SYSTOLIC BLOOD PRESSURE: 115 MMHG | HEART RATE: 76 BPM

## 2023-01-27 VITALS
RESPIRATION RATE: 20 BRPM | TEMPERATURE: 96.8 F | HEIGHT: 68 IN | HEART RATE: 73 BPM | SYSTOLIC BLOOD PRESSURE: 104 MMHG | DIASTOLIC BLOOD PRESSURE: 52 MMHG | WEIGHT: 159.4 LBS | BODY MASS INDEX: 24.16 KG/M2

## 2023-01-27 DIAGNOSIS — I10 PRIMARY HYPERTENSION: ICD-10-CM

## 2023-01-27 DIAGNOSIS — E03.9 ACQUIRED HYPOTHYROIDISM: ICD-10-CM

## 2023-01-27 DIAGNOSIS — E78.2 MIXED HYPERLIPIDEMIA: ICD-10-CM

## 2023-01-27 DIAGNOSIS — G89.29 CHRONIC LEFT SHOULDER PAIN: ICD-10-CM

## 2023-01-27 DIAGNOSIS — M81.0 AGE-RELATED OSTEOPOROSIS WITHOUT CURRENT PATHOLOGICAL FRACTURE: ICD-10-CM

## 2023-01-27 DIAGNOSIS — F32.0 CURRENT MILD EPISODE OF MAJOR DEPRESSIVE DISORDER, UNSPECIFIED WHETHER RECURRENT (HCC): ICD-10-CM

## 2023-01-27 DIAGNOSIS — M25.561 CHRONIC PAIN OF RIGHT KNEE: Primary | ICD-10-CM

## 2023-01-27 DIAGNOSIS — G89.29 CHRONIC PAIN OF RIGHT KNEE: Primary | ICD-10-CM

## 2023-01-27 DIAGNOSIS — Z00.00 ANNUAL PHYSICAL EXAM: Primary | ICD-10-CM

## 2023-01-27 DIAGNOSIS — M54.12 CERVICAL RADICULOPATHY: ICD-10-CM

## 2023-01-27 DIAGNOSIS — M25.512 CHRONIC LEFT SHOULDER PAIN: ICD-10-CM

## 2023-01-27 DIAGNOSIS — M70.51 PES ANSERINUS BURSITIS OF RIGHT KNEE: ICD-10-CM

## 2023-01-27 DIAGNOSIS — K21.9 GASTROESOPHAGEAL REFLUX DISEASE WITHOUT ESOPHAGITIS: ICD-10-CM

## 2023-01-27 DIAGNOSIS — M25.50 POLYARTHRALGIA: ICD-10-CM

## 2023-01-27 DIAGNOSIS — K31.84 GASTROPARESIS: ICD-10-CM

## 2023-01-27 DIAGNOSIS — M79.7 FIBROMYALGIA: ICD-10-CM

## 2023-01-27 DIAGNOSIS — M47.812 CERVICAL SPONDYLOSIS: Primary | ICD-10-CM

## 2023-01-27 DIAGNOSIS — G43.009 MIGRAINE WITHOUT AURA AND WITHOUT STATUS MIGRAINOSUS, NOT INTRACTABLE: ICD-10-CM

## 2023-01-27 RX ORDER — LIDOCAINE HYDROCHLORIDE 10 MG/ML
5 INJECTION, SOLUTION EPIDURAL; INFILTRATION; INTRACAUDAL; PERINEURAL ONCE
OUTPATIENT
Start: 2023-01-27 | End: 2023-01-27

## 2023-01-27 RX ORDER — METOCLOPRAMIDE 5 MG/1
TABLET ORAL
COMMUNITY
Start: 2023-01-23

## 2023-01-27 RX ORDER — 0.9 % SODIUM CHLORIDE 0.9 %
1 VIAL (ML) INJECTION ONCE
OUTPATIENT
Start: 2023-01-27 | End: 2023-01-27

## 2023-01-27 RX ORDER — METHYLPREDNISOLONE ACETATE 80 MG/ML
80 INJECTION, SUSPENSION INTRA-ARTICULAR; INTRALESIONAL; INTRAMUSCULAR; PARENTERAL; SOFT TISSUE ONCE
OUTPATIENT
Start: 2023-01-27 | End: 2023-01-27

## 2023-01-27 RX ORDER — PREGABALIN 25 MG/1
25 CAPSULE ORAL 3 TIMES DAILY
Qty: 90 CAPSULE | Refills: 0 | Status: SHIPPED | OUTPATIENT
Start: 2023-01-27

## 2023-01-27 NOTE — PATIENT INSTRUCTIONS
Early Postoperative or Post Injury Shoulder Exercises   WHAT YOU NEED TO KNOW:   You may need to wait until your swelling and pain have gone down before you start to exercise  Do not start an exercise program before you talk to your healthcare provider  DISCHARGE INSTRUCTIONS:   Contact your healthcare provider if:   You have sharp or worsening pain during exercise or at rest     You have questions or concerns about your shoulder exercises  Before you exercise:  Warm up and stretch before you exercise  Walk or ride a stationary bike for 5 to 10 minutes to help you warm up  Stretching helps increase range of motion  It may also decrease muscle soreness and help prevent another injury  Your healthcare provider will tell you which of the following stretches to do:  Crossover arm stretch:  Relax your shoulders  Hold your upper arm with the opposite hand  Pull your arm across your chest until you feel a stretch  Hold the stretch for 30 seconds  Return to the starting position  Shoulder flexion stretch:  Stand facing a wall  Slowly walk your fingers up the wall until you feel a stretch  Hold the stretch for 30 seconds  Return to the starting position  Sleeper stretch:  Lie on your injured side on a firm, flat surface  Bend the elbow of your injured arm 90° with your hand facing up  Use your arm that is not injured to slowly push your injured arm down  Stop when you feel a stretch at the back of your injured shoulder  Hold the stretch for 30 seconds  Slowly return to the starting position  How to perform exercises without a weight or an exercise band:   Pendulum swings:  Lean forward and rest the arm that is not injured on a table  Do not round your back or lock your knees during the exercise  Let your other arm hang freely by your side  Gently swing your injured arm forward and backward, side to side, and in circles  Shrugs:  Stand with your arms by your side   Gently lift your shoulders up to your ears and hold for 5 seconds  With your shoulders lifted, pinch your shoulder blades together  Hold for 5 seconds  Slowly return to the starting position  How to exercise with a weight:  Hold a weight with your arm slightly in front of your body  Slowly raise your arm to the side with your thumb pointing up or down as directed  Stop when you reach the level of your shoulder  Hold for as many seconds as directed  Slowly return to the starting position  How to exercise with an exercise band:   Hold the exercise band with both hands in front of your body  Slowly raise your injured arm up and to the side with your thumb pointing up or down as directed  Stop when you reach the level of your shoulder  Hold for as many seconds as directed  Slowly return to the starting position  Tie one end of the exercise band to a heavy object  Stand and hold the band in your hand  Bend your elbow  Keep your arm close to your side and pull the band straight back  Squeeze your shoulder blades together as you pull  Slowly return to the starting position  Follow up with your physical therapist as directed:  Write down your questions so you remember to ask them at your visits  © Copyright [employer] 2022 Information is for End User's use only and may not be sold, redistributed or otherwise used for commercial purposes  All illustrations and images included in CareNotes® are the copyrighted property of [employer] , Inc  or [facility] Leo Mazariegos   The above information is an  only  It is not intended as medical advice for individual conditions or treatments  Talk to your doctor, nurse or pharmacist before following any medical regimen to see if it is safe and effective for you  Neck Exercises   AMBULATORY CARE:   Neck exercises  help reduce neck pain, and improve neck movement and strength  Neck exercises also help prevent long-term neck problems    What you need to know about neck exercises:   Do the exercises every day,  or as often as directed by your healthcare provider  Move slowly, gently, and smoothly  Avoid fast or jerky motions  Stand and sit the way your healthcare provider shows you  Good posture may reduce your neck pain  Check your posture often, even when you are not doing your neck exercises  How to perform neck exercises safely:   Exercise position:  You may sit or stand while you do neck exercises  Face forward  Your shoulders should be straight and relaxed, with a good posture  Head tilts, forward and back:  Gently bow your head and try to touch your chin to your chest  Your healthcare provider may tell you to push on the back of your neck to help bow your head  Raise your chin back to the starting position  Tilt your head back as far as possible so you are looking up at the ceiling  Your healthcare provider may tell you to lift your chin to help tilt your head back  Return your head to the starting position  Head tilts, side to side:  Tilt your head, bringing your ear toward your shoulder  Then tilt your head toward the other shoulder  Head turns:  Turn your head to look over your shoulder  Tilt your chin down and try to touch it to your shoulder  Do not raise your shoulder to your chin  Face forward again  Do the same on the other side  Head rolls:  Slowly bring your chin toward your chest  Next, roll your head to the right  Your ear should be positioned over your shoulder  Hold this position for 5 seconds  Roll your head back toward your chest and to the left into the same position  Hold for 5 seconds  Gently roll your head back and around in a clockwise [place] 3 times  Next, move your head in the reverse direction (counterclockwise) in a [place] 3 times  Do not shrug your shoulders upwards while you do this exercise  Contact your healthcare provider if:   Your pain does not get better, or gets worse      You have questions or concerns about your condition, care, or exercise program     © Copyright [employer] 2022 Information is for End User's use only and may not be sold, redistributed or otherwise used for commercial purposes  All illustrations and images included in CareNotes® are the copyrighted property of [employer] M [employer] Inc  or [employer]  The above information is an  only  It is not intended as medical advice for individual conditions or treatments  Talk to your doctor, nurse or pharmacist before following any medical regimen to see if it is safe and effective for you

## 2023-01-27 NOTE — PROGRESS NOTES
Assessment  1  Cervical spondylosis  -     Ambulatory referral to Physical Therapy; Future  -     pregabalin (LYRICA) 25 mg capsule; Take 1 capsule (25 mg total) by mouth 3 (three) times a day  -     Case request operating room: BLOCK / INJECTION EPIDURAL STEROID CERVICAL C6-7 or alternate level; Standing  -     Case request operating room: BLOCK / INJECTION EPIDURAL STEROID CERVICAL C6-7 or alternate level    2  Chronic left shoulder pain  -     X-ray shoulder left 2+ views; Future; Expected date: 01/27/2023    3  Cervical radiculopathy  -     Case request operating room: BLOCK / INJECTION EPIDURAL STEROID CERVICAL C6-7 or alternate level; Standing  -     Case request operating room: BLOCK / INJECTION EPIDURAL STEROID CERVICAL C6-7 or alternate level    Greater than 90% relief of pain with improved ability to participate with IADLs after bilateral L3, L4, L5 medial branch blocks #1 and #2 and subsequent radiofrequency ablation; describes now axial neck pain consistent with arthritic features as well as radicular features in C3, C4, C5 dermatome; +facet loading maneuver eliciting pain in neck, ttp over cervical paraspinal muscles, +spurling's maneuver bilaterally  MRI cervical spine at  C3-C4 shows left-sided facet arthrosis  There is no significant canal stenosis or foraminal narrowing  At C4-C5:  There is a disc osteophyte complex with uncovertebral spurring  There is facet arthrosis  There is mild left foraminal narrowing  There is no significant canal stenosis  At C5-C6 there is a disc osteophyte complex with left-sided uncovertebral spurring  There is mild left foraminal encroachment  Risks discussed with regard to [facility] SERVICES, informed consent obtained  Previously reported the following symptomatology:     Neck and low back pain described primarily are radicular features    Pain radiates in C6 and C7 dermatomal distribution from neck to hands bilaterally as well as primarily arthritic in nature in low back; additionally with radicular features in L3 and L4 dermatomal distribution right greater than left  5/5 strength bilaterally with active range of motion movements in upper lower extremities  Slight pain limited weakness with left hip flexion, knee extension  Negative Spurling's maneuver, negative SLR bilaterally  Significant tenderness to palpation with lumbar cervical facet loading maneuvers  She is currently engaged with PT for both her neck and low back with modest relief of the pain  She has not trialed medications other than OTC tylenol and ibuprofen  MRI cervical spine noncontrast, MRI lumbar spine noncontrast show mild compressive disease with spondyloarthropathy  Will proceed with multimodal pain therapy; counseled regarding lifestyle modifications including PT  Plan  -C6-C7 ILESI; f/u 2 weeks post procedure  -xray left shoulder; f/u result with patient  -gabapentin transitioned to lyrica 25 mg t i d  for patient given sedative effects of taking gabapentin  Counseled not to take medication while driving or operating heavy machinery/using stairs  -meloxicam 7 5 mg b i d  P r n   pain  Patient has since tapered  Patient has since tapered  Patient educated regarding bleeding risk of taking this medication not taking any other nonsteroidal anti-inflammatory medications while taking this medication; counseled thoroughly regarding potential risk of Cardiovascular injury, Kidney injury, Gastrointestinal ulceration/bleeding  Patient voiced understanding  -physical therapy for right-sided lumbar radiculopathy, lumbar facet arthropathy, SIJ dysfunction right sided; Physician directed home exercise plan as per [facility] demonstrated and handouts provided that patient plans to participate with for 1 hour, twice a week for the next 6 weeks  There are risks associated with opioid medications, including dependence, addiction and tolerance   The patient understands and agrees to use these medications only as prescribed  Potential side effects of the medications include, but are not limited to, constipation, drowsiness, addiction, impaired judgment and risk of fatal overdose if not taken as prescribed  The patient was warned against driving while taking sedation medications  Sharing medications is a felony  At this point in time, the patient is showing no signs of addiction, abuse, diversion or suicidal ideation  [facility] Prescription Drug Monitoring Program report was reviewed and was appropriate      Complete risks and benefits including bleeding, infection, tissue reaction, nerve injury and allergic reaction were discussed  The approach was demonstrated using models and literature was provided  Verbal and written consent was obtained  My impressions and treatment recommendations were discussed in detail with the patient who verbalized understanding and had no further questions  Discharge instructions were provided  I personally saw and examined the patient and I agree with the above discussed plan of care  New Medications Ordered This Visit   Medications   • metoclopramide (REGLAN) 5 mg tablet   • pregabalin (LYRICA) 25 mg capsule     Sig: Take 1 capsule (25 mg total) by mouth 3 (three) times a day     Dispense:  90 capsule     Refill:  0       History of Present Illness    Greater than 90% relief of pain with improved ability to participate with IADLs after bilateral L3, L4, L5 medial branch blocks #1 and #2 and subsequent radiofrequency ablation; describes now axial neck pain consistent with arthritic features as well ars radicular features in C3, C4, C5 dermatome; +facet loading maneuver eliciting pain in neck, ttp over cervical paraspinal muscles, +spurling's maneuver bilaterally  Risks discussed with regard to [facility], informed consent obtained   Previously reported the following symptomatology:     Khanh Yost is a 48 y o  female with past medical history of hypertension, hypothyroidism, hypercholesterolemia, migraine headaches presenting with neck and low back pain described primarily radicular features in neck and axial/arthritic features in low back  Neck pain radiates into the C6 and C7 dermatomal distribution bilaterally accompanied by pain limited weakness numbness and paresthesias  In low back radicular pain travels into the bilateral L3 and L4 dermatomal distribution, right greater than left; however in low back, features are mostly arthritic in nature  She describes lancinating features of the pain  She has been to formal physical therapy for both neck and low back pain with modest relief of her symptoms  She has trialed over-the-counter NSAIDs as well as Tylenol with modest relief of the pain  Her pain significantly impacts independent activities of daily living and overall function, 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  She denies any bowel or bladder abnormality, incontinence, gait instability, headaches or dizziness  I have personally reviewed and/or updated the patient's past medical history, past surgical history, family history, social history, current medications, allergies, and vital signs today  Review of Systems   Constitutional: Positive for activity change  HENT: Negative  Eyes: Negative  Respiratory: Negative  Cardiovascular: Negative  Gastrointestinal: Negative  Endocrine: Negative  Genitourinary: Negative  Musculoskeletal: Positive for arthralgias, back pain, myalgias, neck pain and neck stiffness  Skin: Negative  Allergic/Immunologic: Negative  Neurological: Negative for weakness and numbness  Hematological: Negative  Psychiatric/Behavioral: Negative  All other systems reviewed and are negative        Patient Active Problem List   Diagnosis   • Palpitations   • Hypertension   • SVT (supraventricular tachycardia) (HCC)   • VT (ventricular tachycardia)   • Hyperlipidemia   • Depression   • Osteoporosis   • Arthritis   • Hypothyroidism   • Migraine   • Glaucoma   • Anxiety   • Restless legs   • History of syncope   • Polyarthralgia   • Lumbar spondylosis   • Gastroesophageal reflux disease without esophagitis   • Sacroiliac joint dysfunction of right side   • Cervical radiculopathy   • Chronic left shoulder pain   • Cervical spondylosis       Past Medical History:   Diagnosis Date   • Allergic 1974    Seasonal   • Anxiety    • Arthritis    • Colitis     Noted on colonoscopy 04/24/2020     • Depression    • Disease of thyroid gland    • Gastritis     Noted on EGD 04/24/2020   • Gastroparesis    • Glaucoma    • Headache(784 0)    • Hyperlipidemia    • Hypertension    • Hypothyroidism    • Lyme disease    • Migraine    • Osteoporosis    • Scoliosis    • SVT (supraventricular tachycardia) ([facility] [address] )    • Visual impairment    • VT (ventricular tachycardia)        Past Surgical History:   Procedure Laterality Date   • BREAST BIOPSY Right 10/21/2020    papilloma   • COLONOSCOPY     • EGD     • FINGER SURGERY      left pinky   • FL GUIDED NEEDLE PLAC BX/ASP/INJ  03/17/2022   • FL GUIDED NEEDLE PLAC BX/ASP/INJ  07/08/2022   • HYSTERECTOMY  2008   • HYSTERECTOMY W/ SALPINGO-OOPHERECTOMY  05/2008   • NERVE BLOCK Bilateral 03/17/2022    Procedure: L3, L4, L5 BLOCK MEDIAL BRANCH;  Surgeon: Ken Tee MD;  [facility]: [facility];  Service: Pain Management    • NERVE BLOCK Bilateral 07/08/2022    Procedure: BLOCK MEDIAL BRANCH L3, L4, L5 #2;  Surgeon: Ken Tee MD;  [facility]: [facility];  Service: Pain Management    • OOPHORECTOMY Bilateral 2008   • WI INJECT SI JOINT ARTHRGRPHY&/ANES/STEROID W/JOHNNY Bilateral 12/29/2022    Procedure: BLOCK / INJECTION SACROILIAC;  Surgeon: Ken Tee MD;  Location: [facility];  Service: Pain Management    • RHIZOTOMY Bilateral 12/8/2022    Procedure: RHIZOTOMY LUMBAR L3, L4, L5 medial branch nerves;  Surgeon: Ken Tee MD;  Location: [facility];  Service: Pain Management    • US GUIDED BREAST BIOPSY RIGHT COMPLETE Right 10/21/2020       Family History   Problem Relation Age of Onset   • Peripheral vascular disease Mother    • Glaucoma Mother    • Prostate cancer Father    • Hypothyroidism Sister    • No Known Problems Daughter    • No Known Problems Daughter    • Colon cancer Maternal Grandmother    • No Known Problems Maternal Grandfather    • Arthritis Paternal Grandmother    • No Known Problems Paternal Grandfather    • No Known Problems Maternal Aunt    • No Known Problems Maternal Aunt    • Skin cancer Paternal Aunt    • No Known Problems Paternal Aunt    • Breast cancer Neg Hx    • Breast cancer additional onset Neg Hx    • Endometrial cancer Neg Hx    • BRCA2 Positive Neg Hx    • BRCA2 Negative Neg Hx    • BRCA1 Positive Neg Hx    • BRCA1 Negative Neg Hx    • BRCA 1/2 Neg Hx    • Ovarian cancer Neg Hx        Social History     Occupational History   • Not on file   Tobacco Use   • Smoking status: Former     Packs/day:      Years: 10 00     Pack years: 10 00     Types: Cigarettes     Quit date: 2007     Years since quittin 0   • Smokeless tobacco: Never   • Tobacco comments:     quit    Vaping Use   • Vaping Use: Never used   Substance and Sexual Activity   • Alcohol use: Never   • Drug use: Never   • Sexual activity: Yes     Birth control/protection: Post-menopausal     Comment: hysterectomy       Current Outpatient Medications on File Prior to Visit   Medication Sig   • atorvastatin (LIPITOR) 40 mg tablet Take 1 tablet (40 mg total) by mouth daily   • Calcium Carbonate-Vit D-Min (CALCIUM 1200 PO) Take by mouth daily    • DULoxetine (CYMBALTA) 30 mg delayed release capsule duloxetine 30 mg capsule,delayed release   • Glucosamine-Chondroitin 500-400 MG CAPS Take by mouth daily    • hydrOXYzine HCL (ATARAX) 25 mg tablet Take 1 tablet (25 mg total) by mouth as needed for anxiety   • irbesartan (AVAPRO) 75 mg tablet Take 1 tablet (75 mg total) by mouth daily   • levothyroxine 100 mcg tablet Take 1 tablet (100 mcg total) by mouth daily   • Lumigan 0 01 % ophthalmic drops    • Magnesium 250 MG TABS Take 1 tablet (250 mg total) by mouth daily   • metoclopramide (REGLAN) 5 mg tablet    • metoprolol succinate (TOPROL-XL) 25 mg 24 hr tablet Take 1 tablet (25 mg total) by mouth daily   • multivitamin (THERAGRAN) TABS Take 1 tablet by mouth daily   • Nurtec 75 MG TBDP    • Omega-3 Fatty Acids (FISH OIL OMEGA-3 PO) Take by mouth   • omeprazole (PriLOSEC) 40 MG capsule    • ALPRAZolam (XANAX) 0 5 mg tablet Take 1 tablet (0 5 mg total) by mouth 30 min pre-procedure (Patient not taking: Reported on 12/21/2022)   • BLACK ELDERBERRY PO Take by mouth (Patient not taking: Reported on 12/21/2022)   • BLACK ELDERBERRY,BERRY-FLOWER, PO Take by mouth (Patient not taking: Reported on 12/12/2022)   • Cholecalciferol (VITAMIN D3 PO) Take 1,000 mg by mouth (Patient not taking: Reported on 1/27/2023)   • dicyclomine (BENTYL) 10 mg capsule  (Patient not taking: Reported on 12/21/2022)   • MAGNESIUM PO magnesium 250 mg tablet (Patient not taking: Reported on 12/12/2022)   • scopolamine (TRANSDERM-SCOP) 1 mg/3 days TD 72 hr patch Place 1 mg on the skin (Patient not taking: Reported on 9/8/2022)   • SUMAtriptan (IMITREX) 100 mg tablet Take 100 mg by mouth as needed (Patient not taking: Reported on 12/21/2022)     No current facility-administered medications on file prior to visit  No Known Allergies      Physical Exam    /52   Pulse 73   Temp (!) 96 8 °F (36 °C)   Resp 20   Ht 5' 8" (1 727 m)   Wt 72 3 kg (159 lb 6 4 oz)   BMI 24 24 kg/m²     Constitutional: normal, well developed, well nourished, alert, in no distress and non-toxic and no overt pain behavior    Eyes: anicteric  HEENT: grossly intact  Neck: supple, symmetric, trachea midline and no masses   Pulmonary:even and unlabored  Cardiovascular:No edema or pitting edema present  Skin:Normal without rashes or lesions and well hydrated  Psychiatric:Mood and affect appropriate  Neurologic:Cranial Nerves II-XII grossly intact Sensation grossly intact; no clonus negative bunn's  Reflexes 2+ and brisk  SLR negative bilaterally  Spurling's maneuver positive bilaterally  Musculoskeletal:normal gait  5/5 strength bilaterally with AROM in all extremities  Slight pain limited weakness with left hip flexion, knee extension  Normal heel toe and tip toe walking  Significant pain with cervical and lumbar facet loading bilaterally and with lateral spine rotation  TTP over cervical and lumbar paraspinal muscles  +right sided SIJ loading, claudy finger test, GAENSLEN's, SEMAJ's  Imaging    MRI CERVICAL SPINE WITHOUT CONTRAST     INDICATION: M54 12: Radiculopathy, cervical region      COMPARISON:  5/9/2021     TECHNIQUE:  Sagittal T1, sagittal T2, sagittal inversion recovery, axial T2, axial  2D merge     IMAGE QUALITY:  Diagnostic     FINDINGS:     ALIGNMENT:  There is trace anterolisthesis of C3-C4      MARROW SIGNAL:  Normal marrow signal is identified within the visualized bony structures  No discrete marrow lesion      CERVICAL AND VISUALIZED THORACIC CORD:  Normal signal within the visualized cord      PREVERTEBRAL AND PARASPINAL SOFT TISSUES:  Normal      VISUALIZED POSTERIOR FOSSA:  The visualized posterior fossa demonstrates no abnormal signal      CERVICAL DISC SPACES:     C2-C3:  Normal      C3-C4:  There is left-sided facet arthrosis  There is no significant canal stenosis or foraminal narrowing      C4-C5:  There is a disc osteophyte complex with uncovertebral spurring  There is facet arthrosis  There is mild left foraminal narrowing  There is no significant canal stenosis      C5-C6:  There is a disc osteophyte complex with left-sided uncovertebral spurring  There is mild left foraminal encroachment      C6-C7:  No significant canal stenosis or foraminal narrowing      C7-T1:  No significant canal stenosis or foraminal narrowing      MRI LUMBAR SPINE WITHOUT CONTRAST     INDICATION: M54 12: Radiculopathy, cervical region      COMPARISON:  None      TECHNIQUE:  Sagittal T1, sagittal T2, sagittal inversion recovery, axial T1 and axial T2, coronal T2     IMAGE QUALITY:  Diagnostic     FINDINGS:     VERTEBRAL BODIES:  There are 5 lumbar type vertebral bodies  Normal alignment of the lumbar spine  No spondylolysis or spondylolisthesis  No scoliosis  No compression fracture  No suspicious marrow signal abnormality  Hemangioma within the L1   vertebral body      SACRUM:  Normal signal within the sacrum  No evidence of insufficiency or stress fracture      DISTAL CORD AND CONUS:  Normal size and signal within the distal cord and conus  Conus medullaris terminates at T12-L1      PARASPINAL SOFT TISSUES:  Paraspinal soft tissues are unremarkable      LOWER THORACIC DISC SPACES:  Normal disc height and signal   No disc herniation, canal stenosis or foraminal narrowing      LUMBAR DISC SPACES:     L1-L2:  No significant canal stenosis or foraminal narrowing      L2-L3:  No significant canal stenosis or foraminal narrowing      L3-L4:  Minimal bulge  No significant canal stenosis or foraminal narrowing      L4-L5:  Mild bulge, asymmetric to the right annular fissure  No significant canal stenosis  Mild foraminal narrowing      L5-S1:  Bulging annulus  Facet arthrosis  No significant canal stenosis  Mild right foraminal narrowing      IMPRESSION:     Mild degenerative changes of the lumbar spine, as described above  CT CERVICAL SPINE - WITHOUT CONTRAST     INDICATION:   TRAUMA      COMPARISON:  None      TECHNIQUE:  CT examination of the cervical spine was performed without intravenous contrast   Contiguous axial images were obtained  Sagittal and coronal reconstructions were performed        Radiation dose length product (DLP) for this visit:  355 mGy-cm     This examination, like all CT scans performed in the [facility], was performed utilizing techniques to minimize radiation dose exposure, including the use of iterative   reconstruction and automated exposure control        IMAGE QUALITY:  Diagnostic      FINDINGS:     ALIGNMENT:  Normal alignment of the cervical spine   No subluxation      VERTEBRAL BODIES:  No fracture      DEGENERATIVE CHANGES:  No significant cervical degenerative changes are noted      PREVERTEBRAL AND PARASPINAL SOFT TISSUES:  Unremarkable      THORACIC INLET:  Normal      IMPRESSION:     No cervical spine fracture or traumatic malalignment      The study was marked in EPIC for immediate notification

## 2023-01-27 NOTE — PATIENT INSTRUCTIONS
Patient is here as a follow-up appointment  Patient had an EGD in the interim and has tested negative for H  pylori and eosinophilic esophagitis  There is no overt Arnett's esophagus  She is going to avoid peppermint as this lowers the LES sphincter pressure  This will help with her reflux symptoms  She is going to take her Nexium 30 minutes before breakfast   This will help with the reflux also  Patient is completing physical therapy on her right knee and feels much improved  He does not need a colon test for 7 more years  She had a flu shot already as a requirement of employment  She is maintaining a very active lifestyle with 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

## 2023-01-27 NOTE — PROGRESS NOTES
Daily Note     Today's date: 2023  Patient name: Charla Gotti  : 1970  MRN: [id number]  Referring provider: Nena Portillo*  Dx:   Encounter Diagnosis     ICD-10-CM    1  Chronic pain of right knee  M25 561     G89 29       2  Pes anserinus bursitis of right knee  M70 51                      Subjective:  Patient reports the knee is feeling stronger but continuation of occasional popping and cracking in the LE  Patient reports she would like to transition to HEP secondary to overall improvement  Objective: See treatment diary below      Assessment: Tolerated treatment well  PT notes the patient is approaching therapy goals so PT will plan on transition to HEP  Plan: Potential discharge next visit       Precautions:  None       Manuals      Patella mobs [address] 5' NT     Gastroc stretching  TODD +marco 10' NT                     Neuro Re-Ed        Front and lateral lunge    10x Each Bilat                                                      Ther Ex        Nu-step 10' L1 10' L3 10 min L3      TR/HR 2X10       TKE NT       Step-ups: fwd/lat        QS 5'' 2x10 5'' 2x10 NT     SAQ 5'' 2x10 1#  5'' 2x10  1#  LAQ  2x10 Bilat  2#      SLR 10x 1# NT NT     Adduction ball squeeze 5'' 2x10 5'' 2x10 NT     Bridges 5'' 2x10 5'' 2x10 2x10      Clamshells 5'' 2x10 5'' 2x10 2x10 Bilat     Reverse clamshells 5" 2x10 5'' 2x10 2x10 Bilat      Leg Press 55# 2X10  2x10 55# 2x10 65# DL  2x10 35# SL               HEP        Gait Training                        Modalities        CP Declined       TENS

## 2023-01-27 NOTE — PROGRESS NOTES
Assessment/Plan:       1  Annual physical exam    2  Gastroesophageal reflux disease without esophagitis    3  Acquired hypothyroidism    4  Primary hypertension    5  Migraine without aura and without status migrainosus, not intractable    6  Mixed hyperlipidemia    7  Age-related osteoporosis without current pathological fracture    8  Polyarthralgia    9  Chronic left shoulder pain    10  Current mild episode of major depressive disorder, unspecified whether recurrent ([facility] [address] )    11  Gastroparesis    12  Fibromyalgia      This 60-year-old female sees me for her primary care services  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  She does have some difficulty with lifting and prefers to have weight during as a team as she sometimes has problems with high amounts of weights when moving patients  Patient recently had an EGD showing no eosinophilic esophagitis  She has no Arnett's esophagus  H  pylori testing was negative  She has been taking Nexium but had not been taking it efficiently  She now knows to take the Nexium 30 minutes before eating something  She also has recently been diagnosed with gastroparesis  She has a longstanding problem with having food not empty out of her stomach  She has been placed on Reglan and is just recently been starting to take that  Patient has a history of migraine headaches  She needs to take Nurtec once or twice per week but these are greatly improved in frequency and severity since starting this medication  Patient does have hypertension  She also has hypothyroidism and she is adherent with her medication  New medication she had been taking Prolia for osteoporosis that was defined as having a fragility fracture after a fall  She sees a bone specialist who is putting her on a Prolia holiday and is repeating her DEXA scan  They will make a determination as to whether the Prolia should be restarted      Patient continues to have right knee pain which has responded quite well to physical therapy  She also has some ongoing swelling in both lower extremities which is helped by compression hose  She is complaining of some pain in the left shoulder and has been applying heat and ice to this area  A full exam was done today as part of the examination  A total of 55 minutes was spent rendering care for this patient  This time included review of the patient's electronic medical record, performing the history and physical, reviewing appropriate labs and/or images, developing a treatment and assessment plan, answering patient's questions and concerns, and documenting the patient visit  I will see her in 6 months for a follow-up exam     Patient stated that she received a full pelvic and Pap smear 1 year ago through Dr Sara Young and Mara Ga in the care gap [facility] has been issued  We reviewed her previous labs  He did have ablation due to history of V  tach and SVT  Her lipid profile predicts a 1 2% 10-year risk for major cardiovascular disease over the next 10 years  Patient had a normal colonoscopy 3 years ago and is not due for another colonoscopy until she is 61years old  Subjective:      Patient ID: Chris Puentes is a 48 y o  female  HPI: This pleasant 59-year-old female sees me for her primary care services  She is working full-time as a nurses aide  She is very active on her job and gets additional activity walking her dog on weekends  She makes escobar food choices  She is very intent on keeping herself healthy  She believes that the duloxetine is helping with her chronic pain and fibromyalgia  She states that she is sleeping well  She does continue to have headaches but states that the Nurtec is helping to a great deal     She has not had any recent URI or viral syndrome  She does not have chest pain with exertion or shortness of breath  Her peripheral edema has been helped by wearing compression hose      The following portions of the patient's history were reviewed and updated as appropriate: allergies, current medications, past family history, past medical history, past social history, past surgical history, and problem list     Review of Systems  Patient denies any recent illnesses  She is waiting for full effect of the Reglan that was given to her for gastroparesis  She continued to feel that her stomach is not emptying completely  She denies any recent episodes of dizziness or lightheadedness  She does have some problems with lifting at work but states that colleagues are willing to help her with a heavier patient  Has not had any recurrence of her fast heart rates  She continues taking the metoprolol to control her heart rate and she has also had ablation done in the past   She feels that the hydroxyzine is helping with some anxiety  Objective:      /60 (BP Location: Right arm, Patient Position: Sitting, Cuff Size: Standard)   Pulse 76   Temp 98 4 °F (36 9 °C) (Tympanic)   Ht 5' 8" (1 727 m)   Wt 71 2 kg (156 lb 15 5 oz)   SpO2 99%   BMI 23 87 kg/m²          Physical Exam  Well-developed well-nourished 48y o  year old female who is cooperative with the exam   Patient is alert and oriented x3  Patient is appropriate in answering all questions  HEENT:  Normocephalic  PERRLA  EOMs intact  TMs are clear with identification of bony landmarks  No tragus or pinnae tenderness  No pre or posterior auricular adenopathy  Sinuses without tenderness  Throat without hyperemia  Neck:  Supple without adenopathy  Thyroid midline without thyromegaly or bruits  No carotid bruits  Chest symmetric and nontender  Heart regular rate and rhythm  No murmur rubs or gallops  Point of maximum impulse not displaced  Lungs are clear to auscultation  Breathing is nonlabored  Aerating bases well  Abdomen round and soft positive bowel sounds without masses tenderness or organomegaly    Extremities reveal adequate peripheral pulses without peripheral edema  MSK exam: Patient has some tightness palpation in the trapezius and in the paraspinal muscles in the lower back

## 2023-01-30 ENCOUNTER — TELEPHONE (OUTPATIENT)
Dept: PAIN MEDICINE | Facility: CLINIC | Age: 53
End: 2023-01-30

## 2023-01-30 ENCOUNTER — TELEPHONE (OUTPATIENT)
Dept: ADMINISTRATIVE | Facility: OTHER | Age: 53
End: 2023-01-30

## 2023-01-30 NOTE — TELEPHONE ENCOUNTER
----- Message from Yariel Blunt MD sent at 1/30/2023 12:47 PM EST -----  No significant degenerative changes in left shoulder; can schedule for ultrasound guided shoulder injection if no relief from physical therapy thanks  ----- Message -----  From: Interface, Radiology Results In  Sent: 1/30/2023  12:41 PM EST  To: Yariel Blunt MD

## 2023-01-30 NOTE — TELEPHONE ENCOUNTER
Upon review of the In Basket request we that you please review Encounter dated 10/13/20 mentions patient had a hysterectomy  Please review    Any additional questions or concerns should be emailed to the Practice Liaisons via the appropriate education email address, please do not reply via In Basket      Thank you  Martine Muñoz MA

## 2023-01-30 NOTE — TELEPHONE ENCOUNTER
----- Message from Simone Landers PA-C sent at 1/27/2023  3:49 PM EST -----  Regarding: Care Gap Request  01/27/23 3:49 PM    Hello, our patient attached above has had Pap Smear (HPV) aka Cervical Cancer Screening completed/performed  Please assist in updating the patient chart by pulling the Care Everywhere (CE) document  The date of service is one year ago- Gray Blackman       Thank you,  Simone Landers PA-C  [facility] PRIMARY [facility]ARE

## 2023-02-13 ENCOUNTER — OFFICE VISIT (OUTPATIENT)
Dept: FAMILY MEDICINE CLINIC | Facility: CLINIC | Age: 53
End: 2023-02-13

## 2023-02-13 VITALS
HEIGHT: 68 IN | DIASTOLIC BLOOD PRESSURE: 60 MMHG | OXYGEN SATURATION: 97 % | TEMPERATURE: 97.3 F | BODY MASS INDEX: 24.31 KG/M2 | HEART RATE: 67 BPM | WEIGHT: 160.4 LBS | SYSTOLIC BLOOD PRESSURE: 124 MMHG

## 2023-02-13 DIAGNOSIS — E03.9 ACQUIRED HYPOTHYROIDISM: ICD-10-CM

## 2023-02-13 DIAGNOSIS — M47.812 CERVICAL SPONDYLOSIS: ICD-10-CM

## 2023-02-13 DIAGNOSIS — E78.2 MIXED HYPERLIPIDEMIA: Primary | ICD-10-CM

## 2023-02-13 DIAGNOSIS — G43.009 MIGRAINE WITHOUT AURA AND WITHOUT STATUS MIGRAINOSUS, NOT INTRACTABLE: ICD-10-CM

## 2023-02-13 DIAGNOSIS — F33.0 MILD EPISODE OF RECURRENT MAJOR DEPRESSIVE DISORDER (HCC): ICD-10-CM

## 2023-02-13 DIAGNOSIS — I10 PRIMARY HYPERTENSION: ICD-10-CM

## 2023-02-13 DIAGNOSIS — I10 ESSENTIAL HYPERTENSION: ICD-10-CM

## 2023-02-13 DIAGNOSIS — K31.84 GASTROPARESIS: ICD-10-CM

## 2023-02-13 DIAGNOSIS — R11.0 NAUSEA: ICD-10-CM

## 2023-02-13 DIAGNOSIS — M47.816 LUMBAR SPONDYLOSIS: ICD-10-CM

## 2023-02-13 DIAGNOSIS — K21.9 GASTROESOPHAGEAL REFLUX DISEASE WITHOUT ESOPHAGITIS: ICD-10-CM

## 2023-02-13 DIAGNOSIS — F41.9 ANXIETY: ICD-10-CM

## 2023-02-13 DIAGNOSIS — L71.9 ROSACEA: Primary | ICD-10-CM

## 2023-02-13 PROBLEM — R00.2 PALPITATIONS: Status: RESOLVED | Noted: 2020-09-11 | Resolved: 2023-02-13

## 2023-02-13 PROBLEM — Z87.898 HISTORY OF SYNCOPE: Status: RESOLVED | Noted: 2022-03-12 | Resolved: 2023-02-13

## 2023-02-13 RX ORDER — METRONIDAZOLE 7.5 MG/G
0.75 GEL TOPICAL 2 TIMES DAILY
Qty: 45 G | Refills: 2 | Status: SHIPPED | OUTPATIENT
Start: 2023-02-13

## 2023-02-13 RX ORDER — IRBESARTAN 75 MG/1
75 TABLET ORAL DAILY
Qty: 90 TABLET | Refills: 3 | Status: SHIPPED | OUTPATIENT
Start: 2023-02-13

## 2023-02-13 RX ORDER — METRONIDAZOLE 7.5 MG/G
0.75 GEL TOPICAL 2 TIMES DAILY
COMMUNITY
End: 2023-02-13 | Stop reason: SDUPTHER

## 2023-02-13 RX ORDER — ONDANSETRON 4 MG/1
4 TABLET, ORALLY DISINTEGRATING ORAL EVERY 6 HOURS PRN
Qty: 30 TABLET | Refills: 1 | Status: SHIPPED | OUTPATIENT
Start: 2023-02-13

## 2023-02-13 NOTE — PROGRESS NOTES
Name: Nico Dodson      : 1970      MRN: [id number]  Encounter Provider: FITO Roberts  Encounter Date: 2023   Encounter department: [facility]ending [facility] to [facility]    Assessment & Plan     1  Mixed hyperlipidemia  Assessment & Plan:  Continue with statin      2  Primary hypertension  Assessment & Plan:  BP controlled today, continue med regimen  3  Acquired hypothyroidism  Assessment & Plan:  Controlled, last TSH in normal range, continue with Levothyroxine  4  Gastroesophageal reflux disease without esophagitis  Assessment & Plan:  Followed by gastroenterology, on PPI with minimal relief  Reglan as needed  5  Nausea  -     ondansetron (Zofran ODT) 4 mg disintegrating tablet; Take 1 tablet (4 mg total) by mouth every 6 (six) hours as needed for nausea or vomiting    6  Lumbar spondylosis  Assessment & Plan:  Continue with PT, home exercises  Sees pain management  7  Cervical spondylosis  Assessment & Plan:  Continue with PT, home exercises  8  Migraine without aura and without status migrainosus, not intractable    9  Mild episode of recurrent major depressive disorder (HCC)  Assessment & Plan:  Stable      10  Anxiety  Assessment & Plan:  Stable  11  Gastroparesis  Assessment & Plan:  Continue with GI    Orders:  -     ondansetron (Zofran ODT) 4 mg disintegrating tablet; Take 1 tablet (4 mg total) by mouth every 6 (six) hours as needed for nausea or vomiting         Subjective      Here today for a follow-up  She is with a hx of GERD and more recently diagnosed with gastroparesis  Reports that she has tried to change her diet but still finds herself sick with most meals  She is followed by GI and has had an EGD completed  She has been trying reglan for nausea but would like to try zofran  She is also seeing a dietician but notes that it has not been helping her  Review of Systems   Constitutional: Negative  HENT: Negative  Respiratory: Negative  Cardiovascular: Negative  Gastrointestinal: Positive for nausea (See HPI)  Musculoskeletal:        Ongoing shoulder and back/neck pain  Neurological: Negative  All other systems reviewed and are negative        Current Outpatient Medications on File Prior to Visit   Medication Sig   • ALPRAZolam (XANAX) 0 5 mg tablet Take 1 tablet (0 5 mg total) by mouth 30 min pre-procedure   • atorvastatin (LIPITOR) 40 mg tablet Take 1 tablet (40 mg total) by mouth daily   • Calcium Carbonate-Vit D-Min (CALCIUM 1200 PO) Take by mouth daily    • Cholecalciferol (VITAMIN D3 PO) Take 1,000 mg by mouth   • dicyclomine (BENTYL) 10 mg capsule    • DULoxetine (CYMBALTA) 30 mg delayed release capsule duloxetine 30 mg capsule,delayed release   • Glucosamine-Chondroitin 500-400 MG CAPS Take by mouth daily    • hydrOXYzine HCL (ATARAX) 25 mg tablet Take 1 tablet (25 mg total) by mouth as needed for anxiety   • irbesartan (AVAPRO) 75 mg tablet Take 1 tablet (75 mg total) by mouth daily   • levothyroxine 100 mcg tablet Take 1 tablet (100 mcg total) by mouth daily   • Lumigan 0 01 % ophthalmic drops    • Magnesium 250 MG TABS Take 1 tablet (250 mg total) by mouth daily   • metoclopramide (REGLAN) 5 mg tablet    • metoprolol succinate (TOPROL-XL) 25 mg 24 hr tablet Take 1 tablet (25 mg total) by mouth daily   • metroNIDAZOLE (METROGEL) 0 75 % gel Apply 8 71 application topically 2 (two) times a day   • multivitamin (THERAGRAN) TABS Take 1 tablet by mouth daily   • Nurtec 75 MG TBDP    • Omega-3 Fatty Acids (FISH OIL OMEGA-3 PO) Take by mouth   • omeprazole (PriLOSEC) 40 MG capsule    • pregabalin (LYRICA) 25 mg capsule Take 1 capsule (25 mg total) by mouth 3 (three) times a day   • scopolamine (TRANSDERM-SCOP) 1 mg/3 days TD 72 hr patch Place 1 mg on the skin   • SUMAtriptan (IMITREX) 100 mg tablet Take 100 mg by mouth as needed   • [DISCONTINUED] BLACK ELDERBERRY PO Take by mouth   • [DISCONTINUED] BLACK ELDERBERRY,BERRY-FLOWER, PO Take by mouth   • [DISCONTINUED] MAGNESIUM PO        Objective     /60 (BP Location: Left arm, Patient Position: Sitting, Cuff Size: Adult)   Pulse 67   Temp (!) 97 3 °F (36 3 °C)   Ht 5' 8" (1 727 m)   Wt 72 8 kg (160 lb 6 4 oz)   SpO2 97%   BMI 24 39 kg/m²     Physical Exam  Constitutional:       Appearance: Normal appearance  Cardiovascular:      Rate and Rhythm: Normal rate and regular rhythm  Pulmonary:      Effort: Pulmonary effort is normal       Breath sounds: Normal breath sounds  Neurological:      Mental Status: She is alert  Psychiatric:         Mood and Affect: Mood normal          Behavior: Behavior normal          Thought Content:  Thought content normal          Judgment: Judgment normal        FITO Mas

## 2023-02-16 ENCOUNTER — OFFICE VISIT (OUTPATIENT)
Dept: PHYSICAL THERAPY | Facility: CLINIC | Age: 53
End: 2023-02-16

## 2023-02-16 DIAGNOSIS — G89.29 CHRONIC PAIN OF RIGHT KNEE: Primary | ICD-10-CM

## 2023-02-16 DIAGNOSIS — M70.51 PES ANSERINUS BURSITIS OF RIGHT KNEE: ICD-10-CM

## 2023-02-16 DIAGNOSIS — M25.561 CHRONIC PAIN OF RIGHT KNEE: Primary | ICD-10-CM

## 2023-02-16 NOTE — PROGRESS NOTES
PT Discharge    Today's date: 2023  Patient name: Hardeep Granados  : 1970  MRN: [id number]  Referring provider: Jesusita Fernández*  Dx:   Encounter Diagnosis     ICD-10-CM    1  Chronic pain of right knee  M25 561     G89 29       2  Pes anserinus bursitis of right knee  M70 51           Start Time: 1550  Stop Time: 4791  Total time in clinic (min): 45 minutes    Assessment  Assessment details: PT notes the patient with improved ROM t/o the right knee with increased flexibility and strength t/o the right knee and LE  Patient has met all goals and will transition to HEP to maintain goals obtained in PT  Impairments: abnormal gait, abnormal or restricted ROM, activity intolerance, impaired balance, impaired physical strength, lacks appropriate home exercise program and pain with function  Understanding of Dx/Px/POC: good   Prognosis: good    Goals  STG(In 4 weeks)  Patient will initiate HEP  MET  Patient will decrease knee pain from 10/10 to 5/10 in order to improve QOL  MET  Patient will increase  ROM by 0-5 in order to improve ability to negotiate stairs  MET  Patient will increase strength by 1 MMT in order to improve ability to negotiate stairs  MET  Patient will increase FOTO score from 19 to 31 to improve QOL  MET     LTG(In 8 weeks)  Patient will decrease knee pain from 10/10 to 1/10  in order to improve QOL  MET  Patient will increase knee ROM  by 5-10 in order to improve ability to  MET   Patient will increase knee strength by 1-2  MMT in order to to be able to perform ADL/IADLs  MET  Patient will increase FOTO score from 31 to 48 in order to improve QOL  MET  Patient will be independent with HEP at D/c  MET         Plan  Plan details: Transition to HEP     Patient would benefit from: skilled physical therapy  Planned modality interventions: cryotherapy  Planned therapy interventions: balance, functional ROM exercises, graded exercise, home exercise program, joint mobilization, manual therapy, massage, patient education, therapeutic activities, strengthening, stretching, therapeutic exercise and therapeutic training  Frequency: 2x week  Duration in weeks: 4  Treatment plan discussed with: patient        Subjective Evaluation    History of Present Illness  Date of onset: 10/1/2022  Mechanism of injury: Patient indicated that she started experiencing significant R knee pain 1 month ago on 10/01/22 when she was walking/standing on her feet for long period of time  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 and this put more stress/pressure on her knee which she thinks re-aggravated her R knee from initial injury  Presently the patient has attended 5 sessions of skilled therapy with no treatment for 3 weeks secondary to family and secondary medical issues and feels 80-90% improvement since the start of therapy  Patient reports the knee is moving better but continuation of weakness in the LE with difficulty with squatting, lifting, carrying, ADL, transfers, and work duties  Quality of life: good    Pain  Current pain ratin  At best pain ratin  At worst pain ratin  Location: Right knee   Quality: burning  Relieving factors: medications, relaxation and rest  Aggravating factors: running, stair climbing, walking, standing and sitting  Progression: improved    Treatments  Current treatment: physical therapy  Patient Goals  Patient goals for therapy: decreased pain, improved balance, increased motion, increased strength, independence with ADLs/IADLs and return to work  Patient goal: Get back to walking  Objective     Tenderness     Right Knee   Tenderness in the patellar tendon and quadriceps tendon  No tenderness in the lateral joint line, LCL (distal), LCL (proximal), MCL (distal), MCL (proximal), medial joint line and pes anserinus       Neurological Testing     Reflexes   Left   Patellar (L4): normal (2+)  Achilles (S1): normal (2+)    Right   Patellar (L4): normal (2+)  Achilles (S1): normal (2+)    Active Range of Motion     Right Hip   Flexion: 49 degrees   Extension: 6 degrees   Abduction: WFL  External rotation (90/90): 19 degrees   Internal rotation (90/90): WFL  Left Knee   Flexion: 125 degrees   Extension: 0 degrees     Right Knee   Flexion: WFL and with pain  Extension: 0 degrees   Left Ankle/Foot   Normal active range of motion    Right Ankle/Foot   Normal active range of motion    Passive Range of Motion     Right Hip   Flexion: 58 degrees   Extension: 9 degrees   Abduction: WFL  External rotation (90/90): 25 degrees   Internal rotation (90/90): [facility]    Mobility   Patellar Mobility:   Left Knee   WFL: medial, lateral, superior and inferior  Right Knee   WFL: medial, lateral, superior and inferior    Strength/Myotome Testing     Right Hip   Planes of Motion   Flexion: 4-  Extension: 4+  Abduction: 4+  Adduction: 5  External rotation: 4  Internal rotation: 4    Left Ankle/Foot   Dorsiflexion: 5  Plantar flexion: 5    Right Ankle/Foot   Dorsiflexion: 5  Plantar flexion: 5    Tests     Right Hip   Negative SEMAJ and FADIR    90/90 SLR: Positive  SLR: Positive  Left Knee   Negative anterior drawer, posterior drawer, Thessaly's test at 5 degrees, valgus stress test at 0 degrees and valgus stress test at 30 degrees  Right Knee   Negative anterior drawer, lateral Rudy, medial Rudy, patellar apprehension, posterior drawer, Thessaly's test at 5 degrees, valgus stress test at 0 degrees and valgus stress test at 30 degrees  Ambulation     Observational Gait   Gait: antalgic   Walking speed and stride length within functional limits  Decreased right stance time       Additional Observational Gait Details  Decrease push off on the left              Precautions:  None       Manuals 11/14 11/23 11/25 11/29 1/9   Patella melody [address] 30 5' 5' 5' DC    Gastroc stretching  TODD +marco 10' 5' 5' Bilat HS, hip ABD, Quad, piriformis and gastroc stretch with knee PA mobs   10 min                    Neuro Re-Ed   11/25 11/29    Front and lateral lunge      10x Each Bilat                                                    Ther Ex   11/25 11/29    Nu-step 10' L1 10' L3 10' L3 10' L3 10 min L4    TR/HR   6" 2x10 6" 2x10 NT   TKE   10x5" GTB 10x5" GTB 10x5" Hold   Green    Step-ups: fwd/lat        QS 5'' 2x10 5'' 2x10 5" 2x10 5" 2x10 HEP    SAQ 5'' 2x10 5'' 2x10 5" 2x10 5" 2x10 HEP    SLR x5 with assist NT F/A/E  10x ea F/A/E 15x ea HEP    Adduction ball squeeze 5'' 2x10 5'' 2x10 5" 2x10 5" 2x10 DC    Bridges 5'' 2x10 5'' 2x10 5" 2x10 5" 2x10 2x10    Clamshells 5'' 2x10 5'' 2x10 5" 2x10 5" 2x10 10x Bilat   Green    Reverse clamshells  5'' 2x10 5" 2x10 5" 2x10 2x10 Bilat    Leg Press   55# 5x 55# 2x10 65# 2x10 Bilat            HEP   10'     Gait Training   11/25 11/29                    Modalities   11/25 11/29    CP Declined    Declined    TENS

## 2023-02-16 NOTE — PROGRESS NOTES
Daily Note     Today's date: 2023  Patient name: Chris Puetnes  : 1970  MRN: [id number]  Referring provider: Nehemias George*  Dx:   Encounter Diagnosis     ICD-10-CM    1  Chronic pain of right knee  M25 561     G89 29       2  Pes anserinus bursitis of right knee  M70 51                      Subjective: Patient reports that she is pleased with the progress she's made with her PT  Objective: See treatment diary below      Assessment: Tolerated treatment well  Patient demonstrated a good understanding of her HEP  Plan: Patient discharged to [facility] at this time       Precautions:  None       Manuals     Patella mobs [address] 5' NT 5'    Gastroc stretching  TODD +marco 10' NT 5'                    Neuro Re-Ed        Front and lateral lunge    10x Each Bilat                                                      Ther Ex      Nu-step 10' L1 10' L3 10 min L3  10'L3    TR/HR 2X10       TKE NT       Step-ups: fwd/lat        QS 5'' 2x10 5'' 2x10 NT     SAQ 5'' 2x10 1#  5'' 2x10  1#  LAQ  2x10 Bilat  2#      SLR 10x 1# NT NT     Adduction ball squeeze 5'' 2x10 5'' 2x10 NT     Bridges 5'' 2x10 5'' 2x10 2x10      Clamshells 5'' 2x10 5'' 2x10 2x10 Bilat     Reverse clamshells 5" 2x10 5'' 2x10 2x10 Bilat      Leg Press 55# 2X10  2x10 55# 2x10 65# DL  2x10 35# SL               HEP    20'    Gait Training                        Modalities        CP Declined       TENS
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
## 2023-02-22 ENCOUNTER — EVALUATION (OUTPATIENT)
Dept: PHYSICAL THERAPY | Facility: CLINIC | Age: 53
End: 2023-02-22

## 2023-02-22 DIAGNOSIS — M47.812 CERVICAL SPONDYLOSIS: ICD-10-CM

## 2023-03-01 ENCOUNTER — OFFICE VISIT (OUTPATIENT)
Dept: FAMILY MEDICINE CLINIC | Facility: CLINIC | Age: 53
End: 2023-03-01

## 2023-03-01 VITALS
SYSTOLIC BLOOD PRESSURE: 133 MMHG | WEIGHT: 157.8 LBS | BODY MASS INDEX: 23.92 KG/M2 | DIASTOLIC BLOOD PRESSURE: 63 MMHG | HEART RATE: 66 BPM | OXYGEN SATURATION: 95 % | TEMPERATURE: 97.5 F | HEIGHT: 68 IN

## 2023-03-01 DIAGNOSIS — R39.15 URINARY URGENCY: ICD-10-CM

## 2023-03-01 DIAGNOSIS — M54.50 ACUTE BILATERAL LOW BACK PAIN WITHOUT SCIATICA: Primary | ICD-10-CM

## 2023-03-01 DIAGNOSIS — R10.30 LOWER ABDOMINAL PAIN: ICD-10-CM

## 2023-03-01 LAB
BACTERIA UR QL AUTO: ABNORMAL /HPF
BILIRUB UR QL STRIP: NEGATIVE
CLARITY UR: CLEAR
COLOR UR: ABNORMAL
GLUCOSE UR STRIP-MCNC: NEGATIVE MG/DL
HGB UR QL STRIP.AUTO: NEGATIVE
KETONES UR STRIP-MCNC: NEGATIVE MG/DL
LEUKOCYTE ESTERASE UR QL STRIP: ABNORMAL
MUCOUS THREADS UR QL AUTO: ABNORMAL
NITRITE UR QL STRIP: NEGATIVE
NON-SQ EPI CELLS URNS QL MICRO: ABNORMAL /HPF
PH UR STRIP.AUTO: 7 [PH]
PROT UR STRIP-MCNC: NEGATIVE MG/DL
RBC #/AREA URNS AUTO: ABNORMAL /HPF
SL AMB  POCT GLUCOSE, UA: ABNORMAL
SL AMB LEUKOCYTE ESTERASE,UA: ABNORMAL
SL AMB POCT BILIRUBIN,UA: ABNORMAL
SL AMB POCT BLOOD,UA: POSITIVE
SL AMB POCT CLARITY,UA: ABNORMAL
SL AMB POCT COLOR,UA: YELLOW
SL AMB POCT KETONES,UA: ABNORMAL
SL AMB POCT NITRITE,UA: NEGATIVE
SL AMB POCT PH,UA: 5
SL AMB POCT SPECIFIC GRAVITY,UA: 1.01
SL AMB POCT URINE PROTEIN: ABNORMAL
SL AMB POCT UROBILINOGEN: 0.2
SP GR UR STRIP.AUTO: 1.01 (ref 1–1.03)
UROBILINOGEN UR STRIP-ACNC: <2 MG/DL
WBC #/AREA URNS AUTO: ABNORMAL /HPF

## 2023-03-01 RX ORDER — NITROFURANTOIN 25; 75 MG/1; MG/1
100 CAPSULE ORAL 2 TIMES DAILY
Qty: 10 CAPSULE | Refills: 0 | Status: SHIPPED | OUTPATIENT
Start: 2023-03-01 | End: 2023-03-06

## 2023-03-01 RX ORDER — OMEPRAZOLE 20 MG/1
CAPSULE, DELAYED RELEASE ORAL
COMMUNITY
Start: 2023-02-16

## 2023-03-01 NOTE — PROGRESS NOTES
Name: Sarah Kidney      : 1970      MRN: [id number]  Encounter Provider: FITO Carvalho  Encounter Date: 3/1/2023   Encounter department: [address] [place] [address] PRIMARY CARE    Assessment & Plan     1  Acute bilateral low back pain without sciatica  -     POCT urine dip  -     Urinalysis with microscopic  -     Urine culture; Future  -     nitrofurantoin (MACROBID) 100 mg capsule; Take 1 capsule (100 mg total) by mouth 2 (two) times a day for 5 days    2  Lower abdominal pain  -     POCT urine dip  -     Urinalysis with microscopic  -     Urine culture; Future  -     nitrofurantoin (MACROBID) 100 mg capsule; Take 1 capsule (100 mg total) by mouth 2 (two) times a day for 5 days    3  Urinary urgency  -     POCT urine dip  -     Urinalysis with microscopic  -     Urine culture; Future  -     nitrofurantoin (MACROBID) 100 mg capsule; Take 1 capsule (100 mg total) by mouth 2 (two) times a day for 5 days    Urine dip positive for leukocytes and blood  Urine sent to lab for culture  Will treat with macrobid initially  Subjective      Here today due to low back and abdominal pain  Reports urinary urgency also  Onset of sx starting Friday - concerned she has a UTI  Back Pain  Associated symptoms include abdominal pain  Review of Systems   Gastrointestinal: Positive for abdominal pain  Genitourinary: Positive for urgency  Musculoskeletal: Positive for back pain  All other systems reviewed and are negative        Current Outpatient Medications on File Prior to Visit   Medication Sig   • ALPRAZolam (XANAX) 0 5 mg tablet Take 1 tablet (0 5 mg total) by mouth 30 min pre-procedure   • atorvastatin (LIPITOR) 40 mg tablet Take 1 tablet (40 mg total) by mouth daily   • Calcium Carbonate-Vit D-Min (CALCIUM 1200 PO) Take by mouth daily    • Cholecalciferol (VITAMIN D3 PO) Take 1,000 mg by mouth   • dicyclomine (BENTYL) 10 mg capsule    • DULoxetine (CYMBALTA) 30 mg delayed release capsule duloxetine 30 mg capsule,delayed release   • Glucosamine-Chondroitin 500-400 MG CAPS Take by mouth daily    • hydrOXYzine HCL (ATARAX) 25 mg tablet Take 1 tablet (25 mg total) by mouth as needed for anxiety   • irbesartan (AVAPRO) 75 mg tablet Take 1 tablet (75 mg total) by mouth daily   • levothyroxine 100 mcg tablet Take 1 tablet (100 mcg total) by mouth daily   • Lumigan 0 01 % ophthalmic drops    • Magnesium 250 MG TABS Take 1 tablet (250 mg total) by mouth daily   • metoclopramide (REGLAN) 5 mg tablet    • metoprolol succinate (TOPROL-XL) 25 mg 24 hr tablet Take 1 tablet (25 mg total) by mouth daily   • metroNIDAZOLE (METROGEL) 0 75 % gel Apply 2 89 application topically 2 (two) times a day   • multivitamin (THERAGRAN) TABS Take 1 tablet by mouth daily   • Nurtec 75 MG TBDP    • Omega-3 Fatty Acids (FISH OIL OMEGA-3 PO) Take by mouth   • omeprazole (PriLOSEC) 20 mg delayed release capsule    • omeprazole (PriLOSEC) 40 MG capsule    • ondansetron (Zofran ODT) 4 mg disintegrating tablet Take 1 tablet (4 mg total) by mouth every 6 (six) hours as needed for nausea or vomiting   • pregabalin (LYRICA) 25 mg capsule Take 1 capsule (25 mg total) by mouth 3 (three) times a day   • scopolamine (TRANSDERM-SCOP) 1 mg/3 days TD 72 hr patch Place 1 mg on the skin   • SUMAtriptan (IMITREX) 100 mg tablet Take 100 mg by mouth as needed       Objective     /63 (BP Location: Right arm, Patient Position: Sitting, Cuff Size: Standard)   Pulse 66   Temp 97 5 °F (36 4 °C)   Ht 5' 8" (1 727 m)   Wt 71 6 kg (157 lb 12 8 oz)   SpO2 95%   BMI 23 99 kg/m²     Physical Exam  Constitutional:       Appearance: Normal appearance  Neurological:      Mental Status: She is alert and oriented to person, place, and time         Reese Pena

## 2023-03-03 ENCOUNTER — APPOINTMENT (OUTPATIENT)
Dept: PHYSICAL THERAPY | Facility: CLINIC | Age: 53
End: 2023-03-03

## 2023-03-06 ENCOUNTER — APPOINTMENT (OUTPATIENT)
Dept: PHYSICAL THERAPY | Facility: CLINIC | Age: 53
End: 2023-03-06

## 2023-03-06 ENCOUNTER — TELEPHONE (OUTPATIENT)
Dept: CARDIOLOGY CLINIC | Facility: CLINIC | Age: 53
End: 2023-03-06

## 2023-03-06 NOTE — TELEPHONE ENCOUNTER
Pt wayne stating that she faxed over paperwork for a leave of absence pt is wondering the status on that    Please call pt [phone]

## 2023-03-08 ENCOUNTER — OFFICE VISIT (OUTPATIENT)
Dept: CARDIOLOGY CLINIC | Facility: CLINIC | Age: 53
End: 2023-03-08
Payer: COMMERCIAL

## 2023-03-08 VITALS
BODY MASS INDEX: 23.64 KG/M2 | DIASTOLIC BLOOD PRESSURE: 72 MMHG | WEIGHT: 156 LBS | SYSTOLIC BLOOD PRESSURE: 142 MMHG | HEIGHT: 68 IN | TEMPERATURE: 97.9 F

## 2023-03-08 DIAGNOSIS — I10 PRIMARY HYPERTENSION: ICD-10-CM

## 2023-03-08 DIAGNOSIS — I47.10 SVT (SUPRAVENTRICULAR TACHYCARDIA): Primary | ICD-10-CM

## 2023-03-08 DIAGNOSIS — E03.9 ACQUIRED HYPOTHYROIDISM: ICD-10-CM

## 2023-03-08 DIAGNOSIS — E78.2 MIXED HYPERLIPIDEMIA: ICD-10-CM

## 2023-03-08 PROCEDURE — 99214 OFFICE O/P EST MOD 30 MIN: CPT | Performed by: INTERNAL MEDICINE

## 2023-03-08 NOTE — PATIENT INSTRUCTIONS
Continue to monitor for recurrent symptoms. Will check device interrogation report and call you with report.

## 2023-03-10 ENCOUNTER — TELEPHONE (OUTPATIENT)
Dept: CARDIOLOGY CLINIC | Facility: CLINIC | Age: 53
End: 2023-03-10

## 2023-03-15 ENCOUNTER — APPOINTMENT (OUTPATIENT)
Dept: PHYSICAL THERAPY | Facility: CLINIC | Age: 53
End: 2023-03-15

## 2023-03-17 ENCOUNTER — APPOINTMENT (OUTPATIENT)
Dept: LAB | Facility: HOSPITAL | Age: 53
End: 2023-03-17

## 2023-03-17 ENCOUNTER — OFFICE VISIT (OUTPATIENT)
Dept: PHYSICAL THERAPY | Facility: CLINIC | Age: 53
End: 2023-03-17

## 2023-03-17 DIAGNOSIS — M47.812 CERVICAL SPONDYLOSIS: Primary | ICD-10-CM

## 2023-03-17 DIAGNOSIS — Z00.8 ENCOUNTER FOR OTHER GENERAL EXAMINATION: ICD-10-CM

## 2023-03-17 LAB
CHOLEST SERPL-MCNC: 149 MG/DL
EST. AVERAGE GLUCOSE BLD GHB EST-MCNC: 94 MG/DL
HBA1C MFR BLD: 4.9 %
HDLC SERPL-MCNC: 49 MG/DL
LDLC SERPL CALC-MCNC: 71 MG/DL (ref 0–100)
NONHDLC SERPL-MCNC: 100 MG/DL
TRIGL SERPL-MCNC: 144 MG/DL
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
## 2023-03-23 ENCOUNTER — EVALUATION (OUTPATIENT)
Dept: PHYSICAL THERAPY | Facility: CLINIC | Age: 53
End: 2023-03-23

## 2023-03-23 DIAGNOSIS — M47.812 CERVICAL SPONDYLOSIS: Primary | ICD-10-CM

## 2023-03-24 DIAGNOSIS — F41.9 ANXIETY: ICD-10-CM

## 2023-03-25 RX ORDER — HYDROXYZINE HYDROCHLORIDE 25 MG/1
25 TABLET, FILM COATED ORAL AS NEEDED
Qty: 30 TABLET | Refills: 0 | Status: SHIPPED | OUTPATIENT
Start: 2023-03-25

## 2023-03-28 DIAGNOSIS — E03.9 ACQUIRED HYPOTHYROIDISM: ICD-10-CM

## 2023-03-28 RX ORDER — LEVOTHYROXINE SODIUM 0.1 MG/1
100 TABLET ORAL DAILY
Qty: 90 TABLET | Refills: 0 | Status: SHIPPED | OUTPATIENT
Start: 2023-03-28

## 2023-03-29 NOTE — PROGRESS NOTES
"Daily Note     Today's date: 3/30/2023  Patient name: Cece Carney  : 1970  MRN: [id number]  Referring provider: Kasi Mann MD  Dx:   Encounter Diagnosis     ICD-10-CM    1  Cervical spondylosis  M47 812                      Subjective:  Patient reports the neck and UB are tight and sore today to 6/10 level  Post session, the patient reports overall improvement to 2/10 level in the neck and UB  Objective: See treatment diary below      Assessment: Tolerated treatment well  PT notes continuation of decrease ROM and strength t/o the cervical spine and UB with demonstration of impaired UB posture with need for continuation of skilled therapy  PT notes several cavitations with MT of thoracic PA mobs  Plan: Continue per plan of care        Precautions:  Chronic cervical and lumbar spine, migraines       Manuals 2/22 3/17 3/23 3/30    CROM mobs and stretching with manual cervical traction   10' 10' 5 min    Cervical side glides   5' 5' 5 min    Thoracic PA mobs    5 min             Neuro Re-Ed        Supine chin tucks  10x :05 2x10 :05 10x5\" Hold     Cervical extension with rotation     Scapular wall slides  IYT  10x Each     Wall push-up+         Scapular pinch into wall   10x :05 10x :05 10x5\" Hold     TB MTP and LTP    GTB 2x10  2x10 Each Green     TB Shoulder Bilateral ER and Horizontal ABD   RTB 2x10 ea RTB 2x10 ea  2x10 each   Red     Seated Thoracic Extension   10x :05 c exhale Attempted but did not tolerate   10x5\" Hold     Ther Ex        UBE   10' 1 0 alt 10' L1 alt  10 min L1     Caudel glide   5x15\" bilat 5x15\" 5x15\" Hold Bilat     Doorway pec stretch    5x15\"  5x15\" Hold                                    HEP update/review  15 min        Ther Activity                        Gait Training                        Modalities        MHP  PRN  15 min B UT 15' UT  15 min Bilat UB                       "

## 2023-03-30 ENCOUNTER — OFFICE VISIT (OUTPATIENT)
Dept: PHYSICAL THERAPY | Facility: CLINIC | Age: 53
End: 2023-03-30

## 2023-03-30 DIAGNOSIS — M47.812 CERVICAL SPONDYLOSIS: Primary | ICD-10-CM

## 2023-03-30 DIAGNOSIS — I10 ESSENTIAL HYPERTENSION: ICD-10-CM

## 2023-03-30 RX ORDER — IRBESARTAN 75 MG/1
75 TABLET ORAL DAILY
Qty: 90 TABLET | Refills: 0 | Status: SHIPPED | OUTPATIENT
Start: 2023-03-30

## 2023-03-31 ENCOUNTER — OFFICE VISIT (OUTPATIENT)
Dept: PHYSICAL THERAPY | Facility: CLINIC | Age: 53
End: 2023-03-31

## 2023-03-31 DIAGNOSIS — M47.812 CERVICAL SPONDYLOSIS: Primary | ICD-10-CM

## 2023-03-31 NOTE — PROGRESS NOTES
"Daily Note     Today's date: 3/31/2023  Patient name: Susan Gresham  : 1970  MRN: [id number]  Referring provider: Joanna Tomas MD  Dx:   Encounter Diagnosis     ICD-10-CM    1  Cervical spondylosis  M47 812                      Subjective: Pt reports she was sore and stiff yesterday before therapy, but felt much better after therapy and feels good this morning  Objective: See treatment diary below      Assessment: Tolerated treatment well  Mobilizations performed by PT Kameron Caal  Pt required occasional verbal cues for correct form and posture for exercises  Patient demonstrated fatigue post treatment, exhibited good technique with therapeutic exercises and would benefit from continued PT to decrease pain and increase function  Plan: Continue per plan of care        Precautions:  Chronic cervical and lumbar spine, migraines       Manuals 2/22 3/17 3/23 3/30 3/31   CROM mobs and stretching with manual cervical traction   10' 10' 5 min 5 mins  Mobs by PT   Cervical side glides   5' 5' 5 min 5 mins mobs by PT   Thoracic PA mobs    5 min  5 mins Mobs by PT           Neuro Re-Ed        Supine chin tucks  10x :05 2x10 :05 10x5\" Hold  10x5\" hold   Cervical extension with rotation     Scapular wall slides  IYT  10x Each  Scapular wall slides  IYT  10x Each    Wall push-up+         Scapular pinch into wall   10x :05 10x :05 10x5\" Hold  10x5\" hold   TB MTP and LTP    GTB 2x10  2x10 Each Green  2x10 ea GTB   TB Shoulder Bilateral ER and Horizontal ABD   RTB 2x10 ea RTB 2x10 ea  2x10 each   Red  2x10 each red   Seated Thoracic Extension   10x :05 c exhale Attempted but did not tolerate   10x5\" Hold  10x5\" hold   Ther Ex        UBE   10' 1 0 alt 10' L1 alt  10 min L1  10 min L1   Caudel glide   5x15\" bilat 5x15\" 5x15\" Hold Bilat  5x15\" hold bilat   Doorway pec stretch    5x15\"  5x15\" Hold 5x15\" hold                                   HEP update/review  15 min        Ther Activity                        Gait " Training                        Modalities        MHP  PRN  15 min B UT 15' UT  15 min Bilat UB  10 min bilat UT

## 2023-04-03 ENCOUNTER — OFFICE VISIT (OUTPATIENT)
Dept: PHYSICAL THERAPY | Facility: CLINIC | Age: 53
End: 2023-04-03

## 2023-04-03 DIAGNOSIS — M47.812 CERVICAL SPONDYLOSIS: Primary | ICD-10-CM

## 2023-04-03 NOTE — PROGRESS NOTES
"Daily Note     Today's date: 4/3/2023  Patient name: Jocelyn Sharif  : 1970  MRN: [id number]  Referring provider: Marvin Linda MD  Dx:   Encounter Diagnosis     ICD-10-CM    1  Cervical spondylosis  M47 812                      Subjective: Patient reports no new c/o pain today  Objective: See treatment diary below      Assessment: Tolerated treatment well  Patient tolerated her therapeutic ex program well with no increase in symptoms  Plan: Patient to move to [facility] next visit       Precautions:  Chronic cervical and lumbar spine, migraines       Manuals 3/17 3/23 3/30 3/31 4/3   CROM mobs and stretching with manual cervical traction  10' 10' 5 min 5 mins  Mobs by PT 5'   Cervical side glides  5' 5' 5 min 5 mins mobs by PT 5'   Thoracic PA mobs   5 min  5 mins Mobs by PT 5'           Neuro Re-Ed     4/3   Supine chin tucks 10x :05 2x10 :05 10x5\" Hold  10x5\" hold 10x5\"   Cervical extension with rotation    Scapular wall slides  IYT  10x Each  Scapular wall slides  IYT  10x Each  Scap Wall Slides IYT 10x ea   Wall push-up+         Scapular pinch into wall  10x :05 10x :05 10x5\" Hold  10x5\" hold 10x5\"   TB MTP and LTP   GTB 2x10  2x10 Each Green  2x10 ea GTB 2x10ea GTB   TB Shoulder Bilateral ER and Horizontal ABD  RTB 2x10 ea RTB 2x10 ea  2x10 each   Red  2x10 each red 2x10ea RED   Seated Thoracic Extension  10x :05 c exhale Attempted but did not tolerate   10x5\" Hold  10x5\" hold 10x5\"   Ther Ex     /3   UBE  10' 1 0 alt 10' L1 alt  10 min L1  10 min L1 10' 1 0alt   Caudel glide  5x15\" bilat 5x15\" 5x15\" Hold Bilat  5x15\" hold bilat 5x15\"bilat   Doorway pec stretch   5x15\"  5x15\" Hold 5x15\" hold 5x15\"                                   HEP update/review         Ther Activity     4/3                   Gait Training     4/3                   Modalities     4/3   MHP  15 min B UT 15' UT  15 min Bilat UB  10 min bilat UT declined                         "
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
## 2023-04-05 ENCOUNTER — OFFICE VISIT (OUTPATIENT)
Dept: PHYSICAL THERAPY | Facility: CLINIC | Age: 53
End: 2023-04-05

## 2023-04-05 ENCOUNTER — REMOTE DEVICE CLINIC VISIT (OUTPATIENT)
Dept: CARDIOLOGY CLINIC | Facility: CLINIC | Age: 53
End: 2023-04-05

## 2023-04-05 ENCOUNTER — TELEPHONE (OUTPATIENT)
Dept: PAIN MEDICINE | Facility: CLINIC | Age: 53
End: 2023-04-05

## 2023-04-05 DIAGNOSIS — M47.812 CERVICAL SPONDYLOSIS: Primary | ICD-10-CM

## 2023-04-05 DIAGNOSIS — Z95.818 PRESENCE OF OTHER CARDIAC IMPLANTS AND GRAFTS: Primary | ICD-10-CM

## 2023-04-05 NOTE — TELEPHONE ENCOUNTER
Spoke to patient  She will need to coordinate a day that will be convenient for her spouse to bring her  Patient to call me back to schedule

## 2023-04-05 NOTE — PROGRESS NOTES
"MDT BJD45/ ACTIVE SYSTEM IS MRI CONDITIONAL   CARELINK TRANSMISSION: LOOP RECORDER  PRESENTING RHYTHM NSR @ 99 BPM  BATTERY STATUS \"OK  \" 1 PT ACTIVATED EPISODE  CALL IN TO PT TO DISCUSS SYMPTOM  EGRAM SHOWS SR  NO DEVICE ACTIVATED EPISODES  NORMAL DEVICE FUNCTION   DL   "

## 2023-04-05 NOTE — TELEPHONE ENCOUNTER
----- Message from Munira Pichardo MD sent at 4/4/2023  9:33 AM EDT -----  Regarding: RE: injection  Please schedule C6-C7 ILESI; f/u 2 weeks post procedure, thanks  ----- Message -----  From: Tessa Rodriguez  Sent: 4/4/2023   9:27 AM EDT  To: Munira Pichardo MD  Subject: injection                                        Patient called to schedule neck injection, she said it would be in office  Her call back number is [phone]   Please advise

## 2023-04-06 ENCOUNTER — PREP FOR PROCEDURE (OUTPATIENT)
Dept: PAIN MEDICINE | Facility: CLINIC | Age: 53
End: 2023-04-06

## 2023-04-06 DIAGNOSIS — M47.812 CERVICAL SPONDYLOSIS: Primary | ICD-10-CM

## 2023-04-19 NOTE — DISCHARGE INSTR - AVS FIRST PAGE
YOUR 2 WEEK FOLLOW UP HAS BEEN SCHEDULED; IF YOU WISH TO CHANGE THE FOLLOW UP, PLEASE CALL THE [facility] [place]: [phone]    Epidural Steroid Injection   WHAT YOU NEED TO KNOW:   An epidural steroid injection (REYNA) is a procedure to inject steroid medicine into the epidural space  The epidural space is between your spinal cord and vertebrae  Steroids reduce inflammation and fluid buildup in your spine that may be causing pain  You may be given pain medicine along with the steroids  DISCHARGE INSTRUCTIONS:   Call your local emergency number (911 in the [address] [facility]) if:   You have a seizure  You have trouble moving your legs  Seek care immediately if:   Blood soaks through your bandage  You have a fever or chills, severe back pain, and the procedure area is sensitive to the touch  You cannot control when you urinate or have a bowel movement  Call your doctor if:   You have weakness or numbness in your legs  Your wound is red, swollen, or draining pus  You have nausea or are vomiting  Your face or neck is red and you feel warm  You have more pain than you had before the procedure  You have swelling in your hands or feet  You have questions or concerns about your condition or care  Care for your wound as directed: You may remove the bandage before you go to bed the day of your procedure  You may take a shower, but do not take a bath for at least 24 hours  Self-care:   Do not drive,  use machines, or do strenuous activity for 24 hours after your procedure or as directed  Continue other treatments  as directed  Steroid injections alone will not control your pain  The injections are meant to be used with other treatments, such as physical therapy  Follow up with your doctor as directed:  Write down your questions so you remember to ask them during your visits     © Copyright [employer]  Information is for End User's use only and may not be sold, redistributed or otherwise used for commercial purposes  The above information is an  only  It is not intended as medical advice for individual conditions or treatments  Talk to your doctor, nurse or pharmacist before following any medical regimen to see if it is safe and effective for you

## 2023-04-20 ENCOUNTER — APPOINTMENT (OUTPATIENT)
Dept: RADIOLOGY | Facility: HOSPITAL | Age: 53
End: 2023-04-20

## 2023-04-20 ENCOUNTER — HOSPITAL ENCOUNTER (OUTPATIENT)
Facility: HOSPITAL | Age: 53
Setting detail: OUTPATIENT SURGERY
Discharge: HOME | End: 2023-04-20
Attending: ANESTHESIOLOGY | Admitting: ANESTHESIOLOGY

## 2023-04-20 VITALS
OXYGEN SATURATION: 100 % | BODY MASS INDEX: 23.64 KG/M2 | TEMPERATURE: 98 F | WEIGHT: 156 LBS | DIASTOLIC BLOOD PRESSURE: 57 MMHG | HEIGHT: 68 IN | HEART RATE: 65 BPM | RESPIRATION RATE: 20 BRPM | SYSTOLIC BLOOD PRESSURE: 132 MMHG

## 2023-04-20 RX ORDER — SODIUM CHLORIDE 9 MG/ML
INJECTION INTRAVENOUS AS NEEDED
Status: DISCONTINUED | OUTPATIENT
Start: 2023-04-20 | End: 2023-04-20 | Stop reason: HOSPADM

## 2023-04-20 RX ORDER — METHYLPREDNISOLONE ACETATE 80 MG/ML
INJECTION, SUSPENSION INTRA-ARTICULAR; INTRALESIONAL; INTRAMUSCULAR; SOFT TISSUE AS NEEDED
Status: DISCONTINUED | OUTPATIENT
Start: 2023-04-20 | End: 2023-04-20 | Stop reason: HOSPADM

## 2023-04-20 RX ORDER — LIDOCAINE HYDROCHLORIDE 10 MG/ML
INJECTION, SOLUTION EPIDURAL; INFILTRATION; INTRACAUDAL; PERINEURAL AS NEEDED
Status: DISCONTINUED | OUTPATIENT
Start: 2023-04-20 | End: 2023-04-20 | Stop reason: HOSPADM

## 2023-04-20 RX ORDER — ONDANSETRON 2 MG/ML
4 INJECTION INTRAMUSCULAR; INTRAVENOUS ONCE
Status: DISCONTINUED | OUTPATIENT
Start: 2023-04-20 | End: 2023-04-20 | Stop reason: HOSPADM

## 2023-04-20 RX ADMIN — SODIUM CHLORIDE, SODIUM LACTATE, POTASSIUM CHLORIDE, AND CALCIUM CHLORIDE 1000 ML: .6; .31; .03; .02 INJECTION, SOLUTION INTRAVENOUS at 08:00

## 2023-04-20 NOTE — OP NOTE
OPERATIVE REPORT  PATIENT NAME: Christine East    :  1970  MRN: [id number]  Pt Location:  GI ROOM 01    SURGERY DATE: 2023    Surgeon(s) and Role: Galindo Moise MD - Primary    Preop Diagnosis:  Cervical spondylosis [M47 812]  Cervical Radiculopathy    Post-Op Diagnosis Codes:     * Cervical spondylosis [M47 812]  Cervical Radiculopathy    Procedure(s):  C6-C7 ILESI    Specimen(s):  * No specimens in log *    Estimated Blood Loss:   Minimal    Drains:  * No LDAs found *    Anesthesia Type:   Local    Operative Indications:  Cervical spondylosis [M47 812]  Cervical Radiculopathy    Operative Findings:  C6-C7 epidurogram    Complications:   None    Procedure and Technique:  Please see detailed procedure note    I was present for the entire procedure      Patient Disposition:  PACU     SIGNATURE: Ethan Willard MD  DATE: 2023  TIME: 7:51 AM

## 2023-04-20 NOTE — H&P
Assessment  1  Cervical spondylosis  -     Ambulatory referral to Physical Therapy; Future  -     pregabalin (LYRICA) 25 mg capsule; Take 1 capsule (25 mg total) by mouth 3 (three) times a day  -     Case request operating room: BLOCK / INJECTION EPIDURAL STEROID CERVICAL C6-7 or alternate level; Standing  -     Case request operating room: BLOCK / INJECTION EPIDURAL STEROID CERVICAL C6-7 or alternate level    2  Chronic left shoulder pain  -     X-ray shoulder left 2+ views; Future; Expected date: 01/27/2023    3  Cervical radiculopathy  -     Case request operating room: BLOCK / INJECTION EPIDURAL STEROID CERVICAL C6-7 or alternate level; Standing  -     Case request operating room: BLOCK / INJECTION EPIDURAL STEROID CERVICAL C6-7 or alternate level    Greater than 90% relief of pain with improved ability to participate with IADLs after bilateral L3, L4, L5 medial branch blocks #1 and #2 and subsequent radiofrequency ablation; describes now axial neck pain consistent with arthritic features as well as radicular features in C3, C4, C5 dermatome; +facet loading maneuver eliciting pain in neck, ttp over cervical paraspinal muscles, +spurling's maneuver bilaterally  MRI cervical spine at  C3-C4 shows left-sided facet arthrosis  There is no significant canal stenosis or foraminal narrowing  At C4-C5:  There is a disc osteophyte complex with uncovertebral spurring  There is facet arthrosis  There is mild left foraminal narrowing  There is no significant canal stenosis  At C5-C6 there is a disc osteophyte complex with left-sided uncovertebral spurring  There is mild left foraminal encroachment  Risks discussed with regard to [facility], informed consent obtained  Previously reported the following symptomatology:     Neck and low back pain described primarily are radicular features    Pain radiates in C6 and C7 dermatomal distribution from neck to hands bilaterally as well as primarily arthritic in nature in low back; additionally with radicular features in L3 and L4 dermatomal distribution right greater than left  5/5 strength bilaterally with active range of motion movements in upper lower extremities  Slight pain limited weakness with left hip flexion, knee extension  Negative Spurling's maneuver, negative SLR bilaterally  Significant tenderness to palpation with lumbar cervical facet loading maneuvers  She is currently engaged with PT for both her neck and low back with modest relief of the pain  She has not trialed medications other than OTC tylenol and ibuprofen  MRI cervical spine noncontrast, MRI lumbar spine noncontrast show mild compressive disease with spondyloarthropathy  Will proceed with multimodal pain therapy; counseled regarding lifestyle modifications including PT  Plan  -C6-C7 ILESI; f/u 2 weeks post procedure  -xray left shoulder; f/u result with patient  -gabapentin transitioned to lyrica 25 mg t i d  for patient given sedative effects of taking gabapentin  Counseled not to take medication while driving or operating heavy machinery/using stairs  -meloxicam 7 5 mg b i d  P r n   pain  Patient has since tapered  Patient has since tapered  Patient educated regarding bleeding risk of taking this medication not taking any other nonsteroidal anti-inflammatory medications while taking this medication; counseled thoroughly regarding potential risk of Cardiovascular injury, Kidney injury, Gastrointestinal ulceration/bleeding  Patient voiced understanding  -physical therapy for right-sided lumbar radiculopathy, lumbar facet arthropathy, SIJ dysfunction right sided; Physician directed home exercise plan as per [facility] demonstrated and handouts provided that patient plans to participate with for 1 hour, twice a week for the next 6 weeks  There are risks associated with opioid medications, including dependence, addiction and tolerance   The patient understands and agrees to use these medications only as prescribed  Potential side effects of the medications include, but are not limited to, constipation, drowsiness, addiction, impaired judgment and risk of fatal overdose if not taken as prescribed  The patient was warned against driving while taking sedation medications  Sharing medications is a felony  At this point in time, the patient is showing no signs of addiction, abuse, diversion or suicidal ideation  [place] Prescription Drug Monitoring Program report was reviewed and was appropriate      Complete risks and benefits including bleeding, infection, tissue reaction, nerve injury and allergic reaction were discussed  The approach was demonstrated using models and literature was provided  Verbal and written consent was obtained  My impressions and treatment recommendations were discussed in detail with the patient who verbalized understanding and had no further questions  Discharge instructions were provided  I personally saw and examined the patient and I agree with the above discussed plan of care  No orders of the defined types were placed in this encounter  History of Present Illness    Greater than 90% relief of pain with improved ability to participate with IADLs after bilateral L3, L4, L5 medial branch blocks #1 and #2 and subsequent radiofrequency ablation; describes now axial neck pain consistent with arthritic features as well ars radicular features in C3, C4, C5 dermatome; +facet loading maneuver eliciting pain in neck, ttp over cervical paraspinal muscles, +spurling's maneuver bilaterally  Risks discussed with regard to [facility], informed consent obtained  Previously reported the following symptomatology:     Dewey Murry is a 48 y o  female with past medical history of hypertension, hypothyroidism, hypercholesterolemia, migraine headaches presenting with neck and low back pain described primarily radicular features in neck and axial/arthritic features in low back    Neck pain radiates into the C6 and C7 dermatomal distribution bilaterally accompanied by pain limited weakness numbness and paresthesias  In low back radicular pain travels into the bilateral L3 and L4 dermatomal distribution, right greater than left; however in low back, features are mostly arthritic in nature  She describes lancinating features of the pain  She has been to formal physical therapy for both neck and low back pain with modest relief of her symptoms  She has trialed over-the-counter NSAIDs as well as Tylenol with modest relief of the pain  Her pain significantly impacts independent activities of daily living and overall function, 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  She denies any bowel or bladder abnormality, incontinence, gait instability, headaches or dizziness  I have personally reviewed and/or updated the patient's past medical history, past surgical history, family history, social history, current medications, allergies, and vital signs today  Review of Systems   Constitutional: Positive for activity change  HENT: Negative  Eyes: Negative  Respiratory: Negative  Cardiovascular: Negative  Gastrointestinal: Negative  Endocrine: Negative  Genitourinary: Negative  Musculoskeletal: Positive for arthralgias, back pain, myalgias, neck pain and neck stiffness  Skin: Negative  Allergic/Immunologic: Negative  Neurological: Negative for weakness and numbness  Hematological: Negative  Psychiatric/Behavioral: Negative  All other systems reviewed and are negative        Patient Active Problem List   Diagnosis   • Hypertension   • SVT (supraventricular tachycardia) (HCC)   • Hyperlipidemia   • Depression   • Osteoporosis   • Arthritis   • Hypothyroidism   • Migraine   • Glaucoma   • Anxiety   • Restless legs   • Polyarthralgia   • Lumbar spondylosis   • Gastroesophageal reflux disease without esophagitis   • Sacroiliac joint dysfunction of right side   • Cervical radiculopathy • Chronic left shoulder pain   • Cervical spondylosis   • Gastroparesis       Past Medical History:   Diagnosis Date   • Allergic 1974    Seasonal   • Anxiety    • Arthritis    • Colitis     Noted on colonoscopy 04/24/2020     • Depression    • Disease of thyroid gland    • Gastritis     Noted on EGD 04/24/2020   • Gastroparesis    • Glaucoma    • Headache(784 0)    • Hyperlipidemia    • Hypertension    • Hypothyroidism    • Lyme disease    • Migraine    • Osteoporosis    • Scoliosis    • SVT (supraventricular tachycardia) ([facility] [address] )    • Visual impairment    • VT (ventricular tachycardia) ([facility] [address] )        Past Surgical History:   Procedure Laterality Date   • BREAST BIOPSY Right 10/21/2020    papilloma   • COLONOSCOPY     • EGD     • FINGER SURGERY      left pinky   • FL GUIDED NEEDLE PLAC BX/ASP/INJ  03/17/2022   • FL GUIDED NEEDLE PLAC BX/ASP/INJ  07/08/2022   • HYSTERECTOMY  2008   • HYSTERECTOMY W/ SALPINGO-OOPHERECTOMY  05/2008   • NERVE BLOCK Bilateral 03/17/2022    Procedure: L3, L4, L5 BLOCK MEDIAL BRANCH;  Surgeon: Kateryna Garcia MD;  Location: [facility];  Service: Pain Management    • NERVE BLOCK Bilateral 07/08/2022    Procedure: BLOCK MEDIAL BRANCH L3, L4, L5 #2;  Surgeon: Kateryna Garcia MD;  Location: [facility];  Service: Pain Management    • OOPHORECTOMY Bilateral 2008   • AZ INJECT SI JOINT ARTHRGRPHY&/ANES/STEROID W/JOHNNY Bilateral 12/29/2022    Procedure: BLOCK / INJECTION SACROILIAC;  Surgeon: Kateryna Garcia MD;  Location: [facility];  Service: Pain Management    • RHIZOTOMY Bilateral 12/8/2022    Procedure: RHIZOTOMY LUMBAR L3, L4, L5 medial branch nerves;  Surgeon: Kateryna Garcia MD;  Location: [facility];  Service: Pain Management    • US GUIDED BREAST BIOPSY RIGHT COMPLETE Right 10/21/2020       Family History   Problem Relation Age of Onset   • Peripheral vascular disease Mother    • Glaucoma Mother    • Prostate cancer Father    • Hypothyroidism Sister    • No Known Problems Daughter    • No Known Problems Daughter    • Colon cancer Maternal Grandmother    • No Known Problems Maternal Grandfather    • Arthritis Paternal Grandmother    • No Known Problems Paternal Grandfather    • No Known Problems Maternal Aunt    • No Known Problems Maternal Aunt    • Skin cancer Paternal Aunt    • No Known Problems Paternal Aunt    • Breast cancer Neg Hx    • Breast cancer additional onset Neg Hx    • Endometrial cancer Neg Hx    • BRCA2 Positive Neg Hx    • BRCA2 Negative Neg Hx    • BRCA1 Positive Neg Hx    • BRCA1 Negative Neg Hx    • BRCA 1/2 Neg Hx    • Ovarian cancer Neg Hx        Social History     Occupational History   • Not on file   Tobacco Use   • Smoking status: Former     Packs/day:      Years: 10 00     Pack years: 10 00     Types: Cigarettes     Quit date: 2007     Years since quittin 3   • Smokeless tobacco: Never   • Tobacco comments:     quit    Vaping Use   • Vaping Use: Never used   Substance and Sexual Activity   • Alcohol use: Never   • Drug use: Never   • Sexual activity: Yes     Birth control/protection: Post-menopausal     Comment: hysterectomy       No current facility-administered medications on file prior to encounter       Current Outpatient Medications on File Prior to Encounter   Medication Sig   • ALPRAZolam (XANAX) 0 5 mg tablet Take 1 tablet (0 5 mg total) by mouth 30 min pre-procedure   • atorvastatin (LIPITOR) 40 mg tablet Take 1 tablet (40 mg total) by mouth daily   • Calcium Carbonate-Vit D-Min (CALCIUM 1200 PO) Take by mouth daily    • Cholecalciferol (VITAMIN D3 PO) Take 1,000 mg by mouth   • dicyclomine (BENTYL) 10 mg capsule    • DULoxetine (CYMBALTA) 30 mg delayed release capsule duloxetine 30 mg capsule,delayed release   • Glucosamine-Chondroitin 500-400 MG CAPS Take by mouth daily    • hydrOXYzine HCL (ATARAX) 25 mg tablet Take 1 tablet (25 mg total) by mouth as needed for anxiety   • levothyroxine 100 mcg tablet Take 1 tablet (100 mcg total) by mouth daily   • "Lumigan 0 01 % ophthalmic drops    • Magnesium 250 MG TABS Take 1 tablet (250 mg total) by mouth daily   • metoclopramide (REGLAN) 5 mg tablet    • metoprolol succinate (TOPROL-XL) 25 mg 24 hr tablet Take 1 tablet (25 mg total) by mouth daily   • metroNIDAZOLE (METROGEL) 0 75 % gel Apply 5 82 application topically 2 (two) times a day   • multivitamin (THERAGRAN) TABS Take 1 tablet by mouth daily   • Nurtec 75 MG TBDP    • Omega-3 Fatty Acids (FISH OIL OMEGA-3 PO) Take by mouth   • omeprazole (PriLOSEC) 20 mg delayed release capsule    • omeprazole (PriLOSEC) 40 MG capsule    • ondansetron (Zofran ODT) 4 mg disintegrating tablet Take 1 tablet (4 mg total) by mouth every 6 (six) hours as needed for nausea or vomiting   • pregabalin (LYRICA) 25 mg capsule Take 1 capsule (25 mg total) by mouth 3 (three) times a day   • scopolamine (TRANSDERM-SCOP) 1 mg/3 days TD 72 hr patch Place 1 mg on the skin   • SUMAtriptan (IMITREX) 100 mg tablet Take 100 mg by mouth as needed       No Known Allergies      Physical Exam    /75   Pulse 61   Temp 97 9 °F (36 6 °C) (Oral)   Resp 18   Ht 5' 8\" (1 727 m)   Wt 70 8 kg (156 lb)   SpO2 99%   BMI 23 72 kg/m²     Constitutional: normal, well developed, well nourished, alert, in no distress and non-toxic and no overt pain behavior  Eyes: anicteric  HEENT: grossly intact  Neck: supple, symmetric, trachea midline and no masses   Pulmonary:even and unlabored  Cardiovascular:No edema or pitting edema present  Skin:Normal without rashes or lesions and well hydrated  Psychiatric:Mood and affect appropriate  Neurologic:Cranial Nerves II-XII grossly intact Sensation grossly intact; no clonus negative bunn's  Reflexes 2+ and brisk  SLR negative bilaterally  Spurling's maneuver positive bilaterally  Musculoskeletal:normal gait  5/5 strength bilaterally with AROM in all extremities  Slight pain limited weakness with left hip flexion, knee extension   Normal heel toe and tip toe " walking  Significant pain with cervical and lumbar facet loading bilaterally and with lateral spine rotation  TTP over cervical and lumbar paraspinal muscles  +right sided SIJ loading, claudy finger test, GAENSLEN's, SEMAJ's  Imaging    MRI CERVICAL SPINE WITHOUT CONTRAST     INDICATION: M54 12: Radiculopathy, cervical region      COMPARISON:  5/9/2021     TECHNIQUE:  Sagittal T1, sagittal T2, sagittal inversion recovery, axial T2, axial  2D merge     IMAGE QUALITY:  Diagnostic     FINDINGS:     ALIGNMENT:  There is trace anterolisthesis of C3-C4      MARROW SIGNAL:  Normal marrow signal is identified within the visualized bony structures  No discrete marrow lesion      CERVICAL AND VISUALIZED THORACIC CORD:  Normal signal within the visualized cord      PREVERTEBRAL AND PARASPINAL SOFT TISSUES:  Normal      VISUALIZED POSTERIOR FOSSA:  The visualized posterior fossa demonstrates no abnormal signal      CERVICAL DISC SPACES:     C2-C3:  Normal      C3-C4:  There is left-sided facet arthrosis  There is no significant canal stenosis or foraminal narrowing      C4-C5:  There is a disc osteophyte complex with uncovertebral spurring  There is facet arthrosis  There is mild left foraminal narrowing  There is no significant canal stenosis      C5-C6:  There is a disc osteophyte complex with left-sided uncovertebral spurring  There is mild left foraminal encroachment      C6-C7:  No significant canal stenosis or foraminal narrowing      C7-T1:  No significant canal stenosis or foraminal narrowing  MRI LUMBAR SPINE WITHOUT CONTRAST     INDICATION: M54 12: Radiculopathy, cervical region      COMPARISON:  None      TECHNIQUE:  Sagittal T1, sagittal T2, sagittal inversion recovery, axial T1 and axial T2, coronal T2     IMAGE QUALITY:  Diagnostic     FINDINGS:     VERTEBRAL BODIES:  There are 5 lumbar type vertebral bodies  Normal alignment of the lumbar spine  No spondylolysis or spondylolisthesis  No scoliosis  No compression fracture  No suspicious marrow signal abnormality  Hemangioma within the L1   vertebral body      SACRUM:  Normal signal within the sacrum  No evidence of insufficiency or stress fracture      DISTAL CORD AND CONUS:  Normal size and signal within the distal cord and conus  Conus medullaris terminates at T12-L1      PARASPINAL SOFT TISSUES:  Paraspinal soft tissues are unremarkable      LOWER THORACIC DISC SPACES:  Normal disc height and signal   No disc herniation, canal stenosis or foraminal narrowing      LUMBAR DISC SPACES:     L1-L2:  No significant canal stenosis or foraminal narrowing      L2-L3:  No significant canal stenosis or foraminal narrowing      L3-L4:  Minimal bulge  No significant canal stenosis or foraminal narrowing      L4-L5:  Mild bulge, asymmetric to the right annular fissure  No significant canal stenosis  Mild foraminal narrowing      L5-S1:  Bulging annulus  Facet arthrosis  No significant canal stenosis  Mild right foraminal narrowing      IMPRESSION:     Mild degenerative changes of the lumbar spine, as described above  CT CERVICAL SPINE - WITHOUT CONTRAST     INDICATION:   TRAUMA      COMPARISON:  None      TECHNIQUE:  CT examination of the cervical spine was performed without intravenous contrast   Contiguous axial images were obtained  Sagittal and coronal reconstructions were performed        Radiation dose length product (DLP) for this visit:  355 mGy-cm   This examination, like all CT scans performed in the [facility], was performed utilizing techniques to minimize radiation dose exposure, including the use of iterative   reconstruction and automated exposure control        IMAGE QUALITY:  Diagnostic      FINDINGS:     ALIGNMENT:  Normal alignment of the cervical spine   No subluxation      VERTEBRAL BODIES:  No fracture      DEGENERATIVE CHANGES:  No significant cervical degenerative changes are noted      PREVERTEBRAL AND PARASPINAL SOFT TISSUES:  Unremarkable      THORACIC INLET:  Normal      IMPRESSION:     No cervical spine fracture or traumatic malalignment      The study was marked in EPIC for immediate notification

## 2023-04-20 NOTE — PROCEDURES
Pre-procedure Diagnosis: Cervical Radiculopathy  Post-procedure Diagnosis: Cervical Radiculopathy  Procedure Title(s):  1  C6-C7 interlaminar epidural steroid injection      2  Intraoperative fluoroscopy  Attending Surgeon:   Dior Esposito MD  Anesthesia:   Local     Indications: The patient is a 48y o  year-old female with a diagnosis of Cervical Radiculopathy  The patient's history and physical exam were reviewed  The risks, benefits and alternatives to the procedure were discussed, and all questions were answered to the patient's satisfaction  The patient agreed to proceed, and written informed consent was obtained  Procedure in Detail: The patient was brought into the procedure room and placed in the prone position on the fluoroscopy table  The area of the cervical spine was prepped with chlorhexidine gluconate solution times one and draped in a sterile manner  The C6-C7 interspace was identified and marked under AP fluoroscopy  The skin and subcutaneous tissues in the area were anesthetized with 1% lidocaine  A 18-gauge Tuohy epidural needle was directed toward the interspace under fluoroscopic guidance until the ligamentum flavum was engaged  The C-arm was oblique to the right to obtain a contra-lateral oblique view  From this point, a loss of resistance technique with saline was used to identify entrance of the needle into the epidural space  Once an appropriate loss was obtained, negative aspiration was confirmed, and 1 ml Omnipaque 240 contrast solution was injected  An appropriate epidurogram was noted  Then, after negative aspiration, a solution consisting of 1-mL depo-medrol (80mg/mL) and 2-mL preservative-free saline was easily injected  The needle was removed with a 1% lidocaine flush  The patient's back was cleaned and a bandage was placed over the site of needle insertion  Disposition: The patient tolerated the procedure well, and there were no apparent complications   The patient was taken to the recovery area where written discharge instructions for the procedure were given       Estimated Blood Loss: None  Specimens Obtained: N/A

## 2023-04-20 NOTE — QUICK NOTE
Vasovagal reaction from procedure; responded well to IV fluids and nausea resolved  Patient discharged home in stable condition after continuous monitoring of BP, HR returning to baseline

## 2023-04-20 NOTE — NURSING NOTE
Patient presents to room 1 post procedure  Noted patient pale with low BP and bradycardic  Patient stated she felt nauseated  Dr Phan Valero made aware of patients condition and came to patients bedside  Patient remained awake alert and oriented  IV placed and fluids started as per Dr Royce Cristobal

## 2023-04-25 ENCOUNTER — OFFICE VISIT (OUTPATIENT)
Dept: FAMILY MEDICINE CLINIC | Facility: CLINIC | Age: 53
End: 2023-04-25

## 2023-04-25 VITALS
BODY MASS INDEX: 23.43 KG/M2 | TEMPERATURE: 97.3 F | DIASTOLIC BLOOD PRESSURE: 76 MMHG | HEART RATE: 64 BPM | OXYGEN SATURATION: 100 % | WEIGHT: 154.6 LBS | SYSTOLIC BLOOD PRESSURE: 130 MMHG | HEIGHT: 68 IN

## 2023-04-25 DIAGNOSIS — B35.1 ONYCHOMYCOSIS OF TOENAIL: ICD-10-CM

## 2023-04-25 DIAGNOSIS — F41.9 ANXIETY: Primary | ICD-10-CM

## 2023-04-25 DIAGNOSIS — F43.9 STRESS AT HOME: ICD-10-CM

## 2023-04-25 RX ORDER — DULOXETIN HYDROCHLORIDE 60 MG/1
60 CAPSULE, DELAYED RELEASE ORAL DAILY
Qty: 90 CAPSULE | Refills: 3 | Status: SHIPPED | OUTPATIENT
Start: 2023-04-25

## 2023-04-25 NOTE — PROGRESS NOTES
Name: Sole Esposito      : 1970      MRN: [id number]  Encounter Provider: FITO Reilly  Encounter Date: 2023   Encounter department: [address] [place] [address] PRIMARY CARE    Assessment & Plan     1  Anxiety  -     DULoxetine (CYMBALTA) 60 mg delayed release capsule; Take 1 capsule (60 mg total) by mouth daily    2  Stress at home  -     DULoxetine (CYMBALTA) 60 mg delayed release capsule; Take 1 capsule (60 mg total) by mouth daily    3  Onychomycosis of toenail    B/l great toenails appear to have healthy nail growing back in - definitive line noted between healthy and abnormal nail  Will have her continue with her own home remedies as needed  Will increase her duloxetine to 60 mg at this time  Subjective      Here today for an acute visit  Reports deformity to her b/l great toe nails - she notes that she has been doing home remedies but is still concerned about them  Also reports an increase in her anxiety and stress as her  was diagnosed recently with stage 4 cancer in his throat  She is stress out with getting imaging done and seeing specialty  She has been on duloxetine for many years for anxiety  Review of Systems   Skin:        See HPI   Psychiatric/Behavioral: The patient is nervous/anxious  All other systems reviewed and are negative        Current Outpatient Medications on File Prior to Visit   Medication Sig   • ALPRAZolam (XANAX) 0 5 mg tablet Take 1 tablet (0 5 mg total) by mouth 30 min pre-procedure   • atorvastatin (LIPITOR) 40 mg tablet Take 1 tablet (40 mg total) by mouth daily   • Calcium Carbonate-Vit D-Min (CALCIUM 1200 PO) Take by mouth daily    • Cholecalciferol (VITAMIN D3 PO) Take 1,000 mg by mouth   • dicyclomine (BENTYL) 10 mg capsule    • Glucosamine-Chondroitin 500-400 MG CAPS Take by mouth daily    • hydrOXYzine HCL (ATARAX) 25 mg tablet Take 1 tablet (25 mg total) by mouth as needed for anxiety   • irbesartan (AVAPRO) 75 mg tablet Take "1 tablet (75 mg total) by mouth daily   • levothyroxine 100 mcg tablet Take 1 tablet (100 mcg total) by mouth daily   • Lumigan 0 01 % ophthalmic drops    • Magnesium 250 MG TABS Take 1 tablet (250 mg total) by mouth daily   • metoclopramide (REGLAN) 5 mg tablet    • metoprolol succinate (TOPROL-XL) 25 mg 24 hr tablet Take 1 tablet (25 mg total) by mouth daily   • metroNIDAZOLE (METROGEL) 0 75 % gel Apply 7 47 application topically 2 (two) times a day   • multivitamin (THERAGRAN) TABS Take 1 tablet by mouth daily   • Nurtec 75 MG TBDP    • Omega-3 Fatty Acids (FISH OIL OMEGA-3 PO) Take by mouth   • omeprazole (PriLOSEC) 20 mg delayed release capsule    • omeprazole (PriLOSEC) 40 MG capsule    • ondansetron (Zofran ODT) 4 mg disintegrating tablet Take 1 tablet (4 mg total) by mouth every 6 (six) hours as needed for nausea or vomiting   • pregabalin (LYRICA) 25 mg capsule Take 1 capsule (25 mg total) by mouth 3 (three) times a day   • scopolamine (TRANSDERM-SCOP) 1 mg/3 days TD 72 hr patch Place 1 mg on the skin   • SUMAtriptan (IMITREX) 100 mg tablet Take 100 mg by mouth as needed   • [DISCONTINUED] DULoxetine (CYMBALTA) 30 mg delayed release capsule duloxetine 30 mg capsule,delayed release       Objective     /76 (BP Location: Right arm, Patient Position: Sitting, Cuff Size: Adult)   Pulse 64   Temp (!) 97 3 °F (36 3 °C)   Ht 5' 8\" (1 727 m)   Wt 70 1 kg (154 lb 9 6 oz)   SpO2 100%   BMI 23 51 kg/m²     Physical Exam  Constitutional:       Appearance: Normal appearance  Pulmonary:      Effort: Pulmonary effort is normal    Neurological:      Mental Status: She is alert and oriented to person, place, and time  Psychiatric:         Attention and Perception: Attention and perception normal          Mood and Affect: Mood normal          Speech: Speech normal          Behavior: Behavior normal  Behavior is cooperative  Thought Content:  Thought content normal        FITO Kumari  "

## 2023-04-27 ENCOUNTER — TELEPHONE (OUTPATIENT)
Dept: RADIOLOGY | Facility: MEDICAL CENTER | Age: 53
End: 2023-04-27

## 2023-04-27 ENCOUNTER — TELEPHONE (OUTPATIENT)
Dept: PAIN MEDICINE | Facility: CLINIC | Age: 53
End: 2023-04-27

## 2023-04-27 NOTE — TELEPHONE ENCOUNTER
Returned patient's voice message to schedule procedure  Unable to leave message as voice mailbox has not been set up

## 2023-05-10 ENCOUNTER — TELEPHONE (OUTPATIENT)
Dept: PAIN MEDICINE | Facility: CLINIC | Age: 53
End: 2023-05-10

## 2023-05-23 DIAGNOSIS — F41.9 ANXIETY: ICD-10-CM

## 2023-05-23 DIAGNOSIS — L71.9 ROSACEA: ICD-10-CM

## 2023-05-23 RX ORDER — METRONIDAZOLE 7.5 MG/G
0.75 GEL TOPICAL 2 TIMES DAILY
Qty: 45 G | Refills: 0 | Status: SHIPPED | OUTPATIENT
Start: 2023-05-23

## 2023-05-23 RX ORDER — HYDROXYZINE HYDROCHLORIDE 25 MG/1
25 TABLET, FILM COATED ORAL AS NEEDED
Qty: 30 TABLET | Refills: 0 | Status: SHIPPED | OUTPATIENT
Start: 2023-05-23

## 2023-05-23 NOTE — TELEPHONE ENCOUNTER
Requested medication(s) are due for refill today: Yes  Patient has already received a courtesy refill: No  Other reason request has been forwarded to provider: Was A Bandage Applied: Yes

## 2023-05-30 ENCOUNTER — OFFICE VISIT (OUTPATIENT)
Dept: FAMILY MEDICINE CLINIC | Facility: CLINIC | Age: 53
End: 2023-05-30

## 2023-05-30 VITALS
DIASTOLIC BLOOD PRESSURE: 62 MMHG | SYSTOLIC BLOOD PRESSURE: 100 MMHG | HEART RATE: 63 BPM | OXYGEN SATURATION: 99 % | BODY MASS INDEX: 23.25 KG/M2 | WEIGHT: 153.4 LBS | HEIGHT: 68 IN

## 2023-05-30 DIAGNOSIS — F33.0 MILD EPISODE OF RECURRENT MAJOR DEPRESSIVE DISORDER (HCC): ICD-10-CM

## 2023-05-30 DIAGNOSIS — G47.9 DIFFICULTY SLEEPING: ICD-10-CM

## 2023-05-30 DIAGNOSIS — F43.21 GRIEF REACTION: ICD-10-CM

## 2023-05-30 DIAGNOSIS — F41.9 ANXIETY: Primary | ICD-10-CM

## 2023-05-30 DIAGNOSIS — R51.9 DAILY HEADACHE: ICD-10-CM

## 2023-05-30 RX ORDER — ALPRAZOLAM 0.5 MG/1
0.5 TABLET ORAL
Qty: 30 TABLET | Refills: 0 | Status: SHIPPED | OUTPATIENT
Start: 2023-05-30

## 2023-05-30 NOTE — PROGRESS NOTES
Name: Bryant Jacobo      : 1970      MRN: [id number]  Encounter Provider: FITO Valenzuela  Encounter Date: 2023   Encounter department: [address] [place] [address] PRIMARY CARE    Assessment & Plan     1  Anxiety  -     ALPRAZolam (XANAX) 0 5 mg tablet; Take 1 tablet (0 5 mg total) by mouth daily at bedtime as needed for anxiety    2  Mild episode of recurrent major depressive disorder ([address] )    3  Daily headache    4  Difficulty sleeping  -     ALPRAZolam (XANAX) 0 5 mg tablet; Take 1 tablet (0 5 mg total) by mouth daily at bedtime as needed for anxiety    5  Grief reaction  -     ALPRAZolam (XANAX) 0 5 mg tablet; Take 1 tablet (0 5 mg total) by mouth daily at bedtime as needed for anxiety    Will continue with the Cymbalta at 60 mg  Suspect headache is due to stress and lack of sleep since the more sudden death of her   Alprazolam provided for before bedtime as needed to help calm her to sleep  Will complete FMLA form - she would like to return to work on 2023 - will see her the week before to evaluate  Subjective      Here today for a follow-up  Was seen near the end of April due to anxiety for her husbands recent throat cancer diagnosis  Her  passed away on 05/10/2023 - she has been stressed with varying things and has had an issue falling asleep due to stress/anxiety  Review of Systems   Constitutional: Negative  HENT: Negative  Respiratory: Negative  Cardiovascular: Negative  Gastrointestinal: Negative  Neurological: Negative  Psychiatric/Behavioral: Positive for sleep disturbance  The patient is nervous/anxious  All other systems reviewed and are negative        Current Outpatient Medications on File Prior to Visit   Medication Sig   • atorvastatin (LIPITOR) 40 mg tablet Take 1 tablet (40 mg total) by mouth daily   • Calcium Carbonate-Vit D-Min (CALCIUM 1200 PO) Take by mouth daily    • Cholecalciferol (VITAMIN D3 PO) Take 1,000 mg by "mouth   • dicyclomine (BENTYL) 10 mg capsule    • DULoxetine (CYMBALTA) 60 mg delayed release capsule Take 1 capsule (60 mg total) by mouth daily   • Glucosamine-Chondroitin 500-400 MG CAPS Take by mouth daily    • hydrOXYzine HCL (ATARAX) 25 mg tablet Take 1 tablet (25 mg total) by mouth as needed for anxiety   • irbesartan (AVAPRO) 75 mg tablet Take 1 tablet (75 mg total) by mouth daily   • levothyroxine 100 mcg tablet Take 1 tablet (100 mcg total) by mouth daily   • Lumigan 0 01 % ophthalmic drops    • Magnesium 250 MG TABS Take 1 tablet (250 mg total) by mouth daily   • metoclopramide (REGLAN) 5 mg tablet    • metoprolol succinate (TOPROL-XL) 25 mg 24 hr tablet Take 1 tablet (25 mg total) by mouth daily   • metroNIDAZOLE (METROGEL) 0 75 % gel Apply 6 29 application  topically 2 (two) times a day   • multivitamin (THERAGRAN) TABS Take 1 tablet by mouth daily   • Nurtec 75 MG TBDP    • Omega-3 Fatty Acids (FISH OIL OMEGA-3 PO) Take by mouth   • omeprazole (PriLOSEC) 20 mg delayed release capsule    • omeprazole (PriLOSEC) 40 MG capsule    • ondansetron (Zofran ODT) 4 mg disintegrating tablet Take 1 tablet (4 mg total) by mouth every 6 (six) hours as needed for nausea or vomiting   • pregabalin (LYRICA) 25 mg capsule Take 1 capsule (25 mg total) by mouth 3 (three) times a day   • scopolamine (TRANSDERM-SCOP) 1 mg/3 days TD 72 hr patch Place 1 mg on the skin   • [DISCONTINUED] ALPRAZolam (XANAX) 0 5 mg tablet Take 1 tablet (0 5 mg total) by mouth 30 min pre-procedure   • SUMAtriptan (IMITREX) 100 mg tablet Take 100 mg by mouth as needed       Objective     /62 (BP Location: Left arm, Patient Position: Sitting, Cuff Size: Standard)   Pulse 63   Ht 5' 8\" (1 727 m)   Wt 69 6 kg (153 lb 6 4 oz)   SpO2 99%   BMI 23 32 kg/m²     Physical Exam  Constitutional:       Appearance: Normal appearance  Neurological:      Mental Status: She is alert and oriented to person, place, and time     Psychiatric:         " Attention and Perception: Attention and perception normal          Mood and Affect: Mood is anxious and depressed  Affect is tearful  Speech: Speech normal          Behavior: Behavior normal  Behavior is cooperative  Thought Content:  Thought content normal          Cognition and Memory: Cognition and memory normal          Judgment: Judgment normal        FITO Rueda

## 2023-06-14 ENCOUNTER — OFFICE VISIT (OUTPATIENT)
Dept: FAMILY MEDICINE CLINIC | Facility: CLINIC | Age: 53
End: 2023-06-14
Payer: COMMERCIAL

## 2023-06-14 VITALS
SYSTOLIC BLOOD PRESSURE: 130 MMHG | WEIGHT: 152.2 LBS | DIASTOLIC BLOOD PRESSURE: 82 MMHG | HEIGHT: 68 IN | TEMPERATURE: 97.5 F | OXYGEN SATURATION: 99 % | HEART RATE: 79 BPM | BODY MASS INDEX: 23.07 KG/M2

## 2023-06-14 DIAGNOSIS — G47.9 DIFFICULTY SLEEPING: ICD-10-CM

## 2023-06-14 DIAGNOSIS — F41.9 ANXIETY: Primary | ICD-10-CM

## 2023-06-14 DIAGNOSIS — F33.0 MILD EPISODE OF RECURRENT MAJOR DEPRESSIVE DISORDER (HCC): ICD-10-CM

## 2023-06-14 DIAGNOSIS — F43.21 GRIEF REACTION: ICD-10-CM

## 2023-06-14 PROCEDURE — 99213 OFFICE O/P EST LOW 20 MIN: CPT | Performed by: NURSE PRACTITIONER

## 2023-06-14 NOTE — PROGRESS NOTES
Name: Eusebia Hitchcock      : 1970      MRN: [id number]  Encounter Provider: FITO Torres  Encounter Date: 2023   Encounter department: [address] [place] [address] PRIMARY CARE    Assessment & Plan     1  Anxiety    2  Mild episode of recurrent major depressive disorder ([facility] [address] )    3  Difficulty sleeping    4  Grief reaction       Will extend her time off until 07/10/2023  Will discuss part time status if needed at next re-evaluation on 2023  Will fax her work note to the appropriate number  Subjective      Here today for a re-evaluation  Her  passed away approximately 1 month ago today due to a brief and tragic illness  She is still struggling with both the loss and the unplanned changes to her life  She was planning to return to work the beginning of July but would now like to extend the time off a little longer  Review of Systems   Constitutional: Negative  HENT: Negative  Respiratory: Negative  Cardiovascular: Negative  Gastrointestinal: Negative  Neurological: Negative  Psychiatric/Behavioral:        See HPI   All other systems reviewed and are negative        Current Outpatient Medications on File Prior to Visit   Medication Sig   • ALPRAZolam (XANAX) 0 5 mg tablet Take 1 tablet (0 5 mg total) by mouth daily at bedtime as needed for anxiety   • atorvastatin (LIPITOR) 40 mg tablet Take 1 tablet (40 mg total) by mouth daily   • Calcium Carbonate-Vit D-Min (CALCIUM 1200 PO) Take by mouth daily    • Cholecalciferol (VITAMIN D3 PO) Take 1,000 mg by mouth   • dicyclomine (BENTYL) 10 mg capsule    • DULoxetine (CYMBALTA) 60 mg delayed release capsule Take 1 capsule (60 mg total) by mouth daily   • Glucosamine-Chondroitin 500-400 MG CAPS Take by mouth daily    • hydrOXYzine HCL (ATARAX) 25 mg tablet Take 1 tablet (25 mg total) by mouth as needed for anxiety   • irbesartan (AVAPRO) 75 mg tablet Take 1 tablet (75 mg total) by mouth daily   • levothyroxine 100 mcg "tablet Take 1 tablet (100 mcg total) by mouth daily   • Lumigan 0 01 % ophthalmic drops    • Magnesium 250 MG TABS Take 1 tablet (250 mg total) by mouth daily   • metoclopramide (REGLAN) 5 mg tablet    • metoprolol succinate (TOPROL-XL) 25 mg 24 hr tablet Take 1 tablet (25 mg total) by mouth daily   • metroNIDAZOLE (METROGEL) 0 75 % gel Apply 6 66 application  topically 2 (two) times a day   • multivitamin (THERAGRAN) TABS Take 1 tablet by mouth daily   • Nurtec 75 MG TBDP    • Omega-3 Fatty Acids (FISH OIL OMEGA-3 PO) Take by mouth   • omeprazole (PriLOSEC) 20 mg delayed release capsule    • omeprazole (PriLOSEC) 40 MG capsule    • ondansetron (Zofran ODT) 4 mg disintegrating tablet Take 1 tablet (4 mg total) by mouth every 6 (six) hours as needed for nausea or vomiting   • pregabalin (LYRICA) 25 mg capsule Take 1 capsule (25 mg total) by mouth 3 (three) times a day   • scopolamine (TRANSDERM-SCOP) 1 mg/3 days TD 72 hr patch Place 1 mg on the skin   • SUMAtriptan (IMITREX) 100 mg tablet Take 100 mg by mouth as needed       Objective     /82 (BP Location: Left arm, Patient Position: Sitting, Cuff Size: Standard)   Pulse 79   Temp 97 5 °F (36 4 °C)   Ht 5' 8\" (1 727 m)   Wt 69 kg (152 lb 3 2 oz)   SpO2 99%   BMI 23 14 kg/m²     Physical Exam  Constitutional:       Appearance: Normal appearance  Neurological:      Mental Status: She is alert and oriented to person, place, and time  Psychiatric:         Attention and Perception: Attention and perception normal          Mood and Affect: Mood is depressed  Affect is tearful  Speech: Speech normal          Behavior: Behavior normal  Behavior is cooperative  Thought Content:  Thought content normal          Cognition and Memory: Cognition and memory normal          Judgment: Judgment normal        FITO Delcid  "

## 2023-06-14 NOTE — LETTER
June 14, 2023     Patient: Mariia Nixon  YOB: 1970  Date of Visit: 6/14/2023      To Whom it May Concern:    Mariia Nixon is under my professional care  Rosie Uche was seen in my office on 6/14/2023  Rosie Ivey may return to work on 07/10/2023   Please send us the FMLA forms to complete for further time off for her  If you have any questions or concerns, please don't hesitate to call           Sincerely,          Lolly Celaya

## 2023-06-28 ENCOUNTER — OFFICE VISIT (OUTPATIENT)
Dept: FAMILY MEDICINE CLINIC | Facility: CLINIC | Age: 53
End: 2023-06-28
Payer: COMMERCIAL

## 2023-06-28 VITALS
HEART RATE: 76 BPM | OXYGEN SATURATION: 97 % | HEIGHT: 68 IN | BODY MASS INDEX: 23.37 KG/M2 | SYSTOLIC BLOOD PRESSURE: 131 MMHG | DIASTOLIC BLOOD PRESSURE: 63 MMHG | WEIGHT: 154.2 LBS

## 2023-06-28 DIAGNOSIS — G47.9 DIFFICULTY SLEEPING: ICD-10-CM

## 2023-06-28 DIAGNOSIS — F43.21 GRIEF REACTION: ICD-10-CM

## 2023-06-28 DIAGNOSIS — F33.0 MILD EPISODE OF RECURRENT MAJOR DEPRESSIVE DISORDER (HCC): ICD-10-CM

## 2023-06-28 DIAGNOSIS — M54.50 ACUTE BILATERAL LOW BACK PAIN WITHOUT SCIATICA: ICD-10-CM

## 2023-06-28 DIAGNOSIS — F41.9 ANXIETY: Primary | ICD-10-CM

## 2023-06-28 PROCEDURE — 99213 OFFICE O/P EST LOW 20 MIN: CPT | Performed by: NURSE PRACTITIONER

## 2023-06-28 RX ORDER — CYCLOBENZAPRINE HCL 5 MG
5 TABLET ORAL
Qty: 30 TABLET | Refills: 1 | Status: SHIPPED | OUTPATIENT
Start: 2023-06-28

## 2023-06-28 NOTE — PROGRESS NOTES
Name: Baron Romo      : 1970      MRN: [id number]  Encounter Provider: FITO Hinojosa  Encounter Date: 2023   Encounter department: [address] [place] [address] PRIMARY CARE    Assessment & Plan     1  Anxiety    2  Difficulty sleeping    3  Grief reaction    4  Mild episode of recurrent major depressive disorder ([facility] [address] )    5  Acute bilateral low back pain without sciatica  -     cyclobenzaprine (FLEXERIL) 5 mg tablet; Take 1 tablet (5 mg total) by mouth daily at bedtime as needed for muscle spasms    She will return to work full time on 07/10/2023  Discussed her low back pain - she has a hx of bulging lumbar discs - suspect this is related to that and overuse with increased household duties since the passing of her   Subjective      Here today for a follow-up  She has been taking time off of work due to her husbands sudden passing  She feels ready to return to work soon  Reports also of having low back pain - hx of lumbar disc issues - has been doing more labor work at home - using the push mower because her riding mower is not working  Back Pain      Review of Systems   Musculoskeletal: Positive for back pain  Psychiatric/Behavioral:        See HPI   All other systems reviewed and are negative        Current Outpatient Medications on File Prior to Visit   Medication Sig   • ALPRAZolam (XANAX) 0 5 mg tablet Take 1 tablet (0 5 mg total) by mouth daily at bedtime as needed for anxiety   • atorvastatin (LIPITOR) 40 mg tablet Take 1 tablet (40 mg total) by mouth daily   • Calcium Carbonate-Vit D-Min (CALCIUM 1200 PO) Take by mouth daily    • Cholecalciferol (VITAMIN D3 PO) Take 1,000 mg by mouth   • dicyclomine (BENTYL) 10 mg capsule    • DULoxetine (CYMBALTA) 60 mg delayed release capsule Take 1 capsule (60 mg total) by mouth daily   • Glucosamine-Chondroitin 500-400 MG CAPS Take by mouth daily    • hydrOXYzine HCL (ATARAX) 25 mg tablet Take 1 tablet (25 mg total) by mouth as "needed for anxiety   • irbesartan (AVAPRO) 75 mg tablet Take 1 tablet (75 mg total) by mouth daily   • levothyroxine 100 mcg tablet Take 1 tablet (100 mcg total) by mouth daily   • Lumigan 0 01 % ophthalmic drops    • Magnesium 250 MG TABS Take 1 tablet (250 mg total) by mouth daily   • metoclopramide (REGLAN) 5 mg tablet    • metoprolol succinate (TOPROL-XL) 25 mg 24 hr tablet Take 1 tablet (25 mg total) by mouth daily   • metroNIDAZOLE (METROGEL) 0 75 % gel Apply 9 21 application  topically 2 (two) times a day   • multivitamin (THERAGRAN) TABS Take 1 tablet by mouth daily   • Nurtec 75 MG TBDP    • Omega-3 Fatty Acids (FISH OIL OMEGA-3 PO) Take by mouth   • omeprazole (PriLOSEC) 20 mg delayed release capsule    • omeprazole (PriLOSEC) 40 MG capsule    • ondansetron (Zofran ODT) 4 mg disintegrating tablet Take 1 tablet (4 mg total) by mouth every 6 (six) hours as needed for nausea or vomiting   • pregabalin (LYRICA) 25 mg capsule Take 1 capsule (25 mg total) by mouth 3 (three) times a day   • scopolamine (TRANSDERM-SCOP) 1 mg/3 days TD 72 hr patch Place 1 mg on the skin   • SUMAtriptan (IMITREX) 100 mg tablet Take 100 mg by mouth as needed       Objective     /63 (BP Location: Left arm, Patient Position: Sitting, Cuff Size: Standard)   Pulse 76   Ht 5' 8\" (1 727 m)   Wt 69 9 kg (154 lb 3 2 oz)   SpO2 97%   BMI 23 45 kg/m²     Physical Exam  Constitutional:       Appearance: Normal appearance  Musculoskeletal:      Lumbar back: Spasms and tenderness present  Decreased range of motion  Neurological:      Mental Status: She is alert  Psychiatric:         Mood and Affect: Mood normal          Behavior: Behavior normal          Thought Content:  Thought content normal          Judgment: Judgment normal        FITO Ovalles  "

## 2023-06-28 NOTE — LETTER
June 28, 2023     Patient: Jordan Tripp  YOB: 1970  Date of Visit: 6/28/2023      To Whom it May Concern:    Jordan Tripp is under my professional care  Excell Katie was seen in my office on 6/28/2023  Excell Katie may return to work on 07/10/2023 on a full time status with no restrictions  If you have any questions or concerns, please don't hesitate to call           Sincerely,          Bonifacio Messer

## 2023-07-02 NOTE — PROGRESS NOTES
Results for orders placed or performed in visit on 06/30/21   Cardiac EP device report    Narrative    MDT LNQ11/ [address] [place] [address]  INTERROGATED IN THE [place] OFFICE LOOP: WOUND CHECK: INCISION CLEAN AND DRY WITH EDGES APPROXIMATED; SUTURES REMOVED; WOUND CARE AND RESTRICTIONS REVIEWED WITH PATIENT  BATTERY VOLTAGE OK  PRESENTING RHYTHM: NSR @ 82 BPM  R-WAVES: 0 51mV  NO PATIENT OR DEVICE ACTIVATED EPISODES  NO PROGRAMMING CHANGES MADE TO DEVICE PARAMETERS  NORMAL DEVICE FUNCTION   [facility]
patient

## 2023-07-06 ENCOUNTER — REMOTE DEVICE CLINIC VISIT (OUTPATIENT)
Dept: CARDIOLOGY CLINIC | Facility: CLINIC | Age: 53
End: 2023-07-06
Payer: COMMERCIAL

## 2023-07-06 DIAGNOSIS — Z95.818 PRESENCE OF OTHER CARDIAC IMPLANTS AND GRAFTS: Primary | ICD-10-CM

## 2023-07-06 PROCEDURE — 93298 REM INTERROG DEV EVAL SCRMS: CPT | Performed by: INTERNAL MEDICINE

## 2023-07-06 PROCEDURE — G2066 INTER DEVC REMOTE 30D: HCPCS | Performed by: INTERNAL MEDICINE

## 2023-07-06 NOTE — PROGRESS NOTES
MDT VSH87/ ACTIVE SYSTEM IS MRI CONDITIONAL   CARELINK TRANSMISSION: LOOP RECORDER. PRESENTING RHYTHM NSR @ 66 BPM. BATTERY STATUS "OK." 3 PT ACTIVATED EPISODES PREVIOUSLY ADDRESSED IN ALERTS. NO NEW PATIENT OR DEVICE ACTIVATED EPISODES. NORMAL DEVICE FUNCTION.  DL

## 2023-07-09 DIAGNOSIS — I10 ESSENTIAL HYPERTENSION: ICD-10-CM

## 2023-07-09 DIAGNOSIS — K31.84 GASTROPARESIS: ICD-10-CM

## 2023-07-09 DIAGNOSIS — F41.9 ANXIETY: ICD-10-CM

## 2023-07-09 DIAGNOSIS — F43.9 STRESS AT HOME: ICD-10-CM

## 2023-07-09 DIAGNOSIS — I10 HYPERTENSION, UNSPECIFIED TYPE: ICD-10-CM

## 2023-07-09 DIAGNOSIS — E87.6 HYPOKALEMIA: ICD-10-CM

## 2023-07-09 DIAGNOSIS — R00.2 PALPITATIONS: ICD-10-CM

## 2023-07-09 DIAGNOSIS — R11.0 NAUSEA: ICD-10-CM

## 2023-07-10 ENCOUNTER — OFFICE VISIT (OUTPATIENT)
Dept: CARDIOLOGY CLINIC | Facility: CLINIC | Age: 53
End: 2023-07-10
Payer: COMMERCIAL

## 2023-07-10 VITALS
HEIGHT: 68 IN | SYSTOLIC BLOOD PRESSURE: 104 MMHG | BODY MASS INDEX: 23.28 KG/M2 | OXYGEN SATURATION: 97 % | WEIGHT: 153.6 LBS | DIASTOLIC BLOOD PRESSURE: 74 MMHG | HEART RATE: 76 BPM | TEMPERATURE: 96.8 F

## 2023-07-10 DIAGNOSIS — I47.1 SVT (SUPRAVENTRICULAR TACHYCARDIA) (HCC): ICD-10-CM

## 2023-07-10 DIAGNOSIS — Z87.898 HISTORY OF SYNCOPE: Primary | ICD-10-CM

## 2023-07-10 DIAGNOSIS — E03.9 ACQUIRED HYPOTHYROIDISM: ICD-10-CM

## 2023-07-10 DIAGNOSIS — E78.2 MIXED HYPERLIPIDEMIA: ICD-10-CM

## 2023-07-10 DIAGNOSIS — I10 PRIMARY HYPERTENSION: ICD-10-CM

## 2023-07-10 PROCEDURE — 99214 OFFICE O/P EST MOD 30 MIN: CPT | Performed by: NURSE PRACTITIONER

## 2023-07-10 RX ORDER — METOCLOPRAMIDE 5 MG/1
5 TABLET ORAL 4 TIMES DAILY
Qty: 30 TABLET | Refills: 0 | Status: SHIPPED | OUTPATIENT
Start: 2023-07-10

## 2023-07-10 RX ORDER — DICYCLOMINE HYDROCHLORIDE 10 MG/1
10 CAPSULE ORAL
Qty: 30 CAPSULE | Refills: 0 | Status: SHIPPED | OUTPATIENT
Start: 2023-07-10

## 2023-07-10 RX ORDER — IRBESARTAN 75 MG/1
75 TABLET ORAL DAILY
Qty: 90 TABLET | Refills: 3 | Status: SHIPPED | OUTPATIENT
Start: 2023-07-10 | End: 2023-08-03 | Stop reason: SDUPTHER

## 2023-07-10 RX ORDER — SCOLOPAMINE TRANSDERMAL SYSTEM 1 MG/1
1 PATCH, EXTENDED RELEASE TRANSDERMAL
Qty: 4 PATCH | Refills: 0 | Status: SHIPPED | OUTPATIENT
Start: 2023-07-10

## 2023-07-10 RX ORDER — DULOXETIN HYDROCHLORIDE 60 MG/1
60 CAPSULE, DELAYED RELEASE ORAL DAILY
Qty: 90 CAPSULE | Refills: 0 | Status: SHIPPED | OUTPATIENT
Start: 2023-07-10

## 2023-07-10 RX ORDER — ONDANSETRON 4 MG/1
4 TABLET, ORALLY DISINTEGRATING ORAL EVERY 6 HOURS PRN
Qty: 30 TABLET | Refills: 0 | Status: SHIPPED | OUTPATIENT
Start: 2023-07-10

## 2023-07-10 RX ORDER — ALPRAZOLAM 0.25 MG/1
TABLET ORAL
COMMUNITY
Start: 2023-06-20

## 2023-07-10 RX ORDER — MULTIVITAMIN WITH IRON
250 TABLET ORAL DAILY
Qty: 90 TABLET | Refills: 3 | Status: SHIPPED | OUTPATIENT
Start: 2023-07-10 | End: 2023-08-03 | Stop reason: SDUPTHER

## 2023-07-10 NOTE — PROGRESS NOTES
Cardiology Follow Up    Bea Buchanan  1970  [id number]  [facility] [address]  [address] [facility] [place]  [phone]    Nayana Mario presents for routine follow up of syncope, HTN, HLD. History of NSVT and SVT on ZIO monitoring. 1. History of syncope  Assessment & Plan:  Patient with two episodes of syncope and collapse in 2021. Concern exists for arrhythmia given ZIO with short run of NSVT. Implantable loop recorder placed May 27 2021 with no events noted on loop recorder thus far. I have asked patient to monitor blood pressures 1-2 times daily at home. She may require further adjustment of her antihypertensive regimen. Will continue to monitor. 2. SVT (supraventricular tachycardia) ([address])  Assessment & Plan:  Only two short runs of SVT noted on ZIO 1/2020. Continue metoprolol succinate 25 mg daily. Continue magnesium 250 mg daily. Currently has implantable loop recorder which was placed 5/27/2021. Palpitations have been rare since last office visit. 3. Primary hypertension  Assessment & Plan:  Blood pressure is borderline low today. Irbesartan hydrochlorothiazide 150/12.5 mg daily discontinued and replaced with irbesartan 75 mg daily 5/2021 due to relatively low blood pressures. She has had episodes of near syncope. I have asked patient to monitor BP 1-2 times daily and report readings after 2 weeks. May require adjustment of anti-hypertensive regimen. Continue irbesartan 75 mg daily, metoprolol succinate 25 mg daily for now. BMP ordered. Orders:  -     Basic metabolic panel; Future    4. Mixed hyperlipidemia  Assessment & Plan:  Patient is currently on atorvastatin  40 mg daily. Lipid panel 3/17/2023: LDL 71. Continue current regimen. 5. Acquired hypothyroidism  Assessment & Plan:  Patient now following with endocrinologist Dr. Edenilson Moore.             ARACELI Glasgow a past medical history of SVT, NSVT, Lyme disease, HTN, HLD, migraines, hypothyroidism, glaucoma, osteoporosis and anxiety.      She was previously being followed by Dr. Frances Dee ([address] [facility]) for evaluation of chest pain and palpitations. Echocardiogram 1/7/2020 was unremarkable.  ZIO monitor 01/27/2020 showed an average heart rate of 78 beats per minute with rare PACs and PVCs. One 7 beat run of NSVT was noted and 2 runs of SVT were noted with the longest lasting 9 beats.  Nuclear exercise stress test 03/18/2020 showed no evidence of ischemia.     She established with Dr. Maureen Enrique on 9/11/2020. At that time she was maintained on metoprolol succinate for SVT/VT and palpitations. She denied chest pain or shortness of breath. She continued with intermittent palpitations. She also reported episodes of heart racing associated with lightheadedness and dizziness. ECG at that visit was unremarkable with mild nonspecific ST and T wave abnormality which were unchanged from her prior ECG. She was instructed to initiate magnesium supplementation. Updated blood work was ordered as well. She did note less palpitations at 11/18/2020 follow up.     She returned for follow up 5/18/2021 where she reported an episode where she had woken up in a cold sweat with pressure/squeezing in her left upper abdomen/lower chest. She was in a fog and next thing she new she was on the floor of her kitchen. She did hit her head. She vomited afterward. She was seen in the ER within a few hours. She reported feeling weak and lightheaded. She admitted that she had passed out in the past during a mammogram. Her TSH was low and medications were adjusted by her PCP. Orthostatic BP's were negative. Her irbesartan/HCTZ 150/12.5 mg was discontinued and replaced with irbesartan 75 mg daily and timing was changed to am instead of pm. She was referred for implantable loop recorder, which was inserted 5/27/2021.     She followed up with me on 66/30/2021. Loop recorder was in place.  She denied any events since placement at that time.     She followed up in our office 8/5/2021 after a recurrent syncopal event from the weekend. She reached out via the patient portal on 8/1/2021 when she experienced an episode overnight where she woke with heart racing, chest pressure, lightheadedness, diaphoresis. No N/V with this episode. She rushed ot the kitchen and woke on the floor. She did activate her loop recorder and was able to go back to sleep without any further issues. She denied residual symptoms upon awakening. Her loop transmission was unrevealing. Thyroid level remained abnormal at the time and she was referred to endocrinology for management. Sleep study 10/2021 was negative for BLANCHE. She followed up most recently with Dr. Aylin Marvin on 3/8/2023. She had noticed a "jumpy" heartbeat on the way to her appointment and sent a loop transmission which showed SR/ST.    7/10/2023: Sonja Sandhoff presents today for routine follow up. Unfortunately her spouse passed away suddenly since her last visit related to complications from lung cancer. She has a support system and went back to work this week. She denies cardiac complaints today. No recent syncope/near syncope. She is not monitoring her blood pressure. She is taking her medications faithfully. No overt chest pain, shortness of breath, or swelling. Rare, short lived palpitations. Most recent device interrogation 7/6 showed no episodes. She is receiving injections in her back from pain management.     Medical Problems     Problem List     SVT (supraventricular tachycardia) (HCC)    Hypertension    Hyperlipidemia    Hypothyroidism    Depression    Osteoporosis    Arthritis    Migraine    Glaucoma    Anxiety    Restless legs    Polyarthralgia    Lumbar spondylosis    Gastroesophageal reflux disease without esophagitis    Sacroiliac joint dysfunction of right side    Cervical radiculopathy    Chronic left shoulder pain    Cervical spondylosis    Gastroparesis        Past Medical History:   Diagnosis Date   • Abnormal ECG    • Allergic 1974    Seasonal   • Anxiety    • Arthritis    • Colitis     Noted on colonoscopy 2020. • Depression    • Disease of thyroid gland    • Gastritis     Noted on EGD 2020   • Gastroparesis    • Glaucoma    • Headache(784.0)    • Hyperlipidemia    • Hypertension    • Hypothyroidism    • Lyme disease    • Migraine    • Osteoporosis    • Scoliosis    • SVT (supraventricular tachycardia) (HCC)    • Visual impairment    • VT (ventricular tachycardia) ([address])      Social History     Socioeconomic History   • Marital status:       Spouse name: Not on file   • Number of children: Not on file   • Years of education: Not on file   • Highest education level: Not on file   Occupational History   • Not on file   Tobacco Use   • Smoking status: Some Days     Packs/day: 0.50     Years: 10.00     Total pack years: 5.00     Types: Cigarettes     Last attempt to quit: 2007     Years since quittin.5   • Smokeless tobacco: Never   • Tobacco comments:     quit    Vaping Use   • Vaping Use: Never used   Substance and Sexual Activity   • Alcohol use: Never   • Drug use: Never   • Sexual activity: Not Currently     Partners: Male     Birth control/protection: Post-menopausal     Comment: hysterectomy   Other Topics Concern   • Not on file   Social History Narrative   • Not on file     Social Determinants of Health     Financial Resource Strain: Not on file   Food Insecurity: Not on file   Transportation Needs: Not on file   Physical Activity: Not on file   Stress: Not on file   Social Connections: Not on file   Intimate Partner Violence: Not on file   Housing Stability: Not on file      Family History   Problem Relation Age of Onset   • Peripheral vascular disease Mother    • Glaucoma Mother    • Prostate cancer Father    • Hypothyroidism Sister    • No Known Problems Daughter    • No Known Problems Daughter    • Colon cancer Maternal Grandmother • No Known Problems Maternal Grandfather    • Arthritis Paternal Grandmother    • No Known Problems Paternal Grandfather    • No Known Problems Maternal Aunt    • No Known Problems Maternal Aunt    • Skin cancer Paternal Aunt    • No Known Problems Paternal Aunt    • Breast cancer Neg Hx    • Breast cancer additional onset Neg Hx    • Endometrial cancer Neg Hx    • BRCA2 Positive Neg Hx    • BRCA2 Negative Neg Hx    • BRCA1 Positive Neg Hx    • BRCA1 Negative Neg Hx    • BRCA 1/2 Neg Hx    • Ovarian cancer Neg Hx      Past Surgical History:   Procedure Laterality Date   • BREAST BIOPSY Right 10/21/2020    papilloma   • COLONOSCOPY     • EGD     • EPIDURAL BLOCK INJECTION N/A 4/20/2023    Procedure: C6-C7 ILESI;  Surgeon: Nicho Gonsales MD;  Location: [facility];  Service: Pain Management    • FINGER SURGERY      left pinky   • FL GUIDED NEEDLE PLAC BX/ASP/INJ  03/17/2022   • FL GUIDED NEEDLE PLAC BX/ASP/INJ  07/08/2022   • HYSTERECTOMY  2008   • HYSTERECTOMY W/ SALPINGO-OOPHERECTOMY  05/2008   • NERVE BLOCK Bilateral 03/17/2022    Procedure: L3, L4, L5 BLOCK MEDIAL BRANCH;  Surgeon: Nicho Gonsales MD;  Location: [facility];  Service: Pain Management    • NERVE BLOCK Bilateral 07/08/2022    Procedure: BLOCK MEDIAL BRANCH L3, L4, L5 #2;  Surgeon: Nicho Gonsales MD;  Location: [facility];  Service: Pain Management    • OOPHORECTOMY Bilateral 2008   • MO INJECT SI JOINT ARTHRGRPHY&/ANES/STEROID W/JOHNNY Bilateral 12/29/2022    Procedure: BLOCK / INJECTION SACROILIAC;  Surgeon: Nicho Gonsales MD;  Location: [facility];  Service: Pain Management    • RHIZOTOMY Bilateral 12/8/2022    Procedure: RHIZOTOMY LUMBAR L3, L4, L5 medial branch nerves;  Surgeon: Nicho Gonsales MD;  Location: [facility];  Service: Pain Management    • US GUIDED BREAST BIOPSY RIGHT COMPLETE Right 10/21/2020       Current Outpatient Medications:   •  atorvastatin (LIPITOR) 40 mg tablet, Take 1 tablet (40 mg total) by mouth daily, Disp: 90 tablet, Rfl: 3  •  Calcium Carbonate-Vit D-Min (CALCIUM 1200 PO), Take by mouth daily , Disp: , Rfl:   •  Cholecalciferol (VITAMIN D3 PO), Take 1,000 mg by mouth (Patient not taking: Reported on 7/21/2023), Disp: , Rfl:   •  dicyclomine (BENTYL) 10 mg capsule, Take 1 capsule (10 mg total) by mouth 3 (three) times a day before meals, Disp: 30 capsule, Rfl: 0  •  DULoxetine (CYMBALTA) 60 mg delayed release capsule, Take 1 capsule (60 mg total) by mouth daily, Disp: 90 capsule, Rfl: 0  •  Glucosamine-Chondroitin 500-400 MG CAPS, Take by mouth daily , Disp: , Rfl:   •  irbesartan (AVAPRO) 75 mg tablet, Take 1 tablet (75 mg total) by mouth daily, Disp: 90 tablet, Rfl: 3  •  levothyroxine 100 mcg tablet, Take 1 tablet (100 mcg total) by mouth daily, Disp: 30 tablet, Rfl: 0  •  Lumigan 0.01 % ophthalmic drops, , Disp: , Rfl:   •  Magnesium 250 MG TABS, Take 1 tablet (250 mg total) by mouth daily, Disp: 90 tablet, Rfl: 3  •  metoclopramide (REGLAN) 5 mg tablet, Take 1 tablet (5 mg total) by mouth 4 (four) times a day, Disp: 30 tablet, Rfl: 0  •  metoprolol succinate (TOPROL-XL) 25 mg 24 hr tablet, Take 1 tablet (25 mg total) by mouth daily, Disp: 90 tablet, Rfl: 1  •  metroNIDAZOLE (METROGEL) 0.75 % gel, Apply 1.49 application.  topically 2 (two) times a day, Disp: 45 g, Rfl: 0  •  multivitamin (THERAGRAN) TABS, Take 1 tablet by mouth daily, Disp: , Rfl:   •  Nurtec 75 MG TBDP, , Disp: , Rfl:   •  Omega-3 Fatty Acids (FISH OIL OMEGA-3 PO), Take by mouth, Disp: , Rfl:   •  omeprazole (PriLOSEC) 20 mg delayed release capsule, , Disp: , Rfl:   •  ondansetron (Zofran ODT) 4 mg disintegrating tablet, Take 1 tablet (4 mg total) by mouth every 6 (six) hours as needed for nausea or vomiting, Disp: 30 tablet, Rfl: 0  •  ALPRAZolam (XANAX) 0.25 mg tablet, , Disp: , Rfl:   •  ALPRAZolam (XANAX) 0.5 mg tablet, Take 1 tablet (0.5 mg total) by mouth daily at bedtime as needed for anxiety (Patient not taking: Reported on 7/10/2023), Disp: 30 tablet, Rfl: 0  •  cyclobenzaprine (FLEXERIL) 5 mg tablet, Take 1 tablet (5 mg total) by mouth daily at bedtime as needed for muscle spasms (Patient not taking: Reported on 7/10/2023), Disp: 30 tablet, Rfl: 1  •  hydrOXYzine HCL (ATARAX) 25 mg tablet, Take 1 tablet (25 mg total) by mouth as needed for anxiety (Patient not taking: Reported on 7/10/2023), Disp: 30 tablet, Rfl: 0  •  omeprazole (PriLOSEC) 40 MG capsule, , Disp: , Rfl:   •  pregabalin (LYRICA) 25 mg capsule, Take 1 capsule (25 mg total) by mouth 3 (three) times a day (Patient not taking: Reported on 7/14/2023), Disp: 90 capsule, Rfl: 0  •  scopolamine (TRANSDERM-SCOP) 1 mg/3 days TD 72 hr patch, Place 1 patch on the skin over 72 hours every third day (Patient not taking: Reported on 7/10/2023), Disp: 4 patch, Rfl: 0  •  SUMAtriptan (IMITREX) 100 mg tablet, Take 100 mg by mouth as needed (Patient not taking: Reported on 7/10/2023), Disp: , Rfl:   •  terbinafine (LamISIL) 250 mg tablet, Take 1 tablet (250 mg total) by mouth daily, Disp: 30 tablet, Rfl: 2  No Known Allergies    Labs:     Chemistry        Component Value Date/Time    K 3.7 07/14/2023 0929     07/14/2023 0929    CO2 28 07/14/2023 0929    BUN 7 07/14/2023 0929    CREATININE 0.85 07/14/2023 0929        Component Value Date/Time    CALCIUM 9.4 07/14/2023 0929    ALKPHOS 60 07/14/2023 0929    AST 19 07/14/2023 0929    ALT 17 07/14/2023 0929        Cardiac testing:   Cardiac EP device report 7/6/2023  MDT LNQ11/ ACTIVE SYSTEM IS MRI CONDITIONAL CARELINK TRANSMISSION: LOOP RECORDER. PRESENTING RHYTHM NSR @ 66 BPM. BATTERY STATUS "OK." 3 PT ACTIVATED EPISODES PREVIOUSLY ADDRESSED IN ALERTS. NO NEW PATIENT OR DEVICE ACTIVATED EPISODES. NORMAL DEVICE FUNCTION. Lipid panel 2/11/2022: C 225. T 150. H 71. L 124.       ECG 5/9/2021: Normal sinus rhythm.  Nonspecific ST abnormality.      Lipid panel 4/13/2021: C 189. T 188. H 52.  L 99.      ECG 09/11/2020:  Normal sinus rhythm.  Possible septal MI (more likely related to lead placement).  Nonspecific ST/T wave abnormality.     Nuclear Stress test 3/18/2020:   Helio. 5:01. 7.1 METs. 90% MPHR. Deconditioned heart rate response to exercise. No evidence of scar. No evidence of ischemia. Calculated ejection fraction 93%.     ZIO 1/27/2020:   1-the patient had a ZIO monitor placed.  Analysis is based on 13 days and 14 hours  2-predominant rhythm is sinus  3-the minimum heart rate is 50 with the maximum heart rate being 151 and the average being 78 when in a sinus rhythm  4-there is a rare PVC.  There was one run of NSVT consisting of 7 beats  5-there is a rare PAC.  There were 2 runs of SVT with the longest being 9 beats. 6- there are no pauses greater than 3.0 seconds  7-there are no episodes of second-degree heart block type II or third-degree heart block  8-there were no diary entries placed  9-there were no acute ST/T wave changes on the strips that were reviewed    No comparison ZIO monitor     Echocardiogram 1/7/2020:   Normal left ventricular chamber size. Normal left ventricular systolic function. Normal regional wall motion. Normal left ventricular wall thickness. Estimated left ventricular ejection fraction is 65-70%.     Mild mitral regurgitation.      Normal pulmonary artery systolic pressure. Normal diastolic function.     Visually Estimated LV Ejection Fraction is:70%    Review of Systems   Constitutional: Negative. HENT: Negative. Cardiovascular: Negative for chest pain, dyspnea on exertion, irregular heartbeat, leg swelling, near-syncope, orthopnea and palpitations. Respiratory: Negative for cough and snoring. Endocrine: Negative. Skin: Negative. Musculoskeletal: Positive for back pain and neck pain. Gastrointestinal: Negative. Genitourinary: Negative. Neurological: Negative. Psychiatric/Behavioral: Negative.         Vitals:    07/10/23 1532   BP: 104/74   Pulse: 76   Temp: (!) 96.8 °F (36 °C)   SpO2: 97%     Vitals: 07/10/23 1532   Weight: 69.7 kg (153 lb 9.6 oz)     Height: 5' 8" (172.7 cm)   Body mass index is 23.35 kg/m². Physical Exam  Vitals and nursing note reviewed. Constitutional:       General: She is not in acute distress. Appearance: She is well-developed. She is not diaphoretic. HENT:      Head: Normocephalic and atraumatic. Neck:      Thyroid: No thyromegaly. Vascular: No carotid bruit or JVD. Cardiovascular:      Rate and Rhythm: Normal rate and regular rhythm. No extrasystoles are present. Pulses: Intact distal pulses. Radial pulses are 2+ on the right side and 2+ on the left side. Heart sounds: Normal heart sounds, S1 normal and S2 normal. No murmur heard. Comments: No edema  Pulmonary:      Effort: Pulmonary effort is normal.      Breath sounds: Normal breath sounds. Abdominal:      General: There is no distension. Palpations: Abdomen is soft. Tenderness: There is no abdominal tenderness. Musculoskeletal:         General: Normal range of motion. Cervical back: Normal range of motion and neck supple. Lymphadenopathy:      Cervical: No cervical adenopathy. Skin:     General: Skin is warm and dry. Neurological:      Mental Status: She is alert and oriented to person, place, and time. Cranial Nerves: No cranial nerve deficit. Psychiatric:         Mood and Affect: Mood and affect normal.         Speech: Speech normal.         Behavior: Behavior normal. Behavior is cooperative.          Cognition and Memory: Cognition normal.

## 2023-07-10 NOTE — PATIENT INSTRUCTIONS
BMP ordered to monitor kidney function. Your blood pressure is borderline low today. Please monitor your blood pressure at least once daily and update me via the portal next week.  If BP is staying < 575 systolic we could try a transition to losartan instead of irbesartan

## 2023-07-11 ENCOUNTER — TELEPHONE (OUTPATIENT)
Dept: ENDOCRINOLOGY | Facility: CLINIC | Age: 53
End: 2023-07-11

## 2023-07-12 ENCOUNTER — DOCUMENTATION (OUTPATIENT)
Dept: ENDOCRINOLOGY | Facility: CLINIC | Age: 53
End: 2023-07-12

## 2023-07-12 DIAGNOSIS — E03.9 ACQUIRED HYPOTHYROIDISM: Primary | ICD-10-CM

## 2023-07-12 DIAGNOSIS — M81.0 OSTEOPOROSIS, UNSPECIFIED OSTEOPOROSIS TYPE, UNSPECIFIED PATHOLOGICAL FRACTURE PRESENCE: ICD-10-CM

## 2023-07-14 ENCOUNTER — APPOINTMENT (OUTPATIENT)
Dept: LAB | Facility: HOSPITAL | Age: 53
End: 2023-07-14
Payer: COMMERCIAL

## 2023-07-14 ENCOUNTER — OFFICE VISIT (OUTPATIENT)
Dept: PODIATRY | Age: 53
End: 2023-07-14
Payer: COMMERCIAL

## 2023-07-14 VITALS — HEIGHT: 68 IN | BODY MASS INDEX: 23.19 KG/M2 | WEIGHT: 153 LBS

## 2023-07-14 DIAGNOSIS — R10.30 LOWER ABDOMINAL PAIN: ICD-10-CM

## 2023-07-14 DIAGNOSIS — M81.0 OSTEOPOROSIS, UNSPECIFIED OSTEOPOROSIS TYPE, UNSPECIFIED PATHOLOGICAL FRACTURE PRESENCE: ICD-10-CM

## 2023-07-14 DIAGNOSIS — B35.1 ONYCHOMYCOSIS: Primary | ICD-10-CM

## 2023-07-14 DIAGNOSIS — R39.15 URINARY URGENCY: ICD-10-CM

## 2023-07-14 DIAGNOSIS — M54.50 ACUTE BILATERAL LOW BACK PAIN WITHOUT SCIATICA: ICD-10-CM

## 2023-07-14 DIAGNOSIS — I10 PRIMARY HYPERTENSION: ICD-10-CM

## 2023-07-14 DIAGNOSIS — E03.9 ACQUIRED HYPOTHYROIDISM: ICD-10-CM

## 2023-07-14 DIAGNOSIS — B35.1 ONYCHOMYCOSIS: ICD-10-CM

## 2023-07-14 LAB
25(OH)D3 SERPL-MCNC: 35.6 NG/ML (ref 30–100)
ALBUMIN SERPL BCP-MCNC: 4.8 G/DL (ref 3.5–5)
ALP SERPL-CCNC: 60 U/L (ref 34–104)
ALT SERPL W P-5'-P-CCNC: 17 U/L (ref 7–52)
ANION GAP SERPL CALCULATED.3IONS-SCNC: 7 MMOL/L
AST SERPL W P-5'-P-CCNC: 19 U/L (ref 13–39)
BILIRUB DIRECT SERPL-MCNC: 0.11 MG/DL (ref 0–0.2)
BILIRUB SERPL-MCNC: 0.61 MG/DL (ref 0.2–1)
BUN SERPL-MCNC: 7 MG/DL (ref 5–25)
CALCIUM SERPL-MCNC: 9.4 MG/DL (ref 8.4–10.2)
CHLORIDE SERPL-SCNC: 105 MMOL/L (ref 96–108)
CO2 SERPL-SCNC: 28 MMOL/L (ref 21–32)
CREAT SERPL-MCNC: 0.85 MG/DL (ref 0.6–1.3)
GFR SERPL CREATININE-BSD FRML MDRD: 78 ML/MIN/1.73SQ M
GLUCOSE P FAST SERPL-MCNC: 73 MG/DL (ref 65–99)
POTASSIUM SERPL-SCNC: 3.7 MMOL/L (ref 3.5–5.3)
PROT SERPL-MCNC: 7.4 G/DL (ref 6.4–8.4)
SODIUM SERPL-SCNC: 140 MMOL/L (ref 135–147)
T4 FREE SERPL-MCNC: 1 NG/DL (ref 0.61–1.12)
TSH SERPL DL<=0.05 MIU/L-ACNC: 1.66 UIU/ML (ref 0.45–4.5)

## 2023-07-14 PROCEDURE — 99214 OFFICE O/P EST MOD 30 MIN: CPT | Performed by: STUDENT IN AN ORGANIZED HEALTH CARE EDUCATION/TRAINING PROGRAM

## 2023-07-14 PROCEDURE — 84443 ASSAY THYROID STIM HORMONE: CPT

## 2023-07-14 PROCEDURE — 82306 VITAMIN D 25 HYDROXY: CPT

## 2023-07-14 PROCEDURE — 87086 URINE CULTURE/COLONY COUNT: CPT

## 2023-07-14 PROCEDURE — 36415 COLL VENOUS BLD VENIPUNCTURE: CPT

## 2023-07-14 PROCEDURE — 82248 BILIRUBIN DIRECT: CPT

## 2023-07-14 PROCEDURE — 84439 ASSAY OF FREE THYROXINE: CPT

## 2023-07-14 PROCEDURE — 80053 COMPREHEN METABOLIC PANEL: CPT

## 2023-07-14 RX ORDER — TERBINAFINE HYDROCHLORIDE 250 MG/1
250 TABLET ORAL DAILY
Qty: 30 TABLET | Refills: 2 | Status: SHIPPED | OUTPATIENT
Start: 2023-07-14 | End: 2023-10-12

## 2023-07-14 NOTE — PATIENT INSTRUCTIONS
Antifungal shoe spray. Leave shoes in dry place (not dark and warm). Wash shoes in hottest water and antibacterial Lysol laundry additive. Get all new socks.

## 2023-07-14 NOTE — PROGRESS NOTES
Assessment/Plan:     Diagnoses and all orders for this visit:    Onychomycosis  -     Hepatic function panel; Future  -     Hepatic function panel; Future  -     terbinafine (LamISIL) 250 mg tablet; Take 1 tablet (250 mg total) by mouth daily             IMPRESSION:  · B/L 1st and 5th toe onychomycosis     PLAN:  I discussed treatment options for toenail fungus with patient in great detail today. She has decided on oral terbinafine. I will rx 250mg/day for 90 days vs pulse dose of 500mg/day for 7 days a month for 3 months. She will get LFTs before tx and one month into tx - I will send rx once I get these results. We discussed that nails take anywhere from 6-12 months to fully grow out and thus she should continue to see improvement in distal nails as time progresses. I discussed that even with sufficient oral treatment, cure rate is not 100% and there is high chance of recurrent toenail fungal infection even if cure is achieved. I discussed general foot hygiene such as keep feet clean and dry, wearing clean socks, washing socks in hot water + laundry , cleaning showers regularly, avoiding keeping shoes in dark/wark spots, using antifungal shoe spray, alternating shoes or replacing shoes all together  F/u in 3 months; likely rx penlac for maintenance       Subjective:      Patient ID: Alex Angela is a 48 y.o. female. Ivette Merino presents to clinic today concerning B/L foot toenail dystrophy. Notes thickening, brittleness, discoloration for years. No pain. Has tried OTC antifungal treatments. The following portions of the patient's history were reviewed and updated as appropriate: allergies, current medications, past family history, past medical history, past social history, past surgical history and problem list.    Review of Systems   Constitutional: Negative for activity change, chills and fever. HENT: Negative. Respiratory: Negative for cough, chest tightness and shortness of breath. Cardiovascular: Negative for chest pain and leg swelling. Endocrine: Negative. Genitourinary: Negative. Musculoskeletal: Negative for gait problem (B/L foot pain). Skin:        B/L 1/5 toenail dystrophy   Neurological: Negative for numbness. Psychiatric/Behavioral: Negative. Negative for agitation and behavioral problems. Objective:      Ht 5' 8" (1.727 m)   Wt 69.4 kg (153 lb)   BMI 23.26 kg/m²          Physical Exam  Constitutional:       Appearance: Normal appearance. She is not ill-appearing. Cardiovascular:      Pulses: Normal pulses. Comments: Bilateral DP & PT pulses 2/4. CRT WNL. Pedal hair present. Feet warm and well perfused. Pulmonary:      Effort: Pulmonary effort is normal. No respiratory distress. Musculoskeletal:         General: No tenderness (Right and left foot pain at plantar calcaneus at medial tubercle at insertion of plantar fascia - total plantar fascia band is tight with windlass mechanism. nearly resolved). Normal range of motion. Comments: There is no significant decrease in ankle DF with knee extended and flexed indicating gastroc-soleal equinus. B/L WB exam exhibits high arch foot type, heel position subtle varus. Bilateral ankle, STJ, TNJ, TMTJ, MTPJ ROM WNL and without pain. LE muscle strength WNL. Skin:     General: Skin is warm. Capillary Refill: Capillary refill takes less than 2 seconds. Findings: No erythema or lesion. Comments: B/L 1st and 5th toenails are thickened, yellowed, brittle with subungual debris. Neurological:      General: No focal deficit present. Mental Status: She is alert and oriented to person, place, and time. Sensory: No sensory deficit. Comments: Gross sensation intact. Denies N/T/B to B/L feet.     Psychiatric:         Mood and Affect: Mood normal.

## 2023-07-15 LAB — BACTERIA UR CULT: NORMAL

## 2023-07-21 ENCOUNTER — OFFICE VISIT (OUTPATIENT)
Dept: ENDOCRINOLOGY | Facility: CLINIC | Age: 53
End: 2023-07-21
Payer: COMMERCIAL

## 2023-07-21 VITALS
DIASTOLIC BLOOD PRESSURE: 76 MMHG | BODY MASS INDEX: 22.79 KG/M2 | HEIGHT: 68 IN | WEIGHT: 150.4 LBS | HEART RATE: 73 BPM | SYSTOLIC BLOOD PRESSURE: 110 MMHG

## 2023-07-21 DIAGNOSIS — M81.0 OSTEOPOROSIS, UNSPECIFIED OSTEOPOROSIS TYPE, UNSPECIFIED PATHOLOGICAL FRACTURE PRESENCE: Primary | ICD-10-CM

## 2023-07-21 DIAGNOSIS — E03.9 ACQUIRED HYPOTHYROIDISM: ICD-10-CM

## 2023-07-21 PROCEDURE — 99214 OFFICE O/P EST MOD 30 MIN: CPT | Performed by: STUDENT IN AN ORGANIZED HEALTH CARE EDUCATION/TRAINING PROGRAM

## 2023-07-21 NOTE — PROGRESS NOTES
Carolynn Lee 48 y.o. female MRN: [id number]    Encounter: [id number]      Assessment/Plan     1. Hypothyroidism - well controlled. Dimas may continue present dosing of levothyroxine 100 mcg daily. She is planning a move, and I will arrange for her to have sufficient quantities of her thyroid medication until she is able to establish with a new provider. Would otherwise recommend 12-mo follow up labs for thyroid function    2. Osteoporosis - history of reclast therapy. Currently on holiday. She is scheduled for follow up DXA in August. I will contact her with results and recommendations. Problem List Items Addressed This Visit        Endocrine    Hypothyroidism       Musculoskeletal and Integument    Osteoporosis - Primary       CC: Hypothyroidism, osteoporosis    History of Present Illness     HPI:    Dimas returns today in follow up. Unfortunately, her  passed away in May. Dimas is planning to move near [place] to live near her daughter. Dimas is taking a stable dose of levothyroxine 100 mcg daily. She denies any hyperthyroid symptoms. She has had about 10 lb weight loss since her  passed. Her appetite has been poor. For bone health, she takes vit D and calcium supplementation daily. She denies any kidney stones. She has history of rib frcatures. She has a multiple year history of reclast use, but has been stable off therapy without any bone related events. She is scheduled for DXA in August.     Review of Systems   Constitutional: Positive for appetite change. Negative for diaphoresis and unexpected weight change. HENT: Negative for trouble swallowing and voice change. Eyes: Negative for visual disturbance. Respiratory: Negative for shortness of breath. Gastrointestinal: Negative for nausea and vomiting. Endocrine: Negative for heat intolerance. Musculoskeletal: Negative for arthralgias and myalgias. Skin: Negative for rash and wound. Neurological: Negative for tremors. Psychiatric/Behavioral: Negative for agitation. All other systems reviewed and are negative. Historical Information   Past Medical History:   Diagnosis Date   • Abnormal ECG    • Allergic 1974    Seasonal   • Anxiety    • Arthritis    • Colitis     Noted on colonoscopy 04/24/2020.    • Depression    • Disease of thyroid gland    • Gastritis     Noted on EGD 04/24/2020   • Gastroparesis    • Glaucoma    • Headache(784.0)    • Hyperlipidemia    • Hypertension    • Hypothyroidism    • Lyme disease    • Migraine    • Osteoporosis    • Scoliosis    • SVT (supraventricular tachycardia) ([address])    • Visual impairment    • VT (ventricular tachycardia) ([address])      Past Surgical History:   Procedure Laterality Date   • BREAST BIOPSY Right 10/21/2020    papilloma   • COLONOSCOPY     • EGD     • EPIDURAL BLOCK INJECTION N/A 4/20/2023    Procedure: C6-C7 ILESI;  Surgeon: Elizabeth Alcantara MD;  Location: [facility];  Service: Pain Management    • FINGER SURGERY      left pinky   • FL GUIDED NEEDLE PLAC BX/ASP/INJ  03/17/2022   • FL GUIDED NEEDLE PLAC BX/ASP/INJ  07/08/2022   • HYSTERECTOMY  2008   • HYSTERECTOMY W/ SALPINGO-OOPHERECTOMY  05/2008   • NERVE BLOCK Bilateral 03/17/2022    Procedure: L3, L4, L5 BLOCK MEDIAL BRANCH;  Surgeon: Elizabeth Alcantara MD;  Location: [facility];  Service: Pain Management    • NERVE BLOCK Bilateral 07/08/2022    Procedure: BLOCK MEDIAL BRANCH L3, L4, L5 #2;  Surgeon: Elizabeth Alcantara MD;  Location: [facility];  Service: Pain Management    • OOPHORECTOMY Bilateral 2008   • OH INJECT SI JOINT ARTHRGRPHY&/ANES/STEROID W/JOHNNY Bilateral 12/29/2022    Procedure: BLOCK / INJECTION SACROILIAC;  Surgeon: Elizabeth Alcantara MD;  Location: [facility];  Service: Pain Management    • RHIZOTOMY Bilateral 12/8/2022    Procedure: RHIZOTOMY LUMBAR L3, L4, L5 medial branch nerves;  Surgeon: Elizabeth Alcantara MD;  Location: [facility];  Service: Pain Management    • US GUIDED BREAST BIOPSY RIGHT COMPLETE Right 10/21/2020 Social History   Social History     Substance and Sexual Activity   Alcohol Use Never     Social History     Substance and Sexual Activity   Drug Use Never     Social History     Tobacco Use   Smoking Status Some Days   • Packs/day: 0.50   • Years: 10.00   • Total pack years: 5.00   • Types: Cigarettes   • Last attempt to quit: 2007   • Years since quittin.5   Smokeless Tobacco Never   Tobacco Comments    quit      Family History:   Family History   Problem Relation Age of Onset   • Peripheral vascular disease Mother    • Glaucoma Mother    • Prostate cancer Father    • Hypothyroidism Sister    • No Known Problems Daughter    • No Known Problems Daughter    • Colon cancer Maternal Grandmother    • No Known Problems Maternal Grandfather    • Arthritis Paternal Grandmother    • No Known Problems Paternal Grandfather    • No Known Problems Maternal Aunt    • No Known Problems Maternal Aunt    • Skin cancer Paternal Aunt    • No Known Problems Paternal Aunt    • Breast cancer Neg Hx    • Breast cancer additional onset Neg Hx    • Endometrial cancer Neg Hx    • BRCA2 Positive Neg Hx    • BRCA2 Negative Neg Hx    • BRCA1 Positive Neg Hx    • BRCA1 Negative Neg Hx    • BRCA 1/2 Neg Hx    • Ovarian cancer Neg Hx        Meds/Allergies   Current Outpatient Medications   Medication Sig Dispense Refill   • atorvastatin (LIPITOR) 40 mg tablet Take 1 tablet (40 mg total) by mouth daily 90 tablet 3   • Calcium Carbonate-Vit D-Min (CALCIUM 1200 PO) Take by mouth daily      • dicyclomine (BENTYL) 10 mg capsule Take 1 capsule (10 mg total) by mouth 3 (three) times a day before meals 30 capsule 0   • DULoxetine (CYMBALTA) 60 mg delayed release capsule Take 1 capsule (60 mg total) by mouth daily 90 capsule 0   • Glucosamine-Chondroitin 500-400 MG CAPS Take by mouth daily      • irbesartan (AVAPRO) 75 mg tablet Take 1 tablet (75 mg total) by mouth daily 90 tablet 3   • levothyroxine 100 mcg tablet Take 1 tablet (100 mcg total) by mouth daily 30 tablet 0   • Lumigan 0.01 % ophthalmic drops      • Magnesium 250 MG TABS Take 1 tablet (250 mg total) by mouth daily 90 tablet 3   • metoclopramide (REGLAN) 5 mg tablet Take 1 tablet (5 mg total) by mouth 4 (four) times a day 30 tablet 0   • metoprolol succinate (TOPROL-XL) 25 mg 24 hr tablet Take 1 tablet (25 mg total) by mouth daily 90 tablet 1   • metroNIDAZOLE (METROGEL) 0.75 % gel Apply 4.15 application.  topically 2 (two) times a day 45 g 0   • multivitamin (THERAGRAN) TABS Take 1 tablet by mouth daily     • Nurtec 75 MG TBDP      • Omega-3 Fatty Acids (FISH OIL OMEGA-3 PO) Take by mouth     • omeprazole (PriLOSEC) 20 mg delayed release capsule      • ondansetron (Zofran ODT) 4 mg disintegrating tablet Take 1 tablet (4 mg total) by mouth every 6 (six) hours as needed for nausea or vomiting 30 tablet 0   • terbinafine (LamISIL) 250 mg tablet Take 1 tablet (250 mg total) by mouth daily 30 tablet 2   • ALPRAZolam (XANAX) 0.25 mg tablet  (Patient not taking: Reported on 7/10/2023)     • ALPRAZolam (XANAX) 0.5 mg tablet Take 1 tablet (0.5 mg total) by mouth daily at bedtime as needed for anxiety (Patient not taking: Reported on 7/10/2023) 30 tablet 0   • Cholecalciferol (VITAMIN D3 PO) Take 1,000 mg by mouth (Patient not taking: Reported on 7/21/2023)     • cyclobenzaprine (FLEXERIL) 5 mg tablet Take 1 tablet (5 mg total) by mouth daily at bedtime as needed for muscle spasms (Patient not taking: Reported on 7/10/2023) 30 tablet 1   • hydrOXYzine HCL (ATARAX) 25 mg tablet Take 1 tablet (25 mg total) by mouth as needed for anxiety (Patient not taking: Reported on 7/10/2023) 30 tablet 0   • omeprazole (PriLOSEC) 40 MG capsule  (Patient not taking: Reported on 7/10/2023)     • pregabalin (LYRICA) 25 mg capsule Take 1 capsule (25 mg total) by mouth 3 (three) times a day (Patient not taking: Reported on 7/14/2023) 90 capsule 0   • scopolamine (TRANSDERM-SCOP) 1 mg/3 days TD 72 hr patch Place 1 patch on the skin over 72 hours every third day (Patient not taking: Reported on 7/10/2023) 4 patch 0   • SUMAtriptan (IMITREX) 100 mg tablet Take 100 mg by mouth as needed (Patient not taking: Reported on 7/10/2023)       No current facility-administered medications for this visit. No Known Allergies    Objective   Vitals: Blood pressure 110/76, pulse 73, height 5' 8" (1.727 m), weight 68.2 kg (150 lb 6.4 oz). Physical Exam  Vitals reviewed. Constitutional:       General: She is not in acute distress. Appearance: Normal appearance. HENT:      Head: Normocephalic and atraumatic. Nose: Nose normal.   Eyes:      General: No scleral icterus. Conjunctiva/sclera: Conjunctivae normal.   Neck:      Thyroid: No thyroid mass, thyromegaly or thyroid tenderness. Cardiovascular:      Rate and Rhythm: Normal rate and regular rhythm. Pulses: Normal pulses. Heart sounds: No murmur heard. Pulmonary:      Effort: Pulmonary effort is normal. No respiratory distress. Abdominal:      Tenderness: There is no abdominal tenderness. Musculoskeletal:      Cervical back: Neck supple. Lymphadenopathy:      Cervical: No cervical adenopathy. Skin:     General: Skin is warm and dry. Neurological:      General: No focal deficit present. Mental Status: She is alert. Psychiatric:         Mood and Affect: Mood normal.         Behavior: Behavior normal.         The history was obtained from the review of the chart, patient.     Lab Results:   Lab Results   Component Value Date/Time    TSH 3RD GENERATON 1.664 07/14/2023 09:29 AM    TSH 3RD GENERATON 2.661 01/03/2023 10:00 AM    TSH 3RD GENERATON 0.771 10/28/2022 11:24 AM    Free T4 1.00 07/14/2023 09:29 AM    Free T4 1.06 01/03/2023 10:00 AM    Free T4 1.09 10/03/2022 10:41 AM     Lab Results   Component Value Date    SODIUM 140 07/14/2023    K 3.7 07/14/2023     07/14/2023    CO2 28 07/14/2023    AGAP 7 07/14/2023    BUN 7 07/14/2023 CREATININE 0.85 07/14/2023    GLUC 114 10/28/2022    GLUF 73 07/14/2023    CALCIUM 9.4 07/14/2023    AST 19 07/14/2023    ALT 17 07/14/2023    ALKPHOS 60 07/14/2023    TP 7.4 07/14/2023    TBILI 0.61 07/14/2023    EGFR 78 07/14/2023         Component      Latest Ref Rng & Units 5/17/2022 5/24/2022 8/10/2022 9/12/2022   Sodium      135 - 147 mmol/L 139 142 140    Potassium      3.5 - 5.3 mmol/L 3.7 3.5 4.0    Chloride      96 - 108 mmol/L 103 104 103    CO2      21 - 32 mmol/L 26 30 28    Anion Gap      4 - 13 mmol/L 10 8 9    BUN      5 - 25 mg/dL 10 15 12    Creatinine      0.60 - 1.30 mg/dL 0.84 0.91 0.89    GLUCOSE FASTING      65 - 99 mg/dL 87      Calcium      8.3 - 10.1 mg/dL 9.0 8.8 8.9    AST      5 - 45 U/L 20 11 20    ALT      12 - 78 U/L 28 18 29    Alkaline Phosphatase      46 - 116 U/L 54 57 73    Total Protein      6.4 - 8.4 g/dL 7.8 7.6 7.7    Albumin      3.5 - 5.0 g/dL 4.2 4.1 4.0    TOTAL BILIRUBIN      0.20 - 1.00 mg/dL 0.51 0.34 0.54    eGFR      ml/min/1.73sq m 80 72 74    Glucose, Random      65 - 140 mg/dL  91 97    Cholesterol      See Comment mg/dL 200   159   Triglycerides      See Comment mg/dL 180 (H)   135   HDL      >=50 mg/dL 53   62   LDL Calculated      0 - 100 mg/dL 111 (H)   70   Non-HDL Cholesterol      mg/dl 147   97   TSH 3RD GENERATON      0.450 - 4.500 uIU/mL  0.913     Free T4      0.76 - 1.46 ng/dL  1.11     Phosphorus      2.7 - 4.5 mg/dL  3.4 3.7    Magnesium      1.6 - 2.6 mg/dL  2.1     PARATHYROID HORMONE      18.4 - 80.1 pg/mL  36.8 49.5    Sed Rate      0 - 29 mm/hour  18             Imaging Studies:   ?   10/22/2021  CENTRAL DXA SCAN     CLINICAL HISTORY:  51-year-old postmenopausal  female with a family history of low impact parenteral hip fracture. Osteoporosis screening. OTHER RISK FACTORS:  None.     PHARMACOLOGIC THERAPY FOR OSTEOPOROSIS:  Prolia.     TECHNIQUE: Bone densitometry was performed using a Hologic Horizon A  bone densitometer.   Regions of interest appear properly placed.      COMPARISON: Outside studies performed on a dissimilar DEXA unit, most recent November 13, 2019.           RESULTS:   LUMBAR SPINE L1-L4 :   BMD  0.881  gm/cm2   T-score -1.5    These values are artifactually elevated due to the presence of scoliosis with spondylosis.     LEFT  TOTAL HIP:   BMD:  0.886  gm/cm2   T-score:  -0.5     LEFT  FEMORAL NECK:   BMD:  0.710  gm/cm2   T score: -1.2               IMPRESSION:  1. Low bone mass (OSTEOPENIA). [Based on the lumbar spine]     2. Since the prior study, there has been a slight DECREASE in the total bone mineral densities of both the lumbar spine and left hip.     It should be noted however that the examinations were performed on dissimilar DXA units. I have personally reviewed pertinent reports. Portions of the record may have been created with voice recognition software. Occasional wrong word or "sound a like" substitutions may have occurred due to the inherent limitations of voice recognition software. Read the chart carefully and recognize, using context, where substitutions have occurred.

## 2023-07-30 NOTE — ASSESSMENT & PLAN NOTE
Patient with two episodes of syncope and collapse in 2021. Concern exists for arrhythmia given ZIO with short run of NSVT. Implantable loop recorder placed May 27 2021 with no events noted on loop recorder thus far. I have asked patient to monitor blood pressures 1-2 times daily at home. She may require further adjustment of her antihypertensive regimen. Will continue to monitor.

## 2023-07-30 NOTE — ASSESSMENT & PLAN NOTE
Blood pressure is borderline low today. Irbesartan hydrochlorothiazide 150/12.5 mg daily discontinued and replaced with irbesartan 75 mg daily 5/2021 due to relatively low blood pressures. She has had episodes of near syncope. I have asked patient to monitor BP 1-2 times daily and report readings after 2 weeks. May require adjustment of anti-hypertensive regimen. Continue irbesartan 75 mg daily, metoprolol succinate 25 mg daily for now. BMP ordered.

## 2023-07-30 NOTE — ASSESSMENT & PLAN NOTE
Patient is currently on atorvastatin  40 mg daily. Lipid panel 3/17/2023: LDL 71. Continue current regimen.

## 2023-07-30 NOTE — ASSESSMENT & PLAN NOTE
Only two short runs of SVT noted on ZIO 1/2020. Continue metoprolol succinate 25 mg daily. Continue magnesium 250 mg daily. Currently has implantable loop recorder which was placed 5/27/2021. Palpitations have been rare since last office visit.

## 2023-08-03 DIAGNOSIS — R51.9 DAILY HEADACHE: Primary | ICD-10-CM

## 2023-08-03 DIAGNOSIS — E87.6 HYPOKALEMIA: ICD-10-CM

## 2023-08-03 DIAGNOSIS — E03.9 ACQUIRED HYPOTHYROIDISM: ICD-10-CM

## 2023-08-03 DIAGNOSIS — G43.009 MIGRAINE WITHOUT AURA AND WITHOUT STATUS MIGRAINOSUS, NOT INTRACTABLE: ICD-10-CM

## 2023-08-03 DIAGNOSIS — I10 ESSENTIAL HYPERTENSION: ICD-10-CM

## 2023-08-03 DIAGNOSIS — I10 HYPERTENSION, UNSPECIFIED TYPE: ICD-10-CM

## 2023-08-03 DIAGNOSIS — H40.9 GLAUCOMA OF RIGHT EYE, UNSPECIFIED GLAUCOMA TYPE: ICD-10-CM

## 2023-08-03 DIAGNOSIS — B35.1 ONYCHOMYCOSIS: ICD-10-CM

## 2023-08-03 DIAGNOSIS — R00.2 PALPITATIONS: ICD-10-CM

## 2023-08-03 RX ORDER — BIMATOPROST 0.1 MG/ML
1 SOLUTION/ DROPS OPHTHALMIC
Qty: 5 ML | Refills: 1 | Status: SHIPPED | OUTPATIENT
Start: 2023-08-03 | End: 2023-09-17 | Stop reason: SDUPTHER

## 2023-08-03 RX ORDER — RIMEGEPANT SULFATE 75 MG/75MG
75 TABLET, ORALLY DISINTEGRATING ORAL ONCE
Qty: 10 TABLET | Refills: 1 | Status: SHIPPED | OUTPATIENT
Start: 2023-08-03 | End: 2023-08-03

## 2023-08-03 RX ORDER — MULTIVITAMIN WITH IRON
250 TABLET ORAL DAILY
Qty: 90 TABLET | Refills: 3 | Status: SHIPPED | OUTPATIENT
Start: 2023-08-03 | End: 2023-09-17 | Stop reason: SDUPTHER

## 2023-08-03 RX ORDER — IRBESARTAN 75 MG/1
75 TABLET ORAL DAILY
Qty: 90 TABLET | Refills: 3 | Status: SHIPPED | OUTPATIENT
Start: 2023-08-03 | End: 2023-09-17 | Stop reason: SDUPTHER

## 2023-08-03 RX ORDER — LEVOTHYROXINE SODIUM 0.1 MG/1
100 TABLET ORAL DAILY
Qty: 90 TABLET | Refills: 0 | Status: SHIPPED | OUTPATIENT
Start: 2023-08-03 | End: 2023-09-17 | Stop reason: SDUPTHER

## 2023-08-07 RX ORDER — TERBINAFINE HYDROCHLORIDE 250 MG/1
250 TABLET ORAL DAILY
Qty: 30 TABLET | Refills: 0 | Status: SHIPPED | OUTPATIENT
Start: 2023-08-07 | End: 2023-11-05

## 2023-08-08 ENCOUNTER — OFFICE VISIT (OUTPATIENT)
Dept: FAMILY MEDICINE CLINIC | Facility: CLINIC | Age: 53
End: 2023-08-08
Payer: COMMERCIAL

## 2023-08-08 VITALS
HEART RATE: 77 BPM | DIASTOLIC BLOOD PRESSURE: 79 MMHG | BODY MASS INDEX: 23.07 KG/M2 | OXYGEN SATURATION: 99 % | WEIGHT: 152.2 LBS | HEIGHT: 68 IN | SYSTOLIC BLOOD PRESSURE: 130 MMHG

## 2023-08-08 DIAGNOSIS — F43.9 STRESS AT HOME: ICD-10-CM

## 2023-08-08 DIAGNOSIS — F33.0 MILD EPISODE OF RECURRENT MAJOR DEPRESSIVE DISORDER (HCC): ICD-10-CM

## 2023-08-08 DIAGNOSIS — R11.0 NAUSEA: ICD-10-CM

## 2023-08-08 DIAGNOSIS — G44.89 OTHER HEADACHE SYNDROME: Primary | ICD-10-CM

## 2023-08-08 DIAGNOSIS — R19.7 DIARRHEA, UNSPECIFIED TYPE: ICD-10-CM

## 2023-08-08 DIAGNOSIS — F41.9 ANXIETY: ICD-10-CM

## 2023-08-08 DIAGNOSIS — L71.9 ROSACEA: ICD-10-CM

## 2023-08-08 DIAGNOSIS — K31.84 GASTROPARESIS: ICD-10-CM

## 2023-08-08 PROCEDURE — 99213 OFFICE O/P EST LOW 20 MIN: CPT | Performed by: NURSE PRACTITIONER

## 2023-08-08 RX ORDER — DULOXETIN HYDROCHLORIDE 60 MG/1
60 CAPSULE, DELAYED RELEASE ORAL DAILY
Qty: 90 CAPSULE | Refills: 3 | Status: SHIPPED | OUTPATIENT
Start: 2023-08-08

## 2023-08-08 RX ORDER — RIMEGEPANT SULFATE 75 MG/75MG
TABLET, ORALLY DISINTEGRATING ORAL
COMMUNITY
Start: 2023-08-03

## 2023-08-08 RX ORDER — ONDANSETRON 4 MG/1
4 TABLET, ORALLY DISINTEGRATING ORAL EVERY 6 HOURS PRN
Qty: 30 TABLET | Refills: 0 | Status: SHIPPED | OUTPATIENT
Start: 2023-08-08

## 2023-08-08 NOTE — PROGRESS NOTES
Name: Francheska Burger      : 1970      MRN: [id number]  Encounter Provider: FITO Flores  Encounter Date: 2023   Encounter department: [facility]    Assessment & Plan     1. Other headache syndrome    2. Anxiety  -     DULoxetine (CYMBALTA) 60 mg delayed release capsule; Take 1 capsule (60 mg total) by mouth daily    3. Mild episode of recurrent major depressive disorder ([address])    4. Nausea  -     ondansetron (Zofran ODT) 4 mg disintegrating tablet; Take 1 tablet (4 mg total) by mouth every 6 (six) hours as needed for nausea or vomiting    5. Diarrhea, unspecified type    6. Gastroparesis  -     ondansetron (Zofran ODT) 4 mg disintegrating tablet; Take 1 tablet (4 mg total) by mouth every 6 (six) hours as needed for nausea or vomiting    7. Rosacea  -     metroNIDAZOLE (METROCREAM) 0.75 % cream; Apply topically 2 (two) times a day    8. Gastroesophageal reflux disease without esophagitis    9. Stress at home  -     DULoxetine (CYMBALTA) 60 mg delayed release capsule; Take 1 capsule (60 mg total) by mouth daily    Suspect viral source of headache, diarrhea, and feeling tired. Refill for rosacea cream provided in cream form. Zofran refilled for nausea. She is with a hx of gastroparesis - recommended she eat small portions of food to help with her nausea. Anxiety and depression are a bit worse but she is in the process of moving to [place]. Subjective      Here today ofr a follow-up. Hx of anxiety and depression - recent loss of her  to a quick illness. Reports she is planning to [place] be near her daughter. Reports starting  with a headache and then yesterday with three bouts of diarrhea and nausea. She has a hx of gastroparesis also. Also notes of feeling washed out and tired. Headache  Diarrhea   Associated symptoms include headaches.    Nausea  Associated symptoms include fatigue, headaches and nausea. Review of Systems   Constitutional: Positive for fatigue. Gastrointestinal: Positive for diarrhea and nausea. Neurological: Positive for headaches. Psychiatric/Behavioral: Positive for dysphoric mood. The patient is nervous/anxious. All other systems reviewed and are negative.       Current Outpatient Medications on File Prior to Visit   Medication Sig   • ALPRAZolam (XANAX) 0.25 mg tablet    • ALPRAZolam (XANAX) 0.5 mg tablet Take 1 tablet (0.5 mg total) by mouth daily at bedtime as needed for anxiety   • atorvastatin (LIPITOR) 40 mg tablet Take 1 tablet (40 mg total) by mouth daily   • Calcium Carbonate-Vit D-Min (CALCIUM 1200 PO) Take by mouth daily    • dicyclomine (BENTYL) 10 mg capsule Take 1 capsule (10 mg total) by mouth 3 (three) times a day before meals   • Glucosamine-Chondroitin 500-400 MG CAPS Take by mouth daily    • hydrOXYzine HCL (ATARAX) 25 mg tablet Take 1 tablet (25 mg total) by mouth as needed for anxiety   • irbesartan (AVAPRO) 75 mg tablet Take 1 tablet (75 mg total) by mouth daily   • levothyroxine 100 mcg tablet Take 1 tablet (100 mcg total) by mouth daily   • Lumigan 0.01 % ophthalmic drops Administer 1 drop to both eyes daily at bedtime   • Magnesium 250 MG TABS Take 1 tablet (250 mg total) by mouth daily   • metoclopramide (REGLAN) 5 mg tablet Take 1 tablet (5 mg total) by mouth 4 (four) times a day   • metoprolol succinate (TOPROL-XL) 25 mg 24 hr tablet Take 1 tablet (25 mg total) by mouth daily   • multivitamin (THERAGRAN) TABS Take 1 tablet by mouth daily   • Nurtec 75 MG TBDP    • omeprazole (PriLOSEC) 20 mg delayed release capsule    • omeprazole (PriLOSEC) 40 MG capsule    • pregabalin (LYRICA) 25 mg capsule Take 1 capsule (25 mg total) by mouth 3 (three) times a day   • SUMAtriptan (IMITREX) 100 mg tablet Take 100 mg by mouth as needed   • terbinafine (LamISIL) 250 mg tablet Take 1 tablet (250 mg total) by mouth daily   • [DISCONTINUED] DULoxetine (CYMBALTA) 60 mg delayed release capsule Take 1 capsule (60 mg total) by mouth daily   • [DISCONTINUED] metroNIDAZOLE (METROGEL) 0.75 % gel Apply 6.62 application. topically 2 (two) times a day   • [DISCONTINUED] ondansetron (Zofran ODT) 4 mg disintegrating tablet Take 1 tablet (4 mg total) by mouth every 6 (six) hours as needed for nausea or vomiting   • [DISCONTINUED] Cholecalciferol (VITAMIN D3 PO) Take 1,000 mg by mouth (Patient not taking: Reported on 7/21/2023)   • [DISCONTINUED] cyclobenzaprine (FLEXERIL) 5 mg tablet Take 1 tablet (5 mg total) by mouth daily at bedtime as needed for muscle spasms (Patient not taking: Reported on 7/10/2023)   • [DISCONTINUED] Omega-3 Fatty Acids (FISH OIL OMEGA-3 PO) Take by mouth (Patient not taking: Reported on 8/8/2023)   • [DISCONTINUED] scopolamine (TRANSDERM-SCOP) 1 mg/3 days TD 72 hr patch Place 1 patch on the skin over 72 hours every third day (Patient not taking: Reported on 7/10/2023)       Objective     /79 (BP Location: Left arm, Patient Position: Sitting, Cuff Size: Standard)   Pulse 77   Ht 5' 8" (1.727 m)   Wt 69 kg (152 lb 3.2 oz)   SpO2 99%   BMI 23.14 kg/m²     Physical Exam  Constitutional:       Appearance: Normal appearance. Cardiovascular:      Rate and Rhythm: Normal rate and regular rhythm. Pulmonary:      Effort: Pulmonary effort is normal.      Breath sounds: Normal breath sounds. Neurological:      Mental Status: She is alert. Psychiatric:         Mood and Affect: Mood normal.         Behavior: Behavior normal.         Thought Content:  Thought content normal.         Judgment: Judgment normal.       Winton Schaumann, CRNP

## 2023-08-08 NOTE — LETTER
August 8, 2023     Patient: Efrain Ballesteros  YOB: 1970  Date of Visit: 8/8/2023      To Whom it May Concern:    Efrain Ballesteros is under my professional care. Sydni Cohen was seen in my office on 8/8/2023. Please excuse Sydni Cohen from work today, 08/10/2023. If you have any questions or concerns, please don't hesitate to call.          Sincerely,          [facility]

## 2023-08-15 ENCOUNTER — APPOINTMENT (OUTPATIENT)
Dept: LAB | Facility: HOSPITAL | Age: 53
End: 2023-08-15
Payer: COMMERCIAL

## 2023-08-15 DIAGNOSIS — B35.1 ONYCHOMYCOSIS: ICD-10-CM

## 2023-08-15 LAB
ALBUMIN SERPL BCP-MCNC: 4.5 G/DL (ref 3.5–5)
ALP SERPL-CCNC: 56 U/L (ref 34–104)
ALT SERPL W P-5'-P-CCNC: 17 U/L (ref 7–52)
AST SERPL W P-5'-P-CCNC: 18 U/L (ref 13–39)
BILIRUB DIRECT SERPL-MCNC: 0.05 MG/DL (ref 0–0.2)
BILIRUB SERPL-MCNC: 0.54 MG/DL (ref 0.2–1)
PROT SERPL-MCNC: 7.2 G/DL (ref 6.4–8.4)

## 2023-08-15 PROCEDURE — 80076 HEPATIC FUNCTION PANEL: CPT

## 2023-08-15 PROCEDURE — 36415 COLL VENOUS BLD VENIPUNCTURE: CPT

## 2023-08-21 ENCOUNTER — HOSPITAL ENCOUNTER (OUTPATIENT)
Dept: RADIOLOGY | Facility: CLINIC | Age: 53
Discharge: HOME | End: 2023-08-21
Payer: COMMERCIAL

## 2023-08-21 DIAGNOSIS — M81.0 OSTEOPOROSIS, UNSPECIFIED OSTEOPOROSIS TYPE, UNSPECIFIED PATHOLOGICAL FRACTURE PRESENCE: ICD-10-CM

## 2023-08-21 PROCEDURE — 77080 DXA BONE DENSITY AXIAL: CPT

## 2023-09-17 DIAGNOSIS — R00.2 PALPITATIONS: ICD-10-CM

## 2023-09-17 DIAGNOSIS — F41.9 ANXIETY: ICD-10-CM

## 2023-09-17 DIAGNOSIS — B35.1 ONYCHOMYCOSIS: ICD-10-CM

## 2023-09-17 DIAGNOSIS — K21.9 GASTROESOPHAGEAL REFLUX DISEASE WITHOUT ESOPHAGITIS: Primary | ICD-10-CM

## 2023-09-17 DIAGNOSIS — H40.9 GLAUCOMA OF RIGHT EYE, UNSPECIFIED GLAUCOMA TYPE: ICD-10-CM

## 2023-09-17 DIAGNOSIS — E78.2 MIXED HYPERLIPIDEMIA: ICD-10-CM

## 2023-09-17 DIAGNOSIS — K31.84 GASTROPARESIS: ICD-10-CM

## 2023-09-17 DIAGNOSIS — E87.6 HYPOKALEMIA: ICD-10-CM

## 2023-09-17 DIAGNOSIS — R11.0 NAUSEA: ICD-10-CM

## 2023-09-17 DIAGNOSIS — L71.9 ROSACEA: ICD-10-CM

## 2023-09-17 DIAGNOSIS — I10 HYPERTENSION, UNSPECIFIED TYPE: ICD-10-CM

## 2023-09-17 DIAGNOSIS — F43.9 STRESS AT HOME: ICD-10-CM

## 2023-09-17 DIAGNOSIS — E03.9 ACQUIRED HYPOTHYROIDISM: ICD-10-CM

## 2023-09-17 DIAGNOSIS — I10 ESSENTIAL HYPERTENSION: ICD-10-CM

## 2023-09-18 RX ORDER — MULTIVITAMIN WITH IRON
250 TABLET ORAL DAILY
Qty: 90 TABLET | Refills: 0 | Status: SHIPPED | OUTPATIENT
Start: 2023-09-18

## 2023-09-18 RX ORDER — ONDANSETRON 4 MG/1
4 TABLET, ORALLY DISINTEGRATING ORAL EVERY 6 HOURS PRN
Qty: 30 TABLET | Refills: 0 | Status: SHIPPED | OUTPATIENT
Start: 2023-09-18

## 2023-09-18 RX ORDER — IRBESARTAN 75 MG/1
75 TABLET ORAL DAILY
Qty: 90 TABLET | Refills: 0 | Status: SHIPPED | OUTPATIENT
Start: 2023-09-18

## 2023-09-18 RX ORDER — LEVOTHYROXINE SODIUM 0.1 MG/1
100 TABLET ORAL DAILY
Qty: 90 TABLET | Refills: 0 | Status: SHIPPED | OUTPATIENT
Start: 2023-09-18

## 2023-09-18 RX ORDER — DICYCLOMINE HYDROCHLORIDE 10 MG/1
10 CAPSULE ORAL
Qty: 30 CAPSULE | Refills: 0 | Status: SHIPPED | OUTPATIENT
Start: 2023-09-18

## 2023-09-18 RX ORDER — CYCLOBENZAPRINE HCL 5 MG
TABLET ORAL
COMMUNITY

## 2023-09-18 RX ORDER — BIMATOPROST 0.1 MG/ML
1 SOLUTION/ DROPS OPHTHALMIC
Qty: 5 ML | Refills: 0 | Status: SHIPPED | OUTPATIENT
Start: 2023-09-18

## 2023-09-18 RX ORDER — AMITRIPTYLINE HYDROCHLORIDE 25 MG/1
TABLET, FILM COATED ORAL
COMMUNITY
Start: 2023-09-01

## 2023-09-18 RX ORDER — OMEPRAZOLE 40 MG/1
40 CAPSULE, DELAYED RELEASE ORAL DAILY
Qty: 90 CAPSULE | Refills: 1 | Status: SHIPPED | OUTPATIENT
Start: 2023-09-18

## 2023-09-18 RX ORDER — ATORVASTATIN CALCIUM 40 MG/1
40 TABLET, FILM COATED ORAL DAILY
Qty: 90 TABLET | Refills: 1 | Status: SHIPPED | OUTPATIENT
Start: 2023-09-18

## 2023-09-18 RX ORDER — ALPRAZOLAM 0.25 MG/1
0.25 TABLET ORAL 2 TIMES DAILY PRN
Qty: 20 TABLET | Refills: 0 | Status: SHIPPED | OUTPATIENT
Start: 2023-09-18

## 2023-09-18 RX ORDER — DULOXETIN HYDROCHLORIDE 60 MG/1
60 CAPSULE, DELAYED RELEASE ORAL DAILY
Qty: 90 CAPSULE | Refills: 1 | Status: SHIPPED | OUTPATIENT
Start: 2023-09-18

## 2023-09-18 RX ORDER — METOCLOPRAMIDE 5 MG/1
5 TABLET ORAL 4 TIMES DAILY
Qty: 30 TABLET | Refills: 0 | Status: SHIPPED | OUTPATIENT
Start: 2023-09-18

## 2023-09-18 RX ORDER — HYDROXYZINE HYDROCHLORIDE 25 MG/1
25 TABLET, FILM COATED ORAL AS NEEDED
Qty: 30 TABLET | Refills: 0 | Status: SHIPPED | OUTPATIENT
Start: 2023-09-18

## 2023-09-21 RX ORDER — TERBINAFINE HYDROCHLORIDE 250 MG/1
250 TABLET ORAL DAILY
Qty: 30 TABLET | Refills: 0 | Status: SHIPPED | OUTPATIENT
Start: 2023-09-21 | End: 2023-12-20

## 2023-10-04 ENCOUNTER — REMOTE DEVICE CLINIC VISIT (OUTPATIENT)
Dept: CARDIOLOGY CLINIC | Facility: CLINIC | Age: 53
End: 2023-10-04
Payer: COMMERCIAL

## 2023-10-04 DIAGNOSIS — Z95.818 PRESENCE OF OTHER CARDIAC IMPLANTS AND GRAFTS: Primary | ICD-10-CM

## 2023-10-04 PROCEDURE — G2066 INTER DEVC REMOTE 30D: HCPCS | Performed by: INTERNAL MEDICINE

## 2023-10-04 PROCEDURE — 93298 REM INTERROG DEV EVAL SCRMS: CPT | Performed by: INTERNAL MEDICINE

## 2023-10-04 NOTE — PROGRESS NOTES
AVE [id number]/ ACTIVE SYSTEM IS MRI CONDITIONAL   CARELINK TRANSMISSION: LOOP RECORDER. PRESENTING RYTHM NSR @ 60 BPM. BATTERY STATUS "OK." NO PATIENT OR DEVICE ACTIVATED EPISODES. NORMAL DEVICE FUNCTION.  DL

## 2023-11-05 NOTE — H&P (VIEW-ONLY)
Assessment  1  Lumbar spondylosis  -     Case request operating room: RHIZOTOMY LUMBAR L3, L4, L5 medial branch nerves; Standing  -     Case request operating room: RHIZOTOMY LUMBAR L3, L4, L5 medial branch nerves    Greater than 90% relief of pain with improved ability to participate with IADLs after bilateral L3, L4, L5 medial branch blocks #1 and #2 for one week at a time  Previously reported the following symptomatology:     Neck and low back pain described primarily are radicular features  Pain radiates in C6 and C7 dermatomal distribution from neck to hands bilaterally as well as primarily arthritic in nature in low back; additionally with radicular features in L3 and L4 dermatomal distribution right greater than left  5/5 strength bilaterally with active range of motion movements in upper lower extremities  Slight pain limited weakness with left hip flexion, knee extension  Negative Spurling's maneuver, negative SLR bilaterally  Significant tenderness to palpation with lumbar cervical facet loading maneuvers  She is currently engaged with PT for both her neck and low back with modest relief of the pain  She has not trialed medications other than OTC tylenol and ibuprofen  MRI cervical spine noncontrast, MRI lumbar spine noncontrast show mild compressive disease with spondyloarthropathy  Will proceed with multimodal pain therapy; counseled regarding lifestyle modifications including PT  Plan  -bilateral L3, L4, L5 medial branch nerve radiofrequency ablation with IV sedation/anesthesia; f/u 2 weeks post procedure  -gabapentin 100 mg t i d  Ordered for patient; has since tapered counseled regarding sedative effects of taking this medication and provided up titration calendar  Counseled not to take medication while driving or operating heavy machinery/using stairs  -meloxicam 7 5 mg b i d  P r n   pain  Patient has since tapered  Patient has since tapered   Patient educated regarding bleeding risk of taking this medication not taking any other nonsteroidal anti-inflammatory medications while taking this medication; counseled thoroughly regarding potential risk of Cardiovascular injury, Kidney injury, Gastrointestinal ulceration/bleeding  Patient voiced understanding  -physical therapy for right-sided lumbar radiculopathy, lumbar facet arthropathy; Physician directed home exercise plan as per [facility] demonstrated and handouts provided that patient plans to participate with for 1 hour, twice a week for the next 6 weeks  There are risks associated with opioid medications, including dependence, addiction and tolerance  The patient understands and agrees to use these medications only as prescribed  Potential side effects of the medications include, but are not limited to, constipation, drowsiness, addiction, impaired judgment and risk of fatal overdose if not taken as prescribed  The patient was warned against driving while taking sedation medications  Sharing medications is a felony  At this point in time, the patient is showing no signs of addiction, abuse, diversion or suicidal ideation  [place] Prescription Drug Monitoring Program report was reviewed and was appropriate      Complete risks and benefits including bleeding, infection, tissue reaction, nerve injury and allergic reaction were discussed  The approach was demonstrated using models and literature was provided  Verbal and written consent was obtained  My impressions and treatment recommendations were discussed in detail with the patient who verbalized understanding and had no further questions  Discharge instructions were provided  I personally saw and examined the patient and I agree with the above discussed plan of care  My impressions and treatment recommendations were discussed in detail with the patient who verbalized understanding and had no further questions  Discharge instructions were provided   I personally saw and examined the patient and I agree with the above discussed plan of care  New Medications Ordered This Visit   Medications   • BLACK ELDERBERRY PO     Sig: Take by mouth   • MAGNESIUM PO     Sig: magnesium 250 mg tablet   • METHYLPREDNISOLONE PO     Sig: methylprednisolone 4 mg tablets in a dose pack   taper as directed       History of Present Illness    Greater than 90% relief of pain with improved ability to participate with IADLs after bilateral L3, L4, L5 medial branch blocks #1 and #2 for one week at at time  Previously reported the following symptomatology:     Michelle Lopez is a 46 y o  female with past medical history of hypertension, hypothyroidism, hypercholesterolemia, migraine headaches presenting with neck and low back pain described primarily radicular features in neck and axial/arthritic features in low back  Neck pain radiates into the C6 and C7 dermatomal distribution bilaterally accompanied by pain limited weakness numbness and paresthesias  In low back radicular pain travels into the bilateral L3 and L4 dermatomal distribution, right greater than left; however in low back, features are mostly arthritic in nature  She describes lancinating features of the pain  She has been to formal physical therapy for both neck and low back pain with modest relief of her symptoms  She has trialed over-the-counter NSAIDs as well as Tylenol with modest relief of the pain  Her pain significantly impacts independent activities of daily living and overall function, 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  She denies any bowel or bladder abnormality, incontinence, gait instability, headaches or dizziness  I have personally reviewed and/or updated the patient's past medical history, past surgical history, family history, social history, current medications, allergies, and vital signs today  Review of Systems   Constitutional: Positive for activity change  HENT: Negative  Eyes: Negative  Respiratory: Negative  Cardiovascular: Negative  Gastrointestinal: Negative  Endocrine: Negative  Genitourinary: Negative  Musculoskeletal: Positive for arthralgias, back pain, myalgias, neck pain and neck stiffness  Skin: Negative  Allergic/Immunologic: Negative  Neurological: Positive for weakness and numbness  Hematological: Negative  Psychiatric/Behavioral: Negative  All other systems reviewed and are negative  Patient Active Problem List   Diagnosis   • Palpitations   • Hypertension   • SVT (supraventricular tachycardia) (HCC)   • VT (ventricular tachycardia)   • Hyperlipidemia   • Depression   • Osteoporosis   • Arthritis   • Hypothyroidism   • Migraine   • Glaucoma   • Anxiety   • Restless legs   • History of syncope   • Polyarthralgia   • Lumbar spondylosis   • Gastroesophageal reflux disease without esophagitis       Past Medical History:   Diagnosis Date   • Allergic 1974    Seasonal   • Anxiety    • Arthritis    • Colitis     Noted on colonoscopy 04/24/2020     • Depression    • Disease of thyroid gland    • Gastritis     Noted on EGD 04/24/2020   • Gastroparesis    • Glaucoma    • Headache(784 0)    • Hyperlipidemia    • Hypertension    • Hypothyroidism    • Lyme disease    • Migraine    • Osteoporosis    • Scoliosis    • SVT (supraventricular tachycardia) (HCC)    • Visual impairment    • VT (ventricular tachycardia)        Past Surgical History:   Procedure Laterality Date   • BREAST BIOPSY Right 10/21/2020    papilloma   • COLONOSCOPY     • EGD     • FINGER SURGERY      left pinky   • FL GUIDED NEEDLE PLAC BX/ASP/INJ  03/17/2022   • FL GUIDED NEEDLE PLAC BX/ASP/INJ  07/08/2022   • HYSTERECTOMY  2008   • HYSTERECTOMY W/ SALPINGO-OOPHERECTOMY  05/2008   • NERVE BLOCK Bilateral 03/17/2022    Procedure: L3, L4, L5 BLOCK MEDIAL BRANCH;  Surgeon: Michael Sevilla MD;  Location: [facility];  Service: Pain Management    • NERVE BLOCK Bilateral 07/08/2022    Procedure: BLOCK MEDIAL BRANCH L3, L4, L5 #2;  Surgeon: Carola Peace MD;  Location: [facility];  Service: Pain Management    • OOPHORECTOMY Bilateral 2008   • US GUIDED BREAST BIOPSY RIGHT COMPLETE Right 10/21/2020       Family History   Problem Relation Age of Onset   • Peripheral vascular disease Mother    • Glaucoma Mother    • Prostate cancer Father    • Hypothyroidism Sister    • No Known Problems Daughter    • No Known Problems Daughter    • Colon cancer Maternal Grandmother    • No Known Problems Maternal Grandfather    • Arthritis Paternal Grandmother    • No Known Problems Paternal Grandfather    • No Known Problems Maternal Aunt    • No Known Problems Maternal Aunt    • Skin cancer Paternal Aunt    • No Known Problems Paternal Aunt    • Breast cancer Neg Hx    • Breast cancer additional onset Neg Hx    • Endometrial cancer Neg Hx    • BRCA2 Positive Neg Hx    • BRCA2 Negative Neg Hx    • BRCA1 Positive Neg Hx    • BRCA1 Negative Neg Hx    • BRCA 1/2 Neg Hx    • Ovarian cancer Neg Hx        Social History     Occupational History   • Not on file   Tobacco Use   • Smoking status: Former Smoker     Packs/day: 1 00     Years: 10 00     Pack years: 10 00     Types: Cigarettes     Quit date: 1/1/2007     Years since quitting: 15 8   • Smokeless tobacco: Never Used   • Tobacco comment: quit 2007   Vaping Use   • Vaping Use: Never used   Substance and Sexual Activity   • Alcohol use: Never   • Drug use: Never   • Sexual activity: Yes     Birth control/protection: Post-menopausal     Comment: hysterectomy       Current Outpatient Medications on File Prior to Visit   Medication Sig   • ALPRAZolam (XANAX) 0 5 mg tablet Take 1 tablet (0 5 mg total) by mouth 30 min pre-procedure   • atorvastatin (LIPITOR) 40 mg tablet Take 1 tablet (40 mg total) by mouth daily   • BLACK ELDERBERRY PO Take by mouth   • BLACK ELDERBERRY,BERRY-FLOWER, PO Take by mouth   • Calcium Carbonate-Vit D-Min (CALCIUM 1200 PO) Take by mouth daily    • Cholecalciferol (VITAMIN D3 PO) Take 1,000 mg by mouth   • DULoxetine (CYMBALTA) 30 mg delayed release capsule duloxetine 30 mg capsule,delayed release   • Glucosamine-Chondroitin 500-400 MG CAPS Take by mouth daily    • hydrOXYzine HCL (ATARAX) 25 mg tablet Take 1 tablet (25 mg total) by mouth as needed for anxiety   • irbesartan (AVAPRO) 75 mg tablet Take 1 tablet (75 mg total) by mouth daily   • levothyroxine 100 mcg tablet Take 1 tablet (100 mcg total) by mouth daily   • Magnesium 250 MG TABS Take 1 tablet (250 mg total) by mouth daily   • MAGNESIUM PO magnesium 250 mg tablet   • methylPREDNISolone 4 MG tablet therapy pack    • METHYLPREDNISOLONE PO methylprednisolone 4 mg tablets in a dose pack   taper as directed   • metoprolol succinate (TOPROL-XL) 25 mg 24 hr tablet Take 1 tablet (25 mg total) by mouth daily   • multivitamin (THERAGRAN) TABS Take 1 tablet by mouth daily   • Nurtec 75 MG TBDP    • Omega-3 Fatty Acids (FISH OIL OMEGA-3 PO) Take by mouth   • omeprazole (PriLOSEC) 40 MG capsule    • scopolamine (TRANSDERM-SCOP) 1 mg/3 days TD 72 hr patch Place 1 mg on the skin (Patient not taking: No sig reported)   • SUMAtriptan (IMITREX) 100 mg tablet Take 100 mg by mouth as needed     No current facility-administered medications on file prior to visit  No Known Allergies      Physical Exam    /78   Pulse 63   Temp 99 2 °F (37 3 °C)   Resp 20   Ht 5' 8" (1 727 m)   Wt 70 3 kg (155 lb)   BMI 23 57 kg/m²     Constitutional: normal, well developed, well nourished, alert, in no distress and non-toxic and no overt pain behavior  Eyes: anicteric  HEENT: grossly intact  Neck: supple, symmetric, trachea midline and no masses   Pulmonary:even and unlabored  Cardiovascular:No edema or pitting edema present  Skin:Normal without rashes or lesions and well hydrated  Psychiatric:Mood and affect appropriate  Neurologic:Cranial Nerves II-XII grossly intact Sensation grossly intact; no clonus negative bunn's  Reflexes 2+ and brisk   SLR negative bilaterally  Spurling's maneuver negative bilaterally  Musculoskeletal:normal gait  5/5 strength bilaterally with AROM in all extremities  Slight pain limited weakness with left hip flexion, knee extension  Normal heel toe and tip toe walking  Significant pain with cervical and lumbar facet loading bilaterally and with lateral spine rotation  TTP over cervical and lumbar paraspinal muscles  Negative davie's test, negative gaenslen's negative SIJ loading bilaterally  Imaging    MRI CERVICAL SPINE WITHOUT CONTRAST     INDICATION: M54 12: Radiculopathy, cervical region      COMPARISON:  5/9/2021     TECHNIQUE:  Sagittal T1, sagittal T2, sagittal inversion recovery, axial T2, axial  2D merge     IMAGE QUALITY:  Diagnostic     FINDINGS:     ALIGNMENT:  There is trace anterolisthesis of C3-C4      MARROW SIGNAL:  Normal marrow signal is identified within the visualized bony structures  No discrete marrow lesion      CERVICAL AND VISUALIZED THORACIC CORD:  Normal signal within the visualized cord      PREVERTEBRAL AND PARASPINAL SOFT TISSUES:  Normal      VISUALIZED POSTERIOR FOSSA:  The visualized posterior fossa demonstrates no abnormal signal      CERVICAL DISC SPACES:     C2-C3:  Normal      C3-C4:  There is left-sided facet arthrosis  There is no significant canal stenosis or foraminal narrowing      C4-C5:  There is a disc osteophyte complex with uncovertebral spurring  There is facet arthrosis  There is mild left foraminal narrowing  There is no significant canal stenosis      C5-C6:  There is a disc osteophyte complex with left-sided uncovertebral spurring  There is mild left foraminal encroachment      C6-C7:  No significant canal stenosis or foraminal narrowing      C7-T1:  No significant canal stenosis or foraminal narrowing  MRI LUMBAR SPINE WITHOUT CONTRAST     INDICATION: M54 12:  Radiculopathy, cervical region      COMPARISON:  None      TECHNIQUE:  Sagittal T1, sagittal T2, sagittal inversion recovery, axial T1 and axial T2, coronal T2     IMAGE QUALITY:  Diagnostic     FINDINGS:     VERTEBRAL BODIES:  There are 5 lumbar type vertebral bodies  Normal alignment of the lumbar spine  No spondylolysis or spondylolisthesis  No scoliosis  No compression fracture  No suspicious marrow signal abnormality  Hemangioma within the L1   vertebral body      SACRUM:  Normal signal within the sacrum  No evidence of insufficiency or stress fracture      DISTAL CORD AND CONUS:  Normal size and signal within the distal cord and conus  Conus medullaris terminates at T12-L1      PARASPINAL SOFT TISSUES:  Paraspinal soft tissues are unremarkable      LOWER THORACIC DISC SPACES:  Normal disc height and signal   No disc herniation, canal stenosis or foraminal narrowing      LUMBAR DISC SPACES:     L1-L2:  No significant canal stenosis or foraminal narrowing      L2-L3:  No significant canal stenosis or foraminal narrowing      L3-L4:  Minimal bulge  No significant canal stenosis or foraminal narrowing      L4-L5:  Mild bulge, asymmetric to the right annular fissure  No significant canal stenosis  Mild foraminal narrowing      L5-S1:  Bulging annulus  Facet arthrosis  No significant canal stenosis  Mild right foraminal narrowing      IMPRESSION:     Mild degenerative changes of the lumbar spine, as described above  CT CERVICAL SPINE - WITHOUT CONTRAST     INDICATION:   TRAUMA      COMPARISON:  None      TECHNIQUE:  CT examination of the cervical spine was performed without intravenous contrast   Contiguous axial images were obtained  Sagittal and coronal reconstructions were performed        Radiation dose length product (DLP) for this visit:  355 mGy-cm     This examination, like all CT scans performed in the [facility], was performed utilizing techniques to minimize radiation dose exposure, including the use of iterative   reconstruction and automated exposure control        IMAGE QUALITY:  Diagnostic      FINDINGS:     ALIGNMENT:  Normal alignment of the cervical spine   No subluxation      VERTEBRAL BODIES:  No fracture      DEGENERATIVE CHANGES:  No significant cervical degenerative changes are noted      PREVERTEBRAL AND PARASPINAL SOFT TISSUES:  Unremarkable      THORACIC INLET:  Normal      IMPRESSION:     No cervical spine fracture or traumatic malalignment      The study was marked in EPIC for immediate notification     The procedure was performed independently

## 2023-11-09 ENCOUNTER — TELEPHONE (OUTPATIENT)
Dept: GYNECOLOGY | Facility: CLINIC | Age: 53
End: 2023-11-09

## 2024-01-21 NOTE — ASSESSMENT & PLAN NOTE
Patient has had intermittent episodes of hypokalemia  Most likely related to HCTZ component of Avalide  Repeat labs 10/1/2020 showed K 3 5 (borderline)  Encouraged intake of potassium containing food previously  Blood pressure is somewhat low and I have recommended discontinuing HCTZ portion of Avalide and transition to irbesartan alone which likely will resolve hypokalemia  Satisfactory

## 2024-04-09 ENCOUNTER — TELEPHONE (OUTPATIENT)
Dept: FAMILY MEDICINE CLINIC | Facility: CLINIC | Age: 54
End: 2024-04-09

## 2024-04-09 NOTE — TELEPHONE ENCOUNTER
----- Message from Indira Cao MA sent at 2024  1:26 PM EDT -----  Regarding: FW: moved  Please call patient    Thank you  ----- Message -----  From: FITO Paul  Sent: 2024  11:53 PM EDT  To: Indira Cao MA  Subject: moved                                            She was last seen in the 2023 - cancelled all of her cardiology appointments/procedures also.  She discussed moving closer to her daughter since her   suddenly last year.  Can we reach out to her to see if she did move out of the area? Thanks.

## 2024-07-19 DIAGNOSIS — Z00.6 ENCOUNTER FOR EXAMINATION FOR NORMAL COMPARISON OR CONTROL IN CLINICAL RESEARCH PROGRAM: ICD-10-CM

## (undated) DEVICE — UTILITY MARKER,BLACK WITH LABELS: Brand: DEVON

## (undated) DEVICE — ELECTRODE RF 10CM

## (undated) DEVICE — GAUZE SPONGES,16 PLY: Brand: CURITY

## (undated) DEVICE — DRAPE TOWEL: Brand: CONVERTORS

## (undated) DEVICE — SYRINGE 5ML LL

## (undated) DEVICE — PLASTIC ADHESIVE BANDAGE: Brand: CURITY

## (undated) DEVICE — SYRINGE 10ML LL

## (undated) DEVICE — IV EXTENSION TUBING SMALL BORE

## (undated) DEVICE — CHLORAPREP HI-LITE 10.5ML ORANGE

## (undated) DEVICE — NEEDLE SPINAL 22G X 3.5IN  QUINCKE

## (undated) DEVICE — DRAPE SHEET THREE QUARTER

## (undated) DEVICE — NEEDLE 25G X 1 1/2

## (undated) DEVICE — NEEDLE BLUNT 18 G X 1 1/2IN

## (undated) DEVICE — GAUZE SPONGES,USP TYPE VII GAUZE, 12 PLY: Brand: CURITY

## (undated) DEVICE — PAD GROUNDING IONIC RF DISP

## (undated) DEVICE — NEEDLE 18 G X 1 1/2 SAFETY

## (undated) DEVICE — FLEXIBLE ADHESIVE BANDAGE,X-LARGE: Brand: CURITY

## (undated) DEVICE — GLOVE SRG LF STRL BGL SKNSNS 7.5 PF

## (undated) DEVICE — TRAY EPID PERIFIX CUSTOM

## (undated) DEVICE — TRAY EPID CONT PERIFIX 18G X 3.5IN 10ML CLSD TIP

## (undated) DEVICE — TELFA ADHESIVE ISLAND DRESSING: Brand: TELFA

## (undated) DEVICE — SYRINGE LOR 8ML PLASTIC LL

## (undated) DEVICE — Device

## (undated) DEVICE — SKIN MARKER DUAL TIP WITH RULER CAP, FLEXIBLE RULER AND LABELS: Brand: DEVON

## (undated) DEVICE — GLOVE INDICATOR PI UNDERGLOVE SZ 7.5 BLUE